# Patient Record
Sex: MALE | Race: WHITE | NOT HISPANIC OR LATINO
[De-identification: names, ages, dates, MRNs, and addresses within clinical notes are randomized per-mention and may not be internally consistent; named-entity substitution may affect disease eponyms.]

---

## 2017-01-20 ENCOUNTER — MEDICATION RENEWAL (OUTPATIENT)
Age: 62
End: 2017-01-20

## 2017-02-16 ENCOUNTER — APPOINTMENT (OUTPATIENT)
Dept: INTERNAL MEDICINE | Facility: CLINIC | Age: 62
End: 2017-02-16

## 2017-02-16 VITALS
WEIGHT: 150 LBS | BODY MASS INDEX: 24.99 KG/M2 | TEMPERATURE: 98.3 F | SYSTOLIC BLOOD PRESSURE: 130 MMHG | OXYGEN SATURATION: 95 % | HEIGHT: 65 IN | DIASTOLIC BLOOD PRESSURE: 90 MMHG | HEART RATE: 80 BPM | RESPIRATION RATE: 14 BRPM

## 2017-02-16 DIAGNOSIS — R22.0 LOCALIZED SWELLING, MASS AND LUMP, HEAD: ICD-10-CM

## 2017-02-27 ENCOUNTER — LABORATORY RESULT (OUTPATIENT)
Age: 62
End: 2017-02-27

## 2017-02-27 ENCOUNTER — APPOINTMENT (OUTPATIENT)
Dept: INTERNAL MEDICINE | Facility: CLINIC | Age: 62
End: 2017-02-27

## 2017-02-27 DIAGNOSIS — F11.10 OPIOID ABUSE, UNCOMPLICATED: ICD-10-CM

## 2017-03-02 LAB — SUBOXONE: NEGATIVE

## 2017-03-05 LAB
AMPHET UR-MCNC: NEGATIVE
BARBITURATES UR-MCNC: NEGATIVE
BENZODIAZ UR-MCNC: NORMAL
COCAINE METAB.OTHER UR-MCNC: NEGATIVE
CREATININE, URINE: 133 MG/DL
METHADONE UR-MCNC: NEGATIVE
METHAQUALONE UR-MCNC: NEGATIVE
OPIATES UR-MCNC: NORMAL
PCP UR-MCNC: NEGATIVE
PROPOXYPH UR QL: NEGATIVE
THC UR QL: NEGATIVE

## 2017-03-27 ENCOUNTER — APPOINTMENT (OUTPATIENT)
Dept: INTERNAL MEDICINE | Facility: CLINIC | Age: 62
End: 2017-03-27

## 2017-04-01 ENCOUNTER — APPOINTMENT (OUTPATIENT)
Dept: INTERNAL MEDICINE | Facility: CLINIC | Age: 62
End: 2017-04-01
Payer: MEDICAID

## 2017-04-01 ENCOUNTER — LABORATORY RESULT (OUTPATIENT)
Age: 62
End: 2017-04-01

## 2017-04-01 ENCOUNTER — NON-APPOINTMENT (OUTPATIENT)
Age: 62
End: 2017-04-01

## 2017-04-01 VITALS
RESPIRATION RATE: 14 BRPM | DIASTOLIC BLOOD PRESSURE: 60 MMHG | SYSTOLIC BLOOD PRESSURE: 100 MMHG | WEIGHT: 154 LBS | OXYGEN SATURATION: 94 % | TEMPERATURE: 98 F | BODY MASS INDEX: 25.66 KG/M2 | HEART RATE: 65 BPM | HEIGHT: 65 IN

## 2017-04-01 PROCEDURE — 93000 ELECTROCARDIOGRAM COMPLETE: CPT

## 2017-04-01 PROCEDURE — 99396 PREV VISIT EST AGE 40-64: CPT | Mod: 25

## 2017-04-01 PROCEDURE — 36415 COLL VENOUS BLD VENIPUNCTURE: CPT

## 2017-04-03 ENCOUNTER — MEDICATION RENEWAL (OUTPATIENT)
Age: 62
End: 2017-04-03

## 2017-04-03 LAB
25(OH)D3 SERPL-MCNC: 22.5 NG/ML
ALBUMIN SERPL ELPH-MCNC: 4 G/DL
ALP BLD-CCNC: 89 U/L
ALT SERPL-CCNC: 12 U/L
ANION GAP SERPL CALC-SCNC: 17 MMOL/L
AST SERPL-CCNC: 15 U/L
BASOPHILS # BLD AUTO: 0.05 K/UL
BASOPHILS NFR BLD AUTO: 0.7 %
BILIRUB SERPL-MCNC: 0.2 MG/DL
BUN SERPL-MCNC: 11 MG/DL
CALCIUM SERPL-MCNC: 9.5 MG/DL
CHLORIDE SERPL-SCNC: 105 MMOL/L
CHOLEST SERPL-MCNC: 184 MG/DL
CHOLEST/HDLC SERPL: 3.2 RATIO
CK SERPL-CCNC: 49 U/L
CO2 SERPL-SCNC: 21 MMOL/L
CREAT SERPL-MCNC: 0.83 MG/DL
EOSINOPHIL # BLD AUTO: 0.28 K/UL
EOSINOPHIL NFR BLD AUTO: 3.9 %
GLUCOSE SERPL-MCNC: 101 MG/DL
HBA1C MFR BLD HPLC: 5.8 %
HCT VFR BLD CALC: 45.5 %
HDLC SERPL-MCNC: 58 MG/DL
HGB BLD-MCNC: 14.7 G/DL
IMM GRANULOCYTES NFR BLD AUTO: 0.3 %
LDLC SERPL CALC-MCNC: 109 MG/DL
LYMPHOCYTES # BLD AUTO: 2.34 K/UL
LYMPHOCYTES NFR BLD AUTO: 32.2 %
MAN DIFF?: NORMAL
MCHC RBC-ENTMCNC: 30.2 PG
MCHC RBC-ENTMCNC: 32.3 GM/DL
MCV RBC AUTO: 93.4 FL
MONOCYTES # BLD AUTO: 0.68 K/UL
MONOCYTES NFR BLD AUTO: 9.4 %
NEUTROPHILS # BLD AUTO: 3.9 K/UL
NEUTROPHILS NFR BLD AUTO: 53.5 %
PLATELET # BLD AUTO: 299 K/UL
POTASSIUM SERPL-SCNC: 4.8 MMOL/L
PROT SERPL-MCNC: 6.5 G/DL
PSA SERPL-MCNC: 4.04 NG/ML
RBC # BLD: 4.87 M/UL
RBC # FLD: 14.1 %
SODIUM SERPL-SCNC: 143 MMOL/L
TRIGL SERPL-MCNC: 83 MG/DL
TSH SERPL-ACNC: 2.34 UIU/ML
WBC # FLD AUTO: 7.27 K/UL

## 2017-04-07 LAB
AMPHET UR-MCNC: NEGATIVE
BARBITURATES UR-MCNC: NEGATIVE
BENZODIAZ UR-MCNC: NORMAL
COCAINE METAB.OTHER UR-MCNC: NEGATIVE
CREATININE, URINE: >200 MG/DL
METHADONE UR-MCNC: NEGATIVE
METHAQUALONE UR-MCNC: NEGATIVE
OPIATES UR-MCNC: NEGATIVE
PCP UR-MCNC: NEGATIVE
PROPOXYPH UR QL: NEGATIVE
PSA SERPL-MCNC: 3.58 NG/ML
THC UR QL: NEGATIVE

## 2017-04-09 LAB — SUBOXONE: NORMAL

## 2017-04-11 ENCOUNTER — APPOINTMENT (OUTPATIENT)
Dept: GASTROENTEROLOGY | Facility: CLINIC | Age: 62
End: 2017-04-11

## 2017-04-11 VITALS
OXYGEN SATURATION: 97 % | HEART RATE: 69 BPM | WEIGHT: 155 LBS | DIASTOLIC BLOOD PRESSURE: 69 MMHG | HEIGHT: 65 IN | SYSTOLIC BLOOD PRESSURE: 108 MMHG | BODY MASS INDEX: 25.83 KG/M2

## 2017-04-11 RX ORDER — BUPRENORPHINE HYDROCHLORIDE, NALOXONE HYDROCHLORIDE 8; 2 MG/1; MG/1
8-2 FILM, SOLUBLE BUCCAL; SUBLINGUAL
Qty: 30 | Refills: 0 | Status: DISCONTINUED | COMMUNITY
Start: 2017-02-27 | End: 2017-04-11

## 2017-05-16 ENCOUNTER — APPOINTMENT (OUTPATIENT)
Dept: GASTROENTEROLOGY | Facility: CLINIC | Age: 62
End: 2017-05-16

## 2017-05-16 ENCOUNTER — MEDICATION RENEWAL (OUTPATIENT)
Age: 62
End: 2017-05-16

## 2017-05-16 VITALS
HEART RATE: 57 BPM | HEIGHT: 65 IN | WEIGHT: 156 LBS | SYSTOLIC BLOOD PRESSURE: 135 MMHG | DIASTOLIC BLOOD PRESSURE: 87 MMHG | BODY MASS INDEX: 25.99 KG/M2 | OXYGEN SATURATION: 99 %

## 2017-05-16 RX ORDER — LACTOBACIL 2/BIFIDO 1/S.THERMO 900B CELL
PACKET (EA) ORAL DAILY
Qty: 30 | Refills: 6 | Status: DISCONTINUED | COMMUNITY
Start: 2017-04-11 | End: 2017-05-16

## 2017-05-16 RX ORDER — LACTOBACIL 2/BIFIDO 1/S.THERMO 900B CELL
PACKET (EA) ORAL
Qty: 60 | Refills: 6 | Status: DISCONTINUED | COMMUNITY
Start: 2017-04-11 | End: 2017-05-16

## 2017-06-08 ENCOUNTER — APPOINTMENT (OUTPATIENT)
Dept: INTERNAL MEDICINE | Facility: CLINIC | Age: 62
End: 2017-06-08
Payer: MEDICAID

## 2017-06-08 DIAGNOSIS — J30.1 ALLERGIC RHINITIS DUE TO POLLEN: ICD-10-CM

## 2017-06-08 PROCEDURE — 99214 OFFICE O/P EST MOD 30 MIN: CPT

## 2017-06-12 ENCOUNTER — APPOINTMENT (OUTPATIENT)
Dept: INTERNAL MEDICINE | Facility: CLINIC | Age: 62
End: 2017-06-12

## 2017-06-12 ENCOUNTER — LABORATORY RESULT (OUTPATIENT)
Age: 62
End: 2017-06-12

## 2017-06-17 LAB — SUBOXONE: NORMAL

## 2017-06-18 LAB
AMPHET UR-MCNC: NEGATIVE
BARBITURATES UR-MCNC: NEGATIVE
BENZODIAZ UR-MCNC: NORMAL
COCAINE METAB.OTHER UR-MCNC: NEGATIVE
CREATININE, URINE: 45.8 MG/DL
METHADONE UR-MCNC: NEGATIVE
METHAQUALONE UR-MCNC: NEGATIVE
OPIATES UR-MCNC: NEGATIVE
PCP UR-MCNC: NEGATIVE
PROPOXYPH UR QL: NEGATIVE
THC UR QL: NEGATIVE

## 2017-07-13 ENCOUNTER — RX RENEWAL (OUTPATIENT)
Age: 62
End: 2017-07-13

## 2017-08-25 ENCOUNTER — APPOINTMENT (OUTPATIENT)
Dept: INTERNAL MEDICINE | Facility: CLINIC | Age: 62
End: 2017-08-25

## 2017-09-05 ENCOUNTER — RX RENEWAL (OUTPATIENT)
Age: 62
End: 2017-09-05

## 2017-09-08 ENCOUNTER — APPOINTMENT (OUTPATIENT)
Dept: INTERNAL MEDICINE | Facility: CLINIC | Age: 62
End: 2017-09-08
Payer: COMMERCIAL

## 2017-09-08 VITALS
HEIGHT: 65 IN | TEMPERATURE: 98.5 F | OXYGEN SATURATION: 98 % | DIASTOLIC BLOOD PRESSURE: 80 MMHG | RESPIRATION RATE: 14 BRPM | WEIGHT: 150 LBS | SYSTOLIC BLOOD PRESSURE: 128 MMHG | HEART RATE: 80 BPM | BODY MASS INDEX: 24.99 KG/M2

## 2017-09-08 DIAGNOSIS — R13.10 DYSPHAGIA, UNSPECIFIED: ICD-10-CM

## 2017-09-08 PROCEDURE — 99214 OFFICE O/P EST MOD 30 MIN: CPT

## 2017-10-04 ENCOUNTER — RX RENEWAL (OUTPATIENT)
Age: 62
End: 2017-10-04

## 2017-10-20 ENCOUNTER — MEDICATION RENEWAL (OUTPATIENT)
Age: 62
End: 2017-10-20

## 2017-10-31 ENCOUNTER — RX RENEWAL (OUTPATIENT)
Age: 62
End: 2017-10-31

## 2017-12-22 ENCOUNTER — RX RENEWAL (OUTPATIENT)
Age: 62
End: 2017-12-22

## 2017-12-24 ENCOUNTER — TRANSCRIPTION ENCOUNTER (OUTPATIENT)
Age: 62
End: 2017-12-24

## 2018-02-08 ENCOUNTER — APPOINTMENT (OUTPATIENT)
Dept: INTERNAL MEDICINE | Facility: CLINIC | Age: 63
End: 2018-02-08
Payer: COMMERCIAL

## 2018-02-08 VITALS
DIASTOLIC BLOOD PRESSURE: 80 MMHG | BODY MASS INDEX: 24.99 KG/M2 | RESPIRATION RATE: 14 BRPM | HEART RATE: 77 BPM | SYSTOLIC BLOOD PRESSURE: 120 MMHG | OXYGEN SATURATION: 98 % | HEIGHT: 65 IN | WEIGHT: 150 LBS | TEMPERATURE: 97.5 F

## 2018-02-08 DIAGNOSIS — S89.91XA UNSPECIFIED INJURY OF RIGHT LOWER LEG, INITIAL ENCOUNTER: ICD-10-CM

## 2018-02-08 PROCEDURE — 99214 OFFICE O/P EST MOD 30 MIN: CPT

## 2018-02-08 RX ORDER — BUPRENORPHINE HYDROCHLORIDE, NALOXONE HYDROCHLORIDE 8; 2 MG/1; MG/1
8-2 FILM, SOLUBLE BUCCAL; SUBLINGUAL
Qty: 15 | Refills: 0 | Status: DISCONTINUED | COMMUNITY
Start: 2017-06-12 | End: 2018-02-08

## 2018-02-08 RX ORDER — BUDESONIDE 3 MG/1
3 CAPSULE, COATED PELLETS ORAL DAILY
Qty: 60 | Refills: 2 | Status: DISCONTINUED | COMMUNITY
Start: 2017-04-11 | End: 2018-02-08

## 2018-02-08 RX ORDER — CEPHALEXIN 500 MG/1
500 CAPSULE ORAL
Qty: 15 | Refills: 0 | Status: DISCONTINUED | COMMUNITY
Start: 2017-12-24

## 2018-02-08 RX ORDER — FLUTICASONE PROPIONATE 50 UG/1
50 SPRAY, METERED NASAL DAILY
Qty: 1 | Refills: 3 | Status: DISCONTINUED | COMMUNITY
Start: 2017-06-08 | End: 2018-02-08

## 2018-04-29 ENCOUNTER — RX RENEWAL (OUTPATIENT)
Age: 63
End: 2018-04-29

## 2018-07-06 ENCOUNTER — APPOINTMENT (OUTPATIENT)
Dept: INTERNAL MEDICINE | Facility: CLINIC | Age: 63
End: 2018-07-06
Payer: COMMERCIAL

## 2018-07-06 VITALS
DIASTOLIC BLOOD PRESSURE: 80 MMHG | HEIGHT: 65 IN | SYSTOLIC BLOOD PRESSURE: 130 MMHG | BODY MASS INDEX: 27.32 KG/M2 | RESPIRATION RATE: 14 BRPM | TEMPERATURE: 98.8 F | HEART RATE: 71 BPM | WEIGHT: 164 LBS | OXYGEN SATURATION: 98 %

## 2018-07-06 PROCEDURE — 99214 OFFICE O/P EST MOD 30 MIN: CPT

## 2018-07-30 ENCOUNTER — MEDICATION RENEWAL (OUTPATIENT)
Age: 63
End: 2018-07-30

## 2018-08-22 ENCOUNTER — MEDICATION RENEWAL (OUTPATIENT)
Age: 63
End: 2018-08-22

## 2018-09-06 ENCOUNTER — APPOINTMENT (OUTPATIENT)
Dept: INTERNAL MEDICINE | Facility: CLINIC | Age: 63
End: 2018-09-06
Payer: COMMERCIAL

## 2018-09-06 VITALS
RESPIRATION RATE: 14 BRPM | HEIGHT: 65 IN | OXYGEN SATURATION: 98 % | WEIGHT: 159 LBS | BODY MASS INDEX: 26.49 KG/M2 | HEART RATE: 65 BPM | DIASTOLIC BLOOD PRESSURE: 60 MMHG | SYSTOLIC BLOOD PRESSURE: 120 MMHG | TEMPERATURE: 98 F

## 2018-09-06 PROCEDURE — 99214 OFFICE O/P EST MOD 30 MIN: CPT

## 2018-09-06 NOTE — PHYSICAL EXAM

## 2018-09-06 NOTE — PLAN
[FreeTextEntry1] : See Orthopedist\par Tylenol PRN\par Wrote a letter for patient recommending a reduced work schedule

## 2018-09-06 NOTE — HISTORY OF PRESENT ILLNESS
[FreeTextEntry8] : Pt c/o pain in right shin and right hip 1 week. 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

## 2018-09-27 ENCOUNTER — MEDICATION RENEWAL (OUTPATIENT)
Age: 63
End: 2018-09-27

## 2018-10-17 ENCOUNTER — MEDICATION RENEWAL (OUTPATIENT)
Age: 63
End: 2018-10-17

## 2018-10-31 ENCOUNTER — RX RENEWAL (OUTPATIENT)
Age: 63
End: 2018-10-31

## 2019-03-04 ENCOUNTER — RESULT CHARGE (OUTPATIENT)
Age: 64
End: 2019-03-04

## 2019-03-05 ENCOUNTER — RX RENEWAL (OUTPATIENT)
Age: 64
End: 2019-03-05

## 2019-03-05 ENCOUNTER — LABORATORY RESULT (OUTPATIENT)
Age: 64
End: 2019-03-05

## 2019-03-05 ENCOUNTER — APPOINTMENT (OUTPATIENT)
Dept: INTERNAL MEDICINE | Facility: CLINIC | Age: 64
End: 2019-03-05
Payer: COMMERCIAL

## 2019-03-05 ENCOUNTER — NON-APPOINTMENT (OUTPATIENT)
Age: 64
End: 2019-03-05

## 2019-03-05 VITALS
SYSTOLIC BLOOD PRESSURE: 110 MMHG | RESPIRATION RATE: 14 BRPM | HEART RATE: 70 BPM | OXYGEN SATURATION: 98 % | BODY MASS INDEX: 26.99 KG/M2 | WEIGHT: 162 LBS | DIASTOLIC BLOOD PRESSURE: 60 MMHG | HEIGHT: 65 IN | TEMPERATURE: 98.2 F

## 2019-03-05 VITALS
BODY MASS INDEX: 26.99 KG/M2 | RESPIRATION RATE: 14 BRPM | DIASTOLIC BLOOD PRESSURE: 60 MMHG | SYSTOLIC BLOOD PRESSURE: 110 MMHG | TEMPERATURE: 98.2 F | OXYGEN SATURATION: 98 % | HEART RATE: 70 BPM | WEIGHT: 162 LBS | HEIGHT: 65 IN

## 2019-03-05 PROCEDURE — 99396 PREV VISIT EST AGE 40-64: CPT | Mod: 25

## 2019-03-05 PROCEDURE — 36415 COLL VENOUS BLD VENIPUNCTURE: CPT

## 2019-03-05 PROCEDURE — 93000 ELECTROCARDIOGRAM COMPLETE: CPT

## 2019-03-05 NOTE — PHYSICAL EXAM
[No Acute Distress] : no acute distress [Well Nourished] : well nourished [Well Developed] : well developed [Well-Appearing] : well-appearing [Normal Voice/Communication] : normal voice/communication [Normal Sclera/Conjunctiva] : normal sclera/conjunctiva [PERRL] : pupils equal round and reactive to light [EOMI] : extraocular movements intact [Normal Outer Ear/Nose] : the outer ears and nose were normal in appearance [Normal Oropharynx] : the oropharynx was normal [Normal TMs] : both tympanic membranes were normal [No JVD] : no jugular venous distention [Supple] : supple [No Lymphadenopathy] : no lymphadenopathy [Thyroid Normal, No Nodules] : the thyroid was normal and there were no nodules present [No Respiratory Distress] : no respiratory distress  [Clear to Auscultation] : lungs were clear to auscultation bilaterally [No Accessory Muscle Use] : no accessory muscle use [Normal Percussion] : the chest was normal to percussion [Normal Rate] : normal rate  [Regular Rhythm] : with a regular rhythm [Normal S1, S2] : normal S1 and S2 [No Murmur] : no murmur heard [No Carotid Bruits] : no carotid bruits [No Abdominal Bruit] : a ~M bruit was not heard ~T in the abdomen [No Varicosities] : no varicosities [Pedal Pulses Present] : the pedal pulses are present [No Edema] : there was no peripheral edema [No Extremity Clubbing/Cyanosis] : no extremity clubbing/cyanosis [No Palpable Aorta] : no palpable aorta [Normal Appearance] : normal in appearance [Soft] : abdomen soft [Non Tender] : non-tender [Non-distended] : non-distended [No Masses] : no abdominal mass palpated [No HSM] : no HSM [Normal Bowel Sounds] : normal bowel sounds [No Hernias] : no hernias [Normal Sphincter Tone] : normal sphincter tone [No Mass] : no mass [Penis Abnormality] : normal circumcised penis [Testes Tenderness] : no tenderness of the testes [Testes Mass (___cm)] : there were no testicular masses [Prostate Tenderness] : the prostate was not tender [Prostate Enlarged] : was enlarged [Normal Supraclavicular Nodes] : no supraclavicular lymphadenopathy [Normal Axillary Nodes] : no axillary lymphadenopathy [Normal Posterior Cervical Nodes] : no posterior cervical lymphadenopathy [Normal Femoral Nodes] : no femoral lymphadenopathy [Normal Anterior Cervical Nodes] : no anterior cervical lymphadenopathy [Normal Inguinal Nodes] : no inguinal lymphadenopathy [No CVA Tenderness] : no CVA  tenderness [No Spinal Tenderness] : no spinal tenderness [No Joint Swelling] : no joint swelling [Grossly Normal Strength/Tone] : grossly normal strength/tone [No Rash] : no rash [Normal Gait] : normal gait [Coordination Grossly Intact] : coordination grossly intact [No Focal Deficits] : no focal deficits [Deep Tendon Reflexes (DTR)] : deep tendon reflexes were 2+ and symmetric [Speech Grossly Normal] : speech grossly normal [Normal Affect] : the affect was normal [Alert and Oriented x3] : oriented to person, place, and time [Normal Mood] : the mood was normal [Normal Insight/Judgement] : insight and judgment were intact [Prostate Nodule] : did not have a nodule [Prostate Tenderness] : was not tender

## 2019-03-06 DIAGNOSIS — R19.5 OTHER FECAL ABNORMALITIES: ICD-10-CM

## 2019-03-07 PROBLEM — R19.5 OCCULT BLOOD IN STOOLS: Status: ACTIVE | Noted: 2019-03-07

## 2019-03-07 LAB
25(OH)D3 SERPL-MCNC: 35.6 NG/ML
ALBUMIN SERPL ELPH-MCNC: 4.2 G/DL
ALP BLD-CCNC: 93 U/L
ALT SERPL-CCNC: 13 U/L
ANION GAP SERPL CALC-SCNC: 17 MMOL/L
APPEARANCE: ABNORMAL
AST SERPL-CCNC: 22 U/L
BASOPHILS # BLD AUTO: 0.09 K/UL
BASOPHILS NFR BLD AUTO: 0.9 %
BILIRUB SERPL-MCNC: 0.4 MG/DL
BILIRUBIN URINE: NEGATIVE
BLOOD URINE: ABNORMAL
BUN SERPL-MCNC: 13 MG/DL
CALCIUM SERPL-MCNC: 9.4 MG/DL
CHLORIDE SERPL-SCNC: 103 MMOL/L
CHOLEST SERPL-MCNC: 249 MG/DL
CHOLEST/HDLC SERPL: 2.8 RATIO
CK SERPL-CCNC: 69 U/L
CO2 SERPL-SCNC: 24 MMOL/L
COLOR: YELLOW
CREAT SERPL-MCNC: 0.92 MG/DL
EOSINOPHIL # BLD AUTO: 0.18 K/UL
EOSINOPHIL NFR BLD AUTO: 1.8 %
GLUCOSE QUALITATIVE U: NEGATIVE
GLUCOSE SERPL-MCNC: 80 MG/DL
HBA1C MFR BLD HPLC: 5.5 %
HCT VFR BLD CALC: 51.8 %
HDLC SERPL-MCNC: 90 MG/DL
HEMOCCULT STL QL IA: POSITIVE
HGB BLD-MCNC: 16.5 G/DL
IMM GRANULOCYTES NFR BLD AUTO: 0.5 %
KETONES URINE: NORMAL
LDLC SERPL CALC-MCNC: 143 MG/DL
LEUKOCYTE ESTERASE URINE: ABNORMAL
LYMPHOCYTES # BLD AUTO: 2.28 K/UL
LYMPHOCYTES NFR BLD AUTO: 22.6 %
MAN DIFF?: NORMAL
MCHC RBC-ENTMCNC: 29.9 PG
MCHC RBC-ENTMCNC: 31.9 GM/DL
MCV RBC AUTO: 94 FL
MONOCYTES # BLD AUTO: 1.17 K/UL
MONOCYTES NFR BLD AUTO: 11.6 %
NEUTROPHILS # BLD AUTO: 6.3 K/UL
NEUTROPHILS NFR BLD AUTO: 62.6 %
NITRITE URINE: NEGATIVE
PH URINE: 5.5
PLATELET # BLD AUTO: 296 K/UL
POTASSIUM SERPL-SCNC: 4.6 MMOL/L
PROT SERPL-MCNC: 7.3 G/DL
PROTEIN URINE: NORMAL
PSA SERPL-MCNC: 6.66 NG/ML
RBC # BLD: 5.51 M/UL
RBC # FLD: 15.1 %
SODIUM SERPL-SCNC: 144 MMOL/L
SPECIFIC GRAVITY URINE: 1.03
TRIGL SERPL-MCNC: 81 MG/DL
TSH SERPL-ACNC: 1 UIU/ML
UROBILINOGEN URINE: NORMAL
WBC # FLD AUTO: 10.07 K/UL

## 2019-03-14 LAB — PSA SERPL-MCNC: 5.2 NG/ML

## 2019-03-25 ENCOUNTER — APPOINTMENT (OUTPATIENT)
Age: 64
End: 2019-03-25
Payer: COMMERCIAL

## 2019-03-25 VITALS
HEART RATE: 83 BPM | SYSTOLIC BLOOD PRESSURE: 160 MMHG | DIASTOLIC BLOOD PRESSURE: 93 MMHG | WEIGHT: 160 LBS | RESPIRATION RATE: 14 BRPM | OXYGEN SATURATION: 95 % | BODY MASS INDEX: 26.66 KG/M2 | HEIGHT: 65 IN

## 2019-03-25 PROCEDURE — 51798 US URINE CAPACITY MEASURE: CPT

## 2019-03-25 PROCEDURE — 99204 OFFICE O/P NEW MOD 45 MIN: CPT | Mod: 25

## 2019-03-25 NOTE — ASSESSMENT
[FreeTextEntry1] : Benign Prostatic Hyperplasia:\par No bothersome lower urinary tract symptoms and minimal post void residual. \par \par Elevated PSA:\par Discussed with the patient that PSA is a substance produced by the prostate gland. Elevated PSA levels may indicate a noncancerous condition such as prostatitis, or an enlarged prostate but it may also indicate prostate cancer.\par Discussed PSA screening and latest recommendations/guidelines- USPTF and AUA. \par Explained that only way to rule out Prostate cancer in a patient with elevated PSA, increased PSA velocity or abnormal digital rectal exam is to do Prostate needle biopsy.\par Will repeat PSA and if still elevated will proceed with TRUS/PNB. Pt in agreement. \par Total and Free PSA. Will inform results. \par \par

## 2019-03-25 NOTE — HISTORY OF PRESENT ILLNESS
[FreeTextEntry1] : 62 yo male presents for Elevated PSA. \par Recently had PSA checked and was told is elevated. \par Denies any recent unintentional weight loss, night sweats and new bone or back pain.\par Family history of Prostate cancer- no. Father had a growth which was surgically removed- benign. \par Reports reasonable stream, urinates every few hours or so during the day. Nocturia of 1 x. \par Endorses occasional hesitancy and intermittency when bladder full. \par Denies urgency, incontinence, sense of incomplete emptying.\par Denies dysuria, hematuria, lower abdominal or flank pain, fever, chills or rigors.\par Rates erections as 5/5. Has off and on problem maintaining erections. Reports normal libido. Has not tried Cialis/Viagra. \par \par

## 2019-04-04 LAB
PSA FREE FLD-MCNC: 11 %
PSA FREE SERPL-MCNC: 0.48 NG/ML
PSA SERPL-MCNC: 4.38 NG/ML

## 2019-05-08 ENCOUNTER — APPOINTMENT (OUTPATIENT)
Dept: INTERNAL MEDICINE | Facility: CLINIC | Age: 64
End: 2019-05-08
Payer: COMMERCIAL

## 2019-05-08 ENCOUNTER — LABORATORY RESULT (OUTPATIENT)
Age: 64
End: 2019-05-08

## 2019-05-08 VITALS
TEMPERATURE: 97.7 F | DIASTOLIC BLOOD PRESSURE: 72 MMHG | HEIGHT: 65 IN | SYSTOLIC BLOOD PRESSURE: 118 MMHG | BODY MASS INDEX: 24.99 KG/M2 | OXYGEN SATURATION: 98 % | RESPIRATION RATE: 14 BRPM | HEART RATE: 82 BPM | WEIGHT: 150 LBS

## 2019-05-08 PROCEDURE — 36415 COLL VENOUS BLD VENIPUNCTURE: CPT

## 2019-05-08 PROCEDURE — 99214 OFFICE O/P EST MOD 30 MIN: CPT | Mod: 25

## 2019-05-08 NOTE — PHYSICAL EXAM
[Well Nourished] : well nourished [No Acute Distress] : no acute distress [Well Developed] : well developed [Well-Appearing] : well-appearing [EOMI] : extraocular movements intact [Normal Voice/Communication] : normal voice/communication [Normal Sclera/Conjunctiva] : normal sclera/conjunctiva [PERRL] : pupils equal round and reactive to light [Normal Outer Ear/Nose] : the outer ears and nose were normal in appearance [No JVD] : no jugular venous distention [Normal Oropharynx] : the oropharynx was normal [Supple] : supple [No Respiratory Distress] : no respiratory distress  [Thyroid Normal, No Nodules] : the thyroid was normal and there were no nodules present [No Lymphadenopathy] : no lymphadenopathy [Clear to Auscultation] : lungs were clear to auscultation bilaterally [No Accessory Muscle Use] : no accessory muscle use [Normal Rate] : normal rate  [Normal S1, S2] : normal S1 and S2 [No Murmur] : no murmur heard [Regular Rhythm] : with a regular rhythm [No Abdominal Bruit] : a ~M bruit was not heard ~T in the abdomen [No Carotid Bruits] : no carotid bruits [No Varicosities] : no varicosities [No Extremity Clubbing/Cyanosis] : no extremity clubbing/cyanosis [Pedal Pulses Present] : the pedal pulses are present [No Edema] : there was no peripheral edema [No Palpable Aorta] : no palpable aorta [Soft] : abdomen soft [Non-distended] : non-distended [No Masses] : no abdominal mass palpated [No HSM] : no HSM [Normal Bowel Sounds] : normal bowel sounds [Normal Posterior Cervical Nodes] : no posterior cervical lymphadenopathy [Normal Anterior Cervical Nodes] : no anterior cervical lymphadenopathy [No Spinal Tenderness] : no spinal tenderness [No Joint Swelling] : no joint swelling [No CVA Tenderness] : no CVA  tenderness [Normal Gait] : normal gait [Grossly Normal Strength/Tone] : grossly normal strength/tone [No Rash] : no rash [No Focal Deficits] : no focal deficits [Coordination Grossly Intact] : coordination grossly intact [Normal Affect] : the affect was normal [Normal Insight/Judgement] : insight and judgment were intact [Deep Tendon Reflexes (DTR)] : deep tendon reflexes were 2+ and symmetric [de-identified] : mild mid epigastric tenderness

## 2019-05-08 NOTE — REVIEW OF SYSTEMS
[Abdominal Pain] : abdominal pain [Diarrhea] : diarrhea [Nausea] : nausea [Heartburn] : heartburn [Negative] : Psychiatric

## 2019-05-08 NOTE — HISTORY OF PRESENT ILLNESS
[FreeTextEntry8] : has gastric pains 2 weeks \par had fever to 101.8\par had diarrhea mucous mixed with blood \par has nausea and vomiting

## 2019-05-09 LAB
ALBUMIN SERPL ELPH-MCNC: 2.8 G/DL
ALP BLD-CCNC: 104 U/L
ALT SERPL-CCNC: 8 U/L
ANION GAP SERPL CALC-SCNC: 18 MMOL/L
AST SERPL-CCNC: 9 U/L
BASOPHILS # BLD AUTO: 0 K/UL
BASOPHILS NFR BLD AUTO: 0 %
BILIRUB SERPL-MCNC: 0.7 MG/DL
BUN SERPL-MCNC: 9 MG/DL
CALCIUM SERPL-MCNC: 8.4 MG/DL
CHLORIDE SERPL-SCNC: 89 MMOL/L
CO2 SERPL-SCNC: 25 MMOL/L
CREAT SERPL-MCNC: 0.9 MG/DL
EOSINOPHIL # BLD AUTO: 0 K/UL
EOSINOPHIL NFR BLD AUTO: 0 %
GLUCOSE SERPL-MCNC: 132 MG/DL
HCT VFR BLD CALC: 47.9 %
HGB BLD-MCNC: 15 G/DL
LYMPHOCYTES # BLD AUTO: 2.41 K/UL
LYMPHOCYTES NFR BLD AUTO: 13.4 %
MAN DIFF?: NORMAL
MCHC RBC-ENTMCNC: 30.1 PG
MCHC RBC-ENTMCNC: 31.3 GM/DL
MCV RBC AUTO: 96 FL
MONOCYTES # BLD AUTO: 4.17 K/UL
MONOCYTES NFR BLD AUTO: 23.2 %
NEUTROPHILS # BLD AUTO: 10.75 K/UL
NEUTROPHILS NFR BLD AUTO: 51.8 %
PLATELET # BLD AUTO: 550 K/UL
POTASSIUM SERPL-SCNC: 3.7 MMOL/L
PROT SERPL-MCNC: 6.1 G/DL
RBC # BLD: 4.99 M/UL
RBC # FLD: 14.4 %
SODIUM SERPL-SCNC: 132 MMOL/L
WBC # FLD AUTO: 17.97 K/UL

## 2019-05-10 ENCOUNTER — INPATIENT (INPATIENT)
Facility: HOSPITAL | Age: 64
LOS: 2 days | Discharge: HOME | DRG: 386 | End: 2019-05-13
Attending: INTERNAL MEDICINE | Admitting: HOSPITALIST
Payer: COMMERCIAL

## 2019-05-10 VITALS
TEMPERATURE: 99 F | RESPIRATION RATE: 15 BRPM | DIASTOLIC BLOOD PRESSURE: 86 MMHG | OXYGEN SATURATION: 95 % | WEIGHT: 149.91 LBS | HEART RATE: 82 BPM | HEIGHT: 65 IN | SYSTOLIC BLOOD PRESSURE: 115 MMHG

## 2019-05-10 DIAGNOSIS — E87.8 OTHER DISORDERS OF ELECTROLYTE AND FLUID BALANCE, NOT ELSEWHERE CLASSIFIED: ICD-10-CM

## 2019-05-10 DIAGNOSIS — Z29.9 ENCOUNTER FOR PROPHYLACTIC MEASURES, UNSPECIFIED: ICD-10-CM

## 2019-05-10 DIAGNOSIS — K62.89 OTHER SPECIFIED DISEASES OF ANUS AND RECTUM: ICD-10-CM

## 2019-05-10 DIAGNOSIS — E78.5 HYPERLIPIDEMIA, UNSPECIFIED: ICD-10-CM

## 2019-05-10 DIAGNOSIS — F41.9 ANXIETY DISORDER, UNSPECIFIED: ICD-10-CM

## 2019-05-10 DIAGNOSIS — K51.00 ULCERATIVE (CHRONIC) PANCOLITIS WITHOUT COMPLICATIONS: ICD-10-CM

## 2019-05-10 DIAGNOSIS — K51.90 ULCERATIVE COLITIS, UNSPECIFIED, WITHOUT COMPLICATIONS: ICD-10-CM

## 2019-05-10 LAB
ALBUMIN SERPL ELPH-MCNC: 1.7 G/DL — LOW (ref 3.3–5)
ALP SERPL-CCNC: 91 U/L — SIGNIFICANT CHANGE UP (ref 40–120)
ALT FLD-CCNC: 7 U/L — LOW (ref 12–78)
AMYLASE P1 CFR SERPL: 10 U/L — LOW (ref 25–115)
ANION GAP SERPL CALC-SCNC: 13 MMOL/L — SIGNIFICANT CHANGE UP (ref 5–17)
APPEARANCE UR: CLEAR — SIGNIFICANT CHANGE UP
APTT BLD: 29.3 SEC — SIGNIFICANT CHANGE UP (ref 28.5–37)
AST SERPL-CCNC: 8 U/L — LOW (ref 15–37)
BASOPHILS # BLD AUTO: 0 K/UL — SIGNIFICANT CHANGE UP (ref 0–0.2)
BASOPHILS NFR BLD AUTO: 0 % — SIGNIFICANT CHANGE UP (ref 0–2)
BILIRUB SERPL-MCNC: 0.6 MG/DL — SIGNIFICANT CHANGE UP (ref 0.2–1.2)
BILIRUB UR-MCNC: ABNORMAL
BUN SERPL-MCNC: 9 MG/DL — SIGNIFICANT CHANGE UP (ref 7–23)
C DIFF TOX GENS STL QL NAA+PROBE: NORMAL
CALCIUM SERPL-MCNC: 8.1 MG/DL — LOW (ref 8.5–10.1)
CDIFF BY PCR: NOT DETECTED
CHLORIDE SERPL-SCNC: 92 MMOL/L — LOW (ref 96–108)
CO2 SERPL-SCNC: 28 MMOL/L — SIGNIFICANT CHANGE UP (ref 22–31)
COLOR SPEC: YELLOW — SIGNIFICANT CHANGE UP
CREAT SERPL-MCNC: 0.74 MG/DL — SIGNIFICANT CHANGE UP (ref 0.5–1.3)
DIFF PNL FLD: ABNORMAL
EOSINOPHIL # BLD AUTO: 0 K/UL — SIGNIFICANT CHANGE UP (ref 0–0.5)
EOSINOPHIL NFR BLD AUTO: 0 % — SIGNIFICANT CHANGE UP (ref 0–6)
GLUCOSE SERPL-MCNC: 132 MG/DL — HIGH (ref 70–99)
GLUCOSE UR QL: NEGATIVE — SIGNIFICANT CHANGE UP
HCT VFR BLD CALC: 40.7 % — SIGNIFICANT CHANGE UP (ref 39–50)
HGB BLD-MCNC: 14.2 G/DL — SIGNIFICANT CHANGE UP (ref 13–17)
INR BLD: 1.64 RATIO — HIGH (ref 0.88–1.16)
KETONES UR-MCNC: ABNORMAL
LACTATE SERPL-SCNC: 1.8 MMOL/L — SIGNIFICANT CHANGE UP (ref 0.7–2)
LEUKOCYTE ESTERASE UR-ACNC: NEGATIVE — SIGNIFICANT CHANGE UP
LIDOCAIN IGE QN: 22 U/L — LOW (ref 73–393)
LYMPHOCYTES # BLD AUTO: 0.4 K/UL — LOW (ref 1–3.3)
LYMPHOCYTES # BLD AUTO: 3 % — LOW (ref 13–44)
MCHC RBC-ENTMCNC: 30.7 PG — SIGNIFICANT CHANGE UP (ref 27–34)
MCHC RBC-ENTMCNC: 34.9 GM/DL — SIGNIFICANT CHANGE UP (ref 32–36)
MCV RBC AUTO: 88.1 FL — SIGNIFICANT CHANGE UP (ref 80–100)
MONOCYTES # BLD AUTO: 2.95 K/UL — HIGH (ref 0–0.9)
MONOCYTES NFR BLD AUTO: 22 % — HIGH (ref 2–14)
NEUTROPHILS # BLD AUTO: 8.97 K/UL — HIGH (ref 1.8–7.4)
NEUTROPHILS NFR BLD AUTO: 51 % — SIGNIFICANT CHANGE UP (ref 43–77)
NITRITE UR-MCNC: NEGATIVE — SIGNIFICANT CHANGE UP
NRBC # BLD: SIGNIFICANT CHANGE UP /100 WBCS (ref 0–0)
PH UR: 5 — SIGNIFICANT CHANGE UP (ref 5–8)
PLATELET # BLD AUTO: 505 K/UL — HIGH (ref 150–400)
POTASSIUM SERPL-MCNC: 3 MMOL/L — LOW (ref 3.5–5.3)
POTASSIUM SERPL-SCNC: 3 MMOL/L — LOW (ref 3.5–5.3)
PROT SERPL-MCNC: 6.2 G/DL — SIGNIFICANT CHANGE UP (ref 6–8.3)
PROT UR-MCNC: 25 MG/DL
PROTHROM AB SERPL-ACNC: 19 SEC — HIGH (ref 10–12.9)
RBC # BLD: 4.62 M/UL — SIGNIFICANT CHANGE UP (ref 4.2–5.8)
RBC # FLD: 13.6 % — SIGNIFICANT CHANGE UP (ref 10.3–14.5)
SODIUM SERPL-SCNC: 133 MMOL/L — LOW (ref 135–145)
SP GR SPEC: 1.01 — SIGNIFICANT CHANGE UP (ref 1.01–1.02)
UROBILINOGEN FLD QL: NEGATIVE — SIGNIFICANT CHANGE UP
WBC # BLD: 13.39 K/UL — HIGH (ref 3.8–10.5)
WBC # FLD AUTO: 13.39 K/UL — HIGH (ref 3.8–10.5)

## 2019-05-10 PROCEDURE — 93010 ELECTROCARDIOGRAM REPORT: CPT

## 2019-05-10 PROCEDURE — 74022 RADEX COMPL AQT ABD SERIES: CPT | Mod: 26

## 2019-05-10 PROCEDURE — 99285 EMERGENCY DEPT VISIT HI MDM: CPT

## 2019-05-10 PROCEDURE — 74019 RADEX ABDOMEN 2 VIEWS: CPT | Mod: 26

## 2019-05-10 PROCEDURE — 99223 1ST HOSP IP/OBS HIGH 75: CPT | Mod: AI,GC

## 2019-05-10 PROCEDURE — 71045 X-RAY EXAM CHEST 1 VIEW: CPT | Mod: 26

## 2019-05-10 PROCEDURE — 74177 CT ABD & PELVIS W/CONTRAST: CPT | Mod: 26

## 2019-05-10 RX ORDER — CIPROFLOXACIN LACTATE 400MG/40ML
400 VIAL (ML) INTRAVENOUS ONCE
Refills: 0 | Status: COMPLETED | OUTPATIENT
Start: 2019-05-10 | End: 2019-05-10

## 2019-05-10 RX ORDER — ACETAMINOPHEN 500 MG
650 TABLET ORAL EVERY 6 HOURS
Refills: 0 | Status: DISCONTINUED | OUTPATIENT
Start: 2019-05-10 | End: 2019-05-13

## 2019-05-10 RX ORDER — SODIUM CHLORIDE 9 MG/ML
1000 INJECTION INTRAMUSCULAR; INTRAVENOUS; SUBCUTANEOUS ONCE
Refills: 0 | Status: COMPLETED | OUTPATIENT
Start: 2019-05-10 | End: 2019-05-10

## 2019-05-10 RX ORDER — METRONIDAZOLE 500 MG
500 TABLET ORAL EVERY 8 HOURS
Refills: 0 | Status: DISCONTINUED | OUTPATIENT
Start: 2019-05-10 | End: 2019-05-13

## 2019-05-10 RX ORDER — SODIUM CHLORIDE 9 MG/ML
1000 INJECTION INTRAMUSCULAR; INTRAVENOUS; SUBCUTANEOUS
Refills: 0 | Status: DISCONTINUED | OUTPATIENT
Start: 2019-05-10 | End: 2019-05-13

## 2019-05-10 RX ORDER — POTASSIUM CHLORIDE 20 MEQ
10 PACKET (EA) ORAL
Refills: 0 | Status: COMPLETED | OUTPATIENT
Start: 2019-05-10 | End: 2019-05-10

## 2019-05-10 RX ORDER — MORPHINE SULFATE 50 MG/1
2 CAPSULE, EXTENDED RELEASE ORAL EVERY 6 HOURS
Refills: 0 | Status: DISCONTINUED | OUTPATIENT
Start: 2019-05-10 | End: 2019-05-13

## 2019-05-10 RX ORDER — DIAZEPAM 5 MG
5 TABLET ORAL AT BEDTIME
Refills: 0 | Status: DISCONTINUED | OUTPATIENT
Start: 2019-05-10 | End: 2019-05-13

## 2019-05-10 RX ORDER — CIPROFLOXACIN LACTATE 400MG/40ML
400 VIAL (ML) INTRAVENOUS EVERY 12 HOURS
Refills: 0 | Status: DISCONTINUED | OUTPATIENT
Start: 2019-05-10 | End: 2019-05-13

## 2019-05-10 RX ORDER — SODIUM CHLORIDE 9 MG/ML
3 INJECTION INTRAMUSCULAR; INTRAVENOUS; SUBCUTANEOUS ONCE
Refills: 0 | Status: COMPLETED | OUTPATIENT
Start: 2019-05-10 | End: 2019-05-10

## 2019-05-10 RX ORDER — IOHEXOL 300 MG/ML
30 INJECTION, SOLUTION INTRAVENOUS ONCE
Refills: 0 | Status: COMPLETED | OUTPATIENT
Start: 2019-05-10 | End: 2019-05-10

## 2019-05-10 RX ORDER — MORPHINE SULFATE 50 MG/1
4 CAPSULE, EXTENDED RELEASE ORAL ONCE
Refills: 0 | Status: DISCONTINUED | OUTPATIENT
Start: 2019-05-10 | End: 2019-05-10

## 2019-05-10 RX ORDER — ENOXAPARIN SODIUM 100 MG/ML
40 INJECTION SUBCUTANEOUS DAILY
Refills: 0 | Status: DISCONTINUED | OUTPATIENT
Start: 2019-05-10 | End: 2019-05-10

## 2019-05-10 RX ORDER — METRONIDAZOLE 500 MG
500 TABLET ORAL ONCE
Refills: 0 | Status: COMPLETED | OUTPATIENT
Start: 2019-05-10 | End: 2019-05-10

## 2019-05-10 RX ORDER — TRAZODONE HCL 50 MG
50 TABLET ORAL AT BEDTIME
Refills: 0 | Status: DISCONTINUED | OUTPATIENT
Start: 2019-05-10 | End: 2019-05-13

## 2019-05-10 RX ORDER — MESALAMINE 400 MG
1200 TABLET, DELAYED RELEASE (ENTERIC COATED) ORAL
Refills: 0 | Status: DISCONTINUED | OUTPATIENT
Start: 2019-05-10 | End: 2019-05-13

## 2019-05-10 RX ORDER — ONDANSETRON 8 MG/1
4 TABLET, FILM COATED ORAL ONCE
Refills: 0 | Status: COMPLETED | OUTPATIENT
Start: 2019-05-10 | End: 2019-05-10

## 2019-05-10 RX ORDER — TRAZODONE HCL 50 MG
1 TABLET ORAL
Qty: 0 | Refills: 0 | DISCHARGE

## 2019-05-10 RX ORDER — MORPHINE SULFATE 50 MG/1
1 CAPSULE, EXTENDED RELEASE ORAL EVERY 6 HOURS
Refills: 0 | Status: DISCONTINUED | OUTPATIENT
Start: 2019-05-10 | End: 2019-05-13

## 2019-05-10 RX ORDER — ATORVASTATIN CALCIUM 80 MG/1
20 TABLET, FILM COATED ORAL AT BEDTIME
Refills: 0 | Status: DISCONTINUED | OUTPATIENT
Start: 2019-05-10 | End: 2019-05-13

## 2019-05-10 RX ORDER — MESALAMINE 400 MG
1000 TABLET, DELAYED RELEASE (ENTERIC COATED) ORAL AT BEDTIME
Refills: 0 | Status: DISCONTINUED | OUTPATIENT
Start: 2019-05-10 | End: 2019-05-13

## 2019-05-10 RX ORDER — DIAZEPAM 5 MG
1 TABLET ORAL
Qty: 0 | Refills: 0 | DISCHARGE

## 2019-05-10 RX ORDER — LACTOBACILLUS ACIDOPHILUS 100MM CELL
1 CAPSULE ORAL
Refills: 0 | Status: DISCONTINUED | OUTPATIENT
Start: 2019-05-10 | End: 2019-05-13

## 2019-05-10 RX ORDER — ONDANSETRON 8 MG/1
4 TABLET, FILM COATED ORAL EVERY 6 HOURS
Refills: 0 | Status: DISCONTINUED | OUTPATIENT
Start: 2019-05-10 | End: 2019-05-13

## 2019-05-10 RX ORDER — PANTOPRAZOLE SODIUM 20 MG/1
40 TABLET, DELAYED RELEASE ORAL ONCE
Refills: 0 | Status: COMPLETED | OUTPATIENT
Start: 2019-05-10 | End: 2019-05-10

## 2019-05-10 RX ADMIN — ONDANSETRON 4 MILLIGRAM(S): 8 TABLET, FILM COATED ORAL at 11:43

## 2019-05-10 RX ADMIN — Medication 200 MILLIGRAM(S): at 11:43

## 2019-05-10 RX ADMIN — SODIUM CHLORIDE 75 MILLILITER(S): 9 INJECTION INTRAMUSCULAR; INTRAVENOUS; SUBCUTANEOUS at 18:05

## 2019-05-10 RX ADMIN — Medication 100 MILLIEQUIVALENT(S): at 17:07

## 2019-05-10 RX ADMIN — ATORVASTATIN CALCIUM 20 MILLIGRAM(S): 80 TABLET, FILM COATED ORAL at 22:16

## 2019-05-10 RX ADMIN — Medication 100 MILLIGRAM(S): at 13:28

## 2019-05-10 RX ADMIN — Medication 100 MILLIEQUIVALENT(S): at 18:02

## 2019-05-10 RX ADMIN — Medication 400 MILLIGRAM(S): at 20:02

## 2019-05-10 RX ADMIN — SODIUM CHLORIDE 1000 MILLILITER(S): 9 INJECTION INTRAMUSCULAR; INTRAVENOUS; SUBCUTANEOUS at 15:00

## 2019-05-10 RX ADMIN — SODIUM CHLORIDE 1000 MILLILITER(S): 9 INJECTION INTRAMUSCULAR; INTRAVENOUS; SUBCUTANEOUS at 11:43

## 2019-05-10 RX ADMIN — Medication 50 MILLIGRAM(S): at 22:15

## 2019-05-10 RX ADMIN — SODIUM CHLORIDE 3 MILLILITER(S): 9 INJECTION INTRAMUSCULAR; INTRAVENOUS; SUBCUTANEOUS at 11:38

## 2019-05-10 RX ADMIN — IOHEXOL 30 MILLILITER(S): 300 INJECTION, SOLUTION INTRAVENOUS at 11:43

## 2019-05-10 RX ADMIN — MORPHINE SULFATE 4 MILLIGRAM(S): 50 CAPSULE, EXTENDED RELEASE ORAL at 11:58

## 2019-05-10 RX ADMIN — Medication 40 MILLIGRAM(S): at 22:15

## 2019-05-10 RX ADMIN — Medication 100 MILLIEQUIVALENT(S): at 15:37

## 2019-05-10 RX ADMIN — Medication 100 MILLIGRAM(S): at 22:15

## 2019-05-10 RX ADMIN — MORPHINE SULFATE 4 MILLIGRAM(S): 50 CAPSULE, EXTENDED RELEASE ORAL at 11:43

## 2019-05-10 RX ADMIN — PANTOPRAZOLE SODIUM 40 MILLIGRAM(S): 20 TABLET, DELAYED RELEASE ORAL at 11:43

## 2019-05-10 RX ADMIN — Medication 1 TABLET(S): at 20:07

## 2019-05-10 RX ADMIN — Medication 500 MILLIGRAM(S): at 20:02

## 2019-05-10 NOTE — H&P ADULT - PROBLEM SELECTOR PLAN 2
pt w/ abd pain, n/v with ct scan findings consistent with pancolitis/proctitis likely 2/2 UC flare  plan as per above

## 2019-05-10 NOTE — H&P ADULT - ASSESSMENT
64 YO M PMHx ulcerative colitis presents to ED for nausea/vomiting, abdominal pain admitted for pancolitis/proctitis. 62 YO M PMHx ulcerative colitis presents to ED for nausea/vomiting, abdominal pain admitted for pancolitis/proctitis likely UC flare.

## 2019-05-10 NOTE — CONSULT NOTE ADULT - SUBJECTIVE AND OBJECTIVE BOX
Chief Complaint:  Patient is a 63y old  Male who presents with a chief complaint of abdominal pain 62 YO M PMHx ulcerative colitis, HLD, anxiety presents to ED for nausea/vomiting, abdominal pain. 2 weeks ago, patient began have diffuse, severe abdominal cramps along with bloody diarrhea. Patient states his wife also had crampy abdominal pain around this time but did not have diarrhea or other symptoms. He is unsure if viral illness. Patient states he has chronic pain related to his ~50 year history of UC. States that he has no major flares in 10-12 years. Has been on azathioprine in past but not currently on any meds for his UC. States pain is crampy in nature and as bad as 9/10 in severity, however currently 3.5/10. Due to pain, patient has had significantly decreased PO intake of food. He endorses multiple episodes of vomiting that have become progressively more bilious without any hematemesis. He reports okay PO intake of water. He has intermittent fever and chills, fever as high as 101.8F on Saturday. No recent antibiotic use, sick contacts or recent travel. Last colonoscopy was 3-4 years ago according to patient without abnormal findings. He denies chest pain, SOB, fatigue, weakness, headache, dizziness.   Of note, saw PCP Dr. Plata yesterday where WBC was 18K; told to go to ED but went home hoping symptoms would improve.   In the ED, vitals T 99F, HR 82, /86, RR 15, 95% on RA. Labs significant for WBC 13.39, Plts 505, 16% bands, PT 19, INR 1.64 Na 133, K 3.0. 16% bands. lactate neg. CT abd w/ po and iv cont:  Severe acute proctitis and pancolitis CXR; no acute pathology Xray abd: consistent with colitis  EKG: NSR, QTc 486  Received Cipro, flagyl, K riderx1, 2L bolus, Zofran, protonix, morphine         Review of Systems:  Review of Systems: CONSTITUTIONAL: Endorses fever, chills, Denies fatigue, weakness  HEENT: denies blurred vision, sore throat  SKIN: denies new lesions, rash  CARDIOVASCULAR: denies chest pain, chest pressure, palpitations  RESPIRATORY: denies shortness of breath, sputum production  GASTROINTESTINAL: Endorses nausea, vomiting, diarrhea, abdominal pain  GENITOURINARY: denies dysuria, discharge  NEUROLOGICAL: denies numbness, headache, focal weakness  MUSCULOSKELETAL: denies new joint pain, muscle aches  HEMATOLOGIC: Endorses bloody diarrhea PSYCHIATRIC: denies recent changes in anxiety, depression	  Other Review of Systems: All other review of systems negative, except as noted in HPI	    was on azathioprine stopped by dr bowden he is on probiotics he is doing better in the past and now he is doing poorly 30 bm a day   brbpr no melena no sob or chest pain    Allergies:  penicillin (Swelling)      Medications:  acetaminophen   Tablet .. 650 milliGRAM(s) Oral every 6 hours PRN  atorvastatin 20 milliGRAM(s) Oral at bedtime  ciprofloxacin   IVPB 400 milliGRAM(s) IV Intermittent every 12 hours  diazepam    Tablet 5 milliGRAM(s) Oral at bedtime PRN  lactobacillus acidophilus 1 Tablet(s) Oral two times a day  mesalamine DR Capsule 1200 milliGRAM(s) Oral four times a day  mesalamine Suppository 1000 milliGRAM(s) Rectal at bedtime  methylPREDNISolone sodium succinate Injectable 40 milliGRAM(s) IV Push every 8 hours  metroNIDAZOLE  IVPB 500 milliGRAM(s) IV Intermittent every 8 hours  morphine  - Injectable 2 milliGRAM(s) IV Push every 6 hours PRN  morphine  - Injectable 1 milliGRAM(s) IV Push every 6 hours PRN  ondansetron Injectable 4 milliGRAM(s) IV Push every 6 hours PRN  sodium chloride 0.9%. 1000 milliLiter(s) IV Continuous <Continuous>  traZODone 50 milliGRAM(s) Oral at bedtime      PMHX/PSHX:  Anxiety  HLD (hyperlipidemia)  Ulcerative colitis  No significant past surgical history      Family history:  no uc no cd no celiac disease    Social History: no etoh no cigs maarried with children    ROS:     General:  No wt loss, fevers, chills, night sweats, fatigue,   Eyes:  Good vision, no reported pain  ENT:  No sore throat, pain, runny nose, dysphagia  CV:  No pain, palpitations, hypo/hypertension  Resp:  No dyspnea, cough, tachypnea, wheezing  GI:  No pain, No nausea, No vomiting, No diarrhea, No constipation, No weight loss, No fever, No pruritis, No rectal bleeding, No tarry stools, No dysphagia,  :  No pain, bleeding, incontinence, nocturia  Muscle:  No pain, weakness  Neuro:  No weakness, tingling, memory problems  Psych:  No fatigue, insomnia, mood problems, depression  Endocrine:  No polyuria, polydipsia, cold/heat intolerance  Heme:  No petechiae, ecchymosis, easy bruisability  Skin:  No rash, tattoos, scars, edema      PHYSICAL EXAM:   Vital Signs:  Vital Signs Last 24 Hrs  T(C): 36.9 (10 May 2019 20:00), Max: 37.2 (10 May 2019 10:37)  T(F): 98.4 (10 May 2019 20:00), Max: 99 (10 May 2019 10:37)  HR: 94 (10 May 2019 20:00) (82 - 94)  BP: 143/90 (10 May 2019 20:00) (115/86 - 143/90)  BP(mean): 108 (10 May 2019 20:00) (108 - 108)  RR: 16 (10 May 2019 20:00) (15 - 17)  SpO2: 98% (10 May 2019 20:00) (95% - 98%)  Daily Height in cm: 165.1 (10 May 2019 10:37)    Daily     GENERAL:  Appears stated age, well-groomed, well-nourished, no distress  HEENT:  NC/AT,  conjunctivae clear and pink, no thyromegaly, nodules, adenopathy, no JVD, sclera -anicteric  CHEST:  Full & symmetric excursion, no increased effort, breath sounds clear  HEART:  Regular rhythm, S1, S2, no murmur/rub/S3/S4, no abdominal bruit, no edema  ABDOMEN:  Soft, non-tender, non-distended, normoactive bowel sounds,  no masses ,no hepato-splenomegaly, no signs of chronic liver disease  EXTEREMITIES:  no cyanosis,clubbing or edema  SKIN:  No rash/erythema/ecchymoses/petechiae/wounds/abscess/warm/dry  NEURO:  Alert, oriented, no asterixis, no tremor, no encephalopathy    LABS:                        14.2   13.39 )-----------( 505      ( 10 May 2019 11:54 )             40.7     05-10    133<L>  |  92<L>  |  9   ----------------------------<  132<H>  3.0<L>   |  28  |  0.74    Ca    8.1<L>      10 May 2019 11:54    TPro  6.2  /  Alb  1.7<L>  /  TBili  0.6  /  DBili  x   /  AST  8<L>  /  ALT  7<L>  /  AlkPhos  91  05-10    LIVER FUNCTIONS - ( 10 May 2019 11:54 )  Alb: 1.7 g/dL / Pro: 6.2 g/dL / ALK PHOS: 91 U/L / ALT: 7 U/L / AST: 8 U/L / GGT: x           PT/INR - ( 10 May 2019 11:54 )   PT: 19.0 sec;   INR: 1.64 ratio         PTT - ( 10 May 2019 11:54 )  PTT:29.3 sec  Urinalysis Basic - ( 10 May 2019 16:27 )    Color: Yellow / Appearance: Clear / S.010 / pH: x  Gluc: x / Ketone: Small  / Bili: Small / Urobili: Negative   Blood: x / Protein: 25 mg/dL / Nitrite: Negative   Leuk Esterase: Negative / RBC: 3-5 /HPF / WBC 0-2   Sq Epi: x / Non Sq Epi: Moderate / Bacteria: Few      Amylase Serum10      Lipase serum22       Ammonia--      Imaging:

## 2019-05-10 NOTE — ED PROVIDER NOTE - ATTENDING CONTRIBUTION TO CARE
I have personally performed a face to face diagnostic evaluation on this patient.  I have reviewed the PA note and agree with the history, exam, and plan of care, except as noted.  History and Exam by me shows patient c/o abdominal pain, nausea, vomiting, loose stool, blood tinged, vomiting bile for 10 days, h/o UC, urged by PMD to go to ER 2 days ago, patient though he wound improve, patient alert and oriented, heart and lungs clear, abdomen soft, epigastric tenderess, no pedal edema, f/u pain, control, abx, iv fluids, protonix, ct abdomen/pelvis, labs, admit.

## 2019-05-10 NOTE — ED ADULT NURSE NOTE - OBJECTIVE STATEMENT
pt with complaints of ulcerative colitis flare up for the past 10 days. pt with vomiting and bloody diarrhea for the past 10 days. pt states the bloody diarrhea is his normal.

## 2019-05-10 NOTE — H&P ADULT - PROBLEM SELECTOR PLAN 1
pt w/ abd pain, n/v with ct scan findings consistent with pancolitis/proctitis likely 2/2 UC flare  with bandemia, will c/w cipro and flagyl; f/u all culture data  start maintenance fluids @75cc/hr for 12hrs while NPO  keep NPO, will transition diet as tolerated  start bacid  zofran PRN for nausea with caution given prolonged QTc  pain control with morphine  GI consult: Fercho pt w/ abd pain, n/v with ct scan findings consistent with pancolitis/proctitis likely 2/2 UC flare  with bandemia, will c/w cipro and flagyl; f/u all culture data  start maintenance fluids @75cc/hr for 12hrs while NPO  clear liquid diet, will transition diet as tolerated  start bacid  zofran PRN for nausea with caution given prolonged QTc  pain control with morphine  GI consult: Fercho pt w/ abd pain, n/v with ct scan findings consistent with pancolitis/proctitis likely 2/2 UC flare  with bandemia, will c/w cipro and flagyl; f/u all culture data  start maintenance fluids @75cc/hr for 12hrs while NPO  clear liquid diet, will transition diet as tolerated  start bacid  zofran PRN for nausea with caution given prolonged QTc  pain control with morphine  IV solumedrol 40Q8H  mesalamine suppository 1g qhs  GI consult: Kane Lama

## 2019-05-10 NOTE — ED PROVIDER NOTE - PROGRESS NOTE DETAILS
pt is currently comfortable in ED, in no distress, is feeling better with tx, all results were reviewed and discussed with pt.  pt understands the need for admission.  case DW hospitalist, Dr. Brito who accepts admission.  call was placed to Dr. Lama, pt's GI doctor, awaiting callback.

## 2019-05-10 NOTE — H&P ADULT - ATTENDING COMMENTS
Patient seen and examined at bedside. Plan d/w Patient. Dr. Kane Lama called, left message with the service, also Texted, ER also paged. Plan as above d/w PGY1. Dr. Plata was informed of the admission

## 2019-05-10 NOTE — H&P ADULT - NSHPREVIEWOFSYSTEMS_GEN_ALL_CORE
CONSTITUTIONAL: denies fever, chills, fatigue, weakness  HEENT: denies blurred vision, sore throat  SKIN: denies new lesions, rash  CARDIOVASCULAR: denies chest pain, chest pressure, palpitations  RESPIRATORY: denies shortness of breath, sputum production  GASTROINTESTINAL: denies nausea, vomiting, diarrhea, abdominal pain  GENITOURINARY: denies dysuria, discharge  NEUROLOGICAL: denies numbness, headache, focal weakness  MUSCULOSKELETAL: denies new joint pain, muscle aches  HEMATOLOGIC: denies gross bleeding, bruising  LYMPHATICS: denies enlarged lymph nodes, extremity swelling  PSYCHIATRIC: denies recent changes in anxiety, depression  ENDOCRINOLOGIC: denies sweating, cold or heat intolerance CONSTITUTIONAL: Endorses fever, chills, Denies fatigue, weakness  HEENT: denies blurred vision, sore throat  SKIN: denies new lesions, rash  CARDIOVASCULAR: denies chest pain, chest pressure, palpitations  RESPIRATORY: denies shortness of breath, sputum production  GASTROINTESTINAL: Endorses nausea, vomiting, diarrhea, abdominal pain  GENITOURINARY: denies dysuria, discharge  NEUROLOGICAL: denies numbness, headache, focal weakness  MUSCULOSKELETAL: denies new joint pain, muscle aches  HEMATOLOGIC: Endorses bloody diarrhea  PSYCHIATRIC: denies recent changes in anxiety, depression

## 2019-05-10 NOTE — ED PROVIDER NOTE - GASTROINTESTINAL, MLM
hyperactive bowel sound, abdomen soft, tender to palpation diffusely, no rebound tenderness or guarding noted, no CVA tenderness noted.

## 2019-05-10 NOTE — H&P ADULT - HISTORY OF PRESENT ILLNESS
64 YO M PMHx ulcerative colitis, HLD, anxiety presents to ED for nausea/vomiting, abdominal pain. 2 weeks ago, patient began have diffuse, severe abdominal cramps along with bloody diarrhea. Patient states his wife also had crampy abdominal pain around this time but did not have diarrhea or other symptoms. He is unsure if viral illness. Patient states he has chronic pain related to his ~50 year history of UC. States that he has no major flares in 10-12 years. Has been on azathioprine in past but not currently on any meds for his UC. States pain is crampy in nature and as bad as 9/10 in severity, however currently 3.5/10. Due to pain, patient has had significantly decreased PO intake of food. He endorses multiple episodes of vomiting that have become progressively more bilious without any hematemesis. He reports okay PO intake of water. He has intermittent fever and chills, fever as high as 101.8F on Saturday. No recent antibiotic use, sick contacts or recent travel. Last colonoscopy was 3-4 years ago according to patient without abnormal findings. He denies chest pain, SOB, fatigue, weakness, headache, dizziness.   Of note, saw PCP Dr. Plata yesterday where WBC was 18K; told to go to ED but went home hoping symptoms would improve.   In the ED, vitals T 99F, HR 82, /86, RR 15, 95% on RA. Labs significant for WBC 13.39, Plts 505, 16% bands, PT 19, INR 1.64 Na 133, K 3.0. 16% bands. lactate neg. CT abd w/ po and iv cont:  Severe acute proctitis and pancolitis CXR; no acute pathology Xray abd: consistent with colitis  EKG: NSR, QTc 486  Received Cipro,flagyl, K riderx1, 2L bolus, Zofran, protonix, morphine 62 YO M PMHx ulcerative colitis, HLD, anxiety presents to ED for nausea/vomiting, abdominal pain. 2 weeks ago, patient began have diffuse, severe abdominal cramps along with bloody diarrhea. Patient states his wife also had crampy abdominal pain around this time but did not have diarrhea or other symptoms. He is unsure if viral illness. Patient states he has chronic pain related to his ~50 year history of UC. States that he has no major flares in 10-12 years. Has been on azathioprine in past but not currently on any meds for his UC. States pain is crampy in nature and as bad as 9/10 in severity, however currently 3.5/10. Due to pain, patient has had significantly decreased PO intake of food. He endorses multiple episodes of vomiting that have become progressively more bilious without any hematemesis. He reports okay PO intake of water. He has intermittent fever and chills, fever as high as 101.8F on Saturday. No recent antibiotic use, sick contacts or recent travel. Last colonoscopy was 3-4 years ago according to patient without abnormal findings. He denies chest pain, SOB, fatigue, weakness, headache, dizziness.   Of note, saw PCP Dr. Plata yesterday where WBC was 18K; told to go to ED but went home hoping symptoms would improve.   In the ED, vitals T 99F, HR 82, /86, RR 15, 95% on RA. Labs significant for WBC 13.39, Plts 505, 16% bands, PT 19, INR 1.64 Na 133, K 3.0. 16% bands. lactate neg. CT abd w/ po and iv cont:  Severe acute proctitis and pancolitis CXR; no acute pathology Xray abd: consistent with colitis  EKG: NSR, QTc 486  Received Cipro, flagyl, K riderx1, 2L bolus, Zofran, protonix, morphine

## 2019-05-10 NOTE — H&P ADULT - PROBLEM SELECTOR PLAN 6
hypovolemic hyponatremia and hypokaliemia in the setting of diarrhea  supplement PRN to keep K > 4, Mg >2, Phos >3 hypovolemic hyponatremia and hypokalemia in the setting of diarrhea  supplement PRN to keep K > 4, Mg >2, Phos >3  follow up AM BMP

## 2019-05-10 NOTE — ED ADULT NURSE NOTE - NSIMPLEMENTINTERV_GEN_ALL_ED
Implemented All Universal Safety Interventions:  [place] to call system. Call bell, personal items and telephone within reach. Instruct patient to call for assistance. Room bathroom lighting operational. Non-slip footwear when patient is off stretcher. Physically safe environment: no spills, clutter or unnecessary equipment. Stretcher in lowest position, wheels locked, appropriate side rails in place.

## 2019-05-10 NOTE — CONSULT NOTE ADULT - ASSESSMENT
uc flare vs infectious colitis    check tb, hep b and tpmt testing  clears  in and out  ppi once a day  iv steroids  f/u stool studies  canasa and delzicol to be started

## 2019-05-10 NOTE — H&P ADULT - PROBLEM SELECTOR PLAN 7
IMPROVE VTE Individual Risk Assessment          RISK                                                          Points  [  ] Previous VTE                                                3  [  ] Thrombophilia                                             2  [  ] Lower limb paralysis                                   2        (unable to hold up >15 seconds)    [  ] Current Cancer                                             2         (within 6 months)  [  ] Immobilization > 24 hrs                              1  [  ] ICU/CCU stay > 24 hours                             1  [x  ] Age > 60                                                         1    IMPROVE VTE Score: 1  lovenox for VTE ppx IMPROVE VTE Individual Risk Assessment          RISK                                                          Points  [  ] Previous VTE                                                3  [  ] Thrombophilia                                             2  [  ] Lower limb paralysis                                   2        (unable to hold up >15 seconds)    [  ] Current Cancer                                             2         (within 6 months)  [  ] Immobilization > 24 hrs                              1  [  ] ICU/CCU stay > 24 hours                             1  [x  ] Age > 60                                                         1    IMPROVE VTE Score: 1  SCD since patient is ambulating, will not give AC secondary to bloody diarrhea and risk of bleeding

## 2019-05-10 NOTE — H&P ADULT - NSHPSOCIALHISTORY_GEN_ALL_CORE
Lives at home with wife and daughter  Denies any tobacco or alcohol use  Denies illicit drug use  Independent with all ADLs  Walks without assistance  Retired

## 2019-05-10 NOTE — ED PROVIDER NOTE - OBJECTIVE STATEMENT
62 y/o male, hx of ulcerative colitis, comes in c/o abdominal pain, nausea and vomiting which started two weeks ago and is worsening.  pt went to his PMD two days ago where he had labs drawn and was told that his WBC is 18 and he could come to the ER but pt states he was trying to wait it out at home.  pt states he is vomiting bile products, states he is unable to tolerate anything by mouth.  c/o fevers at home, highest of 101.8F.  denies any sick contacts or recent travel, denies any long car ride.

## 2019-05-10 NOTE — H&P ADULT - NSHPPHYSICALEXAM_GEN_ALL_CORE
T(C): 36.4 (05-10-19 @ 15:33), Max: 37.2 (05-10-19 @ 10:37)  HR: 86 (05-10-19 @ 15:33) (82 - 86)  BP: 129/80 (05-10-19 @ 15:33) (115/86 - 129/80)  RR: 17 (05-10-19 @ 15:33) (15 - 17)  SpO2: 97% (05-10-19 @ 15:33) (95% - 97%)    GENERAL: patient appears well, no acute distress, appropriate, pleasant  EYES: sclera clear, no exudates  ENMT: oropharynx clear without erythema, no exudates, moist mucous membranes  NECK: supple, soft  LUNGS: good air entry bilaterally, clear to auscultation, symmetric breath sounds, no wheezing or rhonchi appreciated  HEART: soft S1/S2, regular rate and rhythm, no murmurs noted, no lower extremity edema  GASTROINTESTINAL: abdomen is soft, nontender, slight distention, normoactive bowel sounds, no palpable masses  INTEGUMENT: normal skin color, appearance  MUSCULOSKELETAL: no clubbing or cyanosis, no obvious deformity  NEUROLOGIC: awake, alert, oriented x3, good muscle tone in 4 extremities, no obvious sensory deficits  PSYCHIATRIC: mood is good, affect is congruent, linear and logical thought process

## 2019-05-11 LAB
AFP-TM SERPL-MCNC: <1.8 NG/ML — SIGNIFICANT CHANGE UP
ALBUMIN SERPL ELPH-MCNC: 1.3 G/DL — LOW (ref 3.3–5)
ALP SERPL-CCNC: 65 U/L — SIGNIFICANT CHANGE UP (ref 40–120)
ALT FLD-CCNC: 6 U/L — LOW (ref 12–78)
ANION GAP SERPL CALC-SCNC: 11 MMOL/L — SIGNIFICANT CHANGE UP (ref 5–17)
APTT BLD: 29.1 SEC — SIGNIFICANT CHANGE UP (ref 27.5–36.3)
AST SERPL-CCNC: 6 U/L — LOW (ref 15–37)
BASOPHILS # BLD AUTO: 0 K/UL — SIGNIFICANT CHANGE UP (ref 0–0.2)
BASOPHILS NFR BLD AUTO: 0 % — SIGNIFICANT CHANGE UP (ref 0–2)
BILIRUB SERPL-MCNC: 0.4 MG/DL — SIGNIFICANT CHANGE UP (ref 0.2–1.2)
BUN SERPL-MCNC: 5 MG/DL — LOW (ref 7–23)
CALCIUM SERPL-MCNC: 7.3 MG/DL — LOW (ref 8.5–10.1)
CHLORIDE SERPL-SCNC: 100 MMOL/L — SIGNIFICANT CHANGE UP (ref 96–108)
CO2 SERPL-SCNC: 26 MMOL/L — SIGNIFICANT CHANGE UP (ref 22–31)
CREAT SERPL-MCNC: 0.5 MG/DL — SIGNIFICANT CHANGE UP (ref 0.5–1.3)
CULTURE RESULTS: NO GROWTH — SIGNIFICANT CHANGE UP
EOSINOPHIL # BLD AUTO: 0.08 K/UL — SIGNIFICANT CHANGE UP (ref 0–0.5)
EOSINOPHIL NFR BLD AUTO: 1 % — SIGNIFICANT CHANGE UP (ref 0–6)
GLUCOSE SERPL-MCNC: 129 MG/DL — HIGH (ref 70–99)
HBA1C BLD-MCNC: 5.7 % — HIGH (ref 4–5.6)
HBV SURFACE AB SER-ACNC: SIGNIFICANT CHANGE UP
HBV SURFACE AG SER-ACNC: SIGNIFICANT CHANGE UP
HCT VFR BLD CALC: 35.1 % — LOW (ref 39–50)
HGB BLD-MCNC: 11.8 G/DL — LOW (ref 13–17)
INR BLD: 1.66 RATIO — HIGH (ref 0.88–1.16)
LYMPHOCYTES # BLD AUTO: 0.41 K/UL — LOW (ref 1–3.3)
LYMPHOCYTES # BLD AUTO: 5 % — LOW (ref 13–44)
MAGNESIUM SERPL-MCNC: 2.1 MG/DL — SIGNIFICANT CHANGE UP (ref 1.6–2.6)
MCHC RBC-ENTMCNC: 30.3 PG — SIGNIFICANT CHANGE UP (ref 27–34)
MCHC RBC-ENTMCNC: 33.6 GM/DL — SIGNIFICANT CHANGE UP (ref 32–36)
MCV RBC AUTO: 90.2 FL — SIGNIFICANT CHANGE UP (ref 80–100)
MONOCYTES # BLD AUTO: 1.15 K/UL — HIGH (ref 0–0.9)
MONOCYTES NFR BLD AUTO: 14 % — SIGNIFICANT CHANGE UP (ref 2–14)
NEUTROPHILS # BLD AUTO: 6.59 K/UL — SIGNIFICANT CHANGE UP (ref 1.8–7.4)
NEUTROPHILS NFR BLD AUTO: 63 % — SIGNIFICANT CHANGE UP (ref 43–77)
NRBC # BLD: SIGNIFICANT CHANGE UP /100 WBCS (ref 0–0)
PHOSPHATE SERPL-MCNC: 1.8 MG/DL — LOW (ref 2.5–4.5)
PLATELET # BLD AUTO: 421 K/UL — HIGH (ref 150–400)
POTASSIUM SERPL-MCNC: 3.3 MMOL/L — LOW (ref 3.5–5.3)
POTASSIUM SERPL-SCNC: 3.3 MMOL/L — LOW (ref 3.5–5.3)
PROT SERPL-MCNC: 4.9 G/DL — LOW (ref 6–8.3)
PROTHROM AB SERPL-ACNC: 19.1 SEC — HIGH (ref 10–12.9)
RBC # BLD: 3.89 M/UL — LOW (ref 4.2–5.8)
RBC # FLD: 13.7 % — SIGNIFICANT CHANGE UP (ref 10.3–14.5)
SODIUM SERPL-SCNC: 137 MMOL/L — SIGNIFICANT CHANGE UP (ref 135–145)
SPECIMEN SOURCE: SIGNIFICANT CHANGE UP
WBC # BLD: 8.24 K/UL — SIGNIFICANT CHANGE UP (ref 3.8–10.5)
WBC # FLD AUTO: 8.24 K/UL — SIGNIFICANT CHANGE UP (ref 3.8–10.5)

## 2019-05-11 PROCEDURE — 99233 SBSQ HOSP IP/OBS HIGH 50: CPT

## 2019-05-11 RX ORDER — PANTOPRAZOLE SODIUM 20 MG/1
40 TABLET, DELAYED RELEASE ORAL
Refills: 0 | Status: DISCONTINUED | OUTPATIENT
Start: 2019-05-11 | End: 2019-05-13

## 2019-05-11 RX ORDER — POTASSIUM CHLORIDE 20 MEQ
40 PACKET (EA) ORAL EVERY 4 HOURS
Refills: 0 | Status: COMPLETED | OUTPATIENT
Start: 2019-05-11 | End: 2019-05-11

## 2019-05-11 RX ADMIN — Medication 50 MILLIGRAM(S): at 22:25

## 2019-05-11 RX ADMIN — Medication 1200 MILLIGRAM(S): at 00:17

## 2019-05-11 RX ADMIN — Medication 40 MILLIGRAM(S): at 15:00

## 2019-05-11 RX ADMIN — ATORVASTATIN CALCIUM 20 MILLIGRAM(S): 80 TABLET, FILM COATED ORAL at 22:25

## 2019-05-11 RX ADMIN — Medication 100 MILLIGRAM(S): at 05:46

## 2019-05-11 RX ADMIN — Medication 100 MILLIGRAM(S): at 22:25

## 2019-05-11 RX ADMIN — Medication 1200 MILLIGRAM(S): at 05:44

## 2019-05-11 RX ADMIN — Medication 1 TABLET(S): at 05:44

## 2019-05-11 RX ADMIN — Medication 40 MILLIGRAM(S): at 05:45

## 2019-05-11 RX ADMIN — Medication 40 MILLIEQUIVALENT(S): at 11:59

## 2019-05-11 RX ADMIN — Medication 1 TABLET(S): at 17:41

## 2019-05-11 RX ADMIN — Medication 200 MILLIGRAM(S): at 11:59

## 2019-05-11 RX ADMIN — Medication 40 MILLIEQUIVALENT(S): at 17:40

## 2019-05-11 RX ADMIN — Medication 1200 MILLIGRAM(S): at 17:40

## 2019-05-11 RX ADMIN — Medication 1200 MILLIGRAM(S): at 11:59

## 2019-05-11 RX ADMIN — Medication 40 MILLIGRAM(S): at 22:25

## 2019-05-11 RX ADMIN — Medication 100 MILLIGRAM(S): at 15:00

## 2019-05-11 RX ADMIN — Medication 200 MILLIGRAM(S): at 00:17

## 2019-05-11 NOTE — PROGRESS NOTE ADULT - PROBLEM SELECTOR PLAN 6
hypovolemic hyponatremia and hypokalemia in the setting of diarrhea  supplement PRN to keep K > 4, Mg >2, Phos >3  follow up AM BMP

## 2019-05-11 NOTE — PROGRESS NOTE ADULT - PROBLEM SELECTOR PLAN 1
uc flare vs infectious colitis  ct reviewed  f/u stool studies  f/u tb, hep b and tpmt testing  follow bld cxs  ivf/clears as tolerated  cont iv steroids  cont abx  cont bacid  cont canasa suppos qhs  cont delzicol tid  cont ppi ppx  monitor exam/stool output improving   uc flare vs infectious colitis  ct reviewed  f/u stool studies  f/u tb, hep b and tpmt testing  follow bld cxs  ivf/clears as tolerated, rec to advance to lrlf fulls as tolerated  cont iv steroids  cont abx  cont bacid  cont canasa suppos qhs  cont delzicol qid  cont ppi ppx  monitor exam/stool output  will follow, will need close op gi f/u and eventual colonoscopy

## 2019-05-11 NOTE — PROGRESS NOTE ADULT - PROBLEM SELECTOR PLAN 1
pt w/ abd pain, n/v with ct scan findings consistent with pancolitis/proctitis likely 2/2 UC flare  with bandemia, will c/w cipro and flagyl; f/u all culture data  start maintenance fluids @75cc/hr for 12hrs while NPO  clear liquid diet, will transition diet as tolerated  start bacid  zofran PRN for nausea with caution given prolonged QTc  pain control with morphine  IV solumedrol 40Q8H  mesalamine suppository 1g qhs  GI consult: Kane Lama pt w/ abd pain, n/v with ct scan findings consistent with pancolitis/proctitis likely 2/2 UC flare  with bandemia, will c/w cipro and flagyl; f/u all culture data  clear liquid diet, will transition diet as tolerated  start bacid  zofran PRN for nausea with caution given prolonged QTc  pain control with morphine  IV solumedrol 40Q8H  mesalamine suppository 1g qhs  GI consult: Kane Lama

## 2019-05-11 NOTE — PROGRESS NOTE ADULT - SUBJECTIVE AND OBJECTIVE BOX
INTERVAL HPI/OVERNIGHT EVENTS: Patient seen and examined at bedside. No acute events overnight.     MEDICATIONS  (STANDING):  atorvastatin 20 milliGRAM(s) Oral at bedtime  ciprofloxacin   IVPB 400 milliGRAM(s) IV Intermittent every 12 hours  lactobacillus acidophilus 1 Tablet(s) Oral two times a day  mesalamine DR Capsule 1200 milliGRAM(s) Oral four times a day  mesalamine Suppository 1000 milliGRAM(s) Rectal at bedtime  methylPREDNISolone sodium succinate Injectable 40 milliGRAM(s) IV Push every 8 hours  metroNIDAZOLE  IVPB 500 milliGRAM(s) IV Intermittent every 8 hours  pantoprazole    Tablet 40 milliGRAM(s) Oral before breakfast  potassium chloride    Tablet ER 40 milliEquivalent(s) Oral every 4 hours  sodium chloride 0.9%. 1000 milliLiter(s) (75 mL/Hr) IV Continuous <Continuous>  traZODone 50 milliGRAM(s) Oral at bedtime    MEDICATIONS  (PRN):  acetaminophen   Tablet .. 650 milliGRAM(s) Oral every 6 hours PRN Temp greater or equal to 38C (100.4F), Mild Pain (1 - 3)  diazepam    Tablet 5 milliGRAM(s) Oral at bedtime PRN Sleep  morphine  - Injectable 2 milliGRAM(s) IV Push every 6 hours PRN Severe Pain (7 - 10)  morphine  - Injectable 1 milliGRAM(s) IV Push every 6 hours PRN Moderate Pain (4 - 6)  ondansetron Injectable 4 milliGRAM(s) IV Push every 6 hours PRN Nausea      Allergies    penicillin (Swelling)    Intolerances        CONSTITUTIONAL: No weakness, fevers or chills  RESPIRATORY: No cough, wheezing, hemoptysis; No shortness of breath  Cvs: No chest pain or palpitations  Gi: + abdominal pain. No nausea, vomiting, diarrhea or constipation. No melena or hematochezia.  Neuro: no weakness    All other review of systems is negative unless indicated above.    Vital Signs Last 24 Hrs  T(C): 36.9 (11 May 2019 08:21), Max: 37.2 (10 May 2019 23:36)  T(F): 98.4 (11 May 2019 08:21), Max: 99 (10 May 2019 23:36)  HR: 72 (11 May 2019 08:21) (72 - 94)  BP: 114/74 (11 May 2019 08:21) (112/72 - 143/90)  BP(mean): 108 (10 May 2019 20:00) (108 - 108)  RR: 16 (11 May 2019 08:21) (16 - 17)  SpO2: 95% (11 May 2019 08:21) (95% - 98%)    General: NAD  Neurology: A&Ox3 , nonfocal  Respiratory: CTA B/L  CV: RRR, S1S2  Abdominal: Soft,  , ND +BS  Extremities: No edema, + peripheral pulses      LABS:                        11.8   8.24  )-----------( 421      ( 11 May 2019 07:54 )             35.1     0511    137  |  100  |  5<L>  ----------------------------<  129<H>  3.3<L>   |  26  |  0.50    Ca    7.3<L>      11 May 2019 07:54  Phos  1.8       Mg     2.1         TPro  4.9<L>  /  Alb  1.3<L>  /  TBili  0.4  /  DBili  x   /  AST  6<L>  /  ALT  6<L>  /  AlkPhos  65  05-11    PT/INR - ( 11 May 2019 07:54 )   PT: 19.1 sec;   INR: 1.66 ratio         PTT - ( 11 May 2019 07:54 )  PTT:29.1 sec  Urinalysis Basic - ( 10 May 2019 16:27 )    Color: Yellow / Appearance: Clear / S.010 / pH: x  Gluc: x / Ketone: Small  / Bili: Small / Urobili: Negative   Blood: x / Protein: 25 mg/dL / Nitrite: Negative   Leuk Esterase: Negative / RBC: 3-5 /HPF / WBC 0-2   Sq Epi: x / Non Sq Epi: Moderate / Bacteria: Few        RADIOLOGY & ADDITIONAL TESTS: INTERVAL HPI/OVERNIGHT EVENTS: Patient seen and examined at bedside. Pain improved this morning. Denies any diarrhea, melena. Patient refuses to continue to follow with [facility]. Consulted again Dr. Fercho tadeo.     MEDICATIONS  (STANDING):  atorvastatin 20 milliGRAM(s) Oral at bedtime  ciprofloxacin   IVPB 400 milliGRAM(s) IV Intermittent every 12 hours  lactobacillus acidophilus 1 Tablet(s) Oral two times a day  mesalamine DR Capsule 1200 milliGRAM(s) Oral four times a day  mesalamine Suppository 1000 milliGRAM(s) Rectal at bedtime  methylPREDNISolone sodium succinate Injectable 40 milliGRAM(s) IV Push every 8 hours  metroNIDAZOLE  IVPB 500 milliGRAM(s) IV Intermittent every 8 hours  pantoprazole    Tablet 40 milliGRAM(s) Oral before breakfast  potassium chloride    Tablet ER 40 milliEquivalent(s) Oral every 4 hours  sodium chloride 0.9%. 1000 milliLiter(s) (75 mL/Hr) IV Continuous <Continuous>  traZODone 50 milliGRAM(s) Oral at bedtime    MEDICATIONS  (PRN):  acetaminophen   Tablet .. 650 milliGRAM(s) Oral every 6 hours PRN Temp greater or equal to 38C (100.4F), Mild Pain (1 - 3)  diazepam    Tablet 5 milliGRAM(s) Oral at bedtime PRN Sleep  morphine  - Injectable 2 milliGRAM(s) IV Push every 6 hours PRN Severe Pain (7 - 10)  morphine  - Injectable 1 milliGRAM(s) IV Push every 6 hours PRN Moderate Pain (4 - 6)  ondansetron Injectable 4 milliGRAM(s) IV Push every 6 hours PRN Nausea      Allergies    penicillin (Swelling)    Intolerances        CONSTITUTIONAL: No weakness, fevers or chills  RESPIRATORY: No cough, wheezing, hemoptysis; No shortness of breath  Cvs: No chest pain or palpitations  Gi: + abdominal pain. No nausea, vomiting, diarrhea or constipation. No melena or hematochezia.  Neuro: no weakness    All other review of systems is negative unless indicated above.    Vital Signs Last 24 Hrs  T(C): 36.9 (11 May 2019 08:21), Max: 37.2 (10 May 2019 23:36)  T(F): 98.4 (11 May 2019 08:21), Max: 99 (10 May 2019 23:36)  HR: 72 (11 May 2019 08:21) (72 - 94)  BP: 114/74 (11 May 2019 08:21) (112/72 - 143/90)  BP(mean): 108 (10 May 2019 20:00) (108 - 108)  RR: 16 (11 May 2019 08:21) (16 - 17)  SpO2: 95% (11 May 2019 08:21) (95% - 98%)    General: NAD  Neurology: A&Ox3 , nonfocal  Respiratory: CTA B/L  CV: RRR, S1S2  Abdominal: Soft, mild tenderness on deep palpation , ND +BS  Extremities: No edema, + peripheral pulses      LABS:                        11.8   8.24  )-----------( 421      ( 11 May 2019 07:54 )             35.1         137  |  100  |  5<L>  ----------------------------<  129<H>  3.3<L>   |  26  |  0.50    Ca    7.3<L>      11 May 2019 07:54  Phos  1.8       Mg     2.1         TPro  4.9<L>  /  Alb  1.3<L>  /  TBili  0.4  /  DBili  x   /  AST  6<L>  /  ALT  6<L>  /  AlkPhos  65  0511    PT/INR - ( 11 May 2019 07:54 )   PT: 19.1 sec;   INR: 1.66 ratio         PTT - ( 11 May 2019 07:54 )  PTT:29.1 sec  Urinalysis Basic - ( 10 May 2019 16:27 )    Color: Yellow / Appearance: Clear / S.010 / pH: x  Gluc: x / Ketone: Small  / Bili: Small / Urobili: Negative   Blood: x / Protein: 25 mg/dL / Nitrite: Negative   Leuk Esterase: Negative / RBC: 3-5 /HPF / WBC 0-2   Sq Epi: x / Non Sq Epi: Moderate / Bacteria: Few        RADIOLOGY & ADDITIONAL TESTS:

## 2019-05-11 NOTE — PROGRESS NOTE ADULT - PROBLEM SELECTOR PLAN 7
IMPROVE VTE Individual Risk Assessment          RISK                                                          Points  [  ] Previous VTE                                                3  [  ] Thrombophilia                                             2  [  ] Lower limb paralysis                                   2        (unable to hold up >15 seconds)    [  ] Current Cancer                                             2         (within 6 months)  [  ] Immobilization > 24 hrs                              1  [  ] ICU/CCU stay > 24 hours                             1  [x  ] Age > 60                                                         1    IMPROVE VTE Score: 1  SCD since patient is ambulating, will not give AC secondary to bloody diarrhea and risk of bleeding

## 2019-05-11 NOTE — PROGRESS NOTE ADULT - SUBJECTIVE AND OBJECTIVE BOX
INTERVAL HPI/OVERNIGHT EVENTS:  pt seen and examined      MEDICATIONS  (STANDING):  atorvastatin 20 milliGRAM(s) Oral at bedtime  ciprofloxacin   IVPB 400 milliGRAM(s) IV Intermittent every 12 hours  lactobacillus acidophilus 1 Tablet(s) Oral two times a day  mesalamine DR Capsule 1200 milliGRAM(s) Oral four times a day  mesalamine Suppository 1000 milliGRAM(s) Rectal at bedtime  methylPREDNISolone sodium succinate Injectable 40 milliGRAM(s) IV Push every 8 hours  metroNIDAZOLE  IVPB 500 milliGRAM(s) IV Intermittent every 8 hours  potassium chloride    Tablet ER 40 milliEquivalent(s) Oral every 4 hours  sodium chloride 0.9%. 1000 milliLiter(s) (75 mL/Hr) IV Continuous <Continuous>  traZODone 50 milliGRAM(s) Oral at bedtime    MEDICATIONS  (PRN):  acetaminophen   Tablet .. 650 milliGRAM(s) Oral every 6 hours PRN Temp greater or equal to 38C (100.4F), Mild Pain (1 - 3)  diazepam    Tablet 5 milliGRAM(s) Oral at bedtime PRN Sleep  morphine  - Injectable 2 milliGRAM(s) IV Push every 6 hours PRN Severe Pain (7 - 10)  morphine  - Injectable 1 milliGRAM(s) IV Push every 6 hours PRN Moderate Pain (4 - 6)  ondansetron Injectable 4 milliGRAM(s) IV Push every 6 hours PRN Nausea      Allergies    penicillin (Swelling)    Intolerances        Review of Systems:    General:  No wt loss, fevers, chills, night sweats, fatigue   Eyes:  Good vision, no reported pain  ENT:  No sore throat, pain, runny nose, dysphagia  CV:  No pain, palpitations, hypo/hypertension  Resp:  No dyspnea, cough, tachypnea, wheezing  GI:  No pain, No nausea, No vomiting, No diarrhea, No constipation, No weight loss, No fever, No pruritis, No rectal bleeding, No melena, No dysphagia  :  No pain, bleeding, incontinence, nocturia  Muscle:  No pain, weakness  Neuro:  No weakness, tingling, memory problems  Psych:  No fatigue, insomnia, mood problems, depression  Endocrine:  No polyuria, polydypsia, cold/heat intolerance  Heme:  No petechiae, ecchymosis, easy bruisability  Skin:  No rash, tattoos, scars, edema      Vital Signs Last 24 Hrs  T(C): 36.9 (11 May 2019 08:21), Max: 37.2 (10 May 2019 23:36)  T(F): 98.4 (11 May 2019 08:21), Max: 99 (10 May 2019 23:36)  HR: 72 (11 May 2019 08:21) (72 - 94)  BP: 114/74 (11 May 2019 08:21) (112/72 - 143/90)  BP(mean): 108 (10 May 2019 20:00) (108 - 108)  RR: 16 (11 May 2019 08:21) (16 - 17)  SpO2: 95% (11 May 2019 08:21) (95% - 98%)    PHYSICAL EXAM:    Constitutional: NAD  HEENT: EOMI, throat clear  Neck: No LAD, supple  Respiratory: CTA and P  Cardiovascular: S1 and S2, RRR, no M  Gastrointestinal: BS+, soft, NT/ND, neg HSM,  Extremities: No peripheral edema, neg clubbing, cyanosis  Vascular: 2+ peripheral pulses  Neurological: A/O x 3, no focal deficits  Psychiatric: Normal mood, normal affect  Skin: No rashes      LABS:                        11.8   8.24  )-----------( 421      ( 11 May 2019 07:54 )             35.1     05-11    137  |  100  |  5<L>  ----------------------------<  129<H>  3.3<L>   |  26  |  0.50    Ca    7.3<L>      11 May 2019 07:54  Phos  1.8     0511  Mg     2.1     05-11    TPro  4.9<L>  /  Alb  1.3<L>  /  TBili  0.4  /  DBili  x   /  AST  6<L>  /  ALT  6<L>  /  AlkPhos  65  05-11    PT/INR - ( 11 May 2019 07:54 )   PT: 19.1 sec;   INR: 1.66 ratio         PTT - ( 11 May 2019 07:54 )  PTT:29.1 sec  Urinalysis Basic - ( 10 May 2019 16:27 )    Color: Yellow / Appearance: Clear / S.010 / pH: x  Gluc: x / Ketone: Small  / Bili: Small / Urobili: Negative   Blood: x / Protein: 25 mg/dL / Nitrite: Negative   Leuk Esterase: Negative / RBC: 3-5 /HPF / WBC 0-2   Sq Epi: x / Non Sq Epi: Moderate / Bacteria: Few        RADIOLOGY & ADDITIONAL TESTS: INTERVAL HPI/OVERNIGHT EVENTS:  pt seen and examined  states abd pain resolved  deneis n/v  last bm yesterday, somewhat formed w some brb, none since    MEDICATIONS  (STANDING):  atorvastatin 20 milliGRAM(s) Oral at bedtime  ciprofloxacin   IVPB 400 milliGRAM(s) IV Intermittent every 12 hours  lactobacillus acidophilus 1 Tablet(s) Oral two times a day  mesalamine DR Capsule 1200 milliGRAM(s) Oral four times a day  mesalamine Suppository 1000 milliGRAM(s) Rectal at bedtime  methylPREDNISolone sodium succinate Injectable 40 milliGRAM(s) IV Push every 8 hours  metroNIDAZOLE  IVPB 500 milliGRAM(s) IV Intermittent every 8 hours  potassium chloride    Tablet ER 40 milliEquivalent(s) Oral every 4 hours  sodium chloride 0.9%. 1000 milliLiter(s) (75 mL/Hr) IV Continuous <Continuous>  traZODone 50 milliGRAM(s) Oral at bedtime    MEDICATIONS  (PRN):  acetaminophen   Tablet .. 650 milliGRAM(s) Oral every 6 hours PRN Temp greater or equal to 38C (100.4F), Mild Pain (1 - 3)  diazepam    Tablet 5 milliGRAM(s) Oral at bedtime PRN Sleep  morphine  - Injectable 2 milliGRAM(s) IV Push every 6 hours PRN Severe Pain (7 - 10)  morphine  - Injectable 1 milliGRAM(s) IV Push every 6 hours PRN Moderate Pain (4 - 6)  ondansetron Injectable 4 milliGRAM(s) IV Push every 6 hours PRN Nausea      Allergies    penicillin (Swelling)    Intolerances        Review of Systems:    General:  No wt loss, fevers, chills, night sweats, fatigue   Eyes:  Good vision, no reported pain  ENT:  No sore throat, pain, runny nose, dysphagia  CV:  No pain, palpitations, hypo/hypertension  Resp:  No dyspnea, cough, tachypnea, wheezing  GI:  No pain, No nausea, No vomiting, No diarrhea, No constipation, No weight loss, No fever, No pruritis, +rectal bleeding, No melena, No dysphagia  :  No pain, bleeding, incontinence, nocturia  Muscle:  No pain, weakness  Neuro:  No weakness, tingling, memory problems  Psych:  No fatigue, insomnia, mood problems, depression  Endocrine:  No polyuria, polydypsia, cold/heat intolerance  Heme:  No petechiae, ecchymosis, easy bruisability  Skin:  No rash, tattoos, scars, edema      Vital Signs Last 24 Hrs  T(C): 36.9 (11 May 2019 08:21), Max: 37.2 (10 May 2019 23:36)  T(F): 98.4 (11 May 2019 08:21), Max: 99 (10 May 2019 23:36)  HR: 72 (11 May 2019 08:21) (72 - 94)  BP: 114/74 (11 May 2019 08:21) (112/72 - 143/90)  BP(mean): 108 (10 May 2019 20:00) (108 - 108)  RR: 16 (11 May 2019 08:21) (16 - 17)  SpO2: 95% (11 May 2019 08:21) (95% - 98%)    PHYSICAL EXAM:    Constitutional: NAD  HEENT: ncat  Neck: No LAD  Respiratory: dec bs  Cardiovascular: S1 and S2, RRR  Gastrointestinal: soft nt mild dt  Extremities: No peripheral edema  Vascular: 2+ peripheral pulses  Neurological: Awake alert responds appropriately  Skin: No rashes      LABS:                        11.8   8.24  )-----------( 421      ( 11 May 2019 07:54 )             35.1     05-11    137  |  100  |  5<L>  ----------------------------<  129<H>  3.3<L>   |  26  |  0.50    Ca    7.3<L>      11 May 2019 07:54  Phos  1.8     05-11  Mg     2.1     05-11    TPro  4.9<L>  /  Alb  1.3<L>  /  TBili  0.4  /  DBili  x   /  AST  6<L>  /  ALT  6<L>  /  AlkPhos  65  05-11    PT/INR - ( 11 May 2019 07:54 )   PT: 19.1 sec;   INR: 1.66 ratio         PTT - ( 11 May 2019 07:54 )  PTT:29.1 sec  Urinalysis Basic - ( 10 May 2019 16:27 )    Color: Yellow / Appearance: Clear / S.010 / pH: x  Gluc: x / Ketone: Small  / Bili: Small / Urobili: Negative   Blood: x / Protein: 25 mg/dL / Nitrite: Negative   Leuk Esterase: Negative / RBC: 3-5 /HPF / WBC 0-2   Sq Epi: x / Non Sq Epi: Moderate / Bacteria: Few        RADIOLOGY & ADDITIONAL TESTS:

## 2019-05-12 LAB
ALBUMIN SERPL ELPH-MCNC: 1.5 G/DL — LOW (ref 3.3–5)
ALP SERPL-CCNC: 64 U/L — SIGNIFICANT CHANGE UP (ref 40–120)
ALT FLD-CCNC: 7 U/L — LOW (ref 12–78)
ANION GAP SERPL CALC-SCNC: 8 MMOL/L — SIGNIFICANT CHANGE UP (ref 5–17)
AST SERPL-CCNC: 9 U/L — LOW (ref 15–37)
BACTERIA STL CULT: NORMAL
BILIRUB SERPL-MCNC: 0.3 MG/DL — SIGNIFICANT CHANGE UP (ref 0.2–1.2)
BUN SERPL-MCNC: 5 MG/DL — LOW (ref 7–23)
CALCIUM SERPL-MCNC: 7.4 MG/DL — LOW (ref 8.5–10.1)
CHLORIDE SERPL-SCNC: 102 MMOL/L — SIGNIFICANT CHANGE UP (ref 96–108)
CO2 SERPL-SCNC: 28 MMOL/L — SIGNIFICANT CHANGE UP (ref 22–31)
CREAT SERPL-MCNC: 0.51 MG/DL — SIGNIFICANT CHANGE UP (ref 0.5–1.3)
CULTURE RESULTS: SIGNIFICANT CHANGE UP
DEPRECATED O AND P PREP STL: NORMAL
GLUCOSE SERPL-MCNC: 152 MG/DL — HIGH (ref 70–99)
HCT VFR BLD CALC: 34.9 % — LOW (ref 39–50)
HGB BLD-MCNC: 11.8 G/DL — LOW (ref 13–17)
MCHC RBC-ENTMCNC: 30.5 PG — SIGNIFICANT CHANGE UP (ref 27–34)
MCHC RBC-ENTMCNC: 33.8 GM/DL — SIGNIFICANT CHANGE UP (ref 32–36)
MCV RBC AUTO: 90.2 FL — SIGNIFICANT CHANGE UP (ref 80–100)
NRBC # BLD: 0 /100 WBCS — SIGNIFICANT CHANGE UP (ref 0–0)
PLATELET # BLD AUTO: 424 K/UL — HIGH (ref 150–400)
POTASSIUM SERPL-MCNC: 3.5 MMOL/L — SIGNIFICANT CHANGE UP (ref 3.5–5.3)
POTASSIUM SERPL-SCNC: 3.5 MMOL/L — SIGNIFICANT CHANGE UP (ref 3.5–5.3)
PROT SERPL-MCNC: 5.1 G/DL — LOW (ref 6–8.3)
RBC # BLD: 3.87 M/UL — LOW (ref 4.2–5.8)
RBC # FLD: 13.4 % — SIGNIFICANT CHANGE UP (ref 10.3–14.5)
SODIUM SERPL-SCNC: 138 MMOL/L — SIGNIFICANT CHANGE UP (ref 135–145)
SPECIMEN SOURCE: SIGNIFICANT CHANGE UP
WBC # BLD: 9.01 K/UL — SIGNIFICANT CHANGE UP (ref 3.8–10.5)
WBC # FLD AUTO: 9.01 K/UL — SIGNIFICANT CHANGE UP (ref 3.8–10.5)

## 2019-05-12 PROCEDURE — 99233 SBSQ HOSP IP/OBS HIGH 50: CPT

## 2019-05-12 RX ADMIN — Medication 50 MILLIGRAM(S): at 21:54

## 2019-05-12 RX ADMIN — Medication 200 MILLIGRAM(S): at 12:10

## 2019-05-12 RX ADMIN — Medication 1200 MILLIGRAM(S): at 23:13

## 2019-05-12 RX ADMIN — Medication 100 MILLIGRAM(S): at 13:55

## 2019-05-12 RX ADMIN — Medication 40 MILLIGRAM(S): at 06:20

## 2019-05-12 RX ADMIN — Medication 1 TABLET(S): at 17:28

## 2019-05-12 RX ADMIN — Medication 1200 MILLIGRAM(S): at 06:20

## 2019-05-12 RX ADMIN — Medication 1200 MILLIGRAM(S): at 00:21

## 2019-05-12 RX ADMIN — Medication 100 MILLIGRAM(S): at 21:55

## 2019-05-12 RX ADMIN — Medication 40 MILLIGRAM(S): at 21:55

## 2019-05-12 RX ADMIN — Medication 200 MILLIGRAM(S): at 00:20

## 2019-05-12 RX ADMIN — Medication 1200 MILLIGRAM(S): at 12:11

## 2019-05-12 RX ADMIN — Medication 1 TABLET(S): at 06:20

## 2019-05-12 RX ADMIN — Medication 100 MILLIGRAM(S): at 06:19

## 2019-05-12 RX ADMIN — Medication 200 MILLIGRAM(S): at 23:13

## 2019-05-12 RX ADMIN — Medication 1200 MILLIGRAM(S): at 17:28

## 2019-05-12 RX ADMIN — Medication 40 MILLIGRAM(S): at 13:55

## 2019-05-12 RX ADMIN — ATORVASTATIN CALCIUM 20 MILLIGRAM(S): 80 TABLET, FILM COATED ORAL at 21:54

## 2019-05-12 RX ADMIN — PANTOPRAZOLE SODIUM 40 MILLIGRAM(S): 20 TABLET, DELAYED RELEASE ORAL at 06:19

## 2019-05-12 NOTE — PROGRESS NOTE ADULT - PROBLEM SELECTOR PLAN 1
improving, f/u am labs  uc flare vs infectious colitis  ct reviewed  cdiff cancelled as no further loose stool/gi pcr pending  f/u tb, and tpmt testing  advance diet as tolerated  cont iv steroids  cont abx  cont bacid  cont canasa suppos qhs  cont delzicol qid  cont ppi ppx  monitor exam/stool output  will follow, will need close op gi f/u and eventual colonoscopy

## 2019-05-12 NOTE — PROGRESS NOTE ADULT - ASSESSMENT
62 YO M PMHx ulcerative colitis presents to ED for nausea/vomiting, abdominal pain admitted for pancolitis/proctitis likely UC flare.
62 YO M PMHx ulcerative colitis presents to ED for nausea/vomiting, abdominal pain admitted for pancolitis/proctitis likely UC flare.

## 2019-05-12 NOTE — PROGRESS NOTE ADULT - PROBLEM SELECTOR PLAN 1
pt w/ abd pain, n/v with ct scan findings consistent with pancolitis/proctitis likely 2/2 UC flare  with bandemia, will c/w cipro and flagyl; f/u all culture data  advance diet as tolerated  start bacid  zofran PRN for nausea with caution given prolonged QTc  pain control with morphine  IV solumedrol 40Q8H  mesalamine suppository 1g qhs  GI Dr Brantley new GI following

## 2019-05-12 NOTE — PROGRESS NOTE ADULT - SUBJECTIVE AND OBJECTIVE BOX
INTERVAL HPI/OVERNIGHT EVENTS:  pt seen and examined  abd pain improved  denies n/v  stool w some consistency, 2-3x  states hes eating regular outside food and tolerating  labs pending    MEDICATIONS  (STANDING):  atorvastatin 20 milliGRAM(s) Oral at bedtime  ciprofloxacin   IVPB 400 milliGRAM(s) IV Intermittent every 12 hours  lactobacillus acidophilus 1 Tablet(s) Oral two times a day  mesalamine DR Capsule 1200 milliGRAM(s) Oral four times a day  mesalamine Suppository 1000 milliGRAM(s) Rectal at bedtime  methylPREDNISolone sodium succinate Injectable 40 milliGRAM(s) IV Push every 8 hours  metroNIDAZOLE  IVPB 500 milliGRAM(s) IV Intermittent every 8 hours  pantoprazole    Tablet 40 milliGRAM(s) Oral before breakfast  sodium chloride 0.9%. 1000 milliLiter(s) (75 mL/Hr) IV Continuous <Continuous>  traZODone 50 milliGRAM(s) Oral at bedtime    MEDICATIONS  (PRN):  acetaminophen   Tablet .. 650 milliGRAM(s) Oral every 6 hours PRN Temp greater or equal to 38C (100.4F), Mild Pain (1 - 3)  diazepam    Tablet 5 milliGRAM(s) Oral at bedtime PRN Sleep  morphine  - Injectable 2 milliGRAM(s) IV Push every 6 hours PRN Severe Pain (7 - 10)  morphine  - Injectable 1 milliGRAM(s) IV Push every 6 hours PRN Moderate Pain (4 - 6)  ondansetron Injectable 4 milliGRAM(s) IV Push every 6 hours PRN Nausea      Allergies    penicillin (Swelling)    Intolerances        Review of Systems:    General:  No wt loss, fevers, chills, night sweats, fatigue   Eyes:  Good vision, no reported pain  ENT:  No sore throat, pain, runny nose, dysphagia  CV:  No pain, palpitations, hypo/hypertension  Resp:  No dyspnea, cough, tachypnea, wheezing  GI:  No pain, No nausea, No vomiting, No diarrhea, No constipation, No weight loss, No fever, No pruritis, No rectal bleeding, No melena, No dysphagia  :  No pain, bleeding, incontinence, nocturia  Muscle:  No pain, weakness  Neuro:  No weakness, tingling, memory problems  Psych:  No fatigue, insomnia, mood problems, depression  Endocrine:  No polyuria, polydypsia, cold/heat intolerance  Heme:  No petechiae, ecchymosis, easy bruisability  Skin:  No rash, tattoos, scars, edema      Vital Signs Last 24 Hrs  T(C): 36.9 (11 May 2019 23:39), Max: 36.9 (11 May 2019 08:21)  T(F): 98.5 (11 May 2019 23:39), Max: 98.5 (11 May 2019 23:39)  HR: 85 (11 May 2019 23:39) (72 - 85)  BP: 144/83 (11 May 2019 23:39) (114/74 - 144/83)  BP(mean): --  RR: 16 (11 May 2019 23:39) (16 - 16)  SpO2: 96% (11 May 2019 23:39) (95% - 96%)    PHYSICAL EXAM:    Constitutional: NAD  HEENT: ncat  Neck: No LAD  Respiratory: dec bs  Cardiovascular: S1 and S2, RRR  Gastrointestinal: soft nt mild dt  Extremities: No peripheral edema  Vascular: 2+ peripheral pulses  Neurological: Awake alert responds appropriately  Skin: No rashes        LABS:                        11.8   8.24  )-----------( 421      ( 11 May 2019 07:54 )             35.1     05-11    137  |  100  |  5<L>  ----------------------------<  129<H>  3.3<L>   |  26  |  0.50    Ca    7.3<L>      11 May 2019 07:54  Phos  1.8     0511  Mg     2.1     05-11    TPro  4.9<L>  /  Alb  1.3<L>  /  TBili  0.4  /  DBili  x   /  AST  6<L>  /  ALT  6<L>  /  AlkPhos  65  05-11    PT/INR - ( 11 May 2019 07:54 )   PT: 19.1 sec;   INR: 1.66 ratio         PTT - ( 11 May 2019 07:54 )  PTT:29.1 sec  Urinalysis Basic - ( 10 May 2019 16:27 )    Color: Yellow / Appearance: Clear / S.010 / pH: x  Gluc: x / Ketone: Small  / Bili: Small / Urobili: Negative   Blood: x / Protein: 25 mg/dL / Nitrite: Negative   Leuk Esterase: Negative / RBC: 3-5 /HPF / WBC 0-2   Sq Epi: x / Non Sq Epi: Moderate / Bacteria: Few        RADIOLOGY & ADDITIONAL TESTS:

## 2019-05-12 NOTE — PROGRESS NOTE ADULT - PROBLEM SELECTOR PLAN 3
Chronic  not on any home meds  GI consulted, appreciate recs
Chronic  not on any home meds  GI consulted, appreciate recs

## 2019-05-13 ENCOUNTER — TRANSCRIPTION ENCOUNTER (OUTPATIENT)
Age: 64
End: 2019-05-13

## 2019-05-13 VITALS
HEART RATE: 62 BPM | RESPIRATION RATE: 17 BRPM | DIASTOLIC BLOOD PRESSURE: 70 MMHG | OXYGEN SATURATION: 95 % | SYSTOLIC BLOOD PRESSURE: 117 MMHG | TEMPERATURE: 99 F

## 2019-05-13 LAB
ALBUMIN SERPL ELPH-MCNC: 1.4 G/DL — LOW (ref 3.3–5)
ALP SERPL-CCNC: 59 U/L — SIGNIFICANT CHANGE UP (ref 40–120)
ALT FLD-CCNC: 7 U/L — LOW (ref 12–78)
ANION GAP SERPL CALC-SCNC: 6 MMOL/L — SIGNIFICANT CHANGE UP (ref 5–17)
AST SERPL-CCNC: 8 U/L — LOW (ref 15–37)
BILIRUB SERPL-MCNC: 0.3 MG/DL — SIGNIFICANT CHANGE UP (ref 0.2–1.2)
BUN SERPL-MCNC: 5 MG/DL — LOW (ref 7–23)
CALCIUM SERPL-MCNC: 7.5 MG/DL — LOW (ref 8.5–10.1)
CHLORIDE SERPL-SCNC: 103 MMOL/L — SIGNIFICANT CHANGE UP (ref 96–108)
CO2 SERPL-SCNC: 31 MMOL/L — SIGNIFICANT CHANGE UP (ref 22–31)
CREAT SERPL-MCNC: 0.47 MG/DL — LOW (ref 0.5–1.3)
GLUCOSE SERPL-MCNC: 149 MG/DL — HIGH (ref 70–99)
HCT VFR BLD CALC: 33.2 % — LOW (ref 39–50)
HGB BLD-MCNC: 11.3 G/DL — LOW (ref 13–17)
MCHC RBC-ENTMCNC: 30.5 PG — SIGNIFICANT CHANGE UP (ref 27–34)
MCHC RBC-ENTMCNC: 34 GM/DL — SIGNIFICANT CHANGE UP (ref 32–36)
MCV RBC AUTO: 89.5 FL — SIGNIFICANT CHANGE UP (ref 80–100)
NRBC # BLD: 0 /100 WBCS — SIGNIFICANT CHANGE UP (ref 0–0)
PLATELET # BLD AUTO: 396 K/UL — SIGNIFICANT CHANGE UP (ref 150–400)
POTASSIUM SERPL-MCNC: 3.4 MMOL/L — LOW (ref 3.5–5.3)
POTASSIUM SERPL-SCNC: 3.4 MMOL/L — LOW (ref 3.5–5.3)
PROT SERPL-MCNC: 4.7 G/DL — LOW (ref 6–8.3)
RBC # BLD: 3.71 M/UL — LOW (ref 4.2–5.8)
RBC # FLD: 14 % — SIGNIFICANT CHANGE UP (ref 10.3–14.5)
SODIUM SERPL-SCNC: 140 MMOL/L — SIGNIFICANT CHANGE UP (ref 135–145)
WBC # BLD: 7.69 K/UL — SIGNIFICANT CHANGE UP (ref 3.8–10.5)
WBC # FLD AUTO: 7.69 K/UL — SIGNIFICANT CHANGE UP (ref 3.8–10.5)

## 2019-05-13 PROCEDURE — 83735 ASSAY OF MAGNESIUM: CPT

## 2019-05-13 PROCEDURE — 81001 URINALYSIS AUTO W/SCOPE: CPT

## 2019-05-13 PROCEDURE — 86900 BLOOD TYPING SEROLOGIC ABO: CPT

## 2019-05-13 PROCEDURE — 80053 COMPREHEN METABOLIC PANEL: CPT

## 2019-05-13 PROCEDURE — 99285 EMERGENCY DEPT VISIT HI MDM: CPT | Mod: 25

## 2019-05-13 PROCEDURE — 85027 COMPLETE CBC AUTOMATED: CPT

## 2019-05-13 PROCEDURE — 74177 CT ABD & PELVIS W/CONTRAST: CPT

## 2019-05-13 PROCEDURE — 71045 X-RAY EXAM CHEST 1 VIEW: CPT

## 2019-05-13 PROCEDURE — 84433 ASY THIOPURIN S-MTHYLTRNSFRS: CPT

## 2019-05-13 PROCEDURE — 85610 PROTHROMBIN TIME: CPT

## 2019-05-13 PROCEDURE — 86803 HEPATITIS C AB TEST: CPT

## 2019-05-13 PROCEDURE — 87086 URINE CULTURE/COLONY COUNT: CPT

## 2019-05-13 PROCEDURE — 74019 RADEX ABDOMEN 2 VIEWS: CPT

## 2019-05-13 PROCEDURE — 96375 TX/PRO/DX INJ NEW DRUG ADDON: CPT

## 2019-05-13 PROCEDURE — 83605 ASSAY OF LACTIC ACID: CPT

## 2019-05-13 PROCEDURE — 84100 ASSAY OF PHOSPHORUS: CPT

## 2019-05-13 PROCEDURE — 87507 IADNA-DNA/RNA PROBE TQ 12-25: CPT

## 2019-05-13 PROCEDURE — 87340 HEPATITIS B SURFACE AG IA: CPT

## 2019-05-13 PROCEDURE — 36415 COLL VENOUS BLD VENIPUNCTURE: CPT

## 2019-05-13 PROCEDURE — 86901 BLOOD TYPING SEROLOGIC RH(D): CPT

## 2019-05-13 PROCEDURE — 86850 RBC ANTIBODY SCREEN: CPT

## 2019-05-13 PROCEDURE — 86706 HEP B SURFACE ANTIBODY: CPT

## 2019-05-13 PROCEDURE — 96374 THER/PROPH/DIAG INJ IV PUSH: CPT

## 2019-05-13 PROCEDURE — 87040 BLOOD CULTURE FOR BACTERIA: CPT

## 2019-05-13 PROCEDURE — 86480 TB TEST CELL IMMUN MEASURE: CPT

## 2019-05-13 PROCEDURE — 83690 ASSAY OF LIPASE: CPT

## 2019-05-13 PROCEDURE — 82105 ALPHA-FETOPROTEIN SERUM: CPT

## 2019-05-13 PROCEDURE — 99238 HOSP IP/OBS DSCHRG MGMT 30/<: CPT

## 2019-05-13 PROCEDURE — 85730 THROMBOPLASTIN TIME PARTIAL: CPT

## 2019-05-13 PROCEDURE — 93005 ELECTROCARDIOGRAM TRACING: CPT

## 2019-05-13 PROCEDURE — 82150 ASSAY OF AMYLASE: CPT

## 2019-05-13 PROCEDURE — 83036 HEMOGLOBIN GLYCOSYLATED A1C: CPT

## 2019-05-13 RX ORDER — PANTOPRAZOLE SODIUM 20 MG/1
1 TABLET, DELAYED RELEASE ORAL
Qty: 30 | Refills: 0
Start: 2019-05-13 | End: 2019-06-11

## 2019-05-13 RX ORDER — CIPROFLOXACIN LACTATE 400MG/40ML
1 VIAL (ML) INTRAVENOUS
Qty: 14 | Refills: 0
Start: 2019-05-13 | End: 2019-05-19

## 2019-05-13 RX ORDER — MESALAMINE 400 MG
3 TABLET, DELAYED RELEASE (ENTERIC COATED) ORAL
Qty: 168 | Refills: 0
Start: 2019-05-13 | End: 2019-05-26

## 2019-05-13 RX ORDER — MESALAMINE 400 MG
1 TABLET, DELAYED RELEASE (ENTERIC COATED) ORAL
Qty: 30 | Refills: 0
Start: 2019-05-13 | End: 2019-06-11

## 2019-05-13 RX ORDER — METRONIDAZOLE 500 MG
1 TABLET ORAL
Qty: 21 | Refills: 0
Start: 2019-05-13 | End: 2019-05-19

## 2019-05-13 RX ORDER — LACTOBACILLUS ACIDOPHILUS 100MM CELL
1 CAPSULE ORAL
Qty: 60 | Refills: 0
Start: 2019-05-13 | End: 2019-06-11

## 2019-05-13 RX ORDER — CIPROFLOXACIN LACTATE 400MG/40ML
500 VIAL (ML) INTRAVENOUS EVERY 12 HOURS
Refills: 0 | Status: DISCONTINUED | OUTPATIENT
Start: 2019-05-13 | End: 2019-05-13

## 2019-05-13 RX ORDER — METRONIDAZOLE 500 MG
500 TABLET ORAL EVERY 8 HOURS
Refills: 0 | Status: DISCONTINUED | OUTPATIENT
Start: 2019-05-13 | End: 2019-05-13

## 2019-05-13 RX ADMIN — Medication 500 MILLIGRAM(S): at 14:17

## 2019-05-13 RX ADMIN — Medication 1 TABLET(S): at 17:42

## 2019-05-13 RX ADMIN — Medication 40 MILLIGRAM(S): at 05:34

## 2019-05-13 RX ADMIN — Medication 1 TABLET(S): at 05:34

## 2019-05-13 RX ADMIN — Medication 1200 MILLIGRAM(S): at 05:34

## 2019-05-13 RX ADMIN — MORPHINE SULFATE 2 MILLIGRAM(S): 50 CAPSULE, EXTENDED RELEASE ORAL at 07:47

## 2019-05-13 RX ADMIN — Medication 500 MILLIGRAM(S): at 17:42

## 2019-05-13 RX ADMIN — PANTOPRAZOLE SODIUM 40 MILLIGRAM(S): 20 TABLET, DELAYED RELEASE ORAL at 05:34

## 2019-05-13 RX ADMIN — Medication 40 MILLIGRAM(S): at 11:52

## 2019-05-13 RX ADMIN — Medication 1200 MILLIGRAM(S): at 17:43

## 2019-05-13 RX ADMIN — Medication 1200 MILLIGRAM(S): at 11:52

## 2019-05-13 RX ADMIN — Medication 100 MILLIGRAM(S): at 05:35

## 2019-05-13 NOTE — PROGRESS NOTE ADULT - PROBLEM SELECTOR PLAN 2
pt w/ abd pain, n/v with ct scan findings consistent with pancolitis/proctitis likely 2/2 UC flare  plan as per above
see above
pt w/ abd pain, n/v with ct scan findings consistent with pancolitis/proctitis likely 2/2 UC flare  plan as per above

## 2019-05-13 NOTE — DISCHARGE NOTE PROVIDER - CARE PROVIDER_API CALL
Arvind Mckee (DO)  Gastroenterology; Internal Medicine  [address] [place] [address]  Phone: [phone]  Fax: [phone]  Follow Up Time:

## 2019-05-13 NOTE — PROGRESS NOTE ADULT - SUBJECTIVE AND OBJECTIVE BOX
INTERVAL HPI/OVERNIGHT EVENTS:  pt seen and examined  did not tolerate regular food yesterday, had severe abd cramping afterwards  restarted on liquid diet w resolution of cramps  denies n/v  states stools more formed w less blood    MEDICATIONS  (STANDING):  atorvastatin 20 milliGRAM(s) Oral at bedtime  ciprofloxacin   IVPB 400 milliGRAM(s) IV Intermittent every 12 hours  lactobacillus acidophilus 1 Tablet(s) Oral two times a day  mesalamine DR Capsule 1200 milliGRAM(s) Oral four times a day  mesalamine Suppository 1000 milliGRAM(s) Rectal at bedtime  methylPREDNISolone sodium succinate Injectable 40 milliGRAM(s) IV Push every 8 hours  metroNIDAZOLE  IVPB 500 milliGRAM(s) IV Intermittent every 8 hours  pantoprazole    Tablet 40 milliGRAM(s) Oral before breakfast  sodium chloride 0.9%. 1000 milliLiter(s) (75 mL/Hr) IV Continuous <Continuous>  traZODone 50 milliGRAM(s) Oral at bedtime    MEDICATIONS  (PRN):  acetaminophen   Tablet .. 650 milliGRAM(s) Oral every 6 hours PRN Temp greater or equal to 38C (100.4F), Mild Pain (1 - 3)  diazepam    Tablet 5 milliGRAM(s) Oral at bedtime PRN Sleep  morphine  - Injectable 2 milliGRAM(s) IV Push every 6 hours PRN Severe Pain (7 - 10)  morphine  - Injectable 1 milliGRAM(s) IV Push every 6 hours PRN Moderate Pain (4 - 6)  ondansetron Injectable 4 milliGRAM(s) IV Push every 6 hours PRN Nausea      Allergies    penicillin (Swelling)    Intolerances        Review of Systems:    General:  No wt loss, fevers, chills, night sweats, fatigue   Eyes:  Good vision, no reported pain  ENT:  No sore throat, pain, runny nose, dysphagia  CV:  No pain, palpitations, hypo/hypertension  Resp:  No dyspnea, cough, tachypnea, wheezing  GI:  No pain, No nausea, No vomiting, No diarrhea, No constipation, No weight loss, No fever, No pruritis, No rectal bleeding, No melena, No dysphagia  :  No pain, bleeding, incontinence, nocturia  Muscle:  No pain, weakness  Neuro:  No weakness, tingling, memory problems  Psych:  No fatigue, insomnia, mood problems, depression  Endocrine:  No polyuria, polydypsia, cold/heat intolerance  Heme:  No petechiae, ecchymosis, easy bruisability  Skin:  No rash, tattoos, scars, edema      Vital Signs Last 24 Hrs  T(C): 36.6 (13 May 2019 07:20), Max: 36.9 (13 May 2019 00:45)  T(F): 97.9 (13 May 2019 07:20), Max: 98.5 (13 May 2019 00:45)  HR: 73 (13 May 2019 07:20) (73 - 75)  BP: 136/82 (13 May 2019 07:20) (128/78 - 139/80)  BP(mean): --  RR: 18 (13 May 2019 07:20) (16 - 18)  SpO2: 97% (13 May 2019 07:20) (96% - 97%)    PHYSICAL EXAM:    Constitutional: NAD  HEENT: ncat  Neck: No LAD  Respiratory: dec bs  Cardiovascular: S1 and S2, RRR  Gastrointestinal: soft nt mild dt  Extremities: No peripheral edema  Vascular: 2+ peripheral pulses  Neurological: Awake alert responds appropriately  Skin: No rashes    LABS:                        11.3   7.69  )-----------( 396      ( 13 May 2019 06:19 )             33.2     05-13    140  |  103  |  5<L>  ----------------------------<  149<H>  3.4<L>   |  31  |  0.47<L>    Ca    7.5<L>      13 May 2019 06:19    TPro  4.7<L>  /  Alb  1.4<L>  /  TBili  0.3  /  DBili  x   /  AST  8<L>  /  ALT  7<L>  /  AlkPhos  59  05-13          RADIOLOGY & ADDITIONAL TESTS:

## 2019-05-13 NOTE — PROGRESS NOTE ADULT - PROBLEM SELECTOR PLAN 1
suspected uc flare   ct reviewed  cdiff cancelled as no further loose stool; gi pcr neg  reverted to liquid diet 2/2 abd cramping, monitor tolerance  f/u tb, and tpmt testing  cont iv steroids  cont abx  cont bacid  cont canasa suppos qhs and delzicol qid  cont ppi ppx  monitor exam/stool output  will follow, will need close op gi f/u and eventual colonoscopy

## 2019-05-13 NOTE — DISCHARGE NOTE PROVIDER - NSDCCPCAREPLAN_GEN_ALL_CORE_FT
PRINCIPAL DISCHARGE DIAGNOSIS  Diagnosis: Pancolitis  Assessment and Plan of Treatment: Continue antibiotics and steroids  Follow up with GI within 1-2 weeks      SECONDARY DISCHARGE DIAGNOSES  Diagnosis: Proctitis  Assessment and Plan of Treatment: Continue antibiotics.    Diagnosis: Ulcerative colitis  Assessment and Plan of Treatment: Follow up with GI Dr Brantley/ Dr Nagy

## 2019-05-13 NOTE — DISCHARGE NOTE NURSING/CASE MANAGEMENT/SOCIAL WORK - NSDCDPATPORTLINK_GEN_ALL_CORE
You can access the [facility] Patient Portal, offered by [facility], by registering with the following website: [email or web address]

## 2019-05-13 NOTE — DISCHARGE NOTE PROVIDER - HOSPITAL COURSE
62 YO M PMHx ulcerative colitis, HLD, anxiety presents to ED for nausea/vomiting, abdominal pain. 2 weeks ago, patient began have diffuse, severe abdominal cramps along with bloody diarrhea. Patient states his wife also had crampy abdominal pain around this time but did not have diarrhea or other symptoms. He is unsure if viral illness. Patient states he has chronic pain related to his ~50 year history of UC. States that he has no major flares in 10-12 years. Has been on azathioprine in past but not currently on any meds for his UC. States pain is crampy in nature and as bad as 9/10 in severity, however currently 3.5/10. Due to pain, patient has had significantly decreased PO intake of food. He endorses multiple episodes of vomiting that have become progressively more bilious without any hematemesis. He reports okay PO intake of water. He has intermittent fever and chills, fever as high as 101.8F on Saturday. No recent antibiotic use, sick contacts or recent travel. Last colonoscopy was 3-4 years ago according to patient without abnormal findings. He denies chest pain, SOB, fatigue, weakness, headache, dizziness.     Of note, saw PCP Dr. Plata yesterday where WBC was 18K; told to go to ED but went home hoping symptoms would improve.     In the ED, vitals T 99F, HR 82, /86, RR 15, 95% on RA. Labs significant for WBC 13.39, Plts 505, 16% bands, PT 19, INR 1.64 Na 133, K 3.0. 16% bands. lactate neg. CT abd w/ po and iv cont:  Severe acute proctitis and pancolitis CXR; no acute pathology Xray abd: consistent with colitis    EKG: NSR, QTc 486    Received Cipro, flagyl, K riderx1, 2L bolus, Zofran, protonix, morphine        Patient admitted for abdominal pain,  pancolitis/proctitis likely UC flare. He was started on IV antibiotics and steroids. He wishes to switch GI so was seen by Dr Brantley. Patients pain improved, diet was advanced, stable to discharge on oral antibiotics and prednisone.         Vital Signs Last 24 Hrs    T(C): 36.6 (13 May 2019 07:20), Max: 36.9 (13 May 2019 00:45)    T(F): 97.9 (13 May 2019 07:20), Max: 98.5 (13 May 2019 00:45)    HR: 73 (13 May 2019 07:20) (73 - 75)    BP: 136/82 (13 May 2019 07:20) (128/78 - 139/80)    BP(mean): --    RR: 18 (13 May 2019 07:20) (16 - 18)    SpO2: 97% (13 May 2019 07:20) (96% - 97%)        General: NAD    Neurology: A&Ox3 , nonfocal    Respiratory: CTA B/L    CV: RRR, S1S2    Abdominal: Soft, NT, ND +BS    Extremities: No edema, + peripheral pulses No

## 2019-05-15 LAB
CULTURE RESULTS: SIGNIFICANT CHANGE UP
CULTURE RESULTS: SIGNIFICANT CHANGE UP
SPECIMEN SOURCE: SIGNIFICANT CHANGE UP
SPECIMEN SOURCE: SIGNIFICANT CHANGE UP

## 2019-05-17 LAB
GAMMA INTERFERON BACKGROUND BLD IA-ACNC: 0.03 IU/ML — SIGNIFICANT CHANGE UP
M TB IFN-G BLD-IMP: ABNORMAL
M TB IFN-G CD4+ BCKGRND COR BLD-ACNC: 0 IU/ML — SIGNIFICANT CHANGE UP
M TB IFN-G CD4+CD8+ BCKGRND COR BLD-ACNC: 0 IU/ML — SIGNIFICANT CHANGE UP
QUANT TB PLUS MITOGEN MINUS NIL: 0.01 IU/ML — SIGNIFICANT CHANGE UP

## 2019-05-20 LAB
THIOPURINE METHYLTRANSFERASE (TPMT) ENZY: 11.7 — SIGNIFICANT CHANGE UP
TPMT ENZYME METHODOLOGY: SIGNIFICANT CHANGE UP
TPMT GENE PROD MET ACT IMP BLD/T-IMP: SIGNIFICANT CHANGE UP

## 2019-05-22 LAB
HCV AB S/CO SERPL IA: 0.08 S/CO — SIGNIFICANT CHANGE UP (ref 0–0.99)
HCV AB SERPL-IMP: SIGNIFICANT CHANGE UP

## 2019-05-28 ENCOUNTER — INPATIENT (INPATIENT)
Facility: HOSPITAL | Age: 64
LOS: 21 days | Discharge: HOME | DRG: 329 | End: 2019-06-19
Attending: COLON & RECTAL SURGERY | Admitting: INTERNAL MEDICINE
Payer: COMMERCIAL

## 2019-05-28 VITALS
TEMPERATURE: 99 F | HEIGHT: 65 IN | OXYGEN SATURATION: 98 % | RESPIRATION RATE: 16 BRPM | HEART RATE: 120 BPM | WEIGHT: 130.07 LBS | SYSTOLIC BLOOD PRESSURE: 100 MMHG | DIASTOLIC BLOOD PRESSURE: 72 MMHG

## 2019-05-28 DIAGNOSIS — K51.90 ULCERATIVE COLITIS, UNSPECIFIED, WITHOUT COMPLICATIONS: ICD-10-CM

## 2019-05-28 DIAGNOSIS — R13.10 DYSPHAGIA, UNSPECIFIED: ICD-10-CM

## 2019-05-28 DIAGNOSIS — F41.9 ANXIETY DISORDER, UNSPECIFIED: ICD-10-CM

## 2019-05-28 DIAGNOSIS — Z29.9 ENCOUNTER FOR PROPHYLACTIC MEASURES, UNSPECIFIED: ICD-10-CM

## 2019-05-28 DIAGNOSIS — B37.81 CANDIDAL ESOPHAGITIS: ICD-10-CM

## 2019-05-28 DIAGNOSIS — R10.9 UNSPECIFIED ABDOMINAL PAIN: ICD-10-CM

## 2019-05-28 DIAGNOSIS — K21.9 GASTRO-ESOPHAGEAL REFLUX DISEASE WITHOUT ESOPHAGITIS: ICD-10-CM

## 2019-05-28 PROBLEM — E78.5 HYPERLIPIDEMIA, UNSPECIFIED: Chronic | Status: ACTIVE | Noted: 2019-05-10

## 2019-05-28 LAB
ALBUMIN SERPL ELPH-MCNC: 1.7 G/DL — LOW (ref 3.3–5)
ALP SERPL-CCNC: 76 U/L — SIGNIFICANT CHANGE UP (ref 40–120)
ALT FLD-CCNC: <6 U/L — LOW (ref 12–78)
ANION GAP SERPL CALC-SCNC: 10 MMOL/L — SIGNIFICANT CHANGE UP (ref 5–17)
AST SERPL-CCNC: 8 U/L — LOW (ref 15–37)
BASOPHILS # BLD AUTO: 0.07 K/UL — SIGNIFICANT CHANGE UP (ref 0–0.2)
BASOPHILS NFR BLD AUTO: 1 % — SIGNIFICANT CHANGE UP (ref 0–2)
BILIRUB SERPL-MCNC: 0.4 MG/DL — SIGNIFICANT CHANGE UP (ref 0.2–1.2)
BUN SERPL-MCNC: 8 MG/DL — SIGNIFICANT CHANGE UP (ref 7–23)
CALCIUM SERPL-MCNC: 7.5 MG/DL — LOW (ref 8.5–10.1)
CHLORIDE SERPL-SCNC: 100 MMOL/L — SIGNIFICANT CHANGE UP (ref 96–108)
CO2 SERPL-SCNC: 29 MMOL/L — SIGNIFICANT CHANGE UP (ref 22–31)
CREAT SERPL-MCNC: 0.62 MG/DL — SIGNIFICANT CHANGE UP (ref 0.5–1.3)
EOSINOPHIL # BLD AUTO: 0.07 K/UL — SIGNIFICANT CHANGE UP (ref 0–0.5)
EOSINOPHIL NFR BLD AUTO: 1 % — SIGNIFICANT CHANGE UP (ref 0–6)
GLUCOSE SERPL-MCNC: 97 MG/DL — SIGNIFICANT CHANGE UP (ref 70–99)
HCT VFR BLD CALC: 37.4 % — LOW (ref 39–50)
HGB BLD-MCNC: 12.5 G/DL — LOW (ref 13–17)
LACTATE SERPL-SCNC: 1.8 MMOL/L — SIGNIFICANT CHANGE UP (ref 0.7–2)
LIDOCAIN IGE QN: 32 U/L — LOW (ref 73–393)
LYMPHOCYTES # BLD AUTO: 0.95 K/UL — LOW (ref 1–3.3)
LYMPHOCYTES # BLD AUTO: 14 % — SIGNIFICANT CHANGE UP (ref 13–44)
MCHC RBC-ENTMCNC: 30.3 PG — SIGNIFICANT CHANGE UP (ref 27–34)
MCHC RBC-ENTMCNC: 33.4 GM/DL — SIGNIFICANT CHANGE UP (ref 32–36)
MCV RBC AUTO: 90.8 FL — SIGNIFICANT CHANGE UP (ref 80–100)
MONOCYTES # BLD AUTO: 0.68 K/UL — SIGNIFICANT CHANGE UP (ref 0–0.9)
MONOCYTES NFR BLD AUTO: 10 % — SIGNIFICANT CHANGE UP (ref 2–14)
NEUTROPHILS # BLD AUTO: 4.77 K/UL — SIGNIFICANT CHANGE UP (ref 1.8–7.4)
NEUTROPHILS NFR BLD AUTO: 62 % — SIGNIFICANT CHANGE UP (ref 43–77)
NRBC # BLD: SIGNIFICANT CHANGE UP /100 WBCS (ref 0–0)
PLATELET # BLD AUTO: 387 K/UL — SIGNIFICANT CHANGE UP (ref 150–400)
POTASSIUM SERPL-MCNC: 2.6 MMOL/L — CRITICAL LOW (ref 3.5–5.3)
POTASSIUM SERPL-SCNC: 2.6 MMOL/L — CRITICAL LOW (ref 3.5–5.3)
PROT SERPL-MCNC: 5.9 G/DL — LOW (ref 6–8.3)
RBC # BLD: 4.12 M/UL — LOW (ref 4.2–5.8)
RBC # FLD: 13.9 % — SIGNIFICANT CHANGE UP (ref 10.3–14.5)
SODIUM SERPL-SCNC: 139 MMOL/L — SIGNIFICANT CHANGE UP (ref 135–145)
WBC # BLD: 6.81 K/UL — SIGNIFICANT CHANGE UP (ref 3.8–10.5)
WBC # FLD AUTO: 6.81 K/UL — SIGNIFICANT CHANGE UP (ref 3.8–10.5)

## 2019-05-28 PROCEDURE — 99223 1ST HOSP IP/OBS HIGH 75: CPT | Mod: AI

## 2019-05-28 PROCEDURE — 44212 LAPARO TOTAL PROCTOCOLECTOMY: CPT | Mod: AS

## 2019-05-28 PROCEDURE — 99285 EMERGENCY DEPT VISIT HI MDM: CPT

## 2019-05-28 RX ORDER — POTASSIUM CHLORIDE 20 MEQ
40 PACKET (EA) ORAL ONCE
Refills: 0 | Status: COMPLETED | OUTPATIENT
Start: 2019-05-28 | End: 2019-05-28

## 2019-05-28 RX ORDER — MAGNESIUM SULFATE 500 MG/ML
1 VIAL (ML) INJECTION ONCE
Refills: 0 | Status: COMPLETED | OUTPATIENT
Start: 2019-05-28 | End: 2019-05-28

## 2019-05-28 RX ORDER — ACETAMINOPHEN 500 MG
650 TABLET ORAL EVERY 6 HOURS
Refills: 0 | Status: DISCONTINUED | OUTPATIENT
Start: 2019-05-28 | End: 2019-06-10

## 2019-05-28 RX ORDER — FAMOTIDINE 10 MG/ML
20 INJECTION INTRAVENOUS ONCE
Refills: 0 | Status: COMPLETED | OUTPATIENT
Start: 2019-05-28 | End: 2019-05-28

## 2019-05-28 RX ORDER — FLUCONAZOLE 150 MG/1
TABLET ORAL
Refills: 0 | Status: DISCONTINUED | OUTPATIENT
Start: 2019-05-28 | End: 2019-06-02

## 2019-05-28 RX ORDER — ENOXAPARIN SODIUM 100 MG/ML
40 INJECTION SUBCUTANEOUS DAILY
Refills: 0 | Status: DISCONTINUED | OUTPATIENT
Start: 2019-05-28 | End: 2019-06-10

## 2019-05-28 RX ORDER — SODIUM CHLORIDE 9 MG/ML
1000 INJECTION INTRAMUSCULAR; INTRAVENOUS; SUBCUTANEOUS ONCE
Refills: 0 | Status: COMPLETED | OUTPATIENT
Start: 2019-05-28 | End: 2019-05-28

## 2019-05-28 RX ORDER — DIAZEPAM 5 MG
5 TABLET ORAL AT BEDTIME
Refills: 0 | Status: DISCONTINUED | OUTPATIENT
Start: 2019-05-28 | End: 2019-06-03

## 2019-05-28 RX ORDER — ONDANSETRON 8 MG/1
4 TABLET, FILM COATED ORAL ONCE
Refills: 0 | Status: COMPLETED | OUTPATIENT
Start: 2019-05-28 | End: 2019-05-28

## 2019-05-28 RX ORDER — PANTOPRAZOLE SODIUM 20 MG/1
40 TABLET, DELAYED RELEASE ORAL EVERY 12 HOURS
Refills: 0 | Status: DISCONTINUED | OUTPATIENT
Start: 2019-05-28 | End: 2019-05-29

## 2019-05-28 RX ORDER — MESALAMINE 400 MG
1000 TABLET, DELAYED RELEASE (ENTERIC COATED) ORAL AT BEDTIME
Refills: 0 | Status: DISCONTINUED | OUTPATIENT
Start: 2019-05-28 | End: 2019-05-29

## 2019-05-28 RX ORDER — FLUCONAZOLE 150 MG/1
200 TABLET ORAL ONCE
Refills: 0 | Status: COMPLETED | OUTPATIENT
Start: 2019-05-28 | End: 2019-05-28

## 2019-05-28 RX ORDER — ONDANSETRON 8 MG/1
4 TABLET, FILM COATED ORAL EVERY 6 HOURS
Refills: 0 | Status: DISCONTINUED | OUTPATIENT
Start: 2019-05-28 | End: 2019-06-02

## 2019-05-28 RX ORDER — SODIUM CHLORIDE 9 MG/ML
1000 INJECTION INTRAMUSCULAR; INTRAVENOUS; SUBCUTANEOUS
Refills: 0 | Status: DISCONTINUED | OUTPATIENT
Start: 2019-05-28 | End: 2019-06-01

## 2019-05-28 RX ORDER — TRAZODONE HCL 50 MG
50 TABLET ORAL AT BEDTIME
Refills: 0 | Status: DISCONTINUED | OUTPATIENT
Start: 2019-05-28 | End: 2019-06-10

## 2019-05-28 RX ORDER — POTASSIUM CHLORIDE 20 MEQ
10 PACKET (EA) ORAL ONCE
Refills: 0 | Status: DISCONTINUED | OUTPATIENT
Start: 2019-05-28 | End: 2019-05-28

## 2019-05-28 RX ORDER — POTASSIUM CHLORIDE 20 MEQ
10 PACKET (EA) ORAL
Refills: 0 | Status: COMPLETED | OUTPATIENT
Start: 2019-05-28 | End: 2019-05-28

## 2019-05-28 RX ORDER — FLUCONAZOLE 150 MG/1
200 TABLET ORAL EVERY 24 HOURS
Refills: 0 | Status: DISCONTINUED | OUTPATIENT
Start: 2019-05-29 | End: 2019-06-02

## 2019-05-28 RX ORDER — SUCRALFATE 1 G
1 TABLET ORAL
Refills: 0 | Status: DISCONTINUED | OUTPATIENT
Start: 2019-05-28 | End: 2019-06-10

## 2019-05-28 RX ADMIN — SODIUM CHLORIDE 1000 MILLILITER(S): 9 INJECTION INTRAMUSCULAR; INTRAVENOUS; SUBCUTANEOUS at 16:30

## 2019-05-28 RX ADMIN — Medication 100 MILLIEQUIVALENT(S): at 16:20

## 2019-05-28 RX ADMIN — Medication 1 GRAM(S): at 19:38

## 2019-05-28 RX ADMIN — Medication 50 MILLIGRAM(S): at 22:59

## 2019-05-28 RX ADMIN — Medication 100 MILLIEQUIVALENT(S): at 18:42

## 2019-05-28 RX ADMIN — ONDANSETRON 4 MILLIGRAM(S): 8 TABLET, FILM COATED ORAL at 13:46

## 2019-05-28 RX ADMIN — FAMOTIDINE 20 MILLIGRAM(S): 10 INJECTION INTRAVENOUS at 13:46

## 2019-05-28 RX ADMIN — Medication 1000 MILLIGRAM(S): at 23:00

## 2019-05-28 RX ADMIN — SODIUM CHLORIDE 1000 MILLILITER(S): 9 INJECTION INTRAMUSCULAR; INTRAVENOUS; SUBCUTANEOUS at 14:25

## 2019-05-28 RX ADMIN — Medication 5 MILLIGRAM(S): at 23:09

## 2019-05-28 RX ADMIN — Medication 100 GRAM(S): at 18:42

## 2019-05-28 RX ADMIN — SODIUM CHLORIDE 1000 MILLILITER(S): 9 INJECTION INTRAMUSCULAR; INTRAVENOUS; SUBCUTANEOUS at 13:40

## 2019-05-28 RX ADMIN — PANTOPRAZOLE SODIUM 40 MILLIGRAM(S): 20 TABLET, DELAYED RELEASE ORAL at 19:49

## 2019-05-28 RX ADMIN — Medication 100 MILLIEQUIVALENT(S): at 17:50

## 2019-05-28 RX ADMIN — SODIUM CHLORIDE 75 MILLILITER(S): 9 INJECTION INTRAMUSCULAR; INTRAVENOUS; SUBCUTANEOUS at 19:53

## 2019-05-28 RX ADMIN — FLUCONAZOLE 100 MILLIGRAM(S): 150 TABLET ORAL at 16:47

## 2019-05-28 NOTE — H&P ADULT - NSHPREVIEWOFSYSTEMS_GEN_ALL_CORE
REVIEW OF SYSTEMS:    CONSTITUTIONAL: No weakness, fevers or chills  EYES/ENT: No visual changes;  No vertigo, + ORAL THRUSH, tenderness   NECK: No pain or stiffness  RESPIRATORY: No cough, wheezing, hemoptysis; No shortness of breath  CARDIOVASCULAR: No chest pain or palpitations  GASTROINTESTINAL: +abdominal /epigastric pain. No nausea, vomiting, or hematemesis; No diarrhea or constipation. No melena or hematochezia.   GENITOURINARY: No dysuria, frequency or hematuria  NEUROLOGICAL: No numbness or weakness    All other review of systems is negative unless indicated above.

## 2019-05-28 NOTE — H&P ADULT - NSHPPHYSICALEXAM_GEN_ALL_CORE
Vital Signs Last 24 Hrs  T(C): 37.1 (28 May 2019 12:53), Max: 37.1 (28 May 2019 12:53)  T(F): 98.8 (28 May 2019 12:53), Max: 98.8 (28 May 2019 12:53)  HR: 120 (28 May 2019 12:53) (120 - 120)  BP: 100/72 (28 May 2019 12:53) (100/72 - 100/72)  BP(mean): --  RR: 16 (28 May 2019 12:53) (16 - 16)  SpO2: 98% (28 May 2019 12:53) (98% - 98%)    General: WN/WD NAD  Neurology: A&Ox3, nonfocal, MARIEE x 4  HEENT: Oral thrush   Respiratory: CTA B/L  CV: RRR, S1S2, no murmurs, rubs or gallops  Abdominal: Soft, mild epigastric tenderness, ND +BS\  Extremities: No edema, + peripheral pulses

## 2019-05-28 NOTE — ED ADULT TRIAGE NOTE - CHIEF COMPLAINT QUOTE
n/v, abdominal pain & no appetite "I have not eaten solid foods" x 1 month  admitted here 1 week ago for pancolitis

## 2019-05-28 NOTE — H&P ADULT - HISTORY OF PRESENT ILLNESS
62 yo M PMHx ulcerative colitis, HLD, anxiety presents to ED  with complaints of inability to eat, tolerate PO since discharge from hospital recently. Patient states that after discharge on 5/13 for uc flare(on steroids) has been unable to swallow pills or tolerate po. He is complaining of dysphagia (to both solids and liquids) He also complains of nausea, vomiting,and fevers at home. He denies any diarrhea, admits seeing GI in office after discharge, stopped oral steroids and antibiotics recently.   In the ED patient afebrile, tachycardic. Seen by CHERYL wills.

## 2019-05-28 NOTE — CONSULT NOTE ADULT - SUBJECTIVE AND OBJECTIVE BOX
Chief Complaint:  Patient is a 63y old  Male who presents with a chief complaint of   Anxiety  HLD (hyperlipidemia)  Ulcerative colitis  No significant past surgical history     HPI:  63 y male presents with nvd, generalized abdominal pain, states he has hx of ulcerative colitis, was admitted 2 weeks ago for pancolitis,  seen on Wednesday last week by GI Dr Brantley,  states he cannot tolerate meds given    gi consulted for n/v/inability to tolerate po. pt well known to service, dcd 5/13 for uc flare was on iv steroids and abx at that time, stool studies neg. pt f/u w gi op this past wednesday, states since he has been unable to swallow pills or tolerate po. c/o dysphagia (to both solids and liquids) and odynophagia- points to anterior cervical neck. also endorses nausea, episode of non bloody vomiting, intermittent fevers, tmax reported to be 102 at home, dec po and progressive weight loss. denies abd pain and diarrhea; reports multiple bms/day, mix of loose/semi loose stool w scant brb, but states he is at baseline, no acute change. states he is due for colonoscopy has never had egd.    recent vs/cbc reviewed  additional labs pending    penicillin (Swelling)      sodium chloride 0.9% Bolus 1000 milliLiter(s) IV Bolus once  sodium chloride 0.9% Bolus 1000 milliLiter(s) IV Bolus once        FAMILY HISTORY:        Review of Systems:    General:  fever dec po wt loss  Eyes:  Good vision, no reported pain  ENT:  No sore throat, pain, runny nose, dysphagia  CV:  No pain, palpitations, no lightheadedness  Resp:  No dyspnea, cough, tachypnea, wheezing  GI: see above  :  No pain, bleeding, incontinence, nocturia  Muscle:  No pain, weakness  Neuro:  No weakness, tingling, memory problems  Psych:  No fatigue, insomnia, mood problems, depression  Endocrine:  No polyuria, polydypsia, cold/heat intolerance  Heme:  No petechiae, ecchymosis, easy bruisability  Skin:  No rash, tattoos, scars, edema    Relevant Family History:   n/c    Relevant Social History: n/c      Physical Exam:    Vital Signs:  Vital Signs Last 24 Hrs  T(C): 37.1 (28 May 2019 12:53), Max: 37.1 (28 May 2019 12:53)  T(F): 98.8 (28 May 2019 12:53), Max: 98.8 (28 May 2019 12:53)  HR: 120 (28 May 2019 12:53) (120 - 120)  BP: 100/72 (28 May 2019 12:53) (100/72 - 100/72)  BP(mean): --  RR: 16 (28 May 2019 12:53) (16 - 16)  SpO2: 98% (28 May 2019 12:53) (98% - 98%)  Daily Height in cm: 165.1 (28 May 2019 12:53)    Daily     General:  nad  HEENT:  NC/AT,  dry mm, white plaques to tongue and roof of mouth visualized  Chest:  dec bs  Cardiovascular:  tachy S1, S2  Abdomen:  Soft, non-tender, non-distended  Extremities:  no edema  Skin:  No rash  Neuro/Psych:  A&Ox3    Laboratory:                            12.5   6.81  )-----------( 387      ( 28 May 2019 13:40 )             37.4                   Imaging:    none Chief Complaint:  Patient is a 63y old  Male who presents with a chief complaint of   Anxiety  HLD (hyperlipidemia)  Ulcerative colitis  No significant past surgical history     HPI:  63 y male presents with nvd, generalized abdominal pain, states he has hx of ulcerative colitis, was admitted 2 weeks ago for pancolitis,  seen on Wednesday last week by GI Dr Brantley,  states he cannot tolerate meds given    gi consulted for n/v/inability to tolerate po. pt well known to service, dcd 5/13 for uc flare was on iv steroids and abx at that time, stool studies neg. pt f/u w gi op this past wednesday, states since he has been unable to swallow pills or tolerate po. c/o dysphagia (to both solids and liquids) and odynophagia- points to anterior cervical neck. also endorses nausea, episode of non bloody vomiting, intermittent fevers, tmax reported to be 102 at home, dec po and progressive weight loss. denies abd pain and diarrhea; reports multiple bms/day, mix of loose/semi loose stool w scant brb, but states he is at baseline, no acute change. states he is due for colonoscopy has never had egd.    recent vs/cbc reviewed, afebrile, tachy  no leukocytosis no bands  additional labs pending    penicillin (Swelling)      sodium chloride 0.9% Bolus 1000 milliLiter(s) IV Bolus once  sodium chloride 0.9% Bolus 1000 milliLiter(s) IV Bolus once        FAMILY HISTORY:        Review of Systems:    General:  fever dec po wt loss  Eyes:  Good vision, no reported pain  ENT:  No sore throat, pain, runny nose, dysphagia  CV:  No pain, palpitations, no lightheadedness  Resp:  No dyspnea, cough, tachypnea, wheezing  GI: see above  :  No pain, bleeding, incontinence, nocturia  Muscle:  No pain, weakness  Neuro:  No weakness, tingling, memory problems  Psych:  No fatigue, insomnia, mood problems, depression  Endocrine:  No polyuria, polydypsia, cold/heat intolerance  Heme:  No petechiae, ecchymosis, easy bruisability  Skin:  No rash, tattoos, scars, edema    Relevant Family History:   n/c    Relevant Social History: n/c      Physical Exam:    Vital Signs:  Vital Signs Last 24 Hrs  T(C): 37.1 (28 May 2019 12:53), Max: 37.1 (28 May 2019 12:53)  T(F): 98.8 (28 May 2019 12:53), Max: 98.8 (28 May 2019 12:53)  HR: 120 (28 May 2019 12:53) (120 - 120)  BP: 100/72 (28 May 2019 12:53) (100/72 - 100/72)  BP(mean): --  RR: 16 (28 May 2019 12:53) (16 - 16)  SpO2: 98% (28 May 2019 12:53) (98% - 98%)  Daily Height in cm: 165.1 (28 May 2019 12:53)    Daily     General:  nad  HEENT:  NC/AT,  dry mm, white plaques to tongue and roof of mouth visualized  Chest:  dec bs  Cardiovascular:  tachy S1, S2  Abdomen:  Soft, non-tender, non-distended  Extremities:  no edema  Skin:  No rash  Neuro/Psych:  A&Ox3    Laboratory:                            12.5   6.81  )-----------( 387      ( 28 May 2019 13:40 )             37.4                   Imaging:    none

## 2019-05-28 NOTE — ED PROVIDER NOTE - OBJECTIVE STATEMENT
63 y male presents with nvd, generalized abdominal pain, states he has hx of ulcerative colitis, was admitted 2 weeks ago for pancolitis,  seen on Wednesday last week by GI Dr Brantley,  states he cannot tolerate meds given, states he has low grade temp at home, tmax 101.4,  nonsmoker, denies etoh.  PMd Dr MARIO Plata

## 2019-05-28 NOTE — ED PROVIDER NOTE - CLINICAL SUMMARY MEDICAL DECISION MAKING FREE TEXT BOX
soy, hx of ulcerative colitis, recent pancolitis, will obtain labs, give ivf, meds, gi consult, ct if warranted

## 2019-05-28 NOTE — CONSULT NOTE ADULT - ASSESSMENT
64 yo male, hx of uc, recently admitted for flare on abx/steroids at that time, presents with n/v and dysphagia.

## 2019-05-28 NOTE — H&P ADULT - ASSESSMENT
62 yo M PMHx ulcerative colitis, HLD, anxiety presents to ED  with complaints of inability to eat, tolerate PO since discharge from hospital recently. Patient states that after discharge on 5/13 for uc flare(on steroids) has been unable to swallow pills or tolerate po. Suspected candida esophagitis.

## 2019-05-28 NOTE — ED PROVIDER NOTE - PROGRESS NOTE DETAILS
call out to Dr monique, awaiting call back spoke with Roxanna LUO, case discussed, will see patient patient seen by ADRIANE osuna, advised admission, spoke with Dr Sweeney, hospitalist case discussed, will admit patient

## 2019-05-28 NOTE — ED PROVIDER NOTE - ATTENDING CONTRIBUTION TO CARE
63 y male presents with nvd, generalized abdominal pain, states he has hx of ulcerative colitis, was admitted 2 weeks ago for pancolitis,  seen on Wednesday last week by GI Dr Brantley,  states he cannot tolerate meds given, states he has low grade temp at home, tmax 101.4,  nonsmoker, denies etoh.  PMd Dr MARIO hyde, nontoxic   pt admitted for further eval and mgmt

## 2019-05-28 NOTE — ED PROVIDER NOTE - CARE PLAN
Principal Discharge DX:	Abdominal pain, vomiting, and diarrhea Principal Discharge DX:	Abdominal pain, vomiting, and diarrhea  Secondary Diagnosis:	Dehydration, moderate

## 2019-05-28 NOTE — ED ADULT NURSE NOTE - OBJECTIVE STATEMENT
pt states he was admitted here last week for pancolitis - states hes had no appetite and been unable to tolerate po for 1 week -

## 2019-05-29 DIAGNOSIS — R76.11 NONSPECIFIC REACTION TO TUBERCULIN SKIN TEST WITHOUT ACTIVE TUBERCULOSIS: ICD-10-CM

## 2019-05-29 DIAGNOSIS — E87.6 HYPOKALEMIA: ICD-10-CM

## 2019-05-29 LAB
ALBUMIN SERPL ELPH-MCNC: 1.5 G/DL — LOW (ref 3.3–5)
ALP SERPL-CCNC: 65 U/L — SIGNIFICANT CHANGE UP (ref 40–120)
ALT FLD-CCNC: 7 U/L — LOW (ref 12–78)
ANION GAP SERPL CALC-SCNC: 8 MMOL/L — SIGNIFICANT CHANGE UP (ref 5–17)
APPEARANCE UR: ABNORMAL
AST SERPL-CCNC: 10 U/L — LOW (ref 15–37)
BILIRUB SERPL-MCNC: 0.4 MG/DL — SIGNIFICANT CHANGE UP (ref 0.2–1.2)
BILIRUB UR-MCNC: ABNORMAL
BUN SERPL-MCNC: 6 MG/DL — LOW (ref 7–23)
CALCIUM SERPL-MCNC: 6.9 MG/DL — LOW (ref 8.5–10.1)
CHLORIDE SERPL-SCNC: 102 MMOL/L — SIGNIFICANT CHANGE UP (ref 96–108)
CO2 SERPL-SCNC: 30 MMOL/L — SIGNIFICANT CHANGE UP (ref 22–31)
COLOR SPEC: YELLOW — SIGNIFICANT CHANGE UP
CREAT SERPL-MCNC: 0.62 MG/DL — SIGNIFICANT CHANGE UP (ref 0.5–1.3)
DIFF PNL FLD: ABNORMAL
GLUCOSE SERPL-MCNC: 99 MG/DL — SIGNIFICANT CHANGE UP (ref 70–99)
GLUCOSE UR QL: NEGATIVE — SIGNIFICANT CHANGE UP
HCT VFR BLD CALC: 32.1 % — LOW (ref 39–50)
HGB BLD-MCNC: 10.5 G/DL — LOW (ref 13–17)
KETONES UR-MCNC: ABNORMAL
LEUKOCYTE ESTERASE UR-ACNC: ABNORMAL
MAGNESIUM SERPL-MCNC: 2 MG/DL — SIGNIFICANT CHANGE UP (ref 1.6–2.6)
MCHC RBC-ENTMCNC: 30.1 PG — SIGNIFICANT CHANGE UP (ref 27–34)
MCHC RBC-ENTMCNC: 32.7 GM/DL — SIGNIFICANT CHANGE UP (ref 32–36)
MCV RBC AUTO: 92 FL — SIGNIFICANT CHANGE UP (ref 80–100)
NITRITE UR-MCNC: NEGATIVE — SIGNIFICANT CHANGE UP
NRBC # BLD: 0 /100 WBCS — SIGNIFICANT CHANGE UP (ref 0–0)
PH UR: 5 — SIGNIFICANT CHANGE UP (ref 5–8)
PLATELET # BLD AUTO: 341 K/UL — SIGNIFICANT CHANGE UP (ref 150–400)
POTASSIUM SERPL-MCNC: 3 MMOL/L — LOW (ref 3.5–5.3)
POTASSIUM SERPL-SCNC: 3 MMOL/L — LOW (ref 3.5–5.3)
PROT SERPL-MCNC: 5.4 G/DL — LOW (ref 6–8.3)
PROT UR-MCNC: 25 MG/DL
RBC # BLD: 3.49 M/UL — LOW (ref 4.2–5.8)
RBC # FLD: 13.8 % — SIGNIFICANT CHANGE UP (ref 10.3–14.5)
SODIUM SERPL-SCNC: 140 MMOL/L — SIGNIFICANT CHANGE UP (ref 135–145)
SP GR SPEC: 1.01 — SIGNIFICANT CHANGE UP (ref 1.01–1.02)
UROBILINOGEN FLD QL: NEGATIVE — SIGNIFICANT CHANGE UP
WBC # BLD: 6.14 K/UL — SIGNIFICANT CHANGE UP (ref 3.8–10.5)
WBC # FLD AUTO: 6.14 K/UL — SIGNIFICANT CHANGE UP (ref 3.8–10.5)

## 2019-05-29 PROCEDURE — 92610 EVALUATE SWALLOWING FUNCTION: CPT

## 2019-05-29 PROCEDURE — 99233 SBSQ HOSP IP/OBS HIGH 50: CPT

## 2019-05-29 RX ORDER — MESALAMINE 400 MG
1200 TABLET, DELAYED RELEASE (ENTERIC COATED) ORAL
Refills: 0 | Status: DISCONTINUED | OUTPATIENT
Start: 2019-05-29 | End: 2019-06-10

## 2019-05-29 RX ORDER — VALACYCLOVIR 500 MG/1
1000 TABLET, FILM COATED ORAL THREE TIMES A DAY
Refills: 0 | Status: COMPLETED | OUTPATIENT
Start: 2019-05-29 | End: 2019-06-05

## 2019-05-29 RX ORDER — MESALAMINE 400 MG
400 TABLET, DELAYED RELEASE (ENTERIC COATED) ORAL
Refills: 0 | Status: DISCONTINUED | OUTPATIENT
Start: 2019-05-29 | End: 2019-05-29

## 2019-05-29 RX ORDER — PANTOPRAZOLE SODIUM 20 MG/1
40 TABLET, DELAYED RELEASE ORAL
Refills: 0 | Status: DISCONTINUED | OUTPATIENT
Start: 2019-05-29 | End: 2019-06-01

## 2019-05-29 RX ORDER — POTASSIUM CHLORIDE 20 MEQ
40 PACKET (EA) ORAL ONCE
Refills: 0 | Status: COMPLETED | OUTPATIENT
Start: 2019-05-29 | End: 2019-05-29

## 2019-05-29 RX ADMIN — PANTOPRAZOLE SODIUM 40 MILLIGRAM(S): 20 TABLET, DELAYED RELEASE ORAL at 07:00

## 2019-05-29 RX ADMIN — Medication 1200 MILLIGRAM(S): at 17:06

## 2019-05-29 RX ADMIN — ENOXAPARIN SODIUM 40 MILLIGRAM(S): 100 INJECTION SUBCUTANEOUS at 12:05

## 2019-05-29 RX ADMIN — Medication 40 MILLIEQUIVALENT(S): at 08:46

## 2019-05-29 RX ADMIN — Medication 1 GRAM(S): at 07:00

## 2019-05-29 RX ADMIN — PANTOPRAZOLE SODIUM 40 MILLIGRAM(S): 20 TABLET, DELAYED RELEASE ORAL at 17:06

## 2019-05-29 RX ADMIN — FLUCONAZOLE 100 MILLIGRAM(S): 150 TABLET ORAL at 15:09

## 2019-05-29 RX ADMIN — VALACYCLOVIR 1000 MILLIGRAM(S): 500 TABLET, FILM COATED ORAL at 21:13

## 2019-05-29 RX ADMIN — Medication 1200 MILLIGRAM(S): at 12:05

## 2019-05-29 RX ADMIN — Medication 1 GRAM(S): at 17:06

## 2019-05-29 RX ADMIN — Medication 50 MILLIGRAM(S): at 21:13

## 2019-05-29 RX ADMIN — SODIUM CHLORIDE 75 MILLILITER(S): 9 INJECTION INTRAMUSCULAR; INTRAVENOUS; SUBCUTANEOUS at 12:05

## 2019-05-29 NOTE — PROGRESS NOTE ADULT - ASSESSMENT
62 yo male, hx of uc, recently admitted for flare on abx/steroids at that time, presents with n/v and dysphagia.

## 2019-05-29 NOTE — PROGRESS NOTE ADULT - PROBLEM SELECTOR PLAN 5
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

## 2019-05-29 NOTE — CONSULT NOTE ADULT - PROBLEM SELECTOR RECOMMENDATION 3
no
management per primary team and   GI  pt on prednisone and mesalamine.
f/u labs  f/u bld cxs  cont home meds- delzicol 1200mg qid, bacid tid, steroid taper (pred 20mg qd)  was pending op id w/u for indeterminate quantiferon result prior to initiating additional maintenance therapy  will need op colonoscopy  monitor abd exam/stool output    plan dw attg, agreeable

## 2019-05-29 NOTE — PROGRESS NOTE ADULT - ASSESSMENT
64 yo M PMHx ulcerative colitis, HLD, anxiety presents to ED  with complaints of inability to eat, tolerate PO since discharge from hospital recently. Patient states that after discharge on 5/13 for uc flare(on steroids) has been unable to swallow pills or tolerate po. Suspected candida esophagitis.

## 2019-05-29 NOTE — CONSULT NOTE ADULT - ASSESSMENT
63M with UC, hld recent UC flare discharged 5/13  admitted with dysphagia  hypokalemia, hypocalcemia  latent TB

## 2019-05-29 NOTE — CONSULT NOTE ADULT - SUBJECTIVE AND OBJECTIVE BOX
[facility], Division of Infectious Diseases  DARYL Heck A. Lee  [phone]    MONSE RAZO  63y, Male  [id number]    HPI:  62 yo M PMHx ulcerative colitis, HLD, anxiety presents to ED  with complaints of inability to eat, tolerate PO . 10 loose stool per day.  Patient was just admitted to [facility] with UC flare treated with cipro/ flagyl and steroids states he stopped when he saw GI as outpt.    states that after discharge on 5/13 for uc flare(on steroids) has been unable to swallow pills or tolerate po.   He is complaining of dysphagia (to both solids and liquids) He also complains of nausea, vomiting,and fevers at home.  No sick contacts  He may need remicade or other immune suppresion to treat UC and quant gold came back indeterminant      PMH/PSH--  Anxiety  HLD (hyperlipidemia)  Ulcerative colitis      Allergies--  PCN    Medications--  Antibiotics: fluconAZOLE IVPB      fluconAZOLE IVPB 200 milliGRAM(s) IV Intermittent every 24 hours    Immunologic:   Other: acetaminophen   Tablet .. PRN  diazepam    Tablet PRN  enoxaparin Injectable  mesalamine DR Capsule  ondansetron Injectable PRN  pantoprazole  Injectable  predniSONE   Tablet  sodium chloride 0.9%.  sucralfate suspension  traZODone      Social History--  EtOH: denies ***  Tobacco: denies ***  Drug Use: denies ***    Family/Marital History--   lives with wife    Remainder not relevant to clinical concern.    Travel/Environmental/Occupational History:  worked in homeless shelters and   with HIV patients    Review of Systems:  A >=10-point review of systems was obtained.     Pertinent positives and negatives--  Constitutional: No fevers. No Chills. No Rigors. no night sweats + weight loss  Eyes: no blurry vision  ENMT: no sore throat  Cardiovascular: No chest pain. No palpitations.  Respiratory: No shortness of breath. No cough.  Gastrointestinal: No nausea or vomiting. + diarrhea or constipation.   Genitourinary: no dysuria  Musculoskeletal: no myalgia  Skin: no rash  Neurologic: no headache  Psychiatric: + depression + anxiety  Review of systems otherwise negative except as previously noted.    Physical Exam--  Vital Signs: T(F): 98.3 (05-29-19 @ 08:03), Max: 98.8 (05-28-19 @ 12:53)  HR: 94 (05-29-19 @ 08:03)  BP: 119/78 (05-29-19 @ 08:03)  RR: 17 (05-29-19 @ 08:03)  SpO2: 99% (05-29-19 @ 08:03)  Wt(kg): --  General: Nontoxic-appearing Male in no acute distress.  HEENT: AT/NC. Anicteric. Conjunctiva pink and moist. no thrush Dentition fair.  Neck: Not rigid. No sense of mass.  Nodes: None palpable.  Lungs: Clear bilaterally without rales, wheezing or rhonchi  Heart: Regular rate and rhythm. No Murmur.  Abdomen: Bowel sounds present and normoactive. Soft. Nondistended. Nontender.   Back: No spinal tenderness. No costovertebral angle tenderness.   Extremities: No cyanosis or clubbing. No edema.   Skin: Warm. Dry. Good turgor. No rash. No vasculitic stigmata.  Psychiatric: Appropriate affect and mood for situation.         Laboratory & Imaging Data--  CBC                        10.5   6.14  )-----------( 341      ( 29 May 2019 06:33 )             32.1       Chemistries  05-29    140  |  102  |  6<L>  ----------------------------<  99  3.0<L>   |  30  |  0.62    Ca    6.9<L>      29 May 2019 06:33  Mg     2.0     05-29    TPro  5.4<L>  /  Alb  1.5<L>  /  TBili  0.4  /  DBili  x   /  AST  10<L>  /  ALT  7<L>  /  AlkPhos  65  05-29      Culture Data    1Urine Microscopic-Add On (NC) (05.10.19 @ 16:27)    Red Blood Cell - Urine: 3-5 /HPF    White Blood Cell - Urine: 0-2    Bacteria: Few    Epithelial Cells: Moderate        < from: CT Abdomen and Pelvis w/ Oral Cont and w/ IV Cont (05.10.19 @ 14:21) >  EXAM:  CT ABDOMEN AND PELVIS OC IC                            PROCEDURE DATE:  05/10/2019          INTERPRETATION:  History: Abdominal pain. Antecedent history of   ulcerative colitis.    CT of abdomen and pelvis oral and IV contrast.  95 cc Omnipaque 350 injected intravenously.  Bibasilar discoid atelectasis. Coronary artery calcification.  No calcified gallstones or biliary dilatation. Liver pancreas spleen   adrenals unremarkable. There is a tiny too small to characterize cortical   hypodensity lower pole right kidney probable cyst.  Atherosclerotic nonaneurysmal aorta.  No evidence of appendicitis.  There is severe contiguous edematous mural thickening and augmented   mucosal enhancement involving the rectum and entire colon consistent with   severe acute proctitis and pancolitis, presumed ulcerative colitis flare   in this specific clinical setting. Prominent submucosal fat deposition   noted in the rectosigmoid. Pericolonic stranding and hyperemic changes   noted. There is no pneumatosis or extraluminal fluid or gas. No bowel   obstruction. Reflex ileus involving transverse colon.  Normal prostate and bladder. Mild widening left inguinal ring with fat.  No acute skeletal abnormality.    Impression:    Severe acute proctitis and pancolitis, presumed ulcerative colitis flare   in this specific clinical setting.    < end of copied text >      < from: Xray Chest 1 View AP/PA (05.10.19 @ 11:21) >    EXAM:  XR CHEST AP OR PA 1V                            PROCEDURE DATE:  05/10/2019          INTERPRETATION:  Abdominal pain.    AP chest.    The heart and mediastinum not enlarged. There is atherosclerotic   unfolding and calcification in the thoracic aorta. The lungs are expanded   and clear. Each costophrenic angle is sharp.  Bony structures normal for   age.    IMPRESSION: No active disease.    < end of copied text >      GI PCR Panel, Stool (05.12.19 @ 14:57)    Specimen Source: .Stool Feces jana savanna    Culture Results:   GI PCR Results: NOT detected  *******Please Note:*******  GI panel PCR evaluates for:  Campylobacter, Plesiomonas shigelloides, Salmonella,  Vibrio, Yersinia enterocolitica, Enteroaggregative  Escherichia coli (EAEC), Enteropathogenic E.coli (EPEC),  Enterotoxigenic E. coli (ETEC) lt/st, Shiga-like  toxin-producing E. coli (STEC) stx1/stx2,  Shigella/ Enteroinvasive E. coli (EIEC), Cryptosporidium,  Cyclospora cayetanensis, Entamoeba histolytica,  Giardia lamblia, Adenovirus F 40/41, Astrovirus,  Norovirus GI/GII, Rotavirus A, Sapovirus [facility], Division of Infectious Diseases  DARYL Heck A. Lee  [phone]    MONSE RAZO  63y, Male  [id number]    HPI:  64 yo M PMHx ulcerative colitis, HLD, anxiety presents to ED  with complaints of inability to eat, tolerate PO . 10 loose stool per day.  Patient was just admitted to [facility] with UC flare treated with cipro/ flagyl and steroids states he stopped when he saw GI as outpt.    states that after discharge on 5/13 for uc flare(on steroids) has been unable to swallow pills or tolerate po.   He is complaining of dysphagia (to both solids and liquids) He also complains of nausea, vomiting,and fevers at home.  No sick contacts  He may need remicade or other immune suppresion to treat UC and quant gold came back indeterminant      PMH/PSH--  Anxiety  HLD (hyperlipidemia)  Ulcerative colitis      Allergies--  PCN    Medications--  Antibiotics: fluconAZOLE IVPB      fluconAZOLE IVPB 200 milliGRAM(s) IV Intermittent every 24 hours    Immunologic:   Other: acetaminophen   Tablet .. PRN  diazepam    Tablet PRN  enoxaparin Injectable  mesalamine DR Capsule  ondansetron Injectable PRN  pantoprazole  Injectable  predniSONE   Tablet  sodium chloride 0.9%.  sucralfate suspension  traZODone      Social History--  EtOH: denies ***  Tobacco: denies ***  Drug Use: denies ***    Family/Marital History--   lives with wife    Remainder not relevant to clinical concern.    Travel/Environmental/Occupational History:  worked in homeless shelters and   with HIV patients    Review of Systems:  A >=10-point review of systems was obtained.     Pertinent positives and negatives--  Constitutional: No fevers. No Chills. No Rigors. no night sweats + weight loss  Eyes: no blurry vision  ENMT: no sore throat  Cardiovascular: No chest pain. No palpitations.  Respiratory: No shortness of breath. No cough.  Gastrointestinal: No nausea or vomiting. + diarrhea or constipation.   Genitourinary: no dysuria  Musculoskeletal: no myalgia  Skin: no rash  Neurologic: no headache  Psychiatric: + depression + anxiety  Review of systems otherwise negative except as previously noted.    Physical Exam--  Vital Signs: T(F): 98.3 (05-29-19 @ 08:03), Max: 98.8 (05-28-19 @ 12:53)  HR: 94 (05-29-19 @ 08:03)  BP: 119/78 (05-29-19 @ 08:03)  RR: 17 (05-29-19 @ 08:03)  SpO2: 99% (05-29-19 @ 08:03)  Wt(kg): --  General: Nontoxic-appearing Male in no acute distress.  HEENT: AT/NC. Anicteric. Conjunctiva pink and moist. small whitish plaque in back and top palate   Dentition fair.  Neck: Not rigid. No sense of mass.  Nodes: None palpable.  Lungs: Clear bilaterally without rales, wheezing or rhonchi  Heart: Regular rate and rhythm. No Murmur.  Abdomen: Bowel sounds present and normoactive. Soft. Nondistended. Nontender.   Back: No spinal tenderness. No costovertebral angle tenderness.   Extremities: No cyanosis or clubbing. No edema.   Skin: Warm. Dry. Good turgor. No rash. No vasculitic stigmata.  Psychiatric: Appropriate affect and mood for situation.         Laboratory & Imaging Data--  CBC                        10.5   6.14  )-----------( 341      ( 29 May 2019 06:33 )             32.1       Chemistries  05-29    140  |  102  |  6<L>  ----------------------------<  99  3.0<L>   |  30  |  0.62    Ca    6.9<L>      29 May 2019 06:33  Mg     2.0     05-29    TPro  5.4<L>  /  Alb  1.5<L>  /  TBili  0.4  /  DBili  x   /  AST  10<L>  /  ALT  7<L>  /  AlkPhos  65  05-29      Culture Data    1Urine Microscopic-Add On (NC) (05.10.19 @ 16:27)    Red Blood Cell - Urine: 3-5 /HPF    White Blood Cell - Urine: 0-2    Bacteria: Few    Epithelial Cells: Moderate        < from: CT Abdomen and Pelvis w/ Oral Cont and w/ IV Cont (05.10.19 @ 14:21) >  EXAM:  CT ABDOMEN AND PELVIS OC IC                            PROCEDURE DATE:  05/10/2019          INTERPRETATION:  History: Abdominal pain. Antecedent history of   ulcerative colitis.    CT of abdomen and pelvis oral and IV contrast.  95 cc Omnipaque 350 injected intravenously.  Bibasilar discoid atelectasis. Coronary artery calcification.  No calcified gallstones or biliary dilatation. Liver pancreas spleen   adrenals unremarkable. There is a tiny too small to characterize cortical   hypodensity lower pole right kidney probable cyst.  Atherosclerotic nonaneurysmal aorta.  No evidence of appendicitis.  There is severe contiguous edematous mural thickening and augmented   mucosal enhancement involving the rectum and entire colon consistent with   severe acute proctitis and pancolitis, presumed ulcerative colitis flare   in this specific clinical setting. Prominent submucosal fat deposition   noted in the rectosigmoid. Pericolonic stranding and hyperemic changes   noted. There is no pneumatosis or extraluminal fluid or gas. No bowel   obstruction. Reflex ileus involving transverse colon.  Normal prostate and bladder. Mild widening left inguinal ring with fat.  No acute skeletal abnormality.    Impression:    Severe acute proctitis and pancolitis, presumed ulcerative colitis flare   in this specific clinical setting.    < end of copied text >      < from: Xray Chest 1 View AP/PA (05.10.19 @ 11:21) >    EXAM:  XR CHEST AP OR PA 1V                            PROCEDURE DATE:  05/10/2019          INTERPRETATION:  Abdominal pain.    AP chest.    The heart and mediastinum not enlarged. There is atherosclerotic   unfolding and calcification in the thoracic aorta. The lungs are expanded   and clear. Each costophrenic angle is sharp.  Bony structures normal for   age.    IMPRESSION: No active disease.    < end of copied text >      GI PCR Panel, Stool (05.12.19 @ 14:57)    Specimen Source: .Stool Feces jana corrales    Culture Results:   GI PCR Results: NOT detected  *******Please Note:*******  GI panel PCR evaluates for:  Campylobacter, Plesiomonas shigelloides, Salmonella,  Vibrio, Yersinia enterocolitica, Enteroaggregative  Escherichia coli (EAEC), Enteropathogenic E.coli (EPEC),  Enterotoxigenic E. coli (ETEC) lt/st, Shiga-like  toxin-producing E. coli (STEC) stx1/stx2,  Shigella/ Enteroinvasive E. coli (EIEC), Cryptosporidium,  Cyclospora cayetanensis, Entamoeba histolytica,  Giardia lamblia, Adenovirus F 40/41, Astrovirus,  Norovirus GI/GII, Rotavirus A, Sapovirus

## 2019-05-29 NOTE — CONSULT NOTE ADULT - PROBLEM SELECTOR RECOMMENDATION 2
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
see above  gerd prec

## 2019-05-29 NOTE — PROGRESS NOTE ADULT - PROBLEM SELECTOR PLAN 1
GI consulted, reccs appreciated  cont diflucan for suspected candida  cont ppi bid, carafate bid, anti emetics prn  trial of clears, advance diet as tolerated  aspiration prec  monitor lytes, replete prn  Speech and swallow eval placed  Plan for egd/colon on Friday per GI

## 2019-05-29 NOTE — SWALLOW BEDSIDE ASSESSMENT ADULT - ASR SWALLOW ASPIRATION MONITOR
fever/throat clearing/change of breathing pattern/cough/gurgly voice/oral hygiene/position upright (90Y)/pneumonia/upper respiratory infection

## 2019-05-29 NOTE — CONSULT NOTE ADULT - PROBLEM SELECTOR RECOMMENDATION 9
being treated for esophageal candidiasis  cont current rx
rec starting diflucan 200mg IV qd for possible candidiasis   rec ppi bid, carafate bid, anti emetics prn  npo/ivf  monitor lytes, replete prn  aspiration prec  slp eval  may need egd if no improvement

## 2019-05-29 NOTE — PROGRESS NOTE ADULT - PROBLEM SELECTOR PLAN 3
stable  f/u bld cxs  cont home meds- delzicol 1200mg qid, bacid tid, steroid taper (pred 20mg qd)  will need id eval re: indeterminate quantiferon result prior to initiating additional maintenance therapy  monitor abd exam/stool output  colon planned for friday

## 2019-05-29 NOTE — SWALLOW BEDSIDE ASSESSMENT ADULT - SWALLOW EVAL: DIAGNOSIS
1. The patient demonstrated functional oral management of puree and thin liquid textures marked by adequate bolus collection, transfer, and posterior transport. 2. The patient demonstrated a mild oral dysphagia for solids marked by prolonged oral manipulation resulting in delayed bolus collection, transfer, and posterior transport. Mild lingual residue noted subsequent to deglutition which reduced with a liquid wash. 3. The patient demonstrated a mild pharyngeal dysphagia for puree, solid, and thin liquid textures marked by a delayed pharyngeal swallow trigger with reduced hyolaryngeal elevation upon digital palpation w/o evidence of airway penetration.

## 2019-05-29 NOTE — PROGRESS NOTE ADULT - PROBLEM SELECTOR PLAN 1
cont diflucan for suspected candida  cont ppi bid, carafate bid, anti emetics prn  trial of clears, advance diet as tolerated  monitor lytes, replete prn  aspiration prec  consider slp eval  will plan for egd/colon on friday, pt agreeable

## 2019-05-29 NOTE — PROGRESS NOTE ADULT - ATTENDING COMMENTS
lost 11 kg since last admission lost 11 kg since last admission. needs endoscopy  - recurrent shingles. valtrex 1g tid    I personally conducted a physical examination of the patient. I personally gathered the patient's history. I edited the above listed findings which were prepared by the listed resident physician. I personally discussed the plan of care with the patient. The questions and concerns were addressed to the best of my ability. The patient is in agreement with the listed treatment plan.

## 2019-05-29 NOTE — PROGRESS NOTE ADULT - SUBJECTIVE AND OBJECTIVE BOX
Patient is a 63y old  Male who presents with a chief complaint of unable to eat (29 May 2019 10:34)      FROM ADMISSION H+P:   HPI:  62 yo M PMHx ulcerative colitis, HLD, anxiety presents to ED  with complaints of inability to eat, tolerate PO since discharge from hospital recently. Patient states that after discharge on 5/13 for uc flare(on steroids) has been unable to swallow pills or tolerate po. He is complaining of dysphagia (to both solids and liquids) He also complains of nausea, vomiting,and fevers at home. He denies any diarrhea, admits seeing GI in office after discharge, stopped oral steroids and antibiotics recently.   In the ED patient afebrile, tachycardic. Seen by CHERYL wills. (28 May 2019 16:17)      ----  INTERVAL HPI/OVERNIGHT EVENTS: Pt seen and evaluated at the bedside. No acute overnight events occurred. Patient reports feeling nauseous and poor appetite. No fevers, chills, headaches, dizziness, chest pain.    ----  PAST MEDICAL & SURGICAL HISTORY:  Anxiety  HLD (hyperlipidemia)  Ulcerative colitis  No significant past surgical history      FAMILY HISTORY:      ----  MEDICATIONS  (STANDING):  enoxaparin Injectable 40 milliGRAM(s) SubCutaneous daily  fluconAZOLE IVPB      fluconAZOLE IVPB 200 milliGRAM(s) IV Intermittent every 24 hours  mesalamine DR Capsule 1200 milliGRAM(s) Oral four times a day  pantoprazole  Injectable 40 milliGRAM(s) IV Push every 12 hours  predniSONE   Tablet 20 milliGRAM(s) Oral daily  sodium chloride 0.9%. 1000 milliLiter(s) (75 mL/Hr) IV Continuous <Continuous>  sucralfate suspension 1 Gram(s) Oral two times a day  traZODone 50 milliGRAM(s) Oral at bedtime    MEDICATIONS  (PRN):  acetaminophen   Tablet .. 650 milliGRAM(s) Oral every 6 hours PRN Temp greater or equal to 38C (100.4F)  diazepam    Tablet 5 milliGRAM(s) Oral at bedtime PRN insomnia  ondansetron Injectable 4 milliGRAM(s) IV Push every 6 hours PRN Nausea and/or Vomiting      ----  REVIEW OF SYSTEMS:  CONSTITUTIONAL: denies fever, chills, weakness  HEENT: denies blurred vision, sore throat  SKIN: denies new lesions, rash  CARDIOVASCULAR: denies chest pain, chest pressure, palpitations  RESPIRATORY: denies shortness of breath, sputum production  GASTROINTESTINAL: admits nausea, No vomiting, diarrhea, abdominal pain  GENITOURINARY: denies dysuria, discharge  NEUROLOGICAL: denies numbness, headache, focal weakness  MUSCULOSKELETAL: denies new joint pain, muscle aches  HEMATOLOGIC: denies gross bleeding, bruising  LYMPHATICS: denies enlarged lymph nodes, extremity swelling  PSYCHIATRIC: denies recent changes in anxiety, depression  ENDOCRINOLOGIC: denies sweating, cold or heat intolerance    ----  PHYSICAL EXAM:  GENERAL: No acute distress, appropriate  EYES: sclera clear, no exudates  ENMT: oropharynx clear without erythema, no exudates, dry mucous membranes  NECK: supple, soft, no thyromegaly noted  LUNGS: good air entry bilaterally, clear to auscultation, symmetric breath sounds, no wheezing or rhonchi appreciated  HEART: soft S1/S2, regular rate and rhythm, no murmurs noted, no lower extremity edema  GASTROINTESTINAL: abdomen is soft, nontender, nondistended,  hyperactive bowel sounds, no palpable masses  INTEGUMENT: good skin turgor, no lesions noted  MUSCULOSKELETAL: no clubbing or cyanosis, no obvious deformity  NEUROLOGIC: awake, alert, oriented x3, good muscle tone in 4 extremities, no obvious sensory deficits  PSYCHIATRIC: anxious  HEME/LYMPH: no palpable supraclavicular nodules, no obvious ecchymosis or petechiae     T(C): 36.8 (05-29-19 @ 08:03), Max: 37.1 (05-28-19 @ 12:53)  HR: 94 (05-29-19 @ 08:03) (74 - 120)  BP: 119/78 (05-29-19 @ 08:03) (100/72 - 127/84)  RR: 17 (05-29-19 @ 08:03) (16 - 18)  SpO2: 99% (05-29-19 @ 08:03) (98% - 99%)  Wt(kg): --    ----  I&O's Summary    28 May 2019 07:01  -  29 May 2019 07:00  --------------------------------------------------------  IN: 600 mL / OUT: 0 mL / NET: 600 mL        LABS:                        10.5   6.14  )-----------( 341      ( 29 May 2019 06:33 )             32.1     05-29    140  |  102  |  6<L>  ----------------------------<  99  3.0<L>   |  30  |  0.62    Ca    6.9<L>      29 May 2019 06:33  Mg     2.0     05-29    TPro  5.4<L>  /  Alb  1.5<L>  /  TBili  0.4  /  DBili  x   /  AST  10<L>  /  ALT  7<L>  /  AlkPhos  65  05-29        CAPILLARY BLOOD GLUCOSE                    ----  Personally reviewed:  Vital sign trends: [ X ] yes    [  ] no     [  ] n/a  Laboratory results: [X  ] yes    [  ] no     [  ] n/a  Radiology results: [X  ] yes    [  ] no     [  ] n/a  Culture results: [  ] yes    [  ] no     [ x ] n/a  Consultant recommendations: [X  ] yes    [  ] no     [  ] n/a Patient is a 63y old  Male who presents with a chief complaint of unable to eat (29 May 2019 10:34)      FROM ADMISSION H+P:   HPI:  62 yo M PMHx ulcerative colitis, HLD, anxiety presents to ED  with complaints of inability to eat, tolerate PO since discharge from hospital recently. Patient states that after discharge on 5/13 for uc flare(on steroids) has been unable to swallow pills or tolerate po. He is complaining of dysphagia (to both solids and liquids) He also complains of nausea, vomiting,and fevers at home. He denies any diarrhea, admits seeing GI in office after discharge, stopped oral steroids and antibiotics recently.   In the ED patient afebrile, tachycardic. Seen by CHERYL wills. (28 May 2019 16:17)      ----  INTERVAL HPI/OVERNIGHT EVENTS: Pt seen and evaluated at the bedside. No acute overnight events occurred. Patient reports feeling nauseous and poor appetite. No fevers, chills, headaches, dizziness, chest pain.     ----  PAST MEDICAL & SURGICAL HISTORY:  Anxiety  HLD (hyperlipidemia)  Ulcerative colitis  No significant past surgical history      FAMILY HISTORY:      ----  MEDICATIONS  (STANDING):  enoxaparin Injectable 40 milliGRAM(s) SubCutaneous daily  fluconAZOLE IVPB      fluconAZOLE IVPB 200 milliGRAM(s) IV Intermittent every 24 hours  mesalamine DR Capsule 1200 milliGRAM(s) Oral four times a day  pantoprazole  Injectable 40 milliGRAM(s) IV Push every 12 hours  predniSONE   Tablet 20 milliGRAM(s) Oral daily  sodium chloride 0.9%. 1000 milliLiter(s) (75 mL/Hr) IV Continuous <Continuous>  sucralfate suspension 1 Gram(s) Oral two times a day  traZODone 50 milliGRAM(s) Oral at bedtime    MEDICATIONS  (PRN):  acetaminophen   Tablet .. 650 milliGRAM(s) Oral every 6 hours PRN Temp greater or equal to 38C (100.4F)  diazepam    Tablet 5 milliGRAM(s) Oral at bedtime PRN insomnia  ondansetron Injectable 4 milliGRAM(s) IV Push every 6 hours PRN Nausea and/or Vomiting      ----  REVIEW OF SYSTEMS:  CONSTITUTIONAL: denies fever, chills, weakness  HEENT: denies blurred vision, sore throat  SKIN: denies new lesions, rash  CARDIOVASCULAR: denies chest pain, chest pressure, palpitations  RESPIRATORY: denies shortness of breath, sputum production  GASTROINTESTINAL: admits nausea, No vomiting, diarrhea, abdominal pain  GENITOURINARY: denies dysuria, discharge  NEUROLOGICAL: denies numbness, headache, focal weakness  MUSCULOSKELETAL: denies new joint pain, muscle aches  HEMATOLOGIC: denies gross bleeding, bruising  LYMPHATICS: denies enlarged lymph nodes, extremity swelling  PSYCHIATRIC: denies recent changes in anxiety, depression  ENDOCRINOLOGIC: denies sweating, cold or heat intolerance    ----  PHYSICAL EXAM:  GENERAL: No acute distress, appropriate  EYES: sclera clear, no exudates  ENMT: oropharynx clear without erythema, no exudates, dry mucous membranes  NECK: supple, soft, no thyromegaly noted  LUNGS: good air entry bilaterally, clear to auscultation, symmetric breath sounds, no wheezing or rhonchi appreciated  HEART: soft S1/S2, regular rate and rhythm, no murmurs noted, no lower extremity edema  GASTROINTESTINAL: abdomen is soft, nontender, nondistended,  hyperactive bowel sounds, no palpable masses  INTEGUMENT: good skin turgor, no lesions noted  MUSCULOSKELETAL: no clubbing or cyanosis, no obvious deformity  NEUROLOGIC: awake, alert, oriented x3, good muscle tone in 4 extremities, no obvious sensory deficits  PSYCHIATRIC: anxious  HEME/LYMPH: no palpable supraclavicular nodules, no obvious ecchymosis or petechiae     T(C): 36.8 (05-29-19 @ 08:03), Max: 37.1 (05-28-19 @ 12:53)  HR: 94 (05-29-19 @ 08:03) (74 - 120)  BP: 119/78 (05-29-19 @ 08:03) (100/72 - 127/84)  RR: 17 (05-29-19 @ 08:03) (16 - 18)  SpO2: 99% (05-29-19 @ 08:03) (98% - 99%)  Wt(kg): --    ----  I&O's Summary    28 May 2019 07:01  -  29 May 2019 07:00  --------------------------------------------------------  IN: 600 mL / OUT: 0 mL / NET: 600 mL        LABS:                        10.5   6.14  )-----------( 341      ( 29 May 2019 06:33 )             32.1     05-29    140  |  102  |  6<L>  ----------------------------<  99  3.0<L>   |  30  |  0.62    Ca    6.9<L>      29 May 2019 06:33  Mg     2.0     05-29    TPro  5.4<L>  /  Alb  1.5<L>  /  TBili  0.4  /  DBili  x   /  AST  10<L>  /  ALT  7<L>  /  AlkPhos  65  05-29        CAPILLARY BLOOD GLUCOSE                    ----  Personally reviewed:  Vital sign trends: [ X ] yes    [  ] no     [  ] n/a  Laboratory results: [X  ] yes    [  ] no     [  ] n/a  Radiology results: [X  ] yes    [  ] no     [  ] n/a  Culture results: [  ] yes    [  ] no     [ x ] n/a  Consultant recommendations: [X  ] yes    [  ] no     [  ] n/a

## 2019-05-29 NOTE — PROGRESS NOTE ADULT - SUBJECTIVE AND OBJECTIVE BOX
INTERVAL HPI/OVERNIGHT EVENTS:  pt seen and examined  feels better  denies n/v/abd pain/change in stool output  wants to try po    MEDICATIONS  (STANDING):  enoxaparin Injectable 40 milliGRAM(s) SubCutaneous daily  fluconAZOLE IVPB      fluconAZOLE IVPB 200 milliGRAM(s) IV Intermittent every 24 hours  mesalamine Suppository 1000 milliGRAM(s) Rectal at bedtime  pantoprazole  Injectable 40 milliGRAM(s) IV Push every 12 hours  sodium chloride 0.9%. 1000 milliLiter(s) (75 mL/Hr) IV Continuous <Continuous>  sucralfate suspension 1 Gram(s) Oral two times a day  traZODone 50 milliGRAM(s) Oral at bedtime    MEDICATIONS  (PRN):  acetaminophen   Tablet .. 650 milliGRAM(s) Oral every 6 hours PRN Temp greater or equal to 38C (100.4F)  diazepam    Tablet 5 milliGRAM(s) Oral at bedtime PRN insomnia  ondansetron Injectable 4 milliGRAM(s) IV Push every 6 hours PRN Nausea and/or Vomiting      Allergies    penicillin (Swelling)    Intolerances        Review of Systems:    General:  No wt loss, fevers, chills, night sweats, fatigue   Eyes:  Good vision, no reported pain  ENT:  No sore throat, pain, runny nose, dysphagia  CV:  No pain, palpitations, hypo/hypertension  Resp:  No dyspnea, cough, tachypnea, wheezing  GI:  No pain, No nausea, No vomiting, No diarrhea, No constipation, No weight loss, No fever, No pruritis, No rectal bleeding, No melena, ?dysphagia  :  No pain, bleeding, incontinence, nocturia  Muscle:  No pain, weakness  Neuro:  No weakness, tingling, memory problems  Psych:  No fatigue, insomnia, mood problems, depression  Endocrine:  No polyuria, polydypsia, cold/heat intolerance  Heme:  No petechiae, ecchymosis, easy bruisability  Skin:  No rash, tattoos, scars, edema      Vital Signs Last 24 Hrs  T(C): 36.8 (29 May 2019 08:03), Max: 37.1 (28 May 2019 12:53)  T(F): 98.3 (29 May 2019 08:03), Max: 98.8 (28 May 2019 12:53)  HR: 94 (29 May 2019 08:03) (74 - 120)  BP: 119/78 (29 May 2019 08:03) (100/72 - 127/84)  BP(mean): --  RR: 17 (29 May 2019 08:03) (16 - 18)  SpO2: 99% (29 May 2019 08:03) (98% - 99%)    PHYSICAL EXAM:    General:  nad  HEENT:  NC/AT  Chest:  dec bs  Cardiovascular:  rrr S1, S2  Abdomen:  Soft, non-tender, non-distended  Extremities:  no edema  Skin:  No rash  Neuro/Psych:  A&Ox3    LABS:                        10.5   6.14  )-----------( 341      ( 29 May 2019 06:33 )             32.1     05-29    140  |  102  |  6<L>  ----------------------------<  99  3.0<L>   |  30  |  0.62    Ca    6.9<L>      29 May 2019 06:33  Mg     2.0     05-29    TPro  5.4<L>  /  Alb  1.5<L>  /  TBili  0.4  /  DBili  x   /  AST  10<L>  /  ALT  7<L>  /  AlkPhos  65  05-29          RADIOLOGY & ADDITIONAL TESTS:

## 2019-05-29 NOTE — CHART NOTE - NSCHARTNOTEFT_GEN_A_CORE
Called to evaluate patient for rash on left thigh/ hip. Patient denies any current pain or other symptoms. Rash was noticed by RN as she was helping turn patient. Patient has known hx of shingles 3-4 times in same area.     Examined with Dr Castellanos at bedside. Patient has dermatomal rash from L2-L3 with 1 active site and remainder area crusted, non-tender to palpation    Started on Valtrex 1gm TID for 7 days.  Informed ID Dr. Shannen Rodriguez who is following patient

## 2019-05-29 NOTE — SWALLOW BEDSIDE ASSESSMENT ADULT - SWALLOW EVAL: RECOMMENDED FEEDING/EATING TECHNIQUES
maintain upright posture during/after eating for 30 mins/alternate food with liquid/oral hygiene/crush medication (when feasible)/hard swallow w/ each bite or sip/position upright (90 degrees)/small sips/bites/allow for swallow between intakes/check mouth frequently for oral residue/pocketing

## 2019-05-29 NOTE — SWALLOW BEDSIDE ASSESSMENT ADULT - COMMENTS
The patient was seen this afternoon for an initial assessment of swallow function, at which time he was alert and cooperative and denying any swallowing difficulties at this time. Patient is reporting reduced PO intake. Confusion noted throughout conversational discourse with this clinician. Per charting, the patient is a 64 yo M with PMHx significant of "ulcerative colitis, HLD, anxiety presents to ED  with complaints of inability to eat, tolerate PO since discharge from hospital recently. Patient states that after discharge on 5/13 for uc flare(on steroids) has been unable to swallow pills or tolerate po. He is complaining of dysphagia (to both solids and liquids) He also complains of nausea, vomiting,and fevers at home. He denies any diarrhea, admits seeing GI in office after discharge, stopped oral steroids and antibiotics recently." Discussed results and recommendations from this evaluation with the patient, RN, and MD.

## 2019-05-30 DIAGNOSIS — B02.9 ZOSTER WITHOUT COMPLICATIONS: ICD-10-CM

## 2019-05-30 DIAGNOSIS — Z01.818 ENCOUNTER FOR OTHER PREPROCEDURAL EXAMINATION: ICD-10-CM

## 2019-05-30 LAB
ALBUMIN SERPL ELPH-MCNC: 1.5 G/DL — LOW (ref 3.3–5)
ALP SERPL-CCNC: 65 U/L — SIGNIFICANT CHANGE UP (ref 40–120)
ALT FLD-CCNC: 8 U/L — LOW (ref 12–78)
ANION GAP SERPL CALC-SCNC: 12 MMOL/L — SIGNIFICANT CHANGE UP (ref 5–17)
AST SERPL-CCNC: 9 U/L — LOW (ref 15–37)
BILIRUB SERPL-MCNC: 0.4 MG/DL — SIGNIFICANT CHANGE UP (ref 0.2–1.2)
BUN SERPL-MCNC: 3 MG/DL — LOW (ref 7–23)
CALCIUM SERPL-MCNC: 7.2 MG/DL — LOW (ref 8.5–10.1)
CHLORIDE SERPL-SCNC: 100 MMOL/L — SIGNIFICANT CHANGE UP (ref 96–108)
CO2 SERPL-SCNC: 27 MMOL/L — SIGNIFICANT CHANGE UP (ref 22–31)
CREAT SERPL-MCNC: 0.54 MG/DL — SIGNIFICANT CHANGE UP (ref 0.5–1.3)
GLUCOSE SERPL-MCNC: 87 MG/DL — SIGNIFICANT CHANGE UP (ref 70–99)
HCT VFR BLD CALC: 30.8 % — LOW (ref 39–50)
HGB BLD-MCNC: 10.2 G/DL — LOW (ref 13–17)
MAGNESIUM SERPL-MCNC: 1.8 MG/DL — SIGNIFICANT CHANGE UP (ref 1.6–2.6)
MCHC RBC-ENTMCNC: 30.2 PG — SIGNIFICANT CHANGE UP (ref 27–34)
MCHC RBC-ENTMCNC: 33.1 GM/DL — SIGNIFICANT CHANGE UP (ref 32–36)
MCV RBC AUTO: 91.1 FL — SIGNIFICANT CHANGE UP (ref 80–100)
NRBC # BLD: 0 /100 WBCS — SIGNIFICANT CHANGE UP (ref 0–0)
PLATELET # BLD AUTO: 341 K/UL — SIGNIFICANT CHANGE UP (ref 150–400)
POTASSIUM SERPL-MCNC: 3 MMOL/L — LOW (ref 3.5–5.3)
POTASSIUM SERPL-MCNC: 3.2 MMOL/L — LOW (ref 3.5–5.3)
POTASSIUM SERPL-SCNC: 3 MMOL/L — LOW (ref 3.5–5.3)
POTASSIUM SERPL-SCNC: 3.2 MMOL/L — LOW (ref 3.5–5.3)
PROT SERPL-MCNC: 5.2 G/DL — LOW (ref 6–8.3)
RBC # BLD: 3.38 M/UL — LOW (ref 4.2–5.8)
RBC # FLD: 14.2 % — SIGNIFICANT CHANGE UP (ref 10.3–14.5)
SODIUM SERPL-SCNC: 139 MMOL/L — SIGNIFICANT CHANGE UP (ref 135–145)
WBC # BLD: 6.83 K/UL — SIGNIFICANT CHANGE UP (ref 3.8–10.5)
WBC # FLD AUTO: 6.83 K/UL — SIGNIFICANT CHANGE UP (ref 3.8–10.5)

## 2019-05-30 PROCEDURE — 99233 SBSQ HOSP IP/OBS HIGH 50: CPT

## 2019-05-30 PROCEDURE — 71045 X-RAY EXAM CHEST 1 VIEW: CPT | Mod: 26

## 2019-05-30 RX ORDER — POTASSIUM CHLORIDE 20 MEQ
10 PACKET (EA) ORAL
Refills: 0 | Status: COMPLETED | OUTPATIENT
Start: 2019-05-30 | End: 2019-05-30

## 2019-05-30 RX ADMIN — SODIUM CHLORIDE 75 MILLILITER(S): 9 INJECTION INTRAMUSCULAR; INTRAVENOUS; SUBCUTANEOUS at 02:55

## 2019-05-30 RX ADMIN — Medication 100 MILLIEQUIVALENT(S): at 16:49

## 2019-05-30 RX ADMIN — SODIUM CHLORIDE 75 MILLILITER(S): 9 INJECTION INTRAMUSCULAR; INTRAVENOUS; SUBCUTANEOUS at 14:38

## 2019-05-30 RX ADMIN — FLUCONAZOLE 100 MILLIGRAM(S): 150 TABLET ORAL at 14:23

## 2019-05-30 RX ADMIN — Medication 100 MILLIEQUIVALENT(S): at 14:10

## 2019-05-30 RX ADMIN — Medication 1 GRAM(S): at 07:30

## 2019-05-30 RX ADMIN — SODIUM CHLORIDE 75 MILLILITER(S): 9 INJECTION INTRAMUSCULAR; INTRAVENOUS; SUBCUTANEOUS at 21:44

## 2019-05-30 RX ADMIN — PANTOPRAZOLE SODIUM 40 MILLIGRAM(S): 20 TABLET, DELAYED RELEASE ORAL at 16:55

## 2019-05-30 RX ADMIN — PANTOPRAZOLE SODIUM 40 MILLIGRAM(S): 20 TABLET, DELAYED RELEASE ORAL at 07:30

## 2019-05-30 RX ADMIN — ENOXAPARIN SODIUM 40 MILLIGRAM(S): 100 INJECTION SUBCUTANEOUS at 11:16

## 2019-05-30 RX ADMIN — Medication 50 MILLIGRAM(S): at 21:45

## 2019-05-30 RX ADMIN — VALACYCLOVIR 1000 MILLIGRAM(S): 500 TABLET, FILM COATED ORAL at 10:39

## 2019-05-30 RX ADMIN — VALACYCLOVIR 1000 MILLIGRAM(S): 500 TABLET, FILM COATED ORAL at 14:22

## 2019-05-30 RX ADMIN — Medication 1200 MILLIGRAM(S): at 11:16

## 2019-05-30 RX ADMIN — VALACYCLOVIR 1000 MILLIGRAM(S): 500 TABLET, FILM COATED ORAL at 21:44

## 2019-05-30 RX ADMIN — Medication 100 MILLIEQUIVALENT(S): at 18:52

## 2019-05-30 RX ADMIN — ONDANSETRON 4 MILLIGRAM(S): 8 TABLET, FILM COATED ORAL at 07:29

## 2019-05-30 NOTE — PROGRESS NOTE ADULT - PROBLEM SELECTOR PLAN 1
no sense trying to repeat QFG while pt on steroids. If patient can be off steroids could reassess but if need for biologics would treat for 9 months of INH B6 given risk factors. This is certainly not an acute issue and happy to see pt in out pt setting to further evaluate.

## 2019-05-30 NOTE — PROGRESS NOTE ADULT - PROBLEM SELECTOR PLAN 1
GI consulted, recs appreciated  cont diflucan for suspected candida  cont ppi bid, carafate bid, anti emetics prn  trial of clears, advance diet as tolerated  aspiration prec  monitor lytes, replete prn  speech and swallow eval placed  plan for egd/colon on Friday per GI  - symptoms are improving  - npo p midnight, hold lovenox in the AM

## 2019-05-30 NOTE — PROGRESS NOTE ADULT - PROBLEM SELECTOR PLAN 1
cont diflucan for suspected candida  cont ppi bid, carafate bid, anti emetics prn  ivf/clears  monitor lytes, replete prn  aspiration prec  slp input appreciated  will plan for egd in am, pt unlikely to tolerate prep presently, pt agreeable, npo p mn, will need medical clearance

## 2019-05-30 NOTE — PROGRESS NOTE ADULT - ATTENDING COMMENTS
Advanced care planning was discussed with patient and family.  Advanced care planning forms were reviewed and discussed.  Risks, benefits and alternatives of gastroenterologic procedures were discussed in detail and all questions were answered.    30 minutes spent. Advanced care planning was discussed with patient and family.  Advanced care planning forms were reviewed and discussed.  Risks, benefits and alternatives of gastroenterologic procedures were discussed in detail and all questions were answered.    30 minutes spent.    unable to do prep for colonoscopy  patient prefers egd/colon together  consider clears in am  will hold off on  upper gastrointestinal endoscopy tmw

## 2019-05-30 NOTE — PROGRESS NOTE ADULT - ATTENDING COMMENTS
lost 11 kg since last admission. for EGD tomorrow    The tx plan was discussed with the patient in detail. The patient's questions and concerns were addressed to the best of my ability. The patient is in agreement with the plan detailed above. The patient demonstrated adequate understanding of the plan and the counseling which I have provided.

## 2019-05-30 NOTE — PROGRESS NOTE ADULT - SUBJECTIVE AND OBJECTIVE BOX
infectious diseases progress note:    MONSE RAZO is a 63y y. o. Male patient    Patient reports: rash on left flank is painful    ROS:    EYES:  Negative  blurry vision or double vision  GASTROINTESTINAL:  Negative for nausea, vomiting, diarrhea  -otherwise negative except for subjective    Allergies    penicillin (Swelling)    Intolerances        ANTIBIOTICS/RELEVANT:  antimicrobials  fluconAZOLE IVPB      fluconAZOLE IVPB 200 milliGRAM(s) IV Intermittent every 24 hours  valACYclovir 1000 milliGRAM(s) Oral three times a day    immunologic:    OTHER:  acetaminophen   Tablet .. 650 milliGRAM(s) Oral every 6 hours PRN  diazepam    Tablet 5 milliGRAM(s) Oral at bedtime PRN  enoxaparin Injectable 40 milliGRAM(s) SubCutaneous daily  mesalamine DR Capsule 1200 milliGRAM(s) Oral four times a day  ondansetron Injectable 4 milliGRAM(s) IV Push every 6 hours PRN  pantoprazole   Suspension 40 milliGRAM(s) Oral two times a day before meals  predniSONE   Tablet 20 milliGRAM(s) Oral daily  sodium chloride 0.9%. 1000 milliLiter(s) IV Continuous <Continuous>  sucralfate suspension 1 Gram(s) Oral two times a day  traZODone 50 milliGRAM(s) Oral at bedtime      Objective:  Vital Signs Last 24 Hrs  T(C): 37 (30 May 2019 08:04), Max: 37 (30 May 2019 08:04)  T(F): 98.6 (30 May 2019 08:04), Max: 98.6 (30 May 2019 08:04)  HR: 87 (30 May 2019 08:04) (80 - 87)  BP: 140/91 (30 May 2019 08:04) (115/72 - 140/91)  BP(mean): --  RR: 18 (30 May 2019 08:04) (18 - 18)  SpO2: 97% (30 May 2019 08:04) (97% - 98%)    T(C): 37 (19 @ 08:04), Max: 37.1 (19 @ 12:53)  T(C): 37 (19 @ 08:04), Max: 37.1 (19 @ 12:53)  T(C): 37 (19 @ 08:04), Max: 37.1 (05-28-19 @ 12:53)    PHYSICAL EXAM:  Constitutional: Well-developed, well nourished  Eyes: PERRLA, EOMI  Ear/Nose/Throat: oropharynx normal	  Neck: no JVD, no lymphadenopathy, supple  Respiratory: no accessory muscle use  Cardiovascular: RRR,   Gastrointestinal: soft, NT  Extremities: no clubbing, no cyanosis, edema absent  Skin: dermatomal rash left flank      LABS:                        10.2   6.83  )-----------( 341      ( 30 May 2019 07:07 )             30.8       6.83  @ 07:07  6.14  @ 06:33  6.81  @ 13:40          139  |  100  |  3<L>  ----------------------------<  87  3.2<L>   |  27  |  0.54    Ca    7.2<L>      30 May 2019 07:07  Mg     1.8         TPro  5.2<L>  /  Alb  1.5<L>  /  TBili  0.4  /  DBili  x   /  AST  9<L>  /  ALT  8<L>  /  AlkPhos  65        Creatinine, Serum: 0.54 mg/dL (19 @ 07:07)  Creatinine, Serum: 0.62 mg/dL (19 @ 06:33)  Creatinine, Serum: 0.62 mg/dL (19 @ 15:38)        Urinalysis Basic - ( 29 May 2019 13:19 )    Color: Yellow / Appearance: Slightly Turbid / S.015 / pH: x  Gluc: x / Ketone: Moderate  / Bili: Small / Urobili: Negative   Blood: x / Protein: 25 mg/dL / Nitrite: Negative   Leuk Esterase: Trace / RBC: 0-2 /HPF / WBC 0-2   Sq Epi: x / Non Sq Epi: Occasional / Bacteria: x            MICROBIOLOGY:              RADIOLOGY & ADDITIONAL STUDIES:

## 2019-05-30 NOTE — PROGRESS NOTE ADULT - PROBLEM SELECTOR PLAN 2
agree with Valtrex 1000mg PO TID x 7 days.    Thank you for consulting us and involving us in the management of this most interesting and challenging case.     Please Call with any further questions

## 2019-05-30 NOTE — PROGRESS NOTE ADULT - ASSESSMENT
63M with UC, hld recent UC flare discharged 5/13  admitted with dysphagia  hypokalemia, hypocalcemia  ? of latent TB

## 2019-05-30 NOTE — PROGRESS NOTE ADULT - PROBLEM SELECTOR PLAN 3
stable  bld cxs ngtd  cont home meds- delzicol 1200mg qid, bacid tid, steroid taper   id input appreciated  monitor abd exam/stool output  op colon      further plan to be dw attg

## 2019-05-30 NOTE — PROGRESS NOTE ADULT - PROBLEM SELECTOR PLAN 2
I personally reviewed the patient's labs: CBC, BMP, coagulation profile (from last admission), reordered for AM  I personally reviewed the patient's EKG and my interpretation is: PENDING FOR AM  I personally reviewed the patient's CXR and my interpretation is: PENDING FOR AM  The patient's RCRI score is: likely 0, pending eval  Risk stratification is pending workup

## 2019-05-30 NOTE — PROGRESS NOTE ADULT - SUBJECTIVE AND OBJECTIVE BOX
Patient is a 63y old  Male who presents with a chief complaint of unable to eat (30 May 2019 10:26)      FROM ADMISSION H+P:   HPI:  64 yo M PMHx ulcerative colitis, HLD, anxiety presents to ED  with complaints of inability to eat, tolerate PO since discharge from hospital recently. Patient states that after discharge on  for uc flare(on steroids) has been unable to swallow pills or tolerate po. He is complaining of dysphagia (to both solids and liquids) He also complains of nausea, vomiting,and fevers at home. He denies any diarrhea, admits seeing GI in office after discharge, stopped oral steroids and antibiotics recently.   In the ED patient afebrile, tachycardic. Seen by CHERYL wills. (28 May 2019 16:17)      ----  INTERVAL HPI/OVERNIGHT EVENTS: Pt seen and evaluated at the bedside. No acute overnight events occurred. Pt admits feeling improved nausea today. Having frequent BM's. No family @ bedside at the time of my eval. Pt admits nausea and vomiting overnight but none this morning.     PRE-OP: exercise tolerance >4.0 mets [ x ] YES   [  ] NO  ; active tobacco use [  ] YES   [ x ] NO  ;  known h/o ARMINDA [  ] YES   [ x ] NO  ; presence of obesity  [  ] YES   [ x ] NO  ;  presence of alcohol misuse [  ] YES   [ x ] NO  ; presence of illicit drug use [  ] YES   [ x ] NO  ; personal history of complications from anesthesia [  ] YES   [ x ] NO      ----  PAST MEDICAL & SURGICAL HISTORY:  Anxiety  HLD (hyperlipidemia)  Ulcerative colitis  No significant past surgical history      FAMILY HISTORY:      Allergies    penicillin (Swelling)    Intolerances        ----  REVIEW OF SYSTEMS:  CONSTITUTIONAL: denies fever, chills, weakness  HEENT: denies blurred vision, admits mild sore throat  SKIN: denies new lesions, rash  CARDIOVASCULAR: denies chest pain, chest pressure, palpitations  RESPIRATORY: denies shortness of breath, sputum production  GASTROINTESTINAL: as per HPI  GENITOURINARY: denies dysuria, discharge  MUSCULOSKELETAL: denies new joint pain, muscle aches   LYMPHATICS: denies enlarged lymph nodes, extremity swelling  ENDOCRINOLOGIC: denies sweating, cold or heat intolerance    ----  PHYSICAL EXAM:  GENERAL: no acute distress, appropriate, appears comfortable  EYES: sclera clear, no exudates  ENMT: oropharynx clear without erythema, no exudates, dry mucous membranes, no white colored exudates on tongue or in oropharynx  NECK: supple, soft, no thyromegaly noted  LUNGS: good air entry bilaterally, clear to auscultation, symmetric breath sounds, no wheezing or rhonchi appreciated  HEART: soft S1/S2, regular rate and rhythm, no murmurs noted, no lower extremity edema  GASTROINTESTINAL: abdomen is soft, nontender, nondistended,  hyperactive bowel sounds, no palpable masses  INTEGUMENT: good skin turgor, no lesions noted  MUSCULOSKELETAL: no clubbing or cyanosis, no obvious deformity  NEUROLOGIC: awake, alert, oriented x3, good muscle tone in 4 extremities, no obvious sensory deficits  HEME/LYMPH: no palpable supraclavicular nodules, no obvious ecchymosis or petechiae     T(C): 36.9 (19 @ 16:00), Max: 37 (19 @ 08:04)  HR: 90 (19 @ 16:00) (80 - 90)  BP: 139/87 (19 @ 16:00) (115/72 - 140/91)  RR: 18 (19 @ 16:00) (18 - 18)  SpO2: 99% (19 @ 16:00) (97% - 99%)  Wt(kg): --    ----  I&O's Summary    29 May 2019 07:  -  30 May 2019 07:00  --------------------------------------------------------  IN: 2350 mL / OUT: 700 mL / NET: 1650 mL    30 May 2019 07:  -  30 May 2019 17:48  --------------------------------------------------------  IN: 100 mL / OUT: 100 mL / NET: 0 mL        LABS:                        10.2   6.83  )-----------( 341      ( 30 May 2019 07:07 )             30.8         139  |  100  |  3<L>  ----------------------------<  87  3.2<L>   |  27  |  0.54    Ca    7.2<L>      30 May 2019 07:07  Mg     1.8         TPro  5.2<L>  /  Alb  1.5<L>  /  TBili  0.4  /  DBili  x   /  AST  9<L>  /  ALT  8<L>  /  AlkPhos  65        Urinalysis Basic - ( 29 May 2019 13:19 )    Color: Yellow / Appearance: Slightly Turbid / S.015 / pH: x  Gluc: x / Ketone: Moderate  / Bili: Small / Urobili: Negative   Blood: x / Protein: 25 mg/dL / Nitrite: Negative   Leuk Esterase: Trace / RBC: 0-2 /HPF / WBC 0-2   Sq Epi: x / Non Sq Epi: Occasional / Bacteria: x      CAPILLARY BLOOD GLUCOSE           @ 21:25   No growth to date.  --  --            ----  Personally reviewed:  Vital sign trends: [ x ] yes    [  ] no     [  ] n/a  Laboratory results: [x  ] yes    [  ] no     [  ] n/a  Radiology results: [  ] yes    [  ] no     [ x ] n/a  Culture results: [ x ] yes    [  ] no     [  ] n/a  Consultant recommendations: [ x ] yes    [  ] no     [  ] n/a

## 2019-05-31 LAB
ALBUMIN SERPL ELPH-MCNC: 1.5 G/DL — LOW (ref 3.3–5)
ALP SERPL-CCNC: 69 U/L — SIGNIFICANT CHANGE UP (ref 40–120)
ALT FLD-CCNC: <6 U/L — LOW (ref 12–78)
ANION GAP SERPL CALC-SCNC: 8 MMOL/L — SIGNIFICANT CHANGE UP (ref 5–17)
AST SERPL-CCNC: 7 U/L — LOW (ref 15–37)
BILIRUB DIRECT SERPL-MCNC: 0.1 MG/DL — SIGNIFICANT CHANGE UP (ref 0.05–0.2)
BILIRUB INDIRECT FLD-MCNC: 0.4 MG/DL — SIGNIFICANT CHANGE UP (ref 0.2–1)
BILIRUB SERPL-MCNC: 0.5 MG/DL — SIGNIFICANT CHANGE UP (ref 0.2–1.2)
BUN SERPL-MCNC: 3 MG/DL — LOW (ref 7–23)
CALCIUM SERPL-MCNC: 7.1 MG/DL — LOW (ref 8.5–10.1)
CHLORIDE SERPL-SCNC: 100 MMOL/L — SIGNIFICANT CHANGE UP (ref 96–108)
CO2 SERPL-SCNC: 29 MMOL/L — SIGNIFICANT CHANGE UP (ref 22–31)
CREAT SERPL-MCNC: 0.39 MG/DL — LOW (ref 0.5–1.3)
GAMMA INTERFERON BACKGROUND BLD IA-ACNC: 0.02 IU/ML — SIGNIFICANT CHANGE UP
GLUCOSE SERPL-MCNC: 91 MG/DL — SIGNIFICANT CHANGE UP (ref 70–99)
HCT VFR BLD CALC: 30.5 % — LOW (ref 39–50)
HGB BLD-MCNC: 10.1 G/DL — LOW (ref 13–17)
INR BLD: 1.06 RATIO — SIGNIFICANT CHANGE UP (ref 0.88–1.16)
M TB IFN-G BLD-IMP: ABNORMAL
M TB IFN-G CD4+ BCKGRND COR BLD-ACNC: 0 IU/ML — SIGNIFICANT CHANGE UP
M TB IFN-G CD4+CD8+ BCKGRND COR BLD-ACNC: 0 IU/ML — SIGNIFICANT CHANGE UP
MAGNESIUM SERPL-MCNC: 1.7 MG/DL — SIGNIFICANT CHANGE UP (ref 1.6–2.6)
MCHC RBC-ENTMCNC: 30.2 PG — SIGNIFICANT CHANGE UP (ref 27–34)
MCHC RBC-ENTMCNC: 33.1 GM/DL — SIGNIFICANT CHANGE UP (ref 32–36)
MCV RBC AUTO: 91.3 FL — SIGNIFICANT CHANGE UP (ref 80–100)
NRBC # BLD: 0 /100 WBCS — SIGNIFICANT CHANGE UP (ref 0–0)
PCP SPEC-MCNC: SIGNIFICANT CHANGE UP
PHOSPHATE SERPL-MCNC: 2.2 MG/DL — LOW (ref 2.5–4.5)
PLATELET # BLD AUTO: 349 K/UL — SIGNIFICANT CHANGE UP (ref 150–400)
POTASSIUM SERPL-MCNC: 3.5 MMOL/L — SIGNIFICANT CHANGE UP (ref 3.5–5.3)
POTASSIUM SERPL-SCNC: 3.5 MMOL/L — SIGNIFICANT CHANGE UP (ref 3.5–5.3)
PROT SERPL-MCNC: 5.2 G/DL — LOW (ref 6–8.3)
PROTHROM AB SERPL-ACNC: 12.1 SEC — SIGNIFICANT CHANGE UP (ref 10–12.9)
QUANT TB PLUS MITOGEN MINUS NIL: 0.18 IU/ML — SIGNIFICANT CHANGE UP
RBC # BLD: 3.34 M/UL — LOW (ref 4.2–5.8)
RBC # FLD: 14 % — SIGNIFICANT CHANGE UP (ref 10.3–14.5)
SODIUM SERPL-SCNC: 137 MMOL/L — SIGNIFICANT CHANGE UP (ref 135–145)
WBC # BLD: 7.18 K/UL — SIGNIFICANT CHANGE UP (ref 3.8–10.5)
WBC # FLD AUTO: 7.18 K/UL — SIGNIFICANT CHANGE UP (ref 3.8–10.5)

## 2019-05-31 PROCEDURE — 99232 SBSQ HOSP IP/OBS MODERATE 35: CPT

## 2019-05-31 PROCEDURE — 93010 ELECTROCARDIOGRAM REPORT: CPT

## 2019-05-31 RX ORDER — SOD SULF/SODIUM/NAHCO3/KCL/PEG
1 SOLUTION, RECONSTITUTED, ORAL ORAL EVERY 4 HOURS
Refills: 0 | Status: COMPLETED | OUTPATIENT
Start: 2019-06-02 | End: 2019-06-02

## 2019-05-31 RX ADMIN — ONDANSETRON 4 MILLIGRAM(S): 8 TABLET, FILM COATED ORAL at 00:29

## 2019-05-31 RX ADMIN — SODIUM CHLORIDE 75 MILLILITER(S): 9 INJECTION INTRAMUSCULAR; INTRAVENOUS; SUBCUTANEOUS at 14:24

## 2019-05-31 RX ADMIN — Medication 1 GRAM(S): at 17:07

## 2019-05-31 RX ADMIN — FLUCONAZOLE 100 MILLIGRAM(S): 150 TABLET ORAL at 14:27

## 2019-05-31 RX ADMIN — VALACYCLOVIR 1000 MILLIGRAM(S): 500 TABLET, FILM COATED ORAL at 14:00

## 2019-05-31 RX ADMIN — Medication 1200 MILLIGRAM(S): at 00:24

## 2019-05-31 RX ADMIN — ENOXAPARIN SODIUM 40 MILLIGRAM(S): 100 INJECTION SUBCUTANEOUS at 11:44

## 2019-05-31 RX ADMIN — Medication 1200 MILLIGRAM(S): at 11:44

## 2019-05-31 RX ADMIN — PANTOPRAZOLE SODIUM 40 MILLIGRAM(S): 20 TABLET, DELAYED RELEASE ORAL at 17:54

## 2019-05-31 RX ADMIN — Medication 1200 MILLIGRAM(S): at 19:54

## 2019-05-31 NOTE — DIETITIAN INITIAL EVALUATION ADULT. - PHYSICAL APPEARANCE
other (specify)/thin; BMI 20.8. Pt agreeable to Nutrition Focused Physical Exam. Findings upon examination include mild temporal, moderate clavicle, mild shoulder, mild calf and thigh muscle loss; mild orbital, mild triceps fat loss.

## 2019-05-31 NOTE — PROGRESS NOTE ADULT - SUBJECTIVE AND OBJECTIVE BOX
INTERVAL HPI/OVERNIGHT EVENTS:  pt seen and examined  feels slightly better  reports episode of vomiting overnight  c/o intermittent abd cramps  +bms  utox +for opiates    MEDICATIONS  (STANDING):  enoxaparin Injectable 40 milliGRAM(s) SubCutaneous daily  fluconAZOLE IVPB      fluconAZOLE IVPB 200 milliGRAM(s) IV Intermittent every 24 hours  mesalamine DR Capsule 1200 milliGRAM(s) Oral four times a day  pantoprazole   Suspension 40 milliGRAM(s) Oral two times a day before meals  predniSONE   Tablet 20 milliGRAM(s) Oral daily  sodium chloride 0.9%. 1000 milliLiter(s) (75 mL/Hr) IV Continuous <Continuous>  sucralfate suspension 1 Gram(s) Oral two times a day  traZODone 50 milliGRAM(s) Oral at bedtime  valACYclovir 1000 milliGRAM(s) Oral three times a day    MEDICATIONS  (PRN):  acetaminophen   Tablet .. 650 milliGRAM(s) Oral every 6 hours PRN Temp greater or equal to 38C (100.4F)  diazepam    Tablet 5 milliGRAM(s) Oral at bedtime PRN insomnia  ondansetron Injectable 4 milliGRAM(s) IV Push every 6 hours PRN Nausea and/or Vomiting      Allergies    penicillin (Swelling)    Intolerances        Review of Systems:    General:  No wt loss, fevers, chills, night sweats, fatigue   Eyes:  Good vision, no reported pain  ENT:  No sore throat, pain, runny nose, dysphagia  CV:  No pain, palpitations, hypo/hypertension  Resp:  No dyspnea, cough, tachypnea, wheezing  GI:  +cramps vomiting , No nausea,+ diarrhea, No constipation, No weight loss, No fever, No pruritis, No rectal bleeding, No melena, No dysphagia  :  No pain, bleeding, incontinence, nocturia  Muscle:  No pain, weakness  Neuro:  No weakness, tingling, memory problems  Psych:  No fatigue, insomnia, mood problems, depression  Endocrine:  No polyuria, polydypsia, cold/heat intolerance  Heme:  No petechiae, ecchymosis, easy bruisability  Skin:  No rash, tattoos, scars, edema      Vital Signs Last 24 Hrs  T(C): 37.1 (31 May 2019 08:12), Max: 37.1 (31 May 2019 08:12)  T(F): 98.7 (31 May 2019 08:12), Max: 98.7 (31 May 2019 08:12)  HR: 90 (31 May 2019 08:12) (90 - 93)  BP: 125/83 (31 May 2019 08:12) (125/83 - 139/87)  BP(mean): --  RR: 18 (31 May 2019 08:12) (17 - 18)  SpO2: 99% (31 May 2019 08:12) (99% - 99%)    PHYSICAL EXAM:      General:  nad  HEENT:  NC/AT  Chest:  dec bs  Cardiovascular:  rrr S1, S2  Abdomen:  Soft, non-tender, non-distended  Extremities:  no edema  Skin:  shingles l flank  Neuro/Psych:  A&Ox3  LABS:                        10.1   7.18  )-----------( 349      ( 31 May 2019 07:00 )             30.5     05-31    137  |  100  |  3<L>  ----------------------------<  91  3.5   |  29  |  0.39<L>    Ca    7.1<L>      31 May 2019 07:00  Phos  2.2     05-31  Mg     1.7     05-31    TPro  5.2<L>  /  Alb  1.5<L>  /  TBili  0.5  /  DBili  .10  /  AST  7<L>  /  ALT  <6<L>  /  AlkPhos  69  05-31    PT/INR - ( 31 May 2019 05:04 )   PT: 12.1 sec;   INR: 1.06 ratio           Urinalysis Basic - ( 29 May 2019 13:19 )    Color: Yellow / Appearance: Slightly Turbid / S.015 / pH: x  Gluc: x / Ketone: Moderate  / Bili: Small / Urobili: Negative   Blood: x / Protein: 25 mg/dL / Nitrite: Negative   Leuk Esterase: Trace / RBC: 0-2 /HPF / WBC 0-2   Sq Epi: x / Non Sq Epi: Occasional / Bacteria: x        RADIOLOGY & ADDITIONAL TESTS:

## 2019-05-31 NOTE — DIETITIAN INITIAL EVALUATION ADULT. - OTHER INFO
Pt seen for NPO/ clear liquids >3 days. As per chart pt is a 63 year old male with a PMH of ulcerative colitis, HLD, anxiety presents to ED  with complaints of inability to eat, tolerate PO since discharge from hospital recently. Patient states that after discharge on 5/13 for uc flare(on steroids) has been unable to swallow pills or tolerate po. Suspected candida esophagitis. Pt remains on clear liquids, reports that he is not tolerating it well, complains of nausea/ vomiting. Pt's current weight per chart (5/29) 137.7lbs, (admission wt) 135lbs. Pt reports current weight of 135lbs, reports UBW of 165lbs, states that he lost about 30lbs over the past two months secondary to being unable to eat and altered GI function. Still complains of swallowing difficulties, reports that he has to work hard to keep food from coming back up. Complains of nausea/ vomiting/ chronic diarrhea, +BM today.

## 2019-05-31 NOTE — PROGRESS NOTE ADULT - SUBJECTIVE AND OBJECTIVE BOX
Patient is a 63y old  Male who presents with a chief complaint of unable to eat (31 May 2019 09:24)      FROM ADMISSION H+P:   HPI:  64 yo M PMHx ulcerative colitis, HLD, anxiety presents to ED  with complaints of inability to eat, tolerate PO since discharge from hospital recently. Patient states that after discharge on 5/13 for uc flare(on steroids) has been unable to swallow pills or tolerate po. He is complaining of dysphagia (to both solids and liquids) He also complains of nausea, vomiting,and fevers at home. He denies any diarrhea, admits seeing GI in office after discharge, stopped oral steroids and antibiotics recently.   In the ED patient afebrile, tachycardic. Seen by CHERYL wills. (28 May 2019 16:17)      ----  INTERVAL HPI/OVERNIGHT EVENTS: Pt seen and evaluated at the bedside. No acute overnight events occurred. Pt declined endoscopy this morning, he wants EGD and colon at same time but wasn't able to tolerate the prep. Will plan for Monday. Pt still vomiting x1 this monring otherwise tolerated breakfast and lunch (clear liquids). no other complaints. Pt was taking his home narcotics without telling staff. Meds were confiscated and disposed of. Spouse counseled. Pt counseled. Will monitor for withdrawal. All in agreement.     ----  PAST MEDICAL & SURGICAL HISTORY:  Anxiety  HLD (hyperlipidemia)  Ulcerative colitis  No significant past surgical history      FAMILY HISTORY:      Allergies    penicillin (Swelling)    Intolerances        ----  REVIEW OF SYSTEMS:  CONSTITUTIONAL: denies fever, chills, weakness  HEENT: denies blurred vision, admits mild sore throat  SKIN: denies new lesions, rash  CARDIOVASCULAR: denies chest pain, chest pressure, palpitations  RESPIRATORY: denies shortness of breath, sputum production  GASTROINTESTINAL: as per HPI  GENITOURINARY: denies dysuria, discharge  MUSCULOSKELETAL: denies new joint pain, muscle aches   LYMPHATICS: denies enlarged lymph nodes, extremity swelling  ENDOCRINOLOGIC: denies sweating, cold or heat intolerance    ----  PHYSICAL EXAM:  GENERAL: no acute distress, appropriate, appears comfortable  EYES: sclera clear, no exudates  ENMT: oropharynx clear without erythema, no exudates, dry mucous membranes, no white colored exudates on tongue or in oropharynx  NECK: supple, soft, no thyromegaly noted  LUNGS: good air entry bilaterally, clear to auscultation, symmetric breath sounds, no wheezing or rhonchi appreciated  HEART: soft S1/S2, regular rate and rhythm, no murmurs noted, no lower extremity edema  GASTROINTESTINAL: abdomen is soft, nontender, nondistended,  hyperactive bowel sounds, no palpable masses  INTEGUMENT: good skin turgor, no lesions noted  MUSCULOSKELETAL: no clubbing or cyanosis, no obvious deformity  NEUROLOGIC: awake, alert, oriented x3, good muscle tone in 4 extremities, no obvious sensory deficits  HEME/LYMPH: no palpable supraclavicular nodules, no obvious ecchymosis or petechiae     T(C): 36.8 (05-31-19 @ 16:30), Max: 37.1 (05-31-19 @ 08:12)  HR: 88 (05-31-19 @ 16:30) (88 - 93)  BP: 126/89 (05-31-19 @ 16:30) (125/83 - 137/82)  RR: 18 (05-31-19 @ 16:30) (17 - 18)  SpO2: 99% (05-31-19 @ 16:30) (99% - 99%)  Wt(kg): --    ----  I&O's Summary    30 May 2019 07:01  -  31 May 2019 07:00  --------------------------------------------------------  IN: 1050 mL / OUT: 100 mL / NET: 950 mL        LABS:                        10.1   7.18  )-----------( 349      ( 31 May 2019 07:00 )             30.5     05-31    137  |  100  |  3<L>  ----------------------------<  91  3.5   |  29  |  0.39<L>    Ca    7.1<L>      31 May 2019 07:00  Phos  2.2     05-31  Mg     1.7     05-31    TPro  5.2<L>  /  Alb  1.5<L>  /  TBili  0.5  /  DBili  .10  /  AST  7<L>  /  ALT  <6<L>  /  AlkPhos  69  05-31    PT/INR - ( 31 May 2019 05:04 )   PT: 12.1 sec;   INR: 1.06 ratio             CAPILLARY BLOOD GLUCOSE          05-28 @ 21:25   No growth to date.  --  --            ----  Personally reviewed:  Vital sign trends: [ x ] yes    [  ] no     [  ] n/a  Laboratory results: [ x ] yes    [  ] no     [  ] n/a  Radiology results: [  ] yes    [  ] no     [ x ] n/a  Culture results: [x  ] yes    [  ] no     [  ] n/a  Consultant recommendations: [ x ] yes    [  ] no     [  ] n/a

## 2019-05-31 NOTE — DIETITIAN INITIAL EVALUATION ADULT. - ADHERENCE
Pt reports that he was not following any dietary restrictions PTA. Denies any vitamin/ mineral supplementation PTA. NKFA./n/a

## 2019-05-31 NOTE — PROGRESS NOTE ADULT - PROBLEM SELECTOR PLAN 1
cont diflucan for suspected candida  cont ppi bid, carafate bid, anti emetics prn, may need trial of reglan  ivf/liquid diet  monitor lytes, replete prn  aspiration prec  slp input appreciated  will plan for egd/colon on monday, pt agreeable will need medical clearance

## 2019-05-31 NOTE — PROGRESS NOTE ADULT - PROBLEM SELECTOR PLAN 1
GI consulted, recs appreciated  cont diflucan for suspected candida  cont ppi bid, carafate bid, anti emetics prn  trial of clears, advance diet as tolerated  aspiration prec  monitor lytes, replete prn  speech and swallow eval placed  plan for egd/colon on Monday  - symptoms are improving

## 2019-05-31 NOTE — DIETITIAN INITIAL EVALUATION ADULT. - FACTORS AFF FOOD INTAKE
persistent diarrhea/persistent lack of appetite/difficulty swallowing/persistent nausea/Pt seen by SLP on 5/29- recommending for soft solids with thin liquids/vomiting

## 2019-05-31 NOTE — PROGRESS NOTE ADULT - ATTENDING COMMENTS
lost 11 kg since last admission     The tx plan was discussed with the patient in detail. The patient's questions and concerns were addressed to the best of my ability. The patient is in agreement with the plan detailed above. The patient demonstrated adequate understanding of the plan and the counseling which I have provided.

## 2019-05-31 NOTE — DIETITIAN INITIAL EVALUATION ADULT. - ORAL INTAKE PTA
Pt reports poor appetite and PO intake PTA, reports that he was not able to eat anything as he could not swallow any solids or liquids./poor

## 2019-05-31 NOTE — DIETITIAN INITIAL EVALUATION ADULT. - NS AS NUTRI INTERV MEALS SNACK
When medically feasible recommend to advance diet to full liquids then to Low fiber. RD to remain available./General/healthful diet When medically feasible recommend to advance diet to full liquids then to Soft, Low fiber. RD to remain available./General/healthful diet

## 2019-05-31 NOTE — PROGRESS NOTE ADULT - PROBLEM SELECTOR PLAN 3
stable  bld cxs ngtd  cont home meds- delzicol 1200mg qid, bacid tid, steroid taper as tolerated  id input appreciated  monitor abd exam/stool output  colon monday

## 2019-05-31 NOTE — DIETITIAN INITIAL EVALUATION ADULT. - ENERGY NEEDS
Wt: 135lbs (stated), Ht: 65in, BMI: 20.8, IBW: 136lbs (+/-10%), %IBW: 99  No edema or pressure injuries noted at this time

## 2019-05-31 NOTE — CHART NOTE - NSCHARTNOTEFT_GEN_A_CORE
Upon Nutritional Assessment by the Registered Dietitian your patient was determined to meet criteria / has evidence of the following diagnosis/diagnoses:          [ ]  Mild Protein Calorie Malnutrition        [x ]  Moderate Protein Calorie Malnutrition        [ ] Severe Protein Calorie Malnutrition        [ ] Unspecified Protein Calorie Malnutrition        [ ] Underweight / BMI <19        [ ] Morbid Obesity / BMI > 40      Findings as based on:  •  Comprehensive nutrition assessment and consultation  consuming <75% of estimated nutrient needs   18.18% weight loss x 2 months  mild to moderate muscle and fat loss       Treatment:    The following diet has been recommended: Soft, low fiber, Ensure Clear TID      PROVIDER Section:     By signing this assessment you are acknowledging and agree with the diagnosis/diagnoses assigned by the Registered Dietitian    Comments:

## 2019-06-01 DIAGNOSIS — F19.10 OTHER PSYCHOACTIVE SUBSTANCE ABUSE, UNCOMPLICATED: ICD-10-CM

## 2019-06-01 LAB
ANION GAP SERPL CALC-SCNC: 15 MMOL/L — SIGNIFICANT CHANGE UP (ref 5–17)
BUN SERPL-MCNC: <1 MG/DL — LOW (ref 7–23)
CALCIUM SERPL-MCNC: 6.7 MG/DL — LOW (ref 8.5–10.1)
CHLORIDE SERPL-SCNC: 98 MMOL/L — SIGNIFICANT CHANGE UP (ref 96–108)
CO2 SERPL-SCNC: 24 MMOL/L — SIGNIFICANT CHANGE UP (ref 22–31)
CREAT SERPL-MCNC: 0.28 MG/DL — LOW (ref 0.5–1.3)
GLUCOSE SERPL-MCNC: 91 MG/DL — SIGNIFICANT CHANGE UP (ref 70–99)
HCT VFR BLD CALC: 27.6 % — LOW (ref 39–50)
HGB BLD-MCNC: 9.5 G/DL — LOW (ref 13–17)
MCHC RBC-ENTMCNC: 30.1 PG — SIGNIFICANT CHANGE UP (ref 27–34)
MCHC RBC-ENTMCNC: 34.4 GM/DL — SIGNIFICANT CHANGE UP (ref 32–36)
MCV RBC AUTO: 87.3 FL — SIGNIFICANT CHANGE UP (ref 80–100)
NRBC # BLD: 0 /100 WBCS — SIGNIFICANT CHANGE UP (ref 0–0)
PLATELET # BLD AUTO: 324 K/UL — SIGNIFICANT CHANGE UP (ref 150–400)
POTASSIUM SERPL-MCNC: 2.2 MMOL/L — CRITICAL LOW (ref 3.5–5.3)
POTASSIUM SERPL-SCNC: 2.2 MMOL/L — CRITICAL LOW (ref 3.5–5.3)
PREALB SERPL-MCNC: 3 MG/DL — LOW (ref 20–40)
RBC # BLD: 3.16 M/UL — LOW (ref 4.2–5.8)
RBC # FLD: 13.9 % — SIGNIFICANT CHANGE UP (ref 10.3–14.5)
SODIUM SERPL-SCNC: 137 MMOL/L — SIGNIFICANT CHANGE UP (ref 135–145)
WBC # BLD: 5.22 K/UL — SIGNIFICANT CHANGE UP (ref 3.8–10.5)
WBC # FLD AUTO: 5.22 K/UL — SIGNIFICANT CHANGE UP (ref 3.8–10.5)

## 2019-06-01 PROCEDURE — 99233 SBSQ HOSP IP/OBS HIGH 50: CPT

## 2019-06-01 RX ORDER — BUPRENORPHINE AND NALOXONE 2; .5 MG/1; MG/1
2 TABLET SUBLINGUAL ONCE
Refills: 0 | Status: DISCONTINUED | OUTPATIENT
Start: 2019-06-01 | End: 2019-06-01

## 2019-06-01 RX ORDER — SODIUM,POTASSIUM PHOSPHATES 278-250MG
1 POWDER IN PACKET (EA) ORAL
Refills: 0 | Status: COMPLETED | OUTPATIENT
Start: 2019-06-01 | End: 2019-06-04

## 2019-06-01 RX ORDER — POTASSIUM CHLORIDE 20 MEQ
40 PACKET (EA) ORAL EVERY 4 HOURS
Refills: 0 | Status: COMPLETED | OUTPATIENT
Start: 2019-06-01 | End: 2019-06-02

## 2019-06-01 RX ORDER — PROCHLORPERAZINE MALEATE 5 MG
10 TABLET ORAL EVERY 8 HOURS
Refills: 0 | Status: DISCONTINUED | OUTPATIENT
Start: 2019-06-01 | End: 2019-06-02

## 2019-06-01 RX ORDER — PANTOPRAZOLE SODIUM 20 MG/1
40 TABLET, DELAYED RELEASE ORAL
Refills: 0 | Status: DISCONTINUED | OUTPATIENT
Start: 2019-06-01 | End: 2019-06-10

## 2019-06-01 RX ORDER — PROCHLORPERAZINE MALEATE 5 MG
10 TABLET ORAL ONCE
Refills: 0 | Status: COMPLETED | OUTPATIENT
Start: 2019-06-01 | End: 2019-06-01

## 2019-06-01 RX ORDER — DEXTROSE MONOHYDRATE, SODIUM CHLORIDE, AND POTASSIUM CHLORIDE 50; .745; 4.5 G/1000ML; G/1000ML; G/1000ML
1000 INJECTION, SOLUTION INTRAVENOUS
Refills: 0 | Status: COMPLETED | OUTPATIENT
Start: 2019-06-01 | End: 2019-06-02

## 2019-06-01 RX ORDER — HYDROXYZINE HCL 10 MG
50 TABLET ORAL EVERY 4 HOURS
Refills: 0 | Status: DISCONTINUED | OUTPATIENT
Start: 2019-06-01 | End: 2019-06-02

## 2019-06-01 RX ORDER — POTASSIUM CHLORIDE 20 MEQ
10 PACKET (EA) ORAL
Refills: 0 | Status: COMPLETED | OUTPATIENT
Start: 2019-06-01 | End: 2019-06-01

## 2019-06-01 RX ADMIN — DEXTROSE MONOHYDRATE, SODIUM CHLORIDE, AND POTASSIUM CHLORIDE 100 MILLILITER(S): 50; .745; 4.5 INJECTION, SOLUTION INTRAVENOUS at 16:56

## 2019-06-01 RX ADMIN — VALACYCLOVIR 1000 MILLIGRAM(S): 500 TABLET, FILM COATED ORAL at 23:33

## 2019-06-01 RX ADMIN — Medication 40 MILLIEQUIVALENT(S): at 20:50

## 2019-06-01 RX ADMIN — Medication 100 MILLIEQUIVALENT(S): at 18:56

## 2019-06-01 RX ADMIN — Medication 40 MILLIEQUIVALENT(S): at 23:33

## 2019-06-01 RX ADMIN — Medication 1200 MILLIGRAM(S): at 23:34

## 2019-06-01 RX ADMIN — BUPRENORPHINE AND NALOXONE 2 TABLET(S): 2; .5 TABLET SUBLINGUAL at 18:49

## 2019-06-01 RX ADMIN — PANTOPRAZOLE SODIUM 40 MILLIGRAM(S): 20 TABLET, DELAYED RELEASE ORAL at 18:45

## 2019-06-01 RX ADMIN — FLUCONAZOLE 100 MILLIGRAM(S): 150 TABLET ORAL at 14:55

## 2019-06-01 RX ADMIN — BUPRENORPHINE AND NALOXONE 2 TABLET(S): 2; .5 TABLET SUBLINGUAL at 19:49

## 2019-06-01 RX ADMIN — VALACYCLOVIR 1000 MILLIGRAM(S): 500 TABLET, FILM COATED ORAL at 14:10

## 2019-06-01 RX ADMIN — Medication 50 MILLIGRAM(S): at 23:33

## 2019-06-01 RX ADMIN — ONDANSETRON 4 MILLIGRAM(S): 8 TABLET, FILM COATED ORAL at 10:26

## 2019-06-01 RX ADMIN — Medication 1 GRAM(S): at 14:10

## 2019-06-01 RX ADMIN — Medication 1200 MILLIGRAM(S): at 13:00

## 2019-06-01 RX ADMIN — Medication 100 MILLIEQUIVALENT(S): at 23:29

## 2019-06-01 RX ADMIN — ENOXAPARIN SODIUM 40 MILLIGRAM(S): 100 INJECTION SUBCUTANEOUS at 12:59

## 2019-06-01 RX ADMIN — Medication 1 PACKET(S): at 20:50

## 2019-06-01 RX ADMIN — Medication 1 GRAM(S): at 18:45

## 2019-06-01 RX ADMIN — Medication 20 MILLIGRAM(S): at 14:10

## 2019-06-01 RX ADMIN — Medication 1200 MILLIGRAM(S): at 18:45

## 2019-06-01 RX ADMIN — PANTOPRAZOLE SODIUM 40 MILLIGRAM(S): 20 TABLET, DELAYED RELEASE ORAL at 16:03

## 2019-06-01 RX ADMIN — Medication 104 MILLIGRAM(S): at 16:59

## 2019-06-01 RX ADMIN — Medication 100 MILLIEQUIVALENT(S): at 16:32

## 2019-06-01 NOTE — PROGRESS NOTE ADULT - PROBLEM SELECTOR PLAN 3
pt has been abusing his spouse's medications and was administering her medications while he was here  - meds were confiscated, pt may be withdrawing  - will add remote tele today  - may need to add clonidine for opiate withdrawal  - may need to add low dose valium for benzo withdrawal  - benzo's and opiates in the urine, will monitor for withdrawal

## 2019-06-01 NOTE — PROGRESS NOTE ADULT - PROBLEM SELECTOR PLAN 2
GI consulted, recs appreciated  cont diflucan for suspected candidal esophagitis, although the pt's symptoms haven't improved w/ sufficient tx regimen  cont ppi bid, carafate bid, anti emetics prn  continue clears for now but pt is not tolerating  aspiration prec  speech and swallow eval placed  plan for egd/colon on Monday

## 2019-06-01 NOTE — PROGRESS NOTE ADULT - PROBLEM SELECTOR PLAN 1
severely low potassium at 2.2  will check stat EKG  IV and po supplements. added compazine for additional nausea support  will recheck K after supplementation

## 2019-06-01 NOTE — CHART NOTE - NSCHARTNOTEFT_GEN_A_CORE
Signed out to evaluate for withdrawal symptoms at 21:00 and to give suboxone if demonstrating symptoms. Patient seen and examined at bedside, was sleeping comfortably. Patient denied sweating, palpitation, restlessness, anxiety/irritability, nausea/vomiting. On physical exam, PERRLA, no tremors, no yawning, piloerection, runny nose or tearing. Will hold off on giving additional suboxone at this time. RN to call with changes.

## 2019-06-01 NOTE — PROGRESS NOTE ADULT - SUBJECTIVE AND OBJECTIVE BOX
INTERVAL HPI/OVERNIGHT EVENTS:  pt seen and examined  still complains of pain and nausea   c/o intermittent abd cramps  +bms      MEDICATIONS  (STANDING):  enoxaparin Injectable 40 milliGRAM(s) SubCutaneous daily  fluconAZOLE IVPB      fluconAZOLE IVPB 200 milliGRAM(s) IV Intermittent every 24 hours  mesalamine DR Capsule 1200 milliGRAM(s) Oral four times a day  pantoprazole   Suspension 40 milliGRAM(s) Oral two times a day before meals  predniSONE   Tablet 20 milliGRAM(s) Oral daily  sodium chloride 0.9%. 1000 milliLiter(s) (75 mL/Hr) IV Continuous <Continuous>  sucralfate suspension 1 Gram(s) Oral two times a day  traZODone 50 milliGRAM(s) Oral at bedtime  valACYclovir 1000 milliGRAM(s) Oral three times a day    MEDICATIONS  (PRN):  acetaminophen   Tablet .. 650 milliGRAM(s) Oral every 6 hours PRN Temp greater or equal to 38C (100.4F)  diazepam    Tablet 5 milliGRAM(s) Oral at bedtime PRN insomnia  ondansetron Injectable 4 milliGRAM(s) IV Push every 6 hours PRN Nausea and/or Vomiting      Allergies    penicillin (Swelling)    Intolerances        Review of Systems:    General:  No wt loss, fevers, chills, night sweats, fatigue   Eyes:  Good vision, no reported pain  ENT:  No sore throat, pain, runny nose, dysphagia  CV:  No pain, palpitations, hypo/hypertension  Resp:  No dyspnea, cough, tachypnea, wheezing  GI:  +cramps vomiting , No nausea,+ diarrhea, No constipation, No weight loss, No fever, No pruritis, No rectal bleeding, No melena, No dysphagia  :  No pain, bleeding, incontinence, nocturia  Muscle:  No pain, weakness  Neuro:  No weakness, tingling, memory problems  Psych:  No fatigue, insomnia, mood problems, depression  Endocrine:  No polyuria, polydypsia, cold/heat intolerance  Heme:  No petechiae, ecchymosis, easy bruisability  Skin:  No rash, tattoos, scars, edema      Vital Signs Last 24 Hrs  T(C): 36.7 (2019 07:37), Max: 36.8 (31 May 2019 16:30)  T(F): 98 (2019 07:37), Max: 98.2 (31 May 2019 16:30)  HR: 89 (2019 07:37) (88 - 94)  BP: 127/81 (2019 07:37) (126/89 - 141/88)  BP(mean): --  RR: 18 (2019 07:37) (18 - 19)  SpO2: 98% (2019 07:37) (97% - 99%)    PHYSICAL EXAM:      General:  nad  HEENT:  NC/AT  Chest:  dec bs  Cardiovascular:  rrr S1, S2  Abdomen:  Soft, non-tender, non-distended  Extremities:  no edema  Skin:  shingles l flank  Neuro/Psych:  A&Ox3  LABS:                                             10.1   7.18  )-----------( 349      ( 31 May 2019 07:00 )             30.5   05-31    137  |  100  |  3<L>  ----------------------------<  91  3.5   |  29  |  0.39<L>    Ca    7.1<L>      31 May 2019 07:00  Phos  2.2     05-  Mg     1.7     31    TPro  5.2<L>  /  Alb  1.5<L>  /  TBili  0.5  /  DBili  .10  /  AST  7<L>  /  ALT  <6<L>  /  AlkPhos  69  05-31         Urinalysis Basic - ( 29 May 2019 13:19 )    Color: Yellow / Appearance: Slightly Turbid / S.015 / pH: x  Gluc: x / Ketone: Moderate  / Bili: Small / Urobili: Negative   Blood: x / Protein: 25 mg/dL / Nitrite: Negative   Leuk Esterase: Trace / RBC: 0-2 /HPF / WBC 0-2   Sq Epi: x / Non Sq Epi: Occasional / Bacteria: x        RADIOLOGY & ADDITIONAL TESTS:

## 2019-06-01 NOTE — PROGRESS NOTE ADULT - PROBLEM SELECTOR PLAN 1
cont diflucan for suspected candida  cont ppi bid, carafate bid, anti emetics prn, may need trial of reglan  ivf/liquid diet  monitor lytes, replete prn  aspiration prec  slp input appreciated  will plan for egd/colon on monday

## 2019-06-01 NOTE — PROGRESS NOTE ADULT - SUBJECTIVE AND OBJECTIVE BOX
Patient is a 63y old  Male who presents with a chief complaint of unable to eat (01 Jun 2019 12:42)      FROM ADMISSION H+P:   HPI:  64 yo M PMHx ulcerative colitis, HLD, anxiety presents to ED  with complaints of inability to eat, tolerate PO since discharge from hospital recently. Patient states that after discharge on 5/13 for uc flare(on steroids) has been unable to swallow pills or tolerate po. He is complaining of dysphagia (to both solids and liquids) He also complains of nausea, vomiting,and fevers at home. He denies any diarrhea, admits seeing GI in office after discharge, stopped oral steroids and antibiotics recently.   In the ED patient afebrile, tachycardic. Seen by CHERYL wills. (28 May 2019 16:17)      ----  INTERVAL HPI/OVERNIGHT EVENTS: Pt seen and evaluated at the bedside. No acute overnight events occurred. Pt has been experiencing severe nausea today. unable to tolerate any breakfast. Unable to tolerate any po meds and requests that meds be changed to IV. No other complaints. Admits upper abd discomfort which is likely 2/2 retching and repeated episodes of clear liquid emesis.     ----  PAST MEDICAL & SURGICAL HISTORY:  Anxiety  HLD (hyperlipidemia)  Ulcerative colitis  No significant past surgical history      FAMILY HISTORY:      Allergies    penicillin (Swelling)    Intolerances        ----  REVIEW OF SYSTEMS:  CONSTITUTIONAL: denies fever, chills, weakness  HEENT: denies blurred vision, admits mild sore throat  SKIN: denies new lesions, rash  CARDIOVASCULAR: denies chest pain, chest pressure, palpitations  RESPIRATORY: denies shortness of breath, sputum production  GASTROINTESTINAL: as per HPI  GENITOURINARY: denies dysuria, discharge  MUSCULOSKELETAL: denies new joint pain, muscle aches   LYMPHATICS: denies enlarged lymph nodes, admits mild b/l LE swelling  ENDOCRINOLOGIC: denies sweating, cold or heat intolerance    ----  PHYSICAL EXAM:  GENERAL: appears uncomfortable, no acute distress  EYES: sclera clear, no exudates  ENMT: oropharynx clear without erythema, no exudates, dry mucous membranes   LUNGS: good air entry bilaterally, clear to auscultation, symmetric breath sounds, no wheezing or rhonchi appreciated  HEART: soft S1/S2, regular rate and rhythm, no murmurs noted, trace b/l lower extremity edema which is nontender to palpation  GASTROINTESTINAL: abdomen is soft, nontender, nondistended,  hyperactive bowel sounds, no palpable masses  NEUROLOGIC: awake, alert, oriented x3, good muscle tone in 4 extremities, no obvious sensory deficits  HEME/LYMPH: no palpable supraclavicular nodules, no obvious ecchymosis or petechiae     T(C): 37.4 (06-01-19 @ 15:21), Max: 37.4 (06-01-19 @ 15:21)  HR: 87 (06-01-19 @ 15:21) (87 - 94)  BP: 116/80 (06-01-19 @ 15:21) (116/80 - 141/88)  RR: 18 (06-01-19 @ 15:21) (18 - 19)  SpO2: 98% (06-01-19 @ 15:21) (97% - 98%)  Wt(kg): --    ----  I&O's Summary    31 May 2019 07:01  -  01 Jun 2019 07:00  --------------------------------------------------------  IN: 2282 mL / OUT: 375 mL / NET: 1907 mL    01 Jun 2019 07:01  -  01 Jun 2019 17:34  --------------------------------------------------------  IN: 700 mL / OUT: 0 mL / NET: 700 mL        LABS:                        9.5    5.22  )-----------( 324      ( 01 Jun 2019 15:32 )             27.6     06-01    137  |  98  |  <1<L>  ----------------------------<  91  2.2<LL>   |  24  |  0.28<L>    Ca    6.7<L>      01 Jun 2019 15:32  Phos  2.2     05-31  Mg     1.7     05-31    TPro  5.2<L>  /  Alb  1.5<L>  /  TBili  0.5  /  DBili  .10  /  AST  7<L>  /  ALT  <6<L>  /  AlkPhos  69  05-31    PT/INR - ( 31 May 2019 05:04 )   PT: 12.1 sec;   INR: 1.06 ratio             CAPILLARY BLOOD GLUCOSE          05-28 @ 21:25   No growth to date.  --  --            ----  Personally reviewed:  Vital sign trends: [ x ] yes    [  ] no     [  ] n/a  Laboratory results: [ x ] yes    [  ] no     [  ] n/a  Radiology results: [  ] yes    [  ] no     [ x ] n/a  Culture results: [ x ] yes    [  ] no     [  ] n/a  Consultant recommendations: [ x ] yes    [  ] no     [  ] n/a

## 2019-06-01 NOTE — PROGRESS NOTE ADULT - PROBLEM SELECTOR PLAN 5
continue mesalamine rectal, restart oral when able to tolerate PO continue mesalamine rectal, restart oral when able to tolerate PO  - anemia is worsening, will check iron panel, b12, folate and check stool occult  - planning for egd/colon

## 2019-06-02 ENCOUNTER — TRANSCRIPTION ENCOUNTER (OUTPATIENT)
Age: 64
End: 2019-06-02

## 2019-06-02 DIAGNOSIS — E83.51 HYPOCALCEMIA: ICD-10-CM

## 2019-06-02 LAB
ALBUMIN SERPL ELPH-MCNC: 1.4 G/DL — LOW (ref 3.3–5)
ALP SERPL-CCNC: 58 U/L — SIGNIFICANT CHANGE UP (ref 40–120)
ALT FLD-CCNC: 8 U/L — LOW (ref 12–78)
ANION GAP SERPL CALC-SCNC: 11 MMOL/L — SIGNIFICANT CHANGE UP (ref 5–17)
AST SERPL-CCNC: 9 U/L — LOW (ref 15–37)
BILIRUB DIRECT SERPL-MCNC: <.1 MG/DL — SIGNIFICANT CHANGE UP (ref 0.05–0.2)
BILIRUB INDIRECT FLD-MCNC: >0.4 MG/DL — SIGNIFICANT CHANGE UP (ref 0.2–1)
BILIRUB SERPL-MCNC: 0.5 MG/DL — SIGNIFICANT CHANGE UP (ref 0.2–1.2)
BUN SERPL-MCNC: 2 MG/DL — LOW (ref 7–23)
CALCIUM SERPL-MCNC: 6.6 MG/DL — LOW (ref 8.5–10.1)
CHLORIDE SERPL-SCNC: 102 MMOL/L — SIGNIFICANT CHANGE UP (ref 96–108)
CO2 SERPL-SCNC: 22 MMOL/L — SIGNIFICANT CHANGE UP (ref 22–31)
CREAT SERPL-MCNC: 0.41 MG/DL — LOW (ref 0.5–1.3)
CULTURE RESULTS: SIGNIFICANT CHANGE UP
CULTURE RESULTS: SIGNIFICANT CHANGE UP
FERRITIN SERPL-MCNC: 762 NG/ML — HIGH (ref 30–400)
FOLATE SERPL-MCNC: 7 NG/ML — SIGNIFICANT CHANGE UP
GLUCOSE SERPL-MCNC: 119 MG/DL — HIGH (ref 70–99)
HCT VFR BLD CALC: 26.3 % — LOW (ref 39–50)
HGB BLD-MCNC: 8.7 G/DL — LOW (ref 13–17)
IRON SATN MFR SERPL: 25 % — SIGNIFICANT CHANGE UP (ref 16–55)
IRON SATN MFR SERPL: 26 UG/DL — LOW (ref 45–165)
MAGNESIUM SERPL-MCNC: 1.4 MG/DL — LOW (ref 1.6–2.6)
MCHC RBC-ENTMCNC: 29.8 PG — SIGNIFICANT CHANGE UP (ref 27–34)
MCHC RBC-ENTMCNC: 33.1 GM/DL — SIGNIFICANT CHANGE UP (ref 32–36)
MCV RBC AUTO: 90.1 FL — SIGNIFICANT CHANGE UP (ref 80–100)
NRBC # BLD: 0 /100 WBCS — SIGNIFICANT CHANGE UP (ref 0–0)
OB PNL STL: NEGATIVE — SIGNIFICANT CHANGE UP
PHOSPHATE SERPL-MCNC: 1.3 MG/DL — LOW (ref 2.5–4.5)
PLATELET # BLD AUTO: 305 K/UL — SIGNIFICANT CHANGE UP (ref 150–400)
POTASSIUM SERPL-MCNC: 3.4 MMOL/L — LOW (ref 3.5–5.3)
POTASSIUM SERPL-SCNC: 3.4 MMOL/L — LOW (ref 3.5–5.3)
PROT SERPL-MCNC: 4.8 G/DL — LOW (ref 6–8.3)
RBC # BLD: 2.92 M/UL — LOW (ref 4.2–5.8)
RBC # FLD: 13.8 % — SIGNIFICANT CHANGE UP (ref 10.3–14.5)
SODIUM SERPL-SCNC: 135 MMOL/L — SIGNIFICANT CHANGE UP (ref 135–145)
SPECIMEN SOURCE: SIGNIFICANT CHANGE UP
SPECIMEN SOURCE: SIGNIFICANT CHANGE UP
TIBC SERPL-MCNC: 106 UG/DL — LOW (ref 220–430)
UIBC SERPL-MCNC: 80 UG/DL — LOW (ref 110–370)
VIT B12 SERPL-MCNC: 1114 PG/ML — SIGNIFICANT CHANGE UP (ref 232–1245)
WBC # BLD: 3.9 K/UL — SIGNIFICANT CHANGE UP (ref 3.8–10.5)
WBC # FLD AUTO: 3.9 K/UL — SIGNIFICANT CHANGE UP (ref 3.8–10.5)

## 2019-06-02 PROCEDURE — 99233 SBSQ HOSP IP/OBS HIGH 50: CPT

## 2019-06-02 PROCEDURE — 93010 ELECTROCARDIOGRAM REPORT: CPT

## 2019-06-02 RX ORDER — FLUCONAZOLE 150 MG/1
100 TABLET ORAL EVERY 24 HOURS
Refills: 0 | Status: COMPLETED | OUTPATIENT
Start: 2019-06-03 | End: 2019-06-03

## 2019-06-02 RX ORDER — MAGNESIUM SULFATE 500 MG/ML
2 VIAL (ML) INJECTION ONCE
Refills: 0 | Status: COMPLETED | OUTPATIENT
Start: 2019-06-02 | End: 2019-06-02

## 2019-06-02 RX ORDER — ONDANSETRON 8 MG/1
4 TABLET, FILM COATED ORAL ONCE
Refills: 0 | Status: COMPLETED | OUTPATIENT
Start: 2019-06-02 | End: 2019-06-02

## 2019-06-02 RX ORDER — MULTIVIT WITH MIN/MFOLATE/K2 340-15/3 G
1 POWDER (GRAM) ORAL ONCE
Refills: 0 | Status: COMPLETED | OUTPATIENT
Start: 2019-06-02 | End: 2019-06-02

## 2019-06-02 RX ORDER — SOD SULF/SODIUM/NAHCO3/KCL/PEG
4000 SOLUTION, RECONSTITUTED, ORAL ORAL ONCE
Refills: 0 | Status: DISCONTINUED | OUTPATIENT
Start: 2019-06-02 | End: 2019-06-02

## 2019-06-02 RX ORDER — IRON SUCROSE 20 MG/ML
100 INJECTION, SOLUTION INTRAVENOUS ONCE
Refills: 0 | Status: DISCONTINUED | OUTPATIENT
Start: 2019-06-02 | End: 2019-06-02

## 2019-06-02 RX ORDER — POTASSIUM CHLORIDE 20 MEQ
40 PACKET (EA) ORAL EVERY 4 HOURS
Refills: 0 | Status: COMPLETED | OUTPATIENT
Start: 2019-06-02 | End: 2019-06-02

## 2019-06-02 RX ORDER — BUPRENORPHINE AND NALOXONE 2; .5 MG/1; MG/1
2 TABLET SUBLINGUAL DAILY
Refills: 0 | Status: DISCONTINUED | OUTPATIENT
Start: 2019-06-02 | End: 2019-06-09

## 2019-06-02 RX ORDER — CALCIUM GLUCONATE 100 MG/ML
1 VIAL (ML) INTRAVENOUS ONCE
Refills: 0 | Status: COMPLETED | OUTPATIENT
Start: 2019-06-02 | End: 2019-06-02

## 2019-06-02 RX ORDER — DEXTROSE MONOHYDRATE, SODIUM CHLORIDE, AND POTASSIUM CHLORIDE 50; .745; 4.5 G/1000ML; G/1000ML; G/1000ML
1000 INJECTION, SOLUTION INTRAVENOUS
Refills: 0 | Status: DISCONTINUED | OUTPATIENT
Start: 2019-06-02 | End: 2019-06-04

## 2019-06-02 RX ADMIN — DEXTROSE MONOHYDRATE, SODIUM CHLORIDE, AND POTASSIUM CHLORIDE 100 MILLILITER(S): 50; .745; 4.5 INJECTION, SOLUTION INTRAVENOUS at 03:44

## 2019-06-02 RX ADMIN — Medication 10 MILLIGRAM(S): at 18:05

## 2019-06-02 RX ADMIN — BUPRENORPHINE AND NALOXONE 2 TABLET(S): 2; .5 TABLET SUBLINGUAL at 13:30

## 2019-06-02 RX ADMIN — Medication 40 MILLIEQUIVALENT(S): at 03:44

## 2019-06-02 RX ADMIN — Medication 50 GRAM(S): at 18:06

## 2019-06-02 RX ADMIN — VALACYCLOVIR 1000 MILLIGRAM(S): 500 TABLET, FILM COATED ORAL at 14:27

## 2019-06-02 RX ADMIN — Medication 1 GRAM(S): at 18:06

## 2019-06-02 RX ADMIN — Medication 40 MILLIEQUIVALENT(S): at 21:13

## 2019-06-02 RX ADMIN — Medication 1200 MILLIGRAM(S): at 05:49

## 2019-06-02 RX ADMIN — Medication 1 PACKET(S): at 07:58

## 2019-06-02 RX ADMIN — Medication 1 BOTTLE: at 23:42

## 2019-06-02 RX ADMIN — Medication 1200 MILLIGRAM(S): at 12:29

## 2019-06-02 RX ADMIN — Medication 1200 MILLIGRAM(S): at 18:06

## 2019-06-02 RX ADMIN — ONDANSETRON 4 MILLIGRAM(S): 8 TABLET, FILM COATED ORAL at 20:04

## 2019-06-02 RX ADMIN — Medication 1 PACKET(S): at 12:29

## 2019-06-02 RX ADMIN — Medication 1 LITER(S): at 18:28

## 2019-06-02 RX ADMIN — Medication 1 PACKET(S): at 18:06

## 2019-06-02 RX ADMIN — Medication 40 MILLIEQUIVALENT(S): at 18:29

## 2019-06-02 RX ADMIN — Medication 20 MILLIGRAM(S): at 05:49

## 2019-06-02 RX ADMIN — BUPRENORPHINE AND NALOXONE 2 TABLET(S): 2; .5 TABLET SUBLINGUAL at 13:27

## 2019-06-02 RX ADMIN — Medication 200 GRAM(S): at 18:06

## 2019-06-02 RX ADMIN — VALACYCLOVIR 1000 MILLIGRAM(S): 500 TABLET, FILM COATED ORAL at 05:49

## 2019-06-02 RX ADMIN — ENOXAPARIN SODIUM 40 MILLIGRAM(S): 100 INJECTION SUBCUTANEOUS at 12:28

## 2019-06-02 RX ADMIN — FLUCONAZOLE 100 MILLIGRAM(S): 150 TABLET ORAL at 14:35

## 2019-06-02 RX ADMIN — Medication 10 MILLIGRAM(S): at 20:04

## 2019-06-02 RX ADMIN — PANTOPRAZOLE SODIUM 40 MILLIGRAM(S): 20 TABLET, DELAYED RELEASE ORAL at 18:05

## 2019-06-02 RX ADMIN — BUPRENORPHINE AND NALOXONE 2 TABLET(S): 2; .5 TABLET SUBLINGUAL at 12:28

## 2019-06-02 RX ADMIN — Medication 1 GRAM(S): at 05:49

## 2019-06-02 RX ADMIN — PANTOPRAZOLE SODIUM 40 MILLIGRAM(S): 20 TABLET, DELAYED RELEASE ORAL at 05:49

## 2019-06-02 NOTE — PROGRESS NOTE ADULT - ATTENDING COMMENTS
The tx plan was discussed with the patient in detail. The patient's questions and concerns were addressed to the best of my ability. The patient is in agreement with the plan detailed above. The patient demonstrated adequate understanding of the plan and the counseling which I have provided.    EGD tomorrow

## 2019-06-02 NOTE — PROGRESS NOTE ADULT - SUBJECTIVE AND OBJECTIVE BOX
INTERVAL HPI/OVERNIGHT EVENTS:  pt seen and examined  patient states that he is feeling better today   +bms  nausea improved     MEDICATIONS  (STANDING):  enoxaparin Injectable 40 milliGRAM(s) SubCutaneous daily  fluconAZOLE IVPB      fluconAZOLE IVPB 200 milliGRAM(s) IV Intermittent every 24 hours  mesalamine DR Capsule 1200 milliGRAM(s) Oral four times a day  pantoprazole   Suspension 40 milliGRAM(s) Oral two times a day before meals  predniSONE   Tablet 20 milliGRAM(s) Oral daily  sodium chloride 0.9%. 1000 milliLiter(s) (75 mL/Hr) IV Continuous <Continuous>  sucralfate suspension 1 Gram(s) Oral two times a day  traZODone 50 milliGRAM(s) Oral at bedtime  valACYclovir 1000 milliGRAM(s) Oral three times a day    MEDICATIONS  (PRN):  acetaminophen   Tablet .. 650 milliGRAM(s) Oral every 6 hours PRN Temp greater or equal to 38C (100.4F)  diazepam    Tablet 5 milliGRAM(s) Oral at bedtime PRN insomnia  ondansetron Injectable 4 milliGRAM(s) IV Push every 6 hours PRN Nausea and/or Vomiting      Allergies    penicillin (Swelling)    Intolerances        Review of Systems:    General:  No wt loss, fevers, chills, night sweats, fatigue   Eyes:  Good vision, no reported pain  ENT:  No sore throat, pain, runny nose, dysphagia  CV:  No pain, palpitations, hypo/hypertension  Resp:  No dyspnea, cough, tachypnea, wheezing  GI:  +cramps vomiting , No nausea,+ diarrhea, No constipation, No weight loss, No fever, No pruritis, No rectal bleeding, No melena, No dysphagia  :  No pain, bleeding, incontinence, nocturia  Muscle:  No pain, weakness  Neuro:  No weakness, tingling, memory problems  Psych:  No fatigue, insomnia, mood problems, depression  Endocrine:  No polyuria, polydypsia, cold/heat intolerance  Heme:  No petechiae, ecchymosis, easy bruisability  Skin:  No rash, tattoos, scars, edema      Vital Signs Last 24 Hrs  T(C): 36.6 (2019 07:47), Max: 37.4 (2019 15:21)  T(F): 97.9 (2019 07:47), Max: 99.4 (2019 15:21)  HR: 78 (2019 07:47) (78 - 87)  BP: 109/75 (2019 07:47) (109/75 - 117/80)  BP(mean): --  RR: 17 (2019 07:47) (17 - 18)  SpO2: 98% (2019 07:47) (98% - 98%)      PHYSICAL EXAM:      General:  nad  HEENT:  NC/AT  Chest:  dec bs  Cardiovascular:  rrr S1, S2  Abdomen:  Soft, non-tender, non-distended  Extremities:  no edema  Skin:  shingles l flank  Neuro/Psych:  A&Ox3  LABS:                                                             8.7    3.90  )-----------( 305      ( 2019 11:22 )             26.3   06-02    135  |  102  |  2<L>  ----------------------------<  119<H>  3.4<L>   |  22  |  0.41<L>    Ca    6.6<L>      2019 11:22  Phos  1.3     06-02  Mg     1.4     06-02    TPro  4.8<L>  /  Alb  1.4<L>  /  TBili  0.5  /  DBili  <.10  /  AST  9<L>  /  ALT  8<L>  /  AlkPhos  58  06-02      Urinalysis Basic - ( 29 May 2019 13:19 )    Color: Yellow / Appearance: Slightly Turbid / S.015 / pH: x  Gluc: x / Ketone: Moderate  / Bili: Small / Urobili: Negative   Blood: x / Protein: 25 mg/dL / Nitrite: Negative   Leuk Esterase: Trace / RBC: 0-2 /HPF / WBC 0-2   Sq Epi: x / Non Sq Epi: Occasional / Bacteria: x        RADIOLOGY & ADDITIONAL TESTS:

## 2019-06-02 NOTE — PROGRESS NOTE ADULT - PROBLEM SELECTOR PLAN 5
- corrected calcium is only mildly low (severely low albumin)  - will administer 1g calcium gluconate  - pt with dramatic electrolyte abnormalities, will attempt to optimize prior to anestehsia for endo/colon tomorrow

## 2019-06-02 NOTE — PROGRESS NOTE ADULT - SUBJECTIVE AND OBJECTIVE BOX
Patient is a 63y old  Male who presents with a chief complaint of unable to eat (02 Jun 2019 12:55)      FROM ADMISSION H+P:   HPI:  64 yo M PMHx ulcerative colitis, HLD, anxiety presents to ED  with complaints of inability to eat, tolerate PO since discharge from hospital recently. Patient states that after discharge on 5/13 for uc flare(on steroids) has been unable to swallow pills or tolerate po. He is complaining of dysphagia (to both solids and liquids) He also complains of nausea, vomiting,and fevers at home. He denies any diarrhea, admits seeing GI in office after discharge, stopped oral steroids and antibiotics recently.   In the ED patient afebrile, tachycardic. Seen by CHERYL wills. (28 May 2019 16:17)      ----  INTERVAL HPI/OVERNIGHT EVENTS: Pt seen and evaluated at the bedside. No acute overnight events occurred. Pt declined to have labs drawn this morning so they just resulted now, late in the afternoon. Pt was started on suboxone last night for his months of opiate abuse. He was started on 4mg suboxone tablets last night and he tolerated the regimen well. Denies anxiety. He slept well. Last night he was diaphoretic, anxious, jittery prior to administration of meds, then he slept well. No nausea today. He is tolerating 100% of his liquid tray this morning and overall is doing quite well. Pt and spouse are happy w/ the improvement of symptoms.     ----  PAST MEDICAL & SURGICAL HISTORY:  Anxiety  HLD (hyperlipidemia)  Ulcerative colitis  No significant past surgical history      FAMILY HISTORY:      Allergies    penicillin (Swelling)    Intolerances        ----  REVIEW OF SYSTEMS:  CONSTITUTIONAL: denies fever, chills, weakness  HEENT: denies blurred vision, admits mild sore throat  SKIN: denies new lesions, rash  CARDIOVASCULAR: denies chest pain, chest pressure, palpitations  RESPIRATORY: denies shortness of breath, sputum production  GASTROINTESTINAL: as per HPI  GENITOURINARY: denies dysuria, discharge  MUSCULOSKELETAL: denies new joint pain, muscle aches   LYMPHATICS: denies enlarged lymph nodes, admits that edema resolved  ENDOCRINOLOGIC: denies sweating, cold or heat intolerance    ----  PHYSICAL EXAM:  GENERAL: appears comfortable, he is smiling and interactive, he joked with me, no distress  EYES: sclera clear, no exudates  ENMT: oropharynx clear without erythema, no exudates, dry mucous membranes   LUNGS: good air entry bilaterally, clear to auscultation, symmetric breath sounds, no wheezing or rhonchi appreciated  HEART: soft S1/S2, regular rate and rhythm, no murmurs noted, trace b/l lower extremity edema which is nontender to palpation  GASTROINTESTINAL: abdomen is soft, nontender, nondistended,  hyperactive bowel sounds, no palpable masses  NEUROLOGIC: awake, alert, oriented x3, good muscle tone in 4 extremities, no obvious sensory deficits  HEME/LYMPH: no palpable supraclavicular nodules, no obvious ecchymosis or petechiae     T(C): 36.9 (06-02-19 @ 15:51), Max: 36.9 (06-02-19 @ 15:51)  HR: 82 (06-02-19 @ 15:51) (78 - 82)  BP: 124/84 (06-02-19 @ 15:51) (109/75 - 124/84)  RR: 18 (06-02-19 @ 15:51) (17 - 18)  SpO2: 99% (06-02-19 @ 15:51) (98% - 99%)  Wt(kg): --    ----  I&O's Summary    01 Jun 2019 07:01  -  02 Jun 2019 07:00  --------------------------------------------------------  IN: 1475 mL / OUT: 200 mL / NET: 1275 mL    02 Jun 2019 07:01  -  02 Jun 2019 16:34  --------------------------------------------------------  IN: 100 mL / OUT: 0 mL / NET: 100 mL        LABS:                        8.7    3.90  )-----------( 305      ( 02 Jun 2019 11:22 )             26.3     06-02    135  |  102  |  2<L>  ----------------------------<  119<H>  3.4<L>   |  22  |  0.41<L>    Ca    6.6<L>      02 Jun 2019 11:22  Phos  1.3     06-02  Mg     1.4     06-02    TPro  4.8<L>  /  Alb  1.4<L>  /  TBili  0.5  /  DBili  <.10  /  AST  9<L>  /  ALT  8<L>  /  AlkPhos  58  06-02        CAPILLARY BLOOD GLUCOSE                    ----  Personally reviewed:  Vital sign trends: [ x ] yes    [  ] no     [  ] n/a  Laboratory results: [ x ] yes    [  ] no     [  ] n/a  Radiology results: [  ] yes    [  ] no     [ x ] n/a  Culture results: [ x ] yes    [  ] no     [  ] n/a  Consultant recommendations: [ x ] yes    [  ] no     [  ] n/a

## 2019-06-02 NOTE — PROGRESS NOTE ADULT - PROBLEM SELECTOR PLAN 2
I personally reviewed the patient's labs: CBC, BMP, coagulation profile  I personally reviewed the patient's EKG and my interpretation is: NSR w/ qt prolongation  I personally reviewed the patient's CXR and my interpretation is: sharp costophrenic angles, clear lung fields  The patient's RCRI score is: 0  The patient is considered intermediate risk for low risk procedure  There are no absolute contraindications to this planned procedure

## 2019-06-02 NOTE — PROGRESS NOTE ADULT - PROBLEM SELECTOR PLAN 1
improving w/ po and IV supplements  added compazine for additional nausea support  add K to IVF  supplement Mg as well (for hypomagnesemia)

## 2019-06-02 NOTE — PROGRESS NOTE ADULT - PROBLEM SELECTOR PLAN 3
GI consulted, recs appreciated  cont diflucan for suspected candidal esophagitis (anticipate 7d course pending findings of EGD)  supportive care: ppi, carafate  clear liquids  aspiration prec   QT prolongation noted, pt not nauseated today, will hold off on further doses of compazine/zofran for now  plan for egd/colon on Monday

## 2019-06-02 NOTE — PROGRESS NOTE ADULT - PROBLEM SELECTOR PLAN 1
cont diflucan for suspected candida  cont ppi bid, carafate bid, anti emetics prn, may need trial of reglan  ivf/liquid diet  npo after midnight   monitor lytes, replete prn  aspiration prec  bowel prep today   will plan for egd/colon on monday

## 2019-06-02 NOTE — CHART NOTE - NSCHARTNOTEFT_GEN_A_CORE
Called by RN for nausea and vomiting. Patient seen at bedside, is drinking Moviprep in anticipation of procedure tomorrow. Notes he drank prep "too quickly" causing him to become nauseous. Feels better after vomiting. Denies any other sx.  Will order 1x dose of Zofran.  RN to call with any changes

## 2019-06-03 ENCOUNTER — RESULT REVIEW (OUTPATIENT)
Age: 64
End: 2019-06-03

## 2019-06-03 DIAGNOSIS — F43.0 ACUTE STRESS REACTION: ICD-10-CM

## 2019-06-03 DIAGNOSIS — K52.9 NONINFECTIVE GASTROENTERITIS AND COLITIS, UNSPECIFIED: ICD-10-CM

## 2019-06-03 LAB
ALBUMIN SERPL ELPH-MCNC: 1.5 G/DL — LOW (ref 3.3–5)
ALP SERPL-CCNC: 62 U/L — SIGNIFICANT CHANGE UP (ref 40–120)
ALT FLD-CCNC: 9 U/L — LOW (ref 12–78)
ANION GAP SERPL CALC-SCNC: 9 MMOL/L — SIGNIFICANT CHANGE UP (ref 5–17)
AST SERPL-CCNC: 11 U/L — LOW (ref 15–37)
BILIRUB DIRECT SERPL-MCNC: 0.1 MG/DL — SIGNIFICANT CHANGE UP (ref 0.05–0.2)
BILIRUB INDIRECT FLD-MCNC: 0.3 MG/DL — SIGNIFICANT CHANGE UP (ref 0.2–1)
BILIRUB SERPL-MCNC: 0.4 MG/DL — SIGNIFICANT CHANGE UP (ref 0.2–1.2)
BUN SERPL-MCNC: 1 MG/DL — LOW (ref 7–23)
CALCIUM SERPL-MCNC: 6.9 MG/DL — LOW (ref 8.5–10.1)
CHLORIDE SERPL-SCNC: 102 MMOL/L — SIGNIFICANT CHANGE UP (ref 96–108)
CO2 SERPL-SCNC: 25 MMOL/L — SIGNIFICANT CHANGE UP (ref 22–31)
CREAT SERPL-MCNC: 0.34 MG/DL — LOW (ref 0.5–1.3)
GLUCOSE SERPL-MCNC: 90 MG/DL — SIGNIFICANT CHANGE UP (ref 70–99)
HCT VFR BLD CALC: 28.9 % — LOW (ref 39–50)
HGB BLD-MCNC: 9.9 G/DL — LOW (ref 13–17)
INR BLD: 1.67 RATIO — HIGH (ref 0.88–1.16)
MAGNESIUM SERPL-MCNC: 1.9 MG/DL — SIGNIFICANT CHANGE UP (ref 1.6–2.6)
MCHC RBC-ENTMCNC: 30.2 PG — SIGNIFICANT CHANGE UP (ref 27–34)
MCHC RBC-ENTMCNC: 34.3 GM/DL — SIGNIFICANT CHANGE UP (ref 32–36)
MCV RBC AUTO: 88.1 FL — SIGNIFICANT CHANGE UP (ref 80–100)
NRBC # BLD: 0 /100 WBCS — SIGNIFICANT CHANGE UP (ref 0–0)
PHOSPHATE SERPL-MCNC: 1.5 MG/DL — LOW (ref 2.5–4.5)
PLATELET # BLD AUTO: 366 K/UL — SIGNIFICANT CHANGE UP (ref 150–400)
POTASSIUM SERPL-MCNC: 3.6 MMOL/L — SIGNIFICANT CHANGE UP (ref 3.5–5.3)
POTASSIUM SERPL-SCNC: 3.6 MMOL/L — SIGNIFICANT CHANGE UP (ref 3.5–5.3)
PROT SERPL-MCNC: 5.2 G/DL — LOW (ref 6–8.3)
PROTHROM AB SERPL-ACNC: 19.2 SEC — HIGH (ref 10–12.9)
RBC # BLD: 3.28 M/UL — LOW (ref 4.2–5.8)
RBC # FLD: 14.2 % — SIGNIFICANT CHANGE UP (ref 10.3–14.5)
SODIUM SERPL-SCNC: 136 MMOL/L — SIGNIFICANT CHANGE UP (ref 135–145)
WBC # BLD: 5.51 K/UL — SIGNIFICANT CHANGE UP (ref 3.8–10.5)
WBC # FLD AUTO: 5.51 K/UL — SIGNIFICANT CHANGE UP (ref 3.8–10.5)

## 2019-06-03 PROCEDURE — 88342 IMHCHEM/IMCYTCHM 1ST ANTB: CPT | Mod: 26

## 2019-06-03 PROCEDURE — 88341 IMHCHEM/IMCYTCHM EA ADD ANTB: CPT | Mod: 26

## 2019-06-03 PROCEDURE — 88313 SPECIAL STAINS GROUP 2: CPT | Mod: 26

## 2019-06-03 PROCEDURE — 88312 SPECIAL STAINS GROUP 1: CPT | Mod: 26

## 2019-06-03 PROCEDURE — 88305 TISSUE EXAM BY PATHOLOGIST: CPT | Mod: 26

## 2019-06-03 PROCEDURE — 99233 SBSQ HOSP IP/OBS HIGH 50: CPT

## 2019-06-03 RX ORDER — CLONAZEPAM 1 MG
0.5 TABLET ORAL THREE TIMES A DAY
Refills: 0 | Status: DISCONTINUED | OUTPATIENT
Start: 2019-06-03 | End: 2019-06-10

## 2019-06-03 RX ORDER — ONDANSETRON 8 MG/1
4 TABLET, FILM COATED ORAL ONCE
Refills: 0 | Status: COMPLETED | OUTPATIENT
Start: 2019-06-03 | End: 2019-06-04

## 2019-06-03 RX ADMIN — DEXTROSE MONOHYDRATE, SODIUM CHLORIDE, AND POTASSIUM CHLORIDE 80 MILLILITER(S): 50; .745; 4.5 INJECTION, SOLUTION INTRAVENOUS at 00:20

## 2019-06-03 RX ADMIN — Medication 1 GRAM(S): at 17:14

## 2019-06-03 RX ADMIN — BUPRENORPHINE AND NALOXONE 2 TABLET(S): 2; .5 TABLET SUBLINGUAL at 13:15

## 2019-06-03 RX ADMIN — Medication 1 PACKET(S): at 13:15

## 2019-06-03 RX ADMIN — PANTOPRAZOLE SODIUM 40 MILLIGRAM(S): 20 TABLET, DELAYED RELEASE ORAL at 17:13

## 2019-06-03 RX ADMIN — DEXTROSE MONOHYDRATE, SODIUM CHLORIDE, AND POTASSIUM CHLORIDE 80 MILLILITER(S): 50; .745; 4.5 INJECTION, SOLUTION INTRAVENOUS at 19:45

## 2019-06-03 RX ADMIN — Medication 1200 MILLIGRAM(S): at 23:47

## 2019-06-03 RX ADMIN — BUPRENORPHINE AND NALOXONE 2 TABLET(S): 2; .5 TABLET SUBLINGUAL at 14:10

## 2019-06-03 RX ADMIN — Medication 1 PACKET(S): at 17:14

## 2019-06-03 RX ADMIN — VALACYCLOVIR 1000 MILLIGRAM(S): 500 TABLET, FILM COATED ORAL at 13:15

## 2019-06-03 RX ADMIN — VALACYCLOVIR 1000 MILLIGRAM(S): 500 TABLET, FILM COATED ORAL at 21:03

## 2019-06-03 RX ADMIN — FLUCONAZOLE 50 MILLIGRAM(S): 150 TABLET ORAL at 13:29

## 2019-06-03 RX ADMIN — Medication 50 MILLIGRAM(S): at 21:03

## 2019-06-03 NOTE — PROGRESS NOTE ADULT - SUBJECTIVE AND OBJECTIVE BOX
Patient is a 63y old  Male who presents with a chief complaint of unable to eat (03 Jun 2019 12:50)      FROM ADMISSION H+P:   HPI:  62 yo M PMHx ulcerative colitis, HLD, anxiety presents to ED  with complaints of inability to eat, tolerate PO since discharge from hospital recently. Patient states that after discharge on 5/13 for uc flare(on steroids) has been unable to swallow pills or tolerate po. He is complaining of dysphagia (to both solids and liquids) He also complains of nausea, vomiting,and fevers at home. He denies any diarrhea, admits seeing GI in office after discharge, stopped oral steroids and antibiotics recently.   In the ED patient afebrile, tachycardic. Seen by CHERYL wills. (28 May 2019 16:17)      ----  INTERVAL HPI/OVERNIGHT EVENTS: Pt seen and evaluated at the bedside. Pt did not tolerate majority of prep and was vomiting most of the night. He had multiple BM's last night and 4 BM's which consisted of mostly water. No other events.    - pt underwent colonoscopy which showed severe proctocolitis. doubt biologic therapy would be of any benefit. rec: CRS eval for total proctocolectomy. pt then spoke w/ family and found out that his spouse (who has been chronically ill and has visited multiple times) had abruptly passed away today. the pt is very distraught.     ----  PAST MEDICAL & SURGICAL HISTORY:  Anxiety  HLD (hyperlipidemia)  Ulcerative colitis  No significant past surgical history      FAMILY HISTORY:      Allergies    penicillin (Swelling)    Intolerances        ----  REVIEW OF SYSTEMS:  CONSTITUTIONAL: denies fever, chills, weakness  HEENT: denies blurred vision, admits mild sore throat  SKIN: denies new lesions, rash  CARDIOVASCULAR: denies chest pain, chest pressure, palpitations  RESPIRATORY: denies shortness of breath, sputum production  GASTROINTESTINAL: as per HPI  GENITOURINARY: denies dysuria, discharge  MUSCULOSKELETAL: denies new joint pain, muscle aches   LYMPHATICS: denies enlarged lymph nodes, admits that edema resolved  ENDOCRINOLOGIC: denies sweating, cold or heat intolerance    ----  PHYSICAL EXAM:  GENERAL: appears comfortable, he has flat affect  EYES: sclera clear, no exudates  ENMT: oropharynx clear without erythema, no exudates, dry mucous membranes   LUNGS: good air entry bilaterally, clear to auscultation, symmetric breath sounds, no wheezing or rhonchi appreciated  HEART: soft S1/S2, regular rate and rhythm, no murmurs noted, trace b/l lower extremity edema which is nontender to palpation  GASTROINTESTINAL: abdomen is soft, nontender, nondistended,  hyperactive bowel sounds, no palpable masses  NEUROLOGIC: awake, alert, oriented x3, good muscle tone in 4 extremities, no obvious sensory deficits  HEME/LYMPH: no palpable supraclavicular nodules, no obvious ecchymosis or petechiae     T(C): 36.4 (06-03-19 @ 16:00), Max: 37.2 (06-03-19 @ 08:00)  HR: 97 (06-03-19 @ 16:00) (77 - 97)  BP: 107/71 (06-03-19 @ 16:00) (107/71 - 137/87)  RR: 16 (06-03-19 @ 16:00) (14 - 18)  SpO2: 99% (06-03-19 @ 16:00) (95% - 99%)  Wt(kg): --    ----  I&O's Summary    02 Jun 2019 07:01  -  03 Jun 2019 07:00  --------------------------------------------------------  IN: 1500 mL / OUT: 500 mL / NET: 1000 mL    03 Jun 2019 07:01  -  03 Jun 2019 19:29  --------------------------------------------------------  IN: 1050 mL / OUT: 400 mL / NET: 650 mL        LABS:                        9.9    5.51  )-----------( 366      ( 03 Jun 2019 09:16 )             28.9     06-03    136  |  102  |  1<L>  ----------------------------<  90  3.6   |  25  |  0.34<L>    Ca    6.9<L>      03 Jun 2019 09:16  Phos  1.5     06-03  Mg     1.9     06-03    TPro  5.2<L>  /  Alb  1.5<L>  /  TBili  0.4  /  DBili  .10  /  AST  11<L>  /  ALT  9<L>  /  AlkPhos  62  06-03    PT/INR - ( 03 Jun 2019 09:16 )   PT: 19.2 sec;   INR: 1.67 ratio             CAPILLARY BLOOD GLUCOSE                    ----  Personally reviewed:  Vital sign trends: [ x ] yes    [  ] no     [  ] n/a  Laboratory results: [ x ] yes    [  ] no     [  ] n/a  Radiology results: [ x ] yes    [  ] no     [  ] n/a  Culture results: [  ] yes    [  ] no     [ x ] n/a  Consultant recommendations: [ x ] yes    [  ] no     [  ] n/a

## 2019-06-03 NOTE — PROGRESS NOTE ADULT - SUBJECTIVE AND OBJECTIVE BOX
s/p  upper gastrointestinal endoscopy colonoscopy    mild esophagitis,  mild gastritis    colonoscopy with SEVERE ulceration, no mucosa, just completely denuted with deep ulcers and severe inflammatory pseudopolyps from rectum to ascending colon  biopsies were obtained where i was able    rec:   doubt biologic therapy would be of any benefit  rec: CRS eval for total proctocolectomy

## 2019-06-03 NOTE — PROGRESS NOTE ADULT - PROBLEM SELECTOR PLAN 2
pt's spouse  at home today. unexpected. pt is distraught.   I provided >45 minutes of counseling at the bedside. added klonopin tid as needed for anxiety.   - "what can I say?" "what can anyone say?" he is emotionally distraught but consolable.   - spoke w/ staff. may need additional support.

## 2019-06-03 NOTE — PROGRESS NOTE ADULT - PROBLEM SELECTOR PLAN 1
severe proctocolitis. will need proctocolectomy likely.   CRS eval is pending  clear liquids  supportive care

## 2019-06-03 NOTE — CHART NOTE - NSCHARTNOTEFT_GEN_A_CORE
Assessment/Follow up: Pt not available at time of visit; for EGD/Colonoscopy. Seen for malnutrition follow up. Clear liquid diet x 5 days. Consuming 0-50% past 5 days w/ improved po intake 6/2 per records. Intermittent nausea. Moderate loose/watery stool 6/2. Per 6/2 labs, low K, Phos, Mg, Ca- supplemented per MD order. Low Fe 26, ferritin 762 wdl. Venofer rx 6/2. B12/folate wdl. Will follow EGD/colonoscopy results. Recommend ensure clear TID. Recommend advancing diet as tolerated from clear liquid to full liquid to low fiber,low fat soft as tolerated. Will remain avaialble.      Factors impacting intake: [ ] none [ x] nausea  [ ] vomiting [x ] diarrhea [ ] constipation  [ ]chewing problems [ ] swallowing issues  [ x] other: Ulcerative Colitis, esophagitis    Diet Presciption: Diet, Clear Liquid (05-29-19 @ 09:53)  Diet, NPO after Midnight:      NPO Start Date: 02-Jun-2019,   NPO Start Time: 23:59 (06-02-19 @ 12:08)    Intake: 0-50% consumed on clear liquid diet past 5 days, slight improvement 6/2 per records.     Current Weight: Weight (kg): 62.6 (05-31 @ 14:00); request current wt to assess.     Pertinent Medications: MEDICATIONS  (STANDING):  buprenorphine 2 mG/naloxone 0.5 mG SL  Tablet 2 Tablet(s) SubLingual daily  enoxaparin Injectable 40 milliGRAM(s) SubCutaneous daily  fluconAZOLE IVPB 100 milliGRAM(s) IV Intermittent every 24 hours  mesalamine DR Capsule 1200 milliGRAM(s) Oral four times a day  ondansetron Injectable 4 milliGRAM(s) IV Push once  pantoprazole    Tablet 40 milliGRAM(s) Oral two times a day  potassium phosphate / sodium phosphate powder 1 Packet(s) Oral three times a day with meals  predniSONE   Tablet 20 milliGRAM(s) Oral daily  sodium chloride 0.9% with potassium chloride 40 mEq/L 1000 milliLiter(s) (80 mL/Hr) IV Continuous <Continuous>  sucralfate suspension 1 Gram(s) Oral two times a day  traZODone 50 milliGRAM(s) Oral at bedtime  valACYclovir 1000 milliGRAM(s) Oral three times a day    MEDICATIONS  (PRN):  acetaminophen   Tablet .. 650 milliGRAM(s) Oral every 6 hours PRN Temp greater or equal to 38C (100.4F)  diazepam    Tablet 5 milliGRAM(s) Oral at bedtime PRN insomnia    Pertinent Labs: 06-03 Na136 mmol/L Glu 90 mg/dL K+ 3.6 mmol/L Cr  0.34 mg/dL<L> BUN 1 mg/dL<L> 06-03 Phos 1.5 mg/dL<L> 06-03 Alb 1.5 g/dL<L> 06-01 PAB 3 mg/dL<L> 05-11 [id number] 5.7 %<H>     CAPILLARY BLOOD GLUCOSE        Skin: intact. Fitz 19.     Estimated Needs:   [x ] no change since previous assessment  [ ] recalculated:     Previous Nutrition Diagnosis:   [ ] Inadequate Energy Intake [ ]Inadequate Oral Intake [ ] Excessive Energy Intake   [ ] Underweight [ ] Increased Nutrient Needs [ ] Overweight/Obesity   [ ] Altered GI Function [ ] Unintended Weight Loss [ ] Food & Nutrition Related Knowledge Deficit [ ] Malnutrition     Nutrition Diagnosis is [ x] ongoing  [ ] resolved [ ] not applicable     New Nutrition Diagnosis: [x ] not applicable       Interventions:   Recommend  [x ] Change Diet To: Recommend advancing diet as tolerated from clear liquid to full liquid to Low Fiber, low fat soft as tolerated.   [x ] Nutrition Supplement: Ensure clear 8oz po TID.   [ ] Nutrition Support  [ ] Other:     Monitoring and Evaluation:   [x ] PO intake [ x ] Tolerance to diet prescription [ x ] weights [ x ] labs[ x ] follow up per protocol  [ ] other: Assessment/Follow up: Pt not available at time of visit; for EGD/Colonoscopy. Seen for malnutrition follow up. Clear liquid diet x 5 days. Consuming 0-50% past 5 days w/ improved po intake 6/2 per records. Intermittent nausea. Moderate loose/watery stool 6/2. Per 6/2 labs, low K, Phos, Mg, Ca- supplemented per MD order. Low Fe 26, ferritin 762 wdl. Venofer rx 6/2. B12/folate wdl. Will follow EGD/colonoscopy results. Recommend ensure clear TID. Recommend advancing diet as tolerated from clear liquid to full liquid to low fiber, soft as tolerated. Will remain avaialble.      Factors impacting intake: [ ] none [ x] nausea  [ ] vomiting [x ] diarrhea [ ] constipation  [ ]chewing problems [ ] swallowing issues  [ x] other: Ulcerative Colitis, esophagitis    Diet Presciption: Diet, Clear Liquid (05-29-19 @ 09:53)  Diet, NPO after Midnight:      NPO Start Date: 02-Jun-2019,   NPO Start Time: 23:59 (06-02-19 @ 12:08)    Intake: 0-50% consumed on clear liquid diet past 5 days, slight improvement 6/2 per records.     Current Weight: Weight (kg): 62.6 (05-31 @ 14:00); request current wt to assess.     Pertinent Medications: MEDICATIONS  (STANDING):  buprenorphine 2 mG/naloxone 0.5 mG SL  Tablet 2 Tablet(s) SubLingual daily  enoxaparin Injectable 40 milliGRAM(s) SubCutaneous daily  fluconAZOLE IVPB 100 milliGRAM(s) IV Intermittent every 24 hours  mesalamine DR Capsule 1200 milliGRAM(s) Oral four times a day  ondansetron Injectable 4 milliGRAM(s) IV Push once  pantoprazole    Tablet 40 milliGRAM(s) Oral two times a day  potassium phosphate / sodium phosphate powder 1 Packet(s) Oral three times a day with meals  predniSONE   Tablet 20 milliGRAM(s) Oral daily  sodium chloride 0.9% with potassium chloride 40 mEq/L 1000 milliLiter(s) (80 mL/Hr) IV Continuous <Continuous>  sucralfate suspension 1 Gram(s) Oral two times a day  traZODone 50 milliGRAM(s) Oral at bedtime  valACYclovir 1000 milliGRAM(s) Oral three times a day    MEDICATIONS  (PRN):  acetaminophen   Tablet .. 650 milliGRAM(s) Oral every 6 hours PRN Temp greater or equal to 38C (100.4F)  diazepam    Tablet 5 milliGRAM(s) Oral at bedtime PRN insomnia    Pertinent Labs: 06-03 Na136 mmol/L Glu 90 mg/dL K+ 3.6 mmol/L Cr  0.34 mg/dL<L> BUN 1 mg/dL<L> 06-03 Phos 1.5 mg/dL<L> 06-03 Alb 1.5 g/dL<L> 06-01 PAB 3 mg/dL<L> 05-11 [id number] 5.7 %<H>     CAPILLARY BLOOD GLUCOSE        Skin: intact. Fitz 19.     Estimated Needs:   [x ] no change since previous assessment  [ ] recalculated:     Previous Nutrition Diagnosis:   [ ] Inadequate Energy Intake [ ]Inadequate Oral Intake [ ] Excessive Energy Intake   [ ] Underweight [ ] Increased Nutrient Needs [ ] Overweight/Obesity   [ ] Altered GI Function [ ] Unintended Weight Loss [ ] Food & Nutrition Related Knowledge Deficit [ ] Malnutrition     Nutrition Diagnosis is [ x] ongoing  [ ] resolved [ ] not applicable     New Nutrition Diagnosis: [x ] not applicable       Interventions:   Recommend  [x ] Change Diet To: Recommend advancing diet as tolerated from clear liquid to full liquid to Low Fiber, soft as tolerated ( further dietary recommendations pending EGD/Colonoscopy results).   [x ] Nutrition Supplement: Ensure clear 8oz po TID.   [ ] Nutrition Support  [x ] Other: MVI with minerals daily    Monitoring and Evaluation:   [x ] PO intake [ x ] Tolerance to diet prescription [ x ] weights [ x ] labs[ x ] follow up per protocol  [ ] other: Assessment/Follow up: Pt not available at time of visit; for EGD/Colonoscopy. Seen for malnutrition follow up. Clear liquid diet x 5 days. Consuming 0-50% past 5 days w/ improved po intake 6/2 per records. Intermittent nausea. Moderate loose/watery stool 6/2. Per 6/2 labs, low K, Phos, Mg, Ca- supplemented per MD order. Low Fe 26, ferritin 762 wdl. Venofer rx 6/2. B12/folate wdl. Will follow EGD/colonoscopy results. Recommend ensure clear TID. Recommend advancing diet as tolerated from clear liquid to full liquid to low fiber, soft as tolerated. Will remain avaialble.      Factors impacting intake: [ ] none [ x] nausea  [ ] vomiting [x ] diarrhea [ ] constipation  [ ]chewing problems [ ] swallowing issues  [ x] other: Ulcerative Colitis, esophagitis    Diet Presciption: Diet, Clear Liquid (05-29-19 @ 09:53)  Diet, NPO after Midnight:      NPO Start Date: 02-Jun-2019,   NPO Start Time: 23:59 (06-02-19 @ 12:08)    Intake: 0-50% consumed on clear liquid diet past 5 days, slight improvement 6/2 per records.     Current Weight: Weight (kg): 62.6 (05-31 @ 14:00); request current wt to assess.     Pertinent Medications: MEDICATIONS  (STANDING):  buprenorphine 2 mG/naloxone 0.5 mG SL  Tablet 2 Tablet(s) SubLingual daily  enoxaparin Injectable 40 milliGRAM(s) SubCutaneous daily  fluconAZOLE IVPB 100 milliGRAM(s) IV Intermittent every 24 hours  mesalamine DR Capsule 1200 milliGRAM(s) Oral four times a day  ondansetron Injectable 4 milliGRAM(s) IV Push once  pantoprazole    Tablet 40 milliGRAM(s) Oral two times a day  potassium phosphate / sodium phosphate powder 1 Packet(s) Oral three times a day with meals  predniSONE   Tablet 20 milliGRAM(s) Oral daily  sodium chloride 0.9% with potassium chloride 40 mEq/L 1000 milliLiter(s) (80 mL/Hr) IV Continuous <Continuous>  sucralfate suspension 1 Gram(s) Oral two times a day  traZODone 50 milliGRAM(s) Oral at bedtime  valACYclovir 1000 milliGRAM(s) Oral three times a day    MEDICATIONS  (PRN):  acetaminophen   Tablet .. 650 milliGRAM(s) Oral every 6 hours PRN Temp greater or equal to 38C (100.4F)  diazepam    Tablet 5 milliGRAM(s) Oral at bedtime PRN insomnia    Pertinent Labs: 06-03 Na136 mmol/L Glu 90 mg/dL K+ 3.6 mmol/L Cr  0.34 mg/dL<L> BUN 1 mg/dL<L> 06-03 Phos 1.5 mg/dL<L> 06-03 Alb 1.5 g/dL<L> 06-01 PAB 3 mg/dL<L> 05-11 [id number] 5.7 %<H>     CAPILLARY BLOOD GLUCOSE        Skin: intact. Fitz 19.     Estimated Needs:   [x ] no change since previous assessment  [ ] recalculated:     Previous Nutrition Diagnosis:   [ ] Inadequate Energy Intake [ ]Inadequate Oral Intake [ ] Excessive Energy Intake   [ ] Underweight [ ] Increased Nutrient Needs [ ] Overweight/Obesity   [ ] Altered GI Function [ ] Unintended Weight Loss [ ] Food & Nutrition Related Knowledge Deficit [x ] Malnutrition     Nutrition Diagnosis is [ x] ongoing  [ ] resolved [ ] not applicable     New Nutrition Diagnosis: [x ] not applicable       Interventions:   Recommend  [x ] Change Diet To: Recommend advancing diet as tolerated from clear liquid to full liquid to Low Fiber, soft as tolerated ( further dietary recommendations pending EGD/Colonoscopy results).   [x ] Nutrition Supplement: Ensure clear 8oz po TID.   [ ] Nutrition Support  [x ] Other: MVI with minerals daily    Monitoring and Evaluation:   [x ] PO intake [ x ] Tolerance to diet prescription [ x ] weights [ x ] labs[ x ] follow up per protocol  [ ] other:

## 2019-06-04 LAB
ALBUMIN SERPL ELPH-MCNC: 1.5 G/DL — LOW (ref 3.3–5)
ALP SERPL-CCNC: 61 U/L — SIGNIFICANT CHANGE UP (ref 40–120)
ALT FLD-CCNC: 7 U/L — LOW (ref 12–78)
ANION GAP SERPL CALC-SCNC: 13 MMOL/L — SIGNIFICANT CHANGE UP (ref 5–17)
AST SERPL-CCNC: 4 U/L — LOW (ref 15–37)
BILIRUB SERPL-MCNC: 0.5 MG/DL — SIGNIFICANT CHANGE UP (ref 0.2–1.2)
BUN SERPL-MCNC: 3 MG/DL — LOW (ref 7–23)
CALCIUM SERPL-MCNC: 7 MG/DL — LOW (ref 8.5–10.1)
CHLORIDE SERPL-SCNC: 100 MMOL/L — SIGNIFICANT CHANGE UP (ref 96–108)
CO2 SERPL-SCNC: 25 MMOL/L — SIGNIFICANT CHANGE UP (ref 22–31)
CREAT SERPL-MCNC: 0.28 MG/DL — LOW (ref 0.5–1.3)
GLUCOSE SERPL-MCNC: 90 MG/DL — SIGNIFICANT CHANGE UP (ref 70–99)
HCT VFR BLD CALC: 26.7 % — LOW (ref 39–50)
HGB BLD-MCNC: 9 G/DL — LOW (ref 13–17)
MAGNESIUM SERPL-MCNC: 1.7 MG/DL — SIGNIFICANT CHANGE UP (ref 1.6–2.6)
MCHC RBC-ENTMCNC: 29.7 PG — SIGNIFICANT CHANGE UP (ref 27–34)
MCHC RBC-ENTMCNC: 33.7 GM/DL — SIGNIFICANT CHANGE UP (ref 32–36)
MCV RBC AUTO: 88.1 FL — SIGNIFICANT CHANGE UP (ref 80–100)
NRBC # BLD: 0 /100 WBCS — SIGNIFICANT CHANGE UP (ref 0–0)
PLATELET # BLD AUTO: 329 K/UL — SIGNIFICANT CHANGE UP (ref 150–400)
POTASSIUM SERPL-MCNC: 3.1 MMOL/L — LOW (ref 3.5–5.3)
POTASSIUM SERPL-SCNC: 3.1 MMOL/L — LOW (ref 3.5–5.3)
PROT SERPL-MCNC: 4.9 G/DL — LOW (ref 6–8.3)
RBC # BLD: 3.03 M/UL — LOW (ref 4.2–5.8)
RBC # FLD: 14.2 % — SIGNIFICANT CHANGE UP (ref 10.3–14.5)
SODIUM SERPL-SCNC: 138 MMOL/L — SIGNIFICANT CHANGE UP (ref 135–145)
WBC # BLD: 4.46 K/UL — SIGNIFICANT CHANGE UP (ref 3.8–10.5)
WBC # FLD AUTO: 4.46 K/UL — SIGNIFICANT CHANGE UP (ref 3.8–10.5)

## 2019-06-04 PROCEDURE — 99233 SBSQ HOSP IP/OBS HIGH 50: CPT

## 2019-06-04 RX ORDER — DEXTROSE MONOHYDRATE, SODIUM CHLORIDE, AND POTASSIUM CHLORIDE 50; .745; 4.5 G/1000ML; G/1000ML; G/1000ML
1000 INJECTION, SOLUTION INTRAVENOUS
Refills: 0 | Status: DISCONTINUED | OUTPATIENT
Start: 2019-06-04 | End: 2019-06-05

## 2019-06-04 RX ORDER — POTASSIUM CHLORIDE 20 MEQ
40 PACKET (EA) ORAL EVERY 4 HOURS
Refills: 0 | Status: COMPLETED | OUTPATIENT
Start: 2019-06-04 | End: 2019-06-04

## 2019-06-04 RX ADMIN — PANTOPRAZOLE SODIUM 40 MILLIGRAM(S): 20 TABLET, DELAYED RELEASE ORAL at 06:05

## 2019-06-04 RX ADMIN — Medication 20 MILLIGRAM(S): at 06:05

## 2019-06-04 RX ADMIN — Medication 40 MILLIEQUIVALENT(S): at 13:30

## 2019-06-04 RX ADMIN — ONDANSETRON 4 MILLIGRAM(S): 8 TABLET, FILM COATED ORAL at 13:33

## 2019-06-04 RX ADMIN — Medication 1 PACKET(S): at 07:56

## 2019-06-04 RX ADMIN — VALACYCLOVIR 1000 MILLIGRAM(S): 500 TABLET, FILM COATED ORAL at 21:26

## 2019-06-04 RX ADMIN — PANTOPRAZOLE SODIUM 40 MILLIGRAM(S): 20 TABLET, DELAYED RELEASE ORAL at 17:30

## 2019-06-04 RX ADMIN — ENOXAPARIN SODIUM 40 MILLIGRAM(S): 100 INJECTION SUBCUTANEOUS at 11:16

## 2019-06-04 RX ADMIN — Medication 1 GRAM(S): at 17:30

## 2019-06-04 RX ADMIN — DEXTROSE MONOHYDRATE, SODIUM CHLORIDE, AND POTASSIUM CHLORIDE 80 MILLILITER(S): 50; .745; 4.5 INJECTION, SOLUTION INTRAVENOUS at 12:22

## 2019-06-04 RX ADMIN — Medication 40 MILLIEQUIVALENT(S): at 11:16

## 2019-06-04 RX ADMIN — Medication 40 MILLIEQUIVALENT(S): at 17:30

## 2019-06-04 RX ADMIN — BUPRENORPHINE AND NALOXONE 2 TABLET(S): 2; .5 TABLET SUBLINGUAL at 18:29

## 2019-06-04 RX ADMIN — Medication 50 MILLIGRAM(S): at 21:26

## 2019-06-04 RX ADMIN — Medication 1 PACKET(S): at 12:23

## 2019-06-04 RX ADMIN — Medication 1 GRAM(S): at 06:05

## 2019-06-04 RX ADMIN — Medication 1200 MILLIGRAM(S): at 06:05

## 2019-06-04 RX ADMIN — Medication 0.5 MILLIGRAM(S): at 17:30

## 2019-06-04 RX ADMIN — BUPRENORPHINE AND NALOXONE 2 TABLET(S): 2; .5 TABLET SUBLINGUAL at 11:15

## 2019-06-04 RX ADMIN — Medication 1200 MILLIGRAM(S): at 11:15

## 2019-06-04 RX ADMIN — VALACYCLOVIR 1000 MILLIGRAM(S): 500 TABLET, FILM COATED ORAL at 13:30

## 2019-06-04 RX ADMIN — Medication 1200 MILLIGRAM(S): at 17:30

## 2019-06-04 RX ADMIN — VALACYCLOVIR 1000 MILLIGRAM(S): 500 TABLET, FILM COATED ORAL at 06:05

## 2019-06-04 NOTE — PROGRESS NOTE ADULT - PROBLEM SELECTOR PLAN 6
Continue Trazadone and valium
Continue Trazadone and valium
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
continue PPI IV BID, restart oral when able to tolerate PO
- corrected calcium is only mildly low (severely low albumin)  - s/p 1g calcium gluconate on 6/2/19
valtrex 1g tid x7d
- corrected calcium is only mildly low (severely low albumin)  - s/p 1g calcium gluconate on 6/2/19

## 2019-06-04 NOTE — CONSULT NOTE ADULT - SUBJECTIVE AND OBJECTIVE BOX
HPI:  63 year old male with PMH of ulcerative colitis, HLD, admitted for nausea and inability to tolerate PO since discharge from hospital a few weeks ago (admitted for UC flare up), underwent EGD/coloscopy with GI yesterday, found to have severe ulceration, no mucosa, completely denuded with deep ulcers and severe inflammatory pseudopolyps from rectum to ascending colon.  Patient reports 7 episodes of bloody diarrhea today, crampy abdominal pain as well as subjective fevers at home.  Patient denies chest pain, shortness of breath, melena, or urinary complaints.    PAST MEDICAL & SURGICAL HISTORY:  Anxiety  HLD (hyperlipidemia)  Ulcerative colitis    No significant past surgical history    REVIEW OF SYSTEMS:    CONSTITUTIONAL: No weakness  EYES/ENT: No visual changes;  No vertigo or throat pain   NECK: No pain or stiffness  RESPIRATORY: No cough, wheezing, hemoptysis; No shortness of breath  CARDIOVASCULAR: No chest pain or palpitations  GASTROINTESTINAL: See HPI.  No epigastric pain. No hematemesis; No constipation. No melena.  GENITOURINARY: No dysuria, frequency or hematuria  NEUROLOGICAL: No numbness or weakness  SKIN: +rash on left flank  All other review of systems is negative unless indicated above.	    MEDICATIONS  (STANDING):  buprenorphine 2 mG/naloxone 0.5 mG SL  Tablet 2 Tablet(s) SubLingual daily  enoxaparin Injectable 40 milliGRAM(s) SubCutaneous daily  mesalamine DR Capsule 1200 milliGRAM(s) Oral four times a day  ondansetron Injectable 4 milliGRAM(s) IV Push once  pantoprazole    Tablet 40 milliGRAM(s) Oral two times a day  potassium chloride    Tablet ER 40 milliEquivalent(s) Oral every 4 hours  sodium chloride 0.9% with potassium chloride 40 mEq/L 1000 milliLiter(s) (80 mL/Hr) IV Continuous <Continuous>  sucralfate suspension 1 Gram(s) Oral two times a day  traZODone 50 milliGRAM(s) Oral at bedtime  valACYclovir 1000 milliGRAM(s) Oral three times a day    MEDICATIONS  (PRN):  acetaminophen   Tablet .. 650 milliGRAM(s) Oral every 6 hours PRN Temp greater or equal to 38C (100.4F)  clonazePAM  Tablet 0.5 milliGRAM(s) Oral three times a day PRN anxiety    Allergies  penicillin (Swelling)    Intolerances    SOCIAL HISTORY:  Nonsmoker.  Denies alcohol or illicit drug use.    FAMILY HISTORY:  Mother with UC.  Grandmother with UC and colon cancer.    Vital Signs Last 24 Hrs  T(C): 37.1 (04 Jun 2019 07:32), Max: 37.1 (04 Jun 2019 07:32)  T(F): 98.8 (04 Jun 2019 07:32), Max: 98.8 (04 Jun 2019 07:32)  HR: 90 (04 Jun 2019 07:32) (90 - 97)  BP: 106/72 (04 Jun 2019 07:32) (106/72 - 129/92)  BP(mean): --  RR: 17 (04 Jun 2019 07:32) (16 - 17)  SpO2: 97% (04 Jun 2019 07:32) (97% - 99%)    PHYSICAL EXAM  GENERAL:  Well-nourished, well-developed male lying comfortably in bed in NAD  HEAD:  Normocephalic, atraumatic  ABDOMEN:  Soft, nondistended, mild TTP in bilateral lower quadrants, left more than right; normoactive bowel sounds in all 4 quadrants.  No rebound tenderness or guarding.  NEURO:  A&O x 3    LABS:                        9.0    4.46  )-----------( 329      ( 04 Jun 2019 07:01 )             26.7     06-04    138  |  100  |  3<L>  ----------------------------<  90  3.1<L>   |  25  |  0.28<L>    Ca    7.0<L>      04 Jun 2019 07:01  Phos  1.5     06-03  Mg     1.7     06-04    TPro  4.9<L>  /  Alb  1.5<L>  /  TBili  0.5  /  DBili  x   /  AST  4<L>  /  ALT  7<L>  /  AlkPhos  61  06-04  PT/INR - ( 03 Jun 2019 09:16 )   PT: 19.2 sec;   INR: 1.67 ratio HPI:  63 year old male with PMH of ulcerative colitis, HLD for many years with acute exacerbations for last year, admitted for nausea and inability to tolerate PO since discharge from hospital a few weeks ago (admitted for UC flare up), underwent EGD/coloscopy with GI yesterday, found to have severe ulceration, no mucosa, completely denuded with deep ulcers and severe inflammatory pseudopolyps from rectum to ascending colon.  Patient reports 7 episodes of bloody diarrhea today, crampy abdominal pain as well as subjective fevers at home.  Patient denies chest pain, shortness of breath, melena, or urinary complaints.  Patient rior colonoscopy was four years ago, and he will have as many as 20 bm per day and in constant abdominal pain.    PAST MEDICAL & SURGICAL HISTORY:  Anxiety  HLD (hyperlipidemia)  Ulcerative colitis    No significant past surgical history    REVIEW OF SYSTEMS:    CONSTITUTIONAL: No weakness  EYES/ENT: No visual changes;  No vertigo or throat pain   NECK: No pain or stiffness  RESPIRATORY: No cough, wheezing, hemoptysis; No shortness of breath  CARDIOVASCULAR: No chest pain or palpitations  GASTROINTESTINAL: See HPI.  No epigastric pain. No hematemesis; No constipation. No melena.  GENITOURINARY: No dysuria, frequency or hematuria  NEUROLOGICAL: No numbness or weakness  SKIN: +rash on left flank  All other review of systems is negative unless indicated above.	    MEDICATIONS  (STANDING):  buprenorphine 2 mG/naloxone 0.5 mG SL  Tablet 2 Tablet(s) SubLingual daily  enoxaparin Injectable 40 milliGRAM(s) SubCutaneous daily  mesalamine DR Capsule 1200 milliGRAM(s) Oral four times a day  ondansetron Injectable 4 milliGRAM(s) IV Push once  pantoprazole    Tablet 40 milliGRAM(s) Oral two times a day  potassium chloride    Tablet ER 40 milliEquivalent(s) Oral every 4 hours  sodium chloride 0.9% with potassium chloride 40 mEq/L 1000 milliLiter(s) (80 mL/Hr) IV Continuous <Continuous>  sucralfate suspension 1 Gram(s) Oral two times a day  traZODone 50 milliGRAM(s) Oral at bedtime  valACYclovir 1000 milliGRAM(s) Oral three times a day    MEDICATIONS  (PRN):  acetaminophen   Tablet .. 650 milliGRAM(s) Oral every 6 hours PRN Temp greater or equal to 38C (100.4F)  clonazePAM  Tablet 0.5 milliGRAM(s) Oral three times a day PRN anxiety    Allergies  penicillin (Swelling)    Intolerances    SOCIAL HISTORY:  Nonsmoker.  Denies alcohol or illicit drug use.    FAMILY HISTORY:  Mother with UC.  Grandmother with UC and colon cancer.    Vital Signs Last 24 Hrs  T(C): 37.1 (04 Jun 2019 07:32), Max: 37.1 (04 Jun 2019 07:32)  T(F): 98.8 (04 Jun 2019 07:32), Max: 98.8 (04 Jun 2019 07:32)  HR: 90 (04 Jun 2019 07:32) (90 - 97)  BP: 106/72 (04 Jun 2019 07:32) (106/72 - 129/92)  BP(mean): --  RR: 17 (04 Jun 2019 07:32) (16 - 17)  SpO2: 97% (04 Jun 2019 07:32) (97% - 99%)    PHYSICAL EXAM  GENERAL:  Well-nourished, well-developed male lying comfortably in bed in NAD  HEAD:  Normocephalic, atraumatic  ABDOMEN:  Soft, nondistended, mild TTP in bilateral lower quadrants, left more than right; normoactive bowel sounds in all 4 quadrants.  No rebound tenderness or guarding.  NEURO:  A&O x 3    LABS:                        9.0    4.46  )-----------( 329      ( 04 Jun 2019 07:01 )             26.7     06-04    138  |  100  |  3<L>  ----------------------------<  90  3.1<L>   |  25  |  0.28<L>    Ca    7.0<L>      04 Jun 2019 07:01  Phos  1.5     06-03  Mg     1.7     06-04    TPro  4.9<L>  /  Alb  1.5<L>  /  TBili  0.5  /  DBili  x   /  AST  4<L>  /  ALT  7<L>  /  AlkPhos  61  06-04  PT/INR - ( 03 Jun 2019 09:16 )   PT: 19.2 sec;   INR: 1.67 ratio

## 2019-06-04 NOTE — PROGRESS NOTE ADULT - PROBLEM SELECTOR PLAN 1
sp egd- findings of mild esophagitis and gastritis  cont ppi bid, carafate bid, anti emetics prn  monitor lytes, replete prn  aspiration prec

## 2019-06-04 NOTE — PROGRESS NOTE ADULT - PROBLEM SELECTOR PLAN 9
DVT ppx: Lovenox
DVT ppx: Lovenox
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
DVT ppx: Lovenox
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

## 2019-06-04 NOTE — PROGRESS NOTE ADULT - PROBLEM SELECTOR PLAN 3
sp c olon- findings of severe ulceration, no mucosa, completely denuded w deep ulcers and severe inflammatory pseudopolyps from rectum to ascending colon  f/u path  cont delzicol 1200mg qid, bacid tid  cont liquid diet  crs eval pending for possible proctocolectomy   id input appreciated  monitor cbc  monitor abd exam/stool output  will follow sp c olon- findings of severe ulceration, no mucosa, completely denuded w deep ulcers and severe inflammatory pseudopolyps from rectum to ascending colon  f/u path  cont delzicol 1200mg qid, bacid tid, pred 40 qd  cont liquid diet  crs eval pending for possible proctocolectomy   id input appreciated  monitor cbc  monitor abd exam/stool output  will follow, plan dw attg, agreeable

## 2019-06-04 NOTE — PROGRESS NOTE ADULT - PROBLEM SELECTOR PLAN 2
pt's spouse  at home yesterday, unexpected. pt is distraught.   counseling at bedside provided  - spoke w/ staff. may need additional support.

## 2019-06-04 NOTE — PROGRESS NOTE ADULT - PROBLEM SELECTOR PLAN 7
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
DVT ppx: Lovenox
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
continue PPI IV BID, restart oral when able to tolerate PO
valtrex 1g tid x7d
mesalamine  - anemia is worsening  - iron deficient - will add iron supplements tomorrow post-op - h/h is downtrending, may need heme eval, remainder of serologic workup still pending  - planning for egd/colon
s/p valtrex 1g tid

## 2019-06-04 NOTE — PROGRESS NOTE ADULT - SUBJECTIVE AND OBJECTIVE BOX
FULL NOTE TO FOLLOW:  - plan for proctocolectomy on Monday FULL NOTE TO FOLLOW:  - plan for proctocolectomy on Monday    -----  Patient is a 63y old  Male who presents with a chief complaint of unable to eat (04 Jun 2019 17:52)      FROM ADMISSION H+P:   HPI:  64 yo M PMHx ulcerative colitis, HLD, anxiety presents to ED  with complaints of inability to eat, tolerate PO since discharge from hospital recently. Patient states that after discharge on 5/13 for uc flare(on steroids) has been unable to swallow pills or tolerate po. He is complaining of dysphagia (to both solids and liquids) He also complains of nausea, vomiting,and fevers at home. He denies any diarrhea, admits seeing GI in office after discharge, stopped oral steroids and antibiotics recently.   In the ED patient afebrile, tachycardic. Seen by CHERYL wills. (28 May 2019 16:17)      ----  INTERVAL HPI/OVERNIGHT EVENTS: Pt seen and evaluated at the bedside. No acute overnight events occurred.     ----  PAST MEDICAL & SURGICAL HISTORY:  Anxiety  HLD (hyperlipidemia)  Ulcerative colitis  No significant past surgical history      FAMILY HISTORY:      Allergies    penicillin (Swelling)    Intolerances        ----  REVIEW OF SYSTEMS:  CONSTITUTIONAL: denies fever, chills, weakness  HEENT: denies blurred vision, admits mild sore throat  SKIN: denies new lesions, rash  CARDIOVASCULAR: denies chest pain, chest pressure, palpitations  RESPIRATORY: denies shortness of breath, sputum production  GASTROINTESTINAL: as per HPI  GENITOURINARY: denies dysuria, discharge  MUSCULOSKELETAL: denies new joint pain, muscle aches   LYMPHATICS: denies enlarged lymph nodes, admits that edema resolved  ENDOCRINOLOGIC: denies sweating, cold or heat intolerance    ----  PHYSICAL EXAM:  GENERAL: appears comfortable, appears to be coping well given circumstances  EYES: sclera clear, no exudates  ENMT: oropharynx clear without erythema, no exudates, dry mucous membranes   LUNGS: good air entry bilaterally, clear to auscultation, symmetric breath sounds, no wheezing or rhonchi appreciated  HEART: soft S1/S2, regular rate and rhythm, no murmurs noted, trace b/l lower extremity edema which is nontender to palpation  GASTROINTESTINAL: abdomen is soft, nontender, nondistended,  hyperactive bowel sounds, no palpable masses  NEUROLOGIC: awake, alert, oriented x3, good muscle tone in 4 extremities, no obvious sensory deficits  HEME/LYMPH: no palpable supraclavicular nodules, no obvious ecchymosis or petechiae     T(C): 37.1 (06-04-19 @ 16:02), Max: 37.1 (06-04-19 @ 07:32)  HR: 96 (06-04-19 @ 16:02) (90 - 96)  BP: 119/82 (06-04-19 @ 16:02) (106/72 - 129/92)  RR: 18 (06-04-19 @ 16:02) (17 - 18)  SpO2: 99% (06-04-19 @ 16:02) (97% - 99%)  Wt(kg): --    ----  I&O's Summary    03 Jun 2019 07:01  -  04 Jun 2019 07:00  --------------------------------------------------------  IN: 1450 mL / OUT: 400 mL / NET: 1050 mL    04 Jun 2019 07:01  -  04 Jun 2019 22:25  --------------------------------------------------------  IN: 990 mL / OUT: 0 mL / NET: 990 mL        LABS:                        9.0    4.46  )-----------( 329      ( 04 Jun 2019 07:01 )             26.7     06-04    138  |  100  |  3<L>  ----------------------------<  90  3.1<L>   |  25  |  0.28<L>    Ca    7.0<L>      04 Jun 2019 07:01  Phos  1.5     06-03  Mg     1.7     06-04    TPro  4.9<L>  /  Alb  1.5<L>  /  TBili  0.5  /  DBili  x   /  AST  4<L>  /  ALT  7<L>  /  AlkPhos  61  06-04    PT/INR - ( 03 Jun 2019 09:16 )   PT: 19.2 sec;   INR: 1.67 ratio             CAPILLARY BLOOD GLUCOSE

## 2019-06-04 NOTE — PROGRESS NOTE ADULT - PROBLEM SELECTOR PROBLEM 7
Latent tuberculosis
GERD (gastroesophageal reflux disease)
Latent tuberculosis
Prophylactic measure
Herpes zoster without complication
Ulcerative colitis
Herpes zoster without complication

## 2019-06-04 NOTE — PROGRESS NOTE ADULT - PROBLEM SELECTOR PROBLEM 9
Prophylactic measure
Prophylactic measure
Latent tuberculosis
Prophylactic measure
Latent tuberculosis

## 2019-06-04 NOTE — PROGRESS NOTE ADULT - PROBLEM SELECTOR PROBLEM 6
Anxiety
Anxiety
GERD (gastroesophageal reflux disease)
Latent tuberculosis
Hypocalcemia
Herpes zoster without complication
Hypocalcemia

## 2019-06-04 NOTE — PROGRESS NOTE ADULT - SUBJECTIVE AND OBJECTIVE BOX
INTERVAL HPI/OVERNIGHT EVENTS:  pt seen and examined  c/o abd pain and loose bms  denies n/v  tolerated some jello this am  sp egd/colon yesterday    MEDICATIONS  (STANDING):  buprenorphine 2 mG/naloxone 0.5 mG SL  Tablet 2 Tablet(s) SubLingual daily  enoxaparin Injectable 40 milliGRAM(s) SubCutaneous daily  mesalamine DR Capsule 1200 milliGRAM(s) Oral four times a day  ondansetron Injectable 4 milliGRAM(s) IV Push once  pantoprazole    Tablet 40 milliGRAM(s) Oral two times a day  potassium chloride    Tablet ER 40 milliEquivalent(s) Oral every 4 hours  potassium phosphate / sodium phosphate powder 1 Packet(s) Oral three times a day with meals  predniSONE   Tablet 20 milliGRAM(s) Oral daily  sodium chloride 0.9% with potassium chloride 40 mEq/L 1000 milliLiter(s) (80 mL/Hr) IV Continuous <Continuous>  sucralfate suspension 1 Gram(s) Oral two times a day  traZODone 50 milliGRAM(s) Oral at bedtime  valACYclovir 1000 milliGRAM(s) Oral three times a day    MEDICATIONS  (PRN):  acetaminophen   Tablet .. 650 milliGRAM(s) Oral every 6 hours PRN Temp greater or equal to 38C (100.4F)  clonazePAM  Tablet 0.5 milliGRAM(s) Oral three times a day PRN anxiety      Allergies    penicillin (Swelling)    Intolerances        Review of Systems:    General:  No wt loss, fevers, chills, night sweats, fatigue   Eyes:  Good vision, no reported pain  ENT:  No sore throat, pain, runny nose, dysphagia  CV:  No pain, palpitations, hypo/hypertension  Resp:  No dyspnea, cough, tachypnea, wheezing  GI:  +pain, No nausea, No vomiting, +diarrhea, No constipation, No weight loss, No fever, No pruritis, No rectal bleeding, No melena, No dysphagia  :  No pain, bleeding, incontinence, nocturia  Muscle:  No pain, weakness  Neuro:  No weakness, tingling, memory problems  Psych:  No fatigue, insomnia, mood problems, depression  Endocrine:  No polyuria, polydypsia, cold/heat intolerance  Heme:  No petechiae, ecchymosis, easy bruisability  Skin:  No rash, tattoos, scars, edema      Vital Signs Last 24 Hrs  T(C): 37.1 (04 Jun 2019 07:32), Max: 37.1 (04 Jun 2019 07:32)  T(F): 98.8 (04 Jun 2019 07:32), Max: 98.8 (04 Jun 2019 07:32)  HR: 90 (04 Jun 2019 07:32) (77 - 97)  BP: 106/72 (04 Jun 2019 07:32) (106/72 - 129/92)  BP(mean): --  RR: 17 (04 Jun 2019 07:32) (14 - 17)  SpO2: 97% (04 Jun 2019 07:32) (97% - 99%)    PHYSICAL EXAM:    General:  nad  HEENT:  NC/AT  Chest:  dec bs  Cardiovascular:  rrr S1, S2  Abdomen:  Soft, mild generalized ttp no guarding mild dt  Extremities:  no edema  Neuro/Psych:  A&Ox3      LABS:                        9.0    4.46  )-----------( 329      ( 04 Jun 2019 07:01 )             26.7     06-04    138  |  100  |  3<L>  ----------------------------<  90  3.1<L>   |  25  |  0.28<L>    Ca    7.0<L>      04 Jun 2019 07:01  Phos  1.5     06-03  Mg     1.7     06-04    TPro  4.9<L>  /  Alb  1.5<L>  /  TBili  0.5  /  DBili  x   /  AST  4<L>  /  ALT  7<L>  /  AlkPhos  61  06-04    PT/INR - ( 03 Jun 2019 09:16 )   PT: 19.2 sec;   INR: 1.67 ratio               RADIOLOGY & ADDITIONAL TESTS:

## 2019-06-04 NOTE — PROGRESS NOTE ADULT - PROBLEM SELECTOR PLAN 8
continue PPI IV BID, restart oral when able to tolerate PO
DVT ppx: Lovenox
continue PPI IV BID, restart oral when able to tolerate PO
mesalamine  - anemia is worsening  - iron deficient - will add iron supplements tomorrow post-op - h/h is downtrending, may need heme eval, remainder of serologic workup still pending
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
mesalamine  - anemia is worsening  - iron deficient - will add iron supplements tomorrow post-op - h/h is downtrending, may need heme eval, remainder of serologic workup still pending

## 2019-06-04 NOTE — PROGRESS NOTE ADULT - PROBLEM SELECTOR PROBLEM 8
GERD (gastroesophageal reflux disease)
GERD (gastroesophageal reflux disease)
Prophylactic measure
Ulcerative colitis
Latent tuberculosis
Ulcerative colitis
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
## 2019-06-04 NOTE — CONSULT NOTE ADULT - ASSESSMENT
63 year old male with severe UC from rectum to ascending colon.    - Continue mesalamine, bacid, and prednisone as per GI  - Continue clear liquids as tolerated  - Monitor electrolytes  - Plan for proctosigmoidectomy, likely Monday 6/10/19  - Case discussed with Dr. Velazquez 63 year old male with severe UC from rectum to ascending colon.    - Continue mesalamine, bacid, and prednisone as per GI  - Continue clear liquids as tolerated  - Monitor electrolytes  I agree at this point without much improvement despite multiple medical treatments and with severity of colitis seen on recent sigmoidoscopy and with severity of symptoms with diarrhea and abdominal pain.  At this point I recommend surgical treatment which i reviewed with him today.  We discussed a three stage procedure.  He will require a total colectomy with ileostomy, followed three to six months later with a proctectomy and ileal pouch anal anstamosis and ileostomy and reversal of ileostomy.  Risks benefits to the first stage was discussed and not being able to do a pouch if this is crohns or depending on the path was also discussed .  Risk benefits and alternatives to TAC and ileostomy were discussed .  Risks including bleeding requireing blood transfusion either intra opertively or post op and possible need for reoperative surgery.  Risk of intrabdominal infection pertonitis even death was discussed.  It may need reoperative surgery a drain by radiology or result in a fistula that may require prolonged Iv nutrition, a bowel obstruction a hernia, ureteral injury, UTI or bladder injury, sexual dysfuntion, cardiopulmonary events MI CHF, Stroke Seziure.  Pneumonia ateltasis, DVT PE and prophalxsis was discussed a 5-10day hositailization and 6-8 week rovery. We discussed without it he will likely not improve.  ALternatives including continueing medications were discussed.  French is aware and understands nd all questions were answered    His lbumin is low may benefit from hyperalimentation or some other improvement in nutrition will discuss with primary team and GI.

## 2019-06-04 NOTE — PROGRESS NOTE ADULT - PROBLEM SELECTOR PLAN 1
severe proctocolitis. will need proctocolectomy likely, tentatively planned for Monday  clear liquids  supportive care

## 2019-06-04 NOTE — PROGRESS NOTE ADULT - SUBJECTIVE AND OBJECTIVE BOX
The patient was interviewed and evaluated  63y Male    Vital Signs Last 24 Hrs  T(C): 37.1 (04 Jun 2019 07:32), Max: 37.1 (04 Jun 2019 07:32)  T(F): 98.8 (04 Jun 2019 07:32), Max: 98.8 (04 Jun 2019 07:32)  HR: 90 (04 Jun 2019 07:32) (90 - 97)  BP: 106/72 (04 Jun 2019 07:32) (106/72 - 129/92)  BP(mean): --  RR: 17 (04 Jun 2019 07:32) (16 - 17)  SpO2: 97% (04 Jun 2019 07:32) (97% - 99%)    Pt seen, doing well, no anesthesia complications or complaints noted or reported.   Pt has Nausea most likely has nothing to do with the procedure      No additional recommendations.     Pain well controlled

## 2019-06-05 DIAGNOSIS — D64.9 ANEMIA, UNSPECIFIED: ICD-10-CM

## 2019-06-05 LAB
ALBUMIN SERPL ELPH-MCNC: 1.4 G/DL — LOW (ref 3.3–5)
ALP SERPL-CCNC: 59 U/L — SIGNIFICANT CHANGE UP (ref 40–120)
ALT FLD-CCNC: 8 U/L — LOW (ref 12–78)
ANION GAP SERPL CALC-SCNC: 8 MMOL/L — SIGNIFICANT CHANGE UP (ref 5–17)
AST SERPL-CCNC: 6 U/L — LOW (ref 15–37)
BILIRUB SERPL-MCNC: 0.4 MG/DL — SIGNIFICANT CHANGE UP (ref 0.2–1.2)
BUN SERPL-MCNC: 3 MG/DL — LOW (ref 7–23)
CALCIUM SERPL-MCNC: 6.7 MG/DL — LOW (ref 8.5–10.1)
CHLORIDE SERPL-SCNC: 102 MMOL/L — SIGNIFICANT CHANGE UP (ref 96–108)
CO2 SERPL-SCNC: 28 MMOL/L — SIGNIFICANT CHANGE UP (ref 22–31)
CREAT SERPL-MCNC: 0.31 MG/DL — LOW (ref 0.5–1.3)
GLUCOSE SERPL-MCNC: 89 MG/DL — SIGNIFICANT CHANGE UP (ref 70–99)
HCT VFR BLD CALC: 25.6 % — LOW (ref 39–50)
HGB BLD-MCNC: 8.6 G/DL — LOW (ref 13–17)
MCHC RBC-ENTMCNC: 29.7 PG — SIGNIFICANT CHANGE UP (ref 27–34)
MCHC RBC-ENTMCNC: 33.6 GM/DL — SIGNIFICANT CHANGE UP (ref 32–36)
MCV RBC AUTO: 88.3 FL — SIGNIFICANT CHANGE UP (ref 80–100)
NRBC # BLD: 0 /100 WBCS — SIGNIFICANT CHANGE UP (ref 0–0)
PLATELET # BLD AUTO: 285 K/UL — SIGNIFICANT CHANGE UP (ref 150–400)
POTASSIUM SERPL-MCNC: 3.4 MMOL/L — LOW (ref 3.5–5.3)
POTASSIUM SERPL-SCNC: 3.4 MMOL/L — LOW (ref 3.5–5.3)
PROT SERPL-MCNC: 4.7 G/DL — LOW (ref 6–8.3)
RBC # BLD: 2.9 M/UL — LOW (ref 4.2–5.8)
RBC # FLD: 14.1 % — SIGNIFICANT CHANGE UP (ref 10.3–14.5)
SODIUM SERPL-SCNC: 138 MMOL/L — SIGNIFICANT CHANGE UP (ref 135–145)
WBC # BLD: 4.66 K/UL — SIGNIFICANT CHANGE UP (ref 3.8–10.5)
WBC # FLD AUTO: 4.66 K/UL — SIGNIFICANT CHANGE UP (ref 3.8–10.5)

## 2019-06-05 PROCEDURE — 99232 SBSQ HOSP IP/OBS MODERATE 35: CPT

## 2019-06-05 RX ORDER — DEXTROSE MONOHYDRATE, SODIUM CHLORIDE, AND POTASSIUM CHLORIDE 50; .745; 4.5 G/1000ML; G/1000ML; G/1000ML
1000 INJECTION, SOLUTION INTRAVENOUS
Refills: 0 | Status: DISCONTINUED | OUTPATIENT
Start: 2019-06-05 | End: 2019-06-06

## 2019-06-05 RX ORDER — POTASSIUM CHLORIDE 20 MEQ
40 PACKET (EA) ORAL ONCE
Refills: 0 | Status: COMPLETED | OUTPATIENT
Start: 2019-06-05 | End: 2019-06-05

## 2019-06-05 RX ORDER — DEXTROSE MONOHYDRATE, SODIUM CHLORIDE, AND POTASSIUM CHLORIDE 50; .745; 4.5 G/1000ML; G/1000ML; G/1000ML
1000 INJECTION, SOLUTION INTRAVENOUS
Refills: 0 | Status: DISCONTINUED | OUTPATIENT
Start: 2019-06-05 | End: 2019-06-05

## 2019-06-05 RX ADMIN — BUPRENORPHINE AND NALOXONE 2 TABLET(S): 2; .5 TABLET SUBLINGUAL at 13:00

## 2019-06-05 RX ADMIN — VALACYCLOVIR 1000 MILLIGRAM(S): 500 TABLET, FILM COATED ORAL at 14:37

## 2019-06-05 RX ADMIN — ENOXAPARIN SODIUM 40 MILLIGRAM(S): 100 INJECTION SUBCUTANEOUS at 12:04

## 2019-06-05 RX ADMIN — Medication 1 GRAM(S): at 06:16

## 2019-06-05 RX ADMIN — Medication 1200 MILLIGRAM(S): at 00:16

## 2019-06-05 RX ADMIN — Medication 1200 MILLIGRAM(S): at 23:24

## 2019-06-05 RX ADMIN — Medication 1 GRAM(S): at 18:04

## 2019-06-05 RX ADMIN — Medication 1200 MILLIGRAM(S): at 12:03

## 2019-06-05 RX ADMIN — VALACYCLOVIR 1000 MILLIGRAM(S): 500 TABLET, FILM COATED ORAL at 06:16

## 2019-06-05 RX ADMIN — Medication 40 MILLIEQUIVALENT(S): at 12:06

## 2019-06-05 RX ADMIN — Medication 1200 MILLIGRAM(S): at 18:04

## 2019-06-05 RX ADMIN — Medication 50 MILLIGRAM(S): at 21:19

## 2019-06-05 RX ADMIN — PANTOPRAZOLE SODIUM 40 MILLIGRAM(S): 20 TABLET, DELAYED RELEASE ORAL at 06:16

## 2019-06-05 RX ADMIN — Medication 1200 MILLIGRAM(S): at 06:16

## 2019-06-05 RX ADMIN — DEXTROSE MONOHYDRATE, SODIUM CHLORIDE, AND POTASSIUM CHLORIDE 80 MILLILITER(S): 50; .745; 4.5 INJECTION, SOLUTION INTRAVENOUS at 12:05

## 2019-06-05 RX ADMIN — Medication 40 MILLIGRAM(S): at 06:16

## 2019-06-05 RX ADMIN — BUPRENORPHINE AND NALOXONE 2 TABLET(S): 2; .5 TABLET SUBLINGUAL at 12:04

## 2019-06-05 RX ADMIN — Medication 0.5 MILLIGRAM(S): at 23:24

## 2019-06-05 RX ADMIN — PANTOPRAZOLE SODIUM 40 MILLIGRAM(S): 20 TABLET, DELAYED RELEASE ORAL at 18:04

## 2019-06-05 NOTE — PROGRESS NOTE ADULT - SUBJECTIVE AND OBJECTIVE BOX
Patient reports less pain and less diarrhea overnight.  Patient able to tolerate jello yesterday    ROS  GI diarrhea    PE  ABdomen soft nt nd

## 2019-06-05 NOTE — PROGRESS NOTE ADULT - PROBLEM SELECTOR PLAN 3
improved  sp egd- findings of mild esophagitis and gastritis  cont ppi bid, carafate bid, anti emetics prn  monitor lytes, replete prn  aspiration prec

## 2019-06-05 NOTE — PROGRESS NOTE ADULT - PROBLEM SELECTOR PLAN 1
sp colon- findings of severe ulceration, no mucosa, completely denuded w deep ulcers and severe inflammatory pseudopolyps from rectum to ascending colon  f/u path  cont delzicol 1200mg qid, bacid tid, pred 40 qd  cont liquid diet, would add ensure supps as tolerated  crs following, will likely need surgery, timing to be determined  may benefit from tpn for nutrition optimization   id input appreciated  monitor abd exam/stool output  will follow, plan dw attg/agreeable, will dw sx

## 2019-06-05 NOTE — PROGRESS NOTE ADULT - SUBJECTIVE AND OBJECTIVE BOX
INTERVAL HPI/OVERNIGHT EVENTS:  pt seen and examined  c/o nausea no vomiting  still w abd pain and loose stool but some improvement  tolerating some clears    MEDICATIONS  (STANDING):  buprenorphine 2 mG/naloxone 0.5 mG SL  Tablet 2 Tablet(s) SubLingual daily  enoxaparin Injectable 40 milliGRAM(s) SubCutaneous daily  mesalamine DR Capsule 1200 milliGRAM(s) Oral four times a day  pantoprazole    Tablet 40 milliGRAM(s) Oral two times a day  potassium chloride    Tablet ER 40 milliEquivalent(s) Oral once  predniSONE   Tablet 40 milliGRAM(s) Oral daily  sodium chloride 0.9% with potassium chloride 40 mEq/L 1000 milliLiter(s) (80 mL/Hr) IV Continuous <Continuous>  sucralfate suspension 1 Gram(s) Oral two times a day  traZODone 50 milliGRAM(s) Oral at bedtime  valACYclovir 1000 milliGRAM(s) Oral three times a day    MEDICATIONS  (PRN):  acetaminophen   Tablet .. 650 milliGRAM(s) Oral every 6 hours PRN Temp greater or equal to 38C (100.4F)  clonazePAM  Tablet 0.5 milliGRAM(s) Oral three times a day PRN anxiety      Allergies    penicillin (Swelling)    Intolerances        Review of Systems:    General:  No wt loss, fevers, chills, night sweats, fatigue   Eyes:  Good vision, no reported pain  ENT:  No sore throat, pain, runny nose, dysphagia  CV:  No pain, palpitations, hypo/hypertension  Resp:  No dyspnea, cough, tachypnea, wheezing  GI:  +pain, +nausea, No vomiting, +diarrhea, No constipation, No weight loss, No fever, No pruritis, +rectal bleeding, No melena, No dysphagia  :  No pain, bleeding, incontinence, nocturia  Muscle:  No pain, weakness  Neuro:  No weakness, tingling, memory problems  Psych:  No fatigue, insomnia, mood problems, depression  Endocrine:  No polyuria, polydypsia, cold/heat intolerance  Heme:  No petechiae, ecchymosis, easy bruisability  Skin:  No rash, tattoos, scars, edema      Vital Signs Last 24 Hrs  T(C): 36.7 (05 Jun 2019 08:13), Max: 37.4 (04 Jun 2019 23:39)  T(F): 98 (05 Jun 2019 08:13), Max: 99.4 (04 Jun 2019 23:39)  HR: 91 (05 Jun 2019 08:13) (91 - 96)  BP: 110/74 (05 Jun 2019 08:13) (110/74 - 119/82)  BP(mean): --  RR: 18 (05 Jun 2019 08:13) (16 - 18)  SpO2: 97% (05 Jun 2019 08:13) (97% - 99%)    PHYSICAL EXAM:    General:  nad  HEENT:  NC/AT  Chest:  dec bs  Cardiovascular:  rrr S1, S2  Abdomen:  Soft, mild generalized ttp no guarding mild dt  Extremities:  no edema  Neuro/Psych:  A&Ox3      LABS:                        8.6    4.66  )-----------( 285      ( 05 Jun 2019 06:18 )             25.6     06-05    138  |  102  |  3<L>  ----------------------------<  89  3.4<L>   |  28  |  0.31<L>    Ca    6.7<L>      05 Jun 2019 06:18  Mg     1.7     06-04    TPro  4.7<L>  /  Alb  1.4<L>  /  TBili  0.4  /  DBili  x   /  AST  6<L>  /  ALT  8<L>  /  AlkPhos  59  06-05          RADIOLOGY & ADDITIONAL TESTS:

## 2019-06-05 NOTE — PROGRESS NOTE ADULT - SUBJECTIVE AND OBJECTIVE BOX
CHIEF COMPLAINT/INTERVAL HISTORY:  Pt. seen and evaluated for proctocolitis/Ulcerative colitis.  Pt. is in no distress.  Reports soreness in abdomen.  Tolerating clear liquids.      REVIEW OF SYSTEMS:  No fever, CP, or SOB.      Vital Signs Last 24 Hrs  T(C): 36.7 (05 Jun 2019 08:13), Max: 37.4 (04 Jun 2019 23:39)  T(F): 98 (05 Jun 2019 08:13), Max: 99.4 (04 Jun 2019 23:39)  HR: 91 (05 Jun 2019 08:13) (91 - 96)  BP: 110/74 (05 Jun 2019 08:13) (110/74 - 119/82)  BP(mean): --  RR: 18 (05 Jun 2019 08:13) (16 - 18)  SpO2: 97% (05 Jun 2019 08:13) (97% - 99%)    PHYSICAL EXAM:  GENERAL: NAD  HEENT: EOMI, hearing normal, conjunctiva and sclera clear  Chest: CTA bilaterally, no wheezing  CV: S1S2, RRR,   GI: soft, +BS, soreness of lower quadrants, ND  Musculoskeletal: trace LE edema  Psychiatric: affect nL, mood nL  Skin: warm and dry    LABS:                        8.6    4.66  )-----------( 285      ( 05 Jun 2019 06:18 )             25.6     06-05    138  |  102  |  3<L>  ----------------------------<  89  3.4<L>   |  28  |  0.31<L>    Ca    6.7<L>      05 Jun 2019 06:18  Mg     1.7     06-04    TPro  4.7<L>  /  Alb  1.4<L>  /  TBili  0.4  /  DBili  x   /  AST  6<L>  /  ALT  8<L>  /  AlkPhos  59  06-05      Assessment and Plan:  -Proctocolitis/Ulcerative colitis:  continue clear liquid diet and NS + KCl 40meq@80cc/hr x 12 hours.  Continue Prednisone 40mg PO daily and mesalamine 1200mg PO QID.  Eventual surgical intervention per CRS.  GI f/u  -Dysphagia: s/p diflucan.  Supportive care with PPI and Carafate  -Drug abuse:  continue Suboxone  -Hypocalcemia:  Corrected calcium level for albumin is 8.7  -Herpes Zoster:  continue Valtrex 1000mg PO TID  -Anemia:  Of chronic disease.  Will monitor hbg.  -Latent TB:  appreciated ID input.    -VTE ppx:  Lovenox 40mg SQ daily

## 2019-06-05 NOTE — PROGRESS NOTE ADULT - ATTENDING COMMENTS
Advanced care planning was discussed with patient and family.  Advanced care planning forms were reviewed and discussed.  Risks, benefits and alternatives of gastroenterologic procedures were discussed in detail and all questions were answered.    30 minutes spent. will need PICC Placed for TPN    Advanced care planning was discussed with patient and family.  Advanced care planning forms were reviewed and discussed.  Risks, benefits and alternatives of gastroenterologic procedures were discussed in detail and all questions were answered.    30 minutes spent.

## 2019-06-05 NOTE — PROGRESS NOTE ADULT - ASSESSMENT
Dx colitis non resonsive to medical treatment  Will need surgery however albumin 1.5 perhaps would benefit from short course of dietary supplemnts and possibly tpn to improve and optimize medically and nutrition.  I will discuss with GI and hospitalist.

## 2019-06-06 LAB
ANION GAP SERPL CALC-SCNC: 9 MMOL/L — SIGNIFICANT CHANGE UP (ref 5–17)
BUN SERPL-MCNC: 3 MG/DL — LOW (ref 7–23)
CALCIUM SERPL-MCNC: 6.7 MG/DL — LOW (ref 8.5–10.1)
CHLORIDE SERPL-SCNC: 101 MMOL/L — SIGNIFICANT CHANGE UP (ref 96–108)
CO2 SERPL-SCNC: 27 MMOL/L — SIGNIFICANT CHANGE UP (ref 22–31)
CREAT SERPL-MCNC: 0.3 MG/DL — LOW (ref 0.5–1.3)
GLUCOSE SERPL-MCNC: 113 MG/DL — HIGH (ref 70–99)
HCT VFR BLD CALC: 23.6 % — LOW (ref 39–50)
HGB BLD-MCNC: 8 G/DL — LOW (ref 13–17)
MAGNESIUM SERPL-MCNC: 1.4 MG/DL — LOW (ref 1.6–2.6)
MCHC RBC-ENTMCNC: 30 PG — SIGNIFICANT CHANGE UP (ref 27–34)
MCHC RBC-ENTMCNC: 33.9 GM/DL — SIGNIFICANT CHANGE UP (ref 32–36)
MCV RBC AUTO: 88.4 FL — SIGNIFICANT CHANGE UP (ref 80–100)
NRBC # BLD: 0 /100 WBCS — SIGNIFICANT CHANGE UP (ref 0–0)
PHOSPHATE SERPL-MCNC: 1.9 MG/DL — LOW (ref 2.5–4.5)
PLATELET # BLD AUTO: 249 K/UL — SIGNIFICANT CHANGE UP (ref 150–400)
POTASSIUM SERPL-MCNC: 3.2 MMOL/L — LOW (ref 3.5–5.3)
POTASSIUM SERPL-SCNC: 3.2 MMOL/L — LOW (ref 3.5–5.3)
RBC # BLD: 2.67 M/UL — LOW (ref 4.2–5.8)
RBC # FLD: 13.8 % — SIGNIFICANT CHANGE UP (ref 10.3–14.5)
SODIUM SERPL-SCNC: 137 MMOL/L — SIGNIFICANT CHANGE UP (ref 135–145)
WBC # BLD: 3.67 K/UL — LOW (ref 3.8–10.5)
WBC # FLD AUTO: 3.67 K/UL — LOW (ref 3.8–10.5)

## 2019-06-06 PROCEDURE — 36573 INSJ PICC RS&I 5 YR+: CPT

## 2019-06-06 PROCEDURE — ZZZZZ: CPT

## 2019-06-06 PROCEDURE — 99232 SBSQ HOSP IP/OBS MODERATE 35: CPT

## 2019-06-06 RX ORDER — POTASSIUM CHLORIDE 20 MEQ
40 PACKET (EA) ORAL EVERY 4 HOURS
Refills: 0 | Status: COMPLETED | OUTPATIENT
Start: 2019-06-06 | End: 2019-06-06

## 2019-06-06 RX ORDER — DEXTROSE MONOHYDRATE, SODIUM CHLORIDE, AND POTASSIUM CHLORIDE 50; .745; 4.5 G/1000ML; G/1000ML; G/1000ML
1000 INJECTION, SOLUTION INTRAVENOUS
Refills: 0 | Status: DISCONTINUED | OUTPATIENT
Start: 2019-06-06 | End: 2019-06-06

## 2019-06-06 RX ORDER — CHLORHEXIDINE GLUCONATE 213 G/1000ML
1 SOLUTION TOPICAL
Refills: 0 | Status: DISCONTINUED | OUTPATIENT
Start: 2019-06-06 | End: 2019-06-10

## 2019-06-06 RX ORDER — MAGNESIUM SULFATE 500 MG/ML
2 VIAL (ML) INJECTION ONCE
Refills: 0 | Status: COMPLETED | OUTPATIENT
Start: 2019-06-06 | End: 2019-06-06

## 2019-06-06 RX ORDER — ELECTROLYTE SOLUTION,INJ
1 VIAL (ML) INTRAVENOUS
Refills: 0 | Status: DISCONTINUED | OUTPATIENT
Start: 2019-06-06 | End: 2019-06-06

## 2019-06-06 RX ORDER — POTASSIUM PHOSPHATE, MONOBASIC POTASSIUM PHOSPHATE, DIBASIC 236; 224 MG/ML; MG/ML
30 INJECTION, SOLUTION INTRAVENOUS ONCE
Refills: 0 | Status: COMPLETED | OUTPATIENT
Start: 2019-06-06 | End: 2019-06-06

## 2019-06-06 RX ADMIN — Medication 40 MILLIEQUIVALENT(S): at 16:14

## 2019-06-06 RX ADMIN — CHLORHEXIDINE GLUCONATE 1 APPLICATION(S): 213 SOLUTION TOPICAL at 16:43

## 2019-06-06 RX ADMIN — Medication 1200 MILLIGRAM(S): at 05:32

## 2019-06-06 RX ADMIN — POTASSIUM PHOSPHATE, MONOBASIC POTASSIUM PHOSPHATE, DIBASIC 83.33 MILLIMOLE(S): 236; 224 INJECTION, SOLUTION INTRAVENOUS at 16:31

## 2019-06-06 RX ADMIN — BUPRENORPHINE AND NALOXONE 2 TABLET(S): 2; .5 TABLET SUBLINGUAL at 11:20

## 2019-06-06 RX ADMIN — PANTOPRAZOLE SODIUM 40 MILLIGRAM(S): 20 TABLET, DELAYED RELEASE ORAL at 05:33

## 2019-06-06 RX ADMIN — ENOXAPARIN SODIUM 40 MILLIGRAM(S): 100 INJECTION SUBCUTANEOUS at 11:17

## 2019-06-06 RX ADMIN — Medication 1200 MILLIGRAM(S): at 11:15

## 2019-06-06 RX ADMIN — Medication 40 MILLIGRAM(S): at 05:33

## 2019-06-06 RX ADMIN — Medication 1 GRAM(S): at 18:16

## 2019-06-06 RX ADMIN — Medication 50 GRAM(S): at 11:17

## 2019-06-06 RX ADMIN — Medication 1 EACH: at 20:46

## 2019-06-06 RX ADMIN — Medication 40 MILLIEQUIVALENT(S): at 11:16

## 2019-06-06 RX ADMIN — Medication 1 GRAM(S): at 05:33

## 2019-06-06 NOTE — CHART NOTE - NSCHARTNOTEFT_GEN_A_CORE
Assessment: Patient seen for nutrition follow up. Chart reviewed, hospital course noted. 64 y/o M hospital day 9 with severe UC. Plan for tentative proctocolectomy when medically optimized, S/P PICC line placement today to start TPN. Current diet order provides 1170 kcal total, 80 grams of protein (19 kcal/kg, 1.3 grams of protein per kg of admission body wt 62 kg).    Pt alert and oriented during time of interview, with friend at bedside. Pt discouraged, states wife passed away on Monday and supportive care offered. Discussed indication for TPN and answered all questions related to intravenous nutrition. Drinks some broth, has jello. Drinking 1-2 Ensure Clear daily. Patient states he is having some loose BMs but not diarrhea. Endorsed intermittent nausea, no emesis.    Factors impacting intake: [ ] none [ ] nausea  [ ] vomiting [X] diarrhea [ ] constipation  [ ]chewing problems [ ] swallowing issues  [X] other: persistent lack of appetite    Diet, Clear Liquid:   Supplement Feeding Modality:  Oral  Ensure Clear Cans or Servings Per Day:  1       Frequency:  Three Times a day (06-05-19 @ 14:36)    Intake: Tolerating clear liquid diet however with minimal po intake.     Current Weight: 141.9 pounds     Pertinent Medications: MEDICATIONS  (STANDING):  buprenorphine 2 mG/naloxone 0.5 mG SL  Tablet 2 Tablet(s) SubLingual daily  chlorhexidine 4% Liquid 1 Application(s) Topical <User Schedule>  enoxaparin Injectable 40 milliGRAM(s) SubCutaneous daily  mesalamine DR Capsule 1200 milliGRAM(s) Oral four times a day  pantoprazole    Tablet 40 milliGRAM(s) Oral two times a day  Parenteral Nutrition - Adult 1 Each (52 mL/Hr) TPN Continuous <Continuous>  potassium chloride    Tablet ER 40 milliEquivalent(s) Oral every 4 hours  potassium phosphate IVPB 30 milliMole(s) IV Intermittent once  predniSONE   Tablet 40 milliGRAM(s) Oral daily  sucralfate suspension 1 Gram(s) Oral two times a day  traZODone 50 milliGRAM(s) Oral at bedtime    MEDICATIONS  (PRN):  acetaminophen   Tablet .. 650 milliGRAM(s) Oral every 6 hours PRN Temp greater or equal to 38C (100.4F)  clonazePAM  Tablet 0.5 milliGRAM(s) Oral three times a day PRN anxiety    Pertinent Labs: 06-06 Na137 mmol/L Glu 113 mg/dL<H> K+ 3.2 mmol/L<L> Cr  0.30 mg/dL<L> BUN 3 mg/dL<L> 06-06 Phos 1.9 mg/dL<L> 06-05 Alb 1.4 g/dL<L> 06-01 PAB 3 mg/dL<L> 05-11 [id number] 5.7 %<H>     CAPILLARY BLOOD GLUCOSE      Skin: no edema or pressure injuries    Estimated Needs:   [X] no change since previous assessment  [ ] recalculated:     Previous Nutrition Diagnosis:    [X] Malnutrition (moderate, acute)    Nutrition Diagnosis is [X] ongoing  [ ] resolved [ ] not applicable     New Nutrition Diagnosis: [X] not applicable       Interventions:   Recommend  [X] Change Diet To: Full liquids as medically feasible with Ensure Enlive supplementation.  [X] Nutrition Supplement: Ensure Clear TID as ordered.  [ ] Nutrition Support  [X] Other: Start TPN @ 1.25 liters as ordered. Check triglyceride levels. Monitor electrolytes closely (K, Phos, Mg) as patient malnourished and at high risk for re-feeding syndrome. Infuse lipids after 48 hours. Discussed with MD Berry. Will continue to follow and if patient tolerating well consider increasing to 1.5L of TPN to provide increased protein/kcal.     Monitoring and Evaluation:   [X] PO intake [ x ] Tolerance to diet prescription [ x ] weights [ x ] labs[ x ] follow up per protocol  [X] other: electrolytes

## 2019-06-06 NOTE — PROGRESS NOTE ADULT - PROBLEM SELECTOR PLAN 3
improved  sp egd- findings of mild esophagitis and gastritis  path as above  cont ppi bid, carafate bid, anti emetics prn  monitor lytes, replete prn  aspiration prec

## 2019-06-06 NOTE — CHART NOTE - NSCHARTNOTEFT_GEN_A_CORE
Interventional Radiology Brief- Operative Note    Operators: Solitario Gonsalez MD    Procedure: right upper extremity double lumen picc placement    Post-op Dx: ulcerative colitis    EBL: 4 mL    Medications: 1% lidocaine    Contrast: 0 mL    Complications: no immediate complications    Findings/Plan:  Successful right basilic approach double lumen 4 Fr 40 cm PICC placed with 2 cm external

## 2019-06-06 NOTE — PROGRESS NOTE ADULT - SUBJECTIVE AND OBJECTIVE BOX
INTERVAL HPI/OVERNIGHT EVENTS:  pt seen and examined  feels ok  denies n/v  states diarrhea improved  mild abd pain  drinking ensure    MEDICATIONS  (STANDING):  buprenorphine 2 mG/naloxone 0.5 mG SL  Tablet 2 Tablet(s) SubLingual daily  enoxaparin Injectable 40 milliGRAM(s) SubCutaneous daily  mesalamine DR Capsule 1200 milliGRAM(s) Oral four times a day  pantoprazole    Tablet 40 milliGRAM(s) Oral two times a day  predniSONE   Tablet 40 milliGRAM(s) Oral daily  sodium chloride 0.9% with potassium chloride 40 mEq/L 1000 milliLiter(s) (80 mL/Hr) IV Continuous <Continuous>  sucralfate suspension 1 Gram(s) Oral two times a day  traZODone 50 milliGRAM(s) Oral at bedtime    MEDICATIONS  (PRN):  acetaminophen   Tablet .. 650 milliGRAM(s) Oral every 6 hours PRN Temp greater or equal to 38C (100.4F)  clonazePAM  Tablet 0.5 milliGRAM(s) Oral three times a day PRN anxiety      Allergies    penicillin (Swelling)    Intolerances        Review of Systems:    General:  No wt loss, fevers, chills, night sweats, fatigue   Eyes:  Good vision, no reported pain  ENT:  No sore throat, pain, runny nose, dysphagia  CV:  No pain, palpitations, hypo/hypertension  Resp:  No dyspnea, cough, tachypnea, wheezing  GI:  +pain, No nausea, No vomiting, +diarrhea, No constipation, No weight loss, No fever, No pruritis, +rectal bleeding, No melena, No dysphagia  :  No pain, bleeding, incontinence, nocturia  Muscle:  No pain, weakness  Neuro:  No weakness, tingling, memory problems  Psych:  No fatigue, insomnia, mood problems, depression  Endocrine:  No polyuria, polydypsia, cold/heat intolerance  Heme:  No petechiae, ecchymosis, easy bruisability  Skin:  No rash, tattoos, scars, edema      Vital Signs Last 24 Hrs  T(C): 37.1 (06 Jun 2019 07:34), Max: 37.1 (06 Jun 2019 07:34)  T(F): 98.7 (06 Jun 2019 07:34), Max: 98.7 (06 Jun 2019 07:34)  HR: 97 (06 Jun 2019 07:34) (87 - 97)  BP: 100/67 (06 Jun 2019 07:34) (100/67 - 117/81)  BP(mean): --  RR: 18 (06 Jun 2019 07:34) (18 - 18)  SpO2: 97% (06 Jun 2019 07:34) (97% - 99%)    PHYSICAL EXAM:  General:  nad  HEENT:  NC/AT  Chest:  dec bs  Cardiovascular:  rrr S1, S2  Abdomen:  Soft, mild lq ttp no guarding mild dt  Extremities:  no edema  Neuro/Psych:  A&Ox3      LABS:                        8.0    3.67  )-----------( 249      ( 06 Jun 2019 06:34 )             23.6     06-06    137  |  101  |  3<L>  ----------------------------<  113<H>  3.2<L>   |  27  |  0.30<L>    Ca    6.7<L>      06 Jun 2019 06:34  Phos  1.9     06-06  Mg     1.4     06-06    TPro  4.7<L>  /  Alb  1.4<L>  /  TBili  0.4  /  DBili  x   /  AST  6<L>  /  ALT  8<L>  /  AlkPhos  59  06-05          RADIOLOGY & ADDITIONAL TESTS:  Surgical Pathology Report (06.03.19 @ 10:23)    Surgical Pathology Report:   ACCESSION No:  [id number]    DANNI MONSE M                   3        Surgical Final Report          Final Diagnosis      1. Gastric antrum biopsy:  Mild chronic inactive gastritis with mild foveolar  hyperplasia and  fibromuscularization of lamina propria, suggestive of  chemical injury/reactive  gastropathy.  Special stain for Helicobacter pylori is negative.    2. Midesophagus biopsy:  Moderate active chronic esophagitis with focal ulceration  and reactive changes.  Alcian blue PAS stain is negative for fungi.  Immunohistochemical analysis for viral inclusions is  pending; results will be  reported separately in an addendum to follow.    3. Ascending colon biopsy:  Moderately active chronic colitis with foci of cryptitis,  crypt abscess, mucin  depletion and crypt architectural distortion, accompanied  by focal ulceration  and reactive changes.  See comments.    4. Transverse colon biopsy:  Severely active chronic colitis with diffuse ulceration and  focal marked  hyalinizingstromal fibrosis (negative for amyloid with  Congo red stain and  polarized light microscopy).  No residual colonic crypt or surface epithelium present  (multiple levels  examined).  See comments.    5. Sigmoid colon biopsy:  Mildly active chronic colitis with focal cryptitis, mucin  depletion and reactive  changes, accompanied by crypt architectural distortion and  extensive surface  hyperplastic change.  See comments.      Comments:            MONSE RAZO                   3        Surgical Final Report          The findings may represent idiopathic inflammatory bowel disease.  No granulomas are identified, and there is no evidence of  dysplasia.  Differential diagnosis includes medication-induced  (in particular, NSAIDs) and infectious  colitis.  Clinical correlation and appropriate follow-up  recommended.    Verified by: Kayden Venegas M.D.  (Electronic Signature)  Reported on: 06/05/19 13:15 EDT, [address] [place] [address]  _________________________________________________________________    Clinical History  Severe colitis, esophagitis, rule out CMV/HSV  Procedure: EGD, colonoscopy    Specimen(s) Submitted  1-Antrum  2-Mid esophagus  3-Ascending colon  4-Transverse colon  5-Sigmoid colon    Gross Description  1. The specimen is received in formalin and the specimen  container is labeled: Antrum. It consists of two fragments of tan  soft tissue measuring 0.3 and 0.4 cm in greatest dimension.  Entirely submitted. One cassette.    2. The specimen is received in formalin and the specimen  container is labeled: Mid esophagus. It consists of two fragments  of tan soft tissue measuring 0.1 and 0.2 cm in greatest  dimension. Entirely submitted. One cassette.    3. The specimen is received in formalin andthe specimen  container is labeled: Ascending colon. It consists of two  fragments of tan soft tissue, each measuring 0.2 cm in greatest  dimension. Entirely submitted. One cassette.    4. The specimen is received in formalin and the specimen  container is labeled: Transverse colon. It consists of two  fragments of tan soft tissue, each measuring 0.2 cm in greatest  dimension. Entirely submitted. One cassette.    5. The specimen is received in formalin and the specimen  container is labeled: Sigmoid colon. It consists of two              MONSE RAZO                   3        Surgical Final Report          fragments of tan soft tissue measuring 0.2 and 0.3 cm in greatest  dimension. Entirely submitted. One cassette.    In addition to other data that may appear on the specimen  containers, all labels have been inspected to confirm the  presence of the patient's name and date of birth.  DN 6/3/2019 2:41 PM    Perioperative Diagnosis  Colitis    Postoperative Diagnosis  Colitis

## 2019-06-06 NOTE — PROGRESS NOTE ADULT - PROBLEM SELECTOR PLAN 1
sp colon- findings of severe ulceration, no mucosa, completely denuded w deep ulcers and severe inflammatory pseudopolyps from rectum to ascending colon  path cw gastritis, esophagitis pending stains, active colitis, no dysplasia  cont delzicol 1200mg qid, bacid tid, pred 40 qd  cont liquid diet +ensure supps   crs following, will likely need surgery, timing to be determined  for picc line to start on tpn, pt agreeable  id input appreciated  monitor abd exam/stool output  will follow

## 2019-06-06 NOTE — PROGRESS NOTE ADULT - SUBJECTIVE AND OBJECTIVE BOX
HOSPITAL DAY: 9    SUBJECTIVE:  62 y/o M seen and examined at bedside with no overnight events.  Patient with no new complaints at this time, he states he is doing "ok." He is currently tolerating clear liquid diet and drinking Ensure. He states he has had 3 episodes of diarrhea this AM.  Patient denies any fevers, chills, chest pain, shortness of breath, nausea, vomiting.    Vital Signs Last 24 Hrs  T(C): 37.1 (06 Jun 2019 07:34), Max: 37.1 (06 Jun 2019 07:34)  T(F): 98.7 (06 Jun 2019 07:34), Max: 98.7 (06 Jun 2019 07:34)  HR: 97 (06 Jun 2019 07:34) (87 - 97)  BP: 100/67 (06 Jun 2019 07:34) (100/67 - 117/81)  BP(mean): --  RR: 18 (06 Jun 2019 07:34) (18 - 18)  SpO2: 97% (06 Jun 2019 07:34) (97% - 99%)    PHYSICAL EXAM:  GENERAL: NAD, well-developed  HEAD:  Atraumatic, Normocephalic  ABDOMEN: Soft, mildly tender to palpation in LLQ, non-distended. Normal bowel sounds x 4 quadrants.   NEUROLOGY: A&O x 3.    I&O's Summary    05 Jun 2019 07:01  -  06 Jun 2019 07:00  --------------------------------------------------------  IN: 400 mL / OUT: 0 mL / NET: 400 mL      I&O's Detail    05 Jun 2019 07:01  -  06 Jun 2019 07:00  --------------------------------------------------------  IN:    sodium chloride 0.9% with potassium chloride 40 mEq/L: 400 mL  Total IN: 400 mL    OUT:  Total OUT: 0 mL    Total NET: 400 mL    MEDICATIONS  (STANDING):  buprenorphine 2 mG/naloxone 0.5 mG SL  Tablet 2 Tablet(s) SubLingual daily  enoxaparin Injectable 40 milliGRAM(s) SubCutaneous daily  mesalamine DR Capsule 1200 milliGRAM(s) Oral four times a day  pantoprazole    Tablet 40 milliGRAM(s) Oral two times a day  predniSONE   Tablet 40 milliGRAM(s) Oral daily  sodium chloride 0.9% with potassium chloride 40 mEq/L 1000 milliLiter(s) (80 mL/Hr) IV Continuous <Continuous>  sucralfate suspension 1 Gram(s) Oral two times a day  traZODone 50 milliGRAM(s) Oral at bedtime    MEDICATIONS  (PRN):  acetaminophen   Tablet .. 650 milliGRAM(s) Oral every 6 hours PRN Temp greater or equal to 38C (100.4F)  clonazePAM  Tablet 0.5 milliGRAM(s) Oral three times a day PRN anxiety    LABS:                        8.0    3.67  )-----------( 249      ( 06 Jun 2019 06:34 )             23.6     06-06    137  |  101  |  3<L>  ----------------------------<  113<H>  3.2<L>   |  27  |  0.30<L>    Ca    6.7<L>      06 Jun 2019 06:34  Phos  1.9     06-06  Mg     1.4     06-06    TPro  4.7<L>  /  Alb  1.4<L>  /  TBili  0.4  /  DBili  x   /  AST  6<L>  /  ALT  8<L>  /  AlkPhos  59  06-05    ASSESSMENT  62 y/o M hospital day 9 with severe UC, 3 episodes of diarrhea this AM, H/H today 8.0/23.6    PLAN  - pain control, supportive care  - care as per medicine  - OOB, ambulate as tolerated  - Clear liquid diet with Ensure  - PICC line placed today for TPN  - continue anticoagulation  - serial abdominal exams  - labs in AM, monitor CBC  - OR for proctocolectomy when medically optimized  - Will discuss with Dr. Velazquez    Surgical Team Contact Information  Spectralink: Ext: [phone] or [phone]  Pager: [phone]

## 2019-06-06 NOTE — PROGRESS NOTE ADULT - SUBJECTIVE AND OBJECTIVE BOX
CHIEF COMPLAINT/INTERVAL HISTORY:  Pt. seen and evaluated for proctocolitis.  Pt. is in no distress.  s/p PICC line placement.  Denies having any abdominal pain.  Reported having mild diarrhea.      REVIEW OF SYSTEMS:  No fever, CP, or SOB.      Vital Signs Last 24 Hrs  T(C): 37.1 (06 Jun 2019 07:34), Max: 37.1 (06 Jun 2019 07:34)  T(F): 98.7 (06 Jun 2019 07:34), Max: 98.7 (06 Jun 2019 07:34)  HR: 97 (06 Jun 2019 07:34) (87 - 97)  BP: 100/67 (06 Jun 2019 07:34) (100/67 - 117/81)  BP(mean): --  RR: 18 (06 Jun 2019 07:34) (18 - 18)  SpO2: 97% (06 Jun 2019 07:34) (97% - 99%)    PHYSICAL EXAM:  GENERAL: NAD  HEENT: EOMI, hearing normal, conjunctiva and sclera clear  Chest: CTA bilaterally, no wheezing  CV: S1S2, RRR,   GI: soft, +BS, NT/ND  Musculoskeletal: no edema  Psychiatric: affect nL, mood nL  Skin: warm and dry    LABS:                        8.0    3.67  )-----------( 249      ( 06 Jun 2019 06:34 )             23.6     06-06    137  |  101  |  3<L>  ----------------------------<  113<H>  3.2<L>   |  27  |  0.30<L>    Ca    6.7<L>      06 Jun 2019 06:34  Phos  1.9     06-06  Mg     1.4     06-06    TPro  4.7<L>  /  Alb  1.4<L>  /  TBili  0.4  /  DBili  x   /  AST  6<L>  /  ALT  8<L>  /  AlkPhos  59  06-05          Assessment and Plan:  -Proctocolitis/Ulcerative colitis:  continue clear liquid diet and NS + KCl 40meq@80cc/hr x 12 hours.  Continue Prednisone 40mg PO daily and mesalamine 1200mg PO QID.  Eventual proctocolectomy per CRS.  GI f/u  -Dysphagia: s/p diflucan.  Supportive care with PPI and Carafate  -Drug abuse:  continue Suboxone  -Hypocalcemia:  Corrected calcium level for albumin is 8.7  -Herpes Zoster:  Completed Valtrex  -Anemia:  Of chronic disease.  Will monitor hbg.  -Latent TB:  appreciated ID input.    -VTE ppx:  Lovenox 40mg SQ daily

## 2019-06-07 ENCOUNTER — TRANSCRIPTION ENCOUNTER (OUTPATIENT)
Age: 64
End: 2019-06-07

## 2019-06-07 LAB
ALBUMIN SERPL ELPH-MCNC: 1.5 G/DL — LOW (ref 3.3–5)
ALP SERPL-CCNC: 68 U/L — SIGNIFICANT CHANGE UP (ref 40–120)
ALT FLD-CCNC: 8 U/L — LOW (ref 12–78)
ANION GAP SERPL CALC-SCNC: 7 MMOL/L — SIGNIFICANT CHANGE UP (ref 5–17)
AST SERPL-CCNC: 6 U/L — LOW (ref 15–37)
BILIRUB SERPL-MCNC: 0.3 MG/DL — SIGNIFICANT CHANGE UP (ref 0.2–1.2)
BUN SERPL-MCNC: 3 MG/DL — LOW (ref 7–23)
CALCIUM SERPL-MCNC: 7.2 MG/DL — LOW (ref 8.5–10.1)
CHLORIDE SERPL-SCNC: 99 MMOL/L — SIGNIFICANT CHANGE UP (ref 96–108)
CO2 SERPL-SCNC: 32 MMOL/L — HIGH (ref 22–31)
CREAT SERPL-MCNC: 0.47 MG/DL — LOW (ref 0.5–1.3)
GLUCOSE SERPL-MCNC: 135 MG/DL — HIGH (ref 70–99)
HCT VFR BLD CALC: 26.7 % — LOW (ref 39–50)
HGB BLD-MCNC: 9 G/DL — LOW (ref 13–17)
MAGNESIUM SERPL-MCNC: 1.7 MG/DL — SIGNIFICANT CHANGE UP (ref 1.6–2.6)
MCHC RBC-ENTMCNC: 29.6 PG — SIGNIFICANT CHANGE UP (ref 27–34)
MCHC RBC-ENTMCNC: 33.7 GM/DL — SIGNIFICANT CHANGE UP (ref 32–36)
MCV RBC AUTO: 87.8 FL — SIGNIFICANT CHANGE UP (ref 80–100)
NRBC # BLD: 0 /100 WBCS — SIGNIFICANT CHANGE UP (ref 0–0)
PHOSPHATE SERPL-MCNC: 3.1 MG/DL — SIGNIFICANT CHANGE UP (ref 2.5–4.5)
PLATELET # BLD AUTO: 263 K/UL — SIGNIFICANT CHANGE UP (ref 150–400)
POTASSIUM SERPL-MCNC: 3.4 MMOL/L — LOW (ref 3.5–5.3)
POTASSIUM SERPL-SCNC: 3.4 MMOL/L — LOW (ref 3.5–5.3)
PROT SERPL-MCNC: 4.8 G/DL — LOW (ref 6–8.3)
RBC # BLD: 3.04 M/UL — LOW (ref 4.2–5.8)
RBC # FLD: 14.2 % — SIGNIFICANT CHANGE UP (ref 10.3–14.5)
SODIUM SERPL-SCNC: 138 MMOL/L — SIGNIFICANT CHANGE UP (ref 135–145)
TRIGL SERPL-MCNC: 117 MG/DL — SIGNIFICANT CHANGE UP (ref 10–149)
WBC # BLD: 5.62 K/UL — SIGNIFICANT CHANGE UP (ref 3.8–10.5)
WBC # FLD AUTO: 5.62 K/UL — SIGNIFICANT CHANGE UP (ref 3.8–10.5)

## 2019-06-07 PROCEDURE — 99232 SBSQ HOSP IP/OBS MODERATE 35: CPT

## 2019-06-07 RX ORDER — ELECTROLYTE SOLUTION,INJ
1 VIAL (ML) INTRAVENOUS
Refills: 0 | Status: DISCONTINUED | OUTPATIENT
Start: 2019-06-07 | End: 2019-06-07

## 2019-06-07 RX ORDER — INSULIN LISPRO 100/ML
VIAL (ML) SUBCUTANEOUS
Refills: 0 | Status: DISCONTINUED | OUTPATIENT
Start: 2019-06-07 | End: 2019-06-10

## 2019-06-07 RX ORDER — MULTIVIT WITH MIN/MFOLATE/K2 340-15/3 G
1 POWDER (GRAM) ORAL ONCE
Refills: 0 | Status: COMPLETED | OUTPATIENT
Start: 2019-06-09 | End: 2019-06-09

## 2019-06-07 RX ORDER — DEXTROSE 50 % IN WATER 50 %
25 SYRINGE (ML) INTRAVENOUS ONCE
Refills: 0 | Status: DISCONTINUED | OUTPATIENT
Start: 2019-06-07 | End: 2019-06-10

## 2019-06-07 RX ORDER — NEOMYCIN SULFATE 500 MG/1
1000 TABLET ORAL ONCE
Refills: 0 | Status: COMPLETED | OUTPATIENT
Start: 2019-06-09 | End: 2019-06-09

## 2019-06-07 RX ORDER — METRONIDAZOLE 500 MG
500 TABLET ORAL ONCE
Refills: 0 | Status: COMPLETED | OUTPATIENT
Start: 2019-06-09 | End: 2019-06-09

## 2019-06-07 RX ORDER — INSULIN LISPRO 100/ML
VIAL (ML) SUBCUTANEOUS
Refills: 0 | Status: DISCONTINUED | OUTPATIENT
Start: 2019-06-07 | End: 2019-06-07

## 2019-06-07 RX ORDER — DEXTROSE 50 % IN WATER 50 %
12.5 SYRINGE (ML) INTRAVENOUS ONCE
Refills: 0 | Status: DISCONTINUED | OUTPATIENT
Start: 2019-06-07 | End: 2019-06-10

## 2019-06-07 RX ORDER — GLUCAGON INJECTION, SOLUTION 0.5 MG/.1ML
1 INJECTION, SOLUTION SUBCUTANEOUS ONCE
Refills: 0 | Status: DISCONTINUED | OUTPATIENT
Start: 2019-06-07 | End: 2019-06-10

## 2019-06-07 RX ORDER — DEXTROSE 50 % IN WATER 50 %
15 SYRINGE (ML) INTRAVENOUS ONCE
Refills: 0 | Status: DISCONTINUED | OUTPATIENT
Start: 2019-06-07 | End: 2019-06-10

## 2019-06-07 RX ORDER — INSULIN LISPRO 100/ML
VIAL (ML) SUBCUTANEOUS EVERY 6 HOURS
Refills: 0 | Status: DISCONTINUED | OUTPATIENT
Start: 2019-06-07 | End: 2019-06-07

## 2019-06-07 RX ORDER — POTASSIUM CHLORIDE 20 MEQ
40 PACKET (EA) ORAL ONCE
Refills: 0 | Status: COMPLETED | OUTPATIENT
Start: 2019-06-07 | End: 2019-06-07

## 2019-06-07 RX ORDER — SODIUM CHLORIDE 9 MG/ML
1000 INJECTION, SOLUTION INTRAVENOUS
Refills: 0 | Status: DISCONTINUED | OUTPATIENT
Start: 2019-06-07 | End: 2019-06-10

## 2019-06-07 RX ADMIN — BUPRENORPHINE AND NALOXONE 2 TABLET(S): 2; .5 TABLET SUBLINGUAL at 12:34

## 2019-06-07 RX ADMIN — Medication 1200 MILLIGRAM(S): at 12:29

## 2019-06-07 RX ADMIN — PANTOPRAZOLE SODIUM 40 MILLIGRAM(S): 20 TABLET, DELAYED RELEASE ORAL at 07:04

## 2019-06-07 RX ADMIN — ENOXAPARIN SODIUM 40 MILLIGRAM(S): 100 INJECTION SUBCUTANEOUS at 12:29

## 2019-06-07 RX ADMIN — CHLORHEXIDINE GLUCONATE 1 APPLICATION(S): 213 SOLUTION TOPICAL at 14:36

## 2019-06-07 RX ADMIN — Medication 1200 MILLIGRAM(S): at 07:03

## 2019-06-07 RX ADMIN — Medication 1: at 18:06

## 2019-06-07 RX ADMIN — Medication 1 EACH: at 19:35

## 2019-06-07 RX ADMIN — Medication 1200 MILLIGRAM(S): at 00:22

## 2019-06-07 RX ADMIN — Medication 1 GRAM(S): at 18:06

## 2019-06-07 RX ADMIN — Medication 40 MILLIEQUIVALENT(S): at 12:29

## 2019-06-07 RX ADMIN — PANTOPRAZOLE SODIUM 40 MILLIGRAM(S): 20 TABLET, DELAYED RELEASE ORAL at 18:06

## 2019-06-07 RX ADMIN — Medication 1 GRAM(S): at 07:03

## 2019-06-07 RX ADMIN — Medication 1200 MILLIGRAM(S): at 18:06

## 2019-06-07 NOTE — PROGRESS NOTE ADULT - PROBLEM SELECTOR PLAN 1
sp colon- findings of severe ulceration, no mucosa, completely denuded w deep ulcers and severe inflammatory pseudopolyps from rectum to ascending colon  path cw active colitis, no dysplasia  cont delzicol 1200mg qid, bacid tid, pred 40 qd  cont liquid diet +ensure supps +tpn  while on tpn monitor fs, lytes, lfts, tg  crs following, will need surgery, timing to be determined  id input appreciated  monitor abd exam/stool output  will follow

## 2019-06-07 NOTE — DISCHARGE NOTE PROVIDER - HOSPITAL COURSE
64 yo M PMHx ulcerative colitis, HLD, anxiety presents to ED  with complaints of inability to eat or tolerate PO since discharge from hospital recently. Patient states that after discharge on 5/13 for uc flare(on steroids) has been unable to swallow pills or tolerate po. He is complaining of dysphagia (to both solids and liquids) He also complains of nausea, vomiting, and fevers at home. He denies any diarrhea, admits seeing GI in office after discharge, stopped oral steroids and antibiotics recently. In the ED patient afebrile, tachycardic. Seen by CHERYL wills.         Pt. was admitted with GI and ID consultation.  He was started on IV Diflucan for presumed candida esophagitis.  Blood cultures were negative.  He was also given mesalamine and prednisone for his ulcerative colitis.  He had a swallow evaluation recommending soft diet with clear liquids.   During the hospitalization he was noted to have dermatomal rash and diagnosed with herpes zoster.  He has since completed a course of Valtrex.  Pt. was also found to be abusing his wifes medications (benzo and opiates) which were confiscated.  He was then started on suboxone in attempts to prevent withdrawal symptoms. Pt. had EGD showing mild esophagitis and gastritis.  Colonoscopy showed severe ulceration, no mucosa, completely denuded with deep ulcers and severe inflammatory pseudopolyps from the rectum to ascending colon.  As a result colorectal surgery was consulted.  Pt. had PICC line placement and was started on TPN in an attempt to optimize his nutritional status prior to proctocolectomy. 62 yo M PMHx ulcerative colitis, HLD, anxiety presents to ED  with complaints of inability to eat or tolerate PO since discharge from hospital recently. Patient states that after discharge on 5/13 for uc flare(on steroids) has been unable to swallow pills or tolerate po. He is complaining of dysphagia (to both solids and liquids) He also complains of nausea, vomiting, and fevers at home. He denies any diarrhea, admits seeing GI in office after discharge, stopped oral steroids and antibiotics recently. In the ED patient afebrile, tachycardic. Seen by CHERYL wills.         Pt. was admitted with GI and ID consultation.  He was started on IV Diflucan for presumed candida esophagitis.  Blood cultures were negative.  He was also given mesalamine and prednisone for his ulcerative colitis.  He had a swallow evaluation recommending soft diet with clear liquids.   During the hospitalization he was noted to have dermatomal rash and diagnosed with herpes zoster.  He has since completed a course of Valtrex.  Pt. was also found to be abusing his wifes medications (benzo and opiates) which were confiscated.  He was then started on suboxone in attempts to prevent withdrawal symptoms. Pt. had EGD showing mild esophagitis and gastritis.  Colonoscopy showed severe ulceration, no mucosa, completely denuded with deep ulcers and severe inflammatory pseudopolyps from the rectum to ascending colon.  As a result colorectal surgery was consulted.  Pt. had PICC line placement and was started on TPN in an attempt to optimize his nutritional status prior to proctocolectomy.  Pt. then underwent total colectomy with end ileostomy.  Post op developed sinus tachycardia likely 2/2  dehydration and pain.  Was given IV hydration and started on morphine PCA with resolution of tachycardia. 64 yo M PMHx ulcerative colitis, HLD, anxiety, anemia of chronic disease presented to ED with complaints of inability to eat or tolerate PO since discharge from hospital on 19 for UC, also with complaints of nausea, vomiting, and fevers at home.  He saw GI in the office after discharge, stopped oral steroids and antibiotics recently.    Patient was admitted with GI and ID consultation.  He was started on IV Diflucan for presumed candida esophagitis.  Blood cultures were negative.  He was also given mesalamine and prednisone for his ulcerative colitis.  He had a swallow evaluation recommending soft diet with clear liquids.   During the hospitalization he was noted to have dermatomal rash and diagnosed with herpes zoster.  He has since completed a course of Valtrex.  Patient was also found to be abusing his wife's medications (benzo and opiates) which were confiscated.  He was then started on suboxone in attempts to prevent withdrawal symptoms.  Patient had EGD showing mild esophagitis and gastritis.  Colonoscopy showed severe ulceration, no mucosa, completely denuded with deep ulcers and severe inflammatory pseudopolyps from the rectum to ascending colon.  As a result colorectal surgery was consulted.  Patient had PICC line placed and was started on TPN in an attempt to optimize his nutritional status prior to proctocolectomy.  Patient then underwent total colectomy with end ileostomy, received 2u PRBC intra-operatively.  Postoperatively developed asymptomatic sinus tachycardia in PACU, EKG performed, was given IV hydration, started on morphine PCA, and given stress dose of steroids, with resolution of tachycardia.  Evaluated by ICU team, determined not to need ICU level of care at that time.  When PACU criteria met, patient was transferred to the floor on remote telemetry for observation, continued TPN, monitoring of ostomy function, and pain control.    Daily CBCs were performed with H/H of 7.3/21.7 noted on 19, transfused 2u PRBC with appropriate response, repeat H/H 11.4/33.3.    On 19 patient with complaints of feeling depressed as his wife recently , seen by psych, placed on remeron, deemed not to be a risk to self or others, no contraindication for discharge.    Patient's diet advanced as tolerated, TPN discontinued and patient placed on D10 on 19.  Also noted to have high ostomy output, placed on imodium and lomotil.    On discharge, tolerating low fiber diet, ostomy is functioning appropriately, and pain is controlled. 62 yo M PMHx ulcerative colitis, HLD, anxiety, anemia of chronic disease presented to ED with complaints of inability to eat or tolerate PO since discharge from hospital on 19 for UC, also with complaints of nausea, vomiting, and fevers at home.  He saw GI in the office after discharge, stopped oral steroids and antibiotics recently.    Patient was admitted with GI and ID consultation.  He was started on IV Diflucan for presumed candida esophagitis.  Blood cultures were negative.  He was also given mesalamine and prednisone for his ulcerative colitis.  He had a swallow evaluation recommending soft diet with clear liquids.   During the hospitalization he was noted to have dermatomal rash and diagnosed with herpes zoster.  He has since completed a course of Valtrex.  Patient was also found to be abusing his wife's medications (benzo and opiates) which were confiscated.  He was then started on suboxone in attempts to prevent withdrawal symptoms.  Patient had EGD showing mild esophagitis and gastritis.  Colonoscopy showed severe ulceration, no mucosa, completely denuded with deep ulcers and severe inflammatory pseudopolyps from the rectum to ascending colon.  As a result colorectal surgery was consulted.  Patient had PICC line placed and was started on TPN in an attempt to optimize his nutritional status prior to proctocolectomy.  Patient then underwent total colectomy with end ileostomy, received 2u PRBC intra-operatively.  Postoperatively developed asymptomatic sinus tachycardia in PACU, EKG performed, was given IV hydration, started on morphine PCA, and given stress dose of steroids, with resolution of tachycardia.  Evaluated by ICU team, determined not to need ICU level of care at that time.  When PACU criteria met, patient was transferred to the floor on remote telemetry for observation, continued TPN, monitoring of ostomy function, and pain control.    Daily CBCs were performed with H/H of 7.3/21.7 noted on 19, transfused 2u PRBC with appropriate response, repeat H/H 11.4/33.3.    On 19 patient with complaints of feeling depressed as his wife recently , seen by psych, placed on remeron, deemed not to be a risk to self or others, no contraindication for discharge.    Patient's diet advanced as tolerated, TPN discontinued and patient placed on D10 on 19.  Also noted to have high ostomy output, placed on imodium and lomotil.  IV antibiotics and PICC line discontinued.  IV hydrocortisone tapered.  As per medical team and outpatient PMD Dr. Plata, patient to be discharged on no pain medication or suboxone and to follow up as outpatient within 3 days.    On discharge, patient tolerating low fiber diet, H/H stable, ostomy is functioning appropriately, patient is ambulating, without pain, and ostomy teaching performed by RN.  As per Dr. Velazquez, patient is stable and cleared for discharge home with home care.

## 2019-06-07 NOTE — DISCHARGE NOTE PROVIDER - NSDCFUADDINST_GEN_ALL_CORE_FT
Continue to take oral prednisone 5mg every other day x 1 week.    Continue to take imodium and lomotil daily as directed.    You may shower normally, but do not scrub over incision site.  If you experience fever, bleeding or pus draining from incision site, black or bloody stools, nausea/vomiting, or severe abdominal pain, return to the ER immediately. Continue to take oral prednisone 5mg every other day x 1 week.    Continue to take imodium and lomotil daily as directed.  You may take over the counter pain medication such as tylenol as needed.  You may shower normally, but do not scrub over incision site.  If you experience fever, bleeding or pus draining from incision site, black or bloody stools, nausea/vomiting, or severe abdominal pain, return to the ER immediately.

## 2019-06-07 NOTE — DISCHARGE NOTE PROVIDER - NSDCCPTREATMENT_GEN_ALL_CORE_FT
PRINCIPAL PROCEDURE  Procedure: Hand-assisted laparoscopic total colectomy  Findings and Treatment: with end ileostomy      SECONDARY PROCEDURE  Procedure: Mobilization of splenic flexure  Findings and Treatment:

## 2019-06-07 NOTE — PROGRESS NOTE ADULT - PROBLEM SELECTOR PLAN 3
resolved  sp egd- findings of mild esophagitis and gastritis  path cw gastritis, esophagitis pending stains  cont ppi bid, carafate bid, anti emetics prn  monitor lytes, replete prn  aspiration prec

## 2019-06-07 NOTE — DISCHARGE NOTE PROVIDER - CARE PROVIDERS DIRECT ADDRESSES
[email or web address]

## 2019-06-07 NOTE — PROGRESS NOTE ADULT - SUBJECTIVE AND OBJECTIVE BOX
CHIEF COMPLAINT/INTERVAL HISTORY:  Pt. seen and evaluated for proctocolitis/ulcerative colitis.  Pt. is in no distress.  Reported having a few episodes of diarrhea over the night.  Started on TPN yesterday evening.      REVIEW OF SYSTEMS:  No fever, CP, SOB, or abdominal pain.     Vital Signs Last 24 Hrs  T(C): 36.6 (07 Jun 2019 07:41), Max: 37.2 (07 Jun 2019 00:18)  T(F): 97.9 (07 Jun 2019 07:41), Max: 99 (07 Jun 2019 00:18)  HR: 86 (07 Jun 2019 07:41) (82 - 86)  BP: 113/77 (07 Jun 2019 07:41) (102/66 - 113/77)  BP(mean): --  RR: 18 (07 Jun 2019 07:41) (18 - 18)  SpO2: 99% (07 Jun 2019 07:41) (98% - 99%)    PHYSICAL EXAM:  GENERAL: NAD  HEENT: EOMI, hearing normal, conjunctiva and sclera clear  Chest: CTA bilaterally, no wheezing  CV: S1S2, RRR,   GI: soft, +BS, NT/ND  Musculoskeletal: no edema  Psychiatric: affect nL, mood nL  Skin: warm and dry    LABS:                        9.0    5.62  )-----------( 263      ( 07 Jun 2019 06:14 )             26.7     06-07    138  |  99  |  3<L>  ----------------------------<  135<H>  3.4<L>   |  32<H>  |  0.47<L>    Ca    7.2<L>      07 Jun 2019 06:14  Phos  3.1     06-07  Mg     1.7     06-07    TPro  4.8<L>  /  Alb  1.5<L>  /  TBili  0.3  /  DBili  x   /  AST  6<L>  /  ALT  8<L>  /  AlkPhos  68  06-07      Assessment and Plan:  -Proctocolitis/Ulcerative colitis:  continue clear liquid diet and TPN  Continue Prednisone 40mg PO daily and mesalamine 1200mg PO QID.  Eventual proctocolectomy per CRS.  GI f/u  -Dysphagia: s/p diflucan.  Supportive care with PPI and Carafate  -Drug abuse:  continue Suboxone  -Hypocalcemia:  Corrected calcium level for albumin is 9.2 today  -Herpes Zoster:  Completed Valtrex  -Anemia:  Of chronic disease.  Will monitor hbg.  -Latent TB:  appreciated ID input.    -VTE ppx:  Lovenox 40mg SQ daily

## 2019-06-07 NOTE — DISCHARGE NOTE PROVIDER - PROVIDER TOKENS
PROVIDER:[TOKEN:[[id number]]] PROVIDER:[TOKEN:[[id number]],FOLLOWUP:[1 week]],PROVIDER:[TOKEN:[[id number]],FOLLOWUP:[1-3 days]]

## 2019-06-07 NOTE — CHART NOTE - NSCHARTNOTEFT_GEN_A_CORE
Received call from RN regarding need for insulin coverage. Patient started on TPN but also Full liquid diet. Discussed with nursing manager and pharmacy.  Will order low dose ISS and FS for coverage per policy.  F/u with primary team in AM

## 2019-06-07 NOTE — DISCHARGE NOTE PROVIDER - NSDCFUADDAPPT_GEN_ALL_CORE_FT
Follow up with your PMD Dr. Plata within 3 days of discharge.  Follow up with Dr. Velazquez within 1 week of discharge.  Follow up with an outpatient gastroenterologist for further management.

## 2019-06-07 NOTE — DISCHARGE NOTE PROVIDER - CARE PROVIDER_API CALL
Abimael Velazquez)  ColonRectal Surgery; Surgery  [address] [facility] [address] [place] [address]  Phone: [phone]  Fax: [phone]  Follow Up Time: Abimael Velazquez)  ColonRectal Surgery; Surgery  [address] [facility] [address] [place] [address]  Phone: [phone]  Fax: [phone]  Follow Up Time: 1 week    Dennis Plata)  Internal Medicine  [address] [place] [address]  Phone: [phone]  Fax: [phone]  Follow Up Time: 1-3 days

## 2019-06-07 NOTE — PROGRESS NOTE ADULT - SUBJECTIVE AND OBJECTIVE BOX
SUBJECTIVE:  Patient seen and examined at bedside.  No overnight events.  Patient with no new complaints at this time, not taking many clears, will drink Ensure clear, admits to flatus, a nonbloody BM this AM, and some mild LLQ pain.  Patient denies any fevers, chills, chest pain, shortness of breath, nausea or vomiting.    Vital Signs Last 24 Hrs  T(C): 36.6 (07 Jun 2019 07:41), Max: 37.2 (07 Jun 2019 00:18)  T(F): 97.9 (07 Jun 2019 07:41), Max: 99 (07 Jun 2019 00:18)  HR: 86 (07 Jun 2019 07:41) (82 - 86)  BP: 113/77 (07 Jun 2019 07:41) (102/66 - 113/77)  BP(mean): --  RR: 18 (07 Jun 2019 07:41) (18 - 18)  SpO2: 99% (07 Jun 2019 07:41) (98% - 99%)    PHYSICAL EXAM  GENERAL:  Well-developed Male lying comfortably in bed in NAD  HEAD:  Normocephalic, atraumatic  ABDOMEN:  Soft, nondistended, mild TTP in LLQ, normoactive bowel sounds in all 4 quadrants.  No rebound tenderness or guarding.  NEURO:  A&O x 3    I&O's Summary    06 Jun 2019 07:01  -  07 Jun 2019 07:00  --------------------------------------------------------  IN: 83.3 mL / OUT: 0 mL / NET: 83.3 mL    I&O's Detail    06 Jun 2019 07:01  -  07 Jun 2019 07:00  --------------------------------------------------------  IN:    Solution: 83.3 mL  Total IN: 83.3 mL    OUT:  Total OUT: 0 mL    Total NET: 83.3 mL    MEDICATIONS  (STANDING):  buprenorphine 2 mG/naloxone 0.5 mG SL  Tablet 2 Tablet(s) SubLingual daily  chlorhexidine 4% Liquid 1 Application(s) Topical <User Schedule>  dextrose 5%. 1000 milliLiter(s) (50 mL/Hr) IV Continuous <Continuous>  dextrose 50% Injectable 12.5 Gram(s) IV Push once  dextrose 50% Injectable 25 Gram(s) IV Push once  dextrose 50% Injectable 25 Gram(s) IV Push once  enoxaparin Injectable 40 milliGRAM(s) SubCutaneous daily  insulin lispro (HumaLOG) corrective regimen sliding scale   SubCutaneous every 6 hours  mesalamine DR Capsule 1200 milliGRAM(s) Oral four times a day  pantoprazole    Tablet 40 milliGRAM(s) Oral two times a day  Parenteral Nutrition - Adult 1 Each (52 mL/Hr) TPN Continuous <Continuous>  Parenteral Nutrition - Adult 1 Each (52 mL/Hr) TPN Continuous <Continuous>  potassium chloride    Tablet ER 40 milliEquivalent(s) Oral once  predniSONE   Tablet 40 milliGRAM(s) Oral daily  sucralfate suspension 1 Gram(s) Oral two times a day  traZODone 50 milliGRAM(s) Oral at bedtime    MEDICATIONS  (PRN):  acetaminophen   Tablet .. 650 milliGRAM(s) Oral every 6 hours PRN Temp greater or equal to 38C (100.4F)  clonazePAM  Tablet 0.5 milliGRAM(s) Oral three times a day PRN anxiety  dextrose 40% Gel 15 Gram(s) Oral once PRN Blood Glucose LESS THAN 70 milliGRAM(s)/deciliter  glucagon  Injectable 1 milliGRAM(s) IntraMuscular once PRN Glucose LESS THAN 70 milligrams/deciliter    LABS:                        9.0    5.62  )-----------( 263      ( 07 Jun 2019 06:14 )             26.7     06-07    138  |  99  |  3<L>  ----------------------------<  135<H>  3.4<L>   |  32<H>  |  0.47<L>    Ca    7.2<L>      07 Jun 2019 06:14  Phos  3.1     06-07  Mg     1.7     06-07    TPro  4.8<L>  /  Alb  1.5<L>  /  TBili  0.3  /  DBili  x   /  AST  6<L>  /  ALT  8<L>  /  AlkPhos  68  06-07    ASSESSMENT & PLAN  63 year old male with severe UC from rectum to ascending colon, nonbloody BM, H/H 9/26.7 today, up from 8/23.6 yesterday, TPN started yesterday.    - Continue current medication regimen as per GI  - Continue clear liquid diet, Ensure, and TPN  - Serial abdominal exams  - Daily CBC  - OOB, pain control  - DVT prophylaxis with lovenox  - OR for proctocolectomy when medically optimized  - Case to be discussed with Dr. Velazquez SUBJECTIVE:  Patient seen and examined at bedside.  No overnight events.  Patient with no new complaints at this time, not taking many clears, will drink Ensure clear, admits to flatus, a nonbloody BM this AM, and some mild LLQ pain.  Patient denies any fevers, chills, chest pain, shortness of breath, nausea or vomiting.    Vital Signs Last 24 Hrs  T(C): 36.6 (07 Jun 2019 07:41), Max: 37.2 (07 Jun 2019 00:18)  T(F): 97.9 (07 Jun 2019 07:41), Max: 99 (07 Jun 2019 00:18)  HR: 86 (07 Jun 2019 07:41) (82 - 86)  BP: 113/77 (07 Jun 2019 07:41) (102/66 - 113/77)  BP(mean): --  RR: 18 (07 Jun 2019 07:41) (18 - 18)  SpO2: 99% (07 Jun 2019 07:41) (98% - 99%)    PHYSICAL EXAM  GENERAL:  Well-developed Male lying comfortably in bed in NAD  HEAD:  Normocephalic, atraumatic  ABDOMEN:  Soft, nondistended, mild TTP in LLQ, normoactive bowel sounds in all 4 quadrants.  No rebound tenderness or guarding.  NEURO:  A&O x 3    I&O's Summary    06 Jun 2019 07:01  -  07 Jun 2019 07:00  --------------------------------------------------------  IN: 83.3 mL / OUT: 0 mL / NET: 83.3 mL    I&O's Detail    06 Jun 2019 07:01  -  07 Jun 2019 07:00  --------------------------------------------------------  IN:    Solution: 83.3 mL  Total IN: 83.3 mL    OUT:  Total OUT: 0 mL    Total NET: 83.3 mL    MEDICATIONS  (STANDING):  buprenorphine 2 mG/naloxone 0.5 mG SL  Tablet 2 Tablet(s) SubLingual daily  chlorhexidine 4% Liquid 1 Application(s) Topical <User Schedule>  dextrose 5%. 1000 milliLiter(s) (50 mL/Hr) IV Continuous <Continuous>  dextrose 50% Injectable 12.5 Gram(s) IV Push once  dextrose 50% Injectable 25 Gram(s) IV Push once  dextrose 50% Injectable 25 Gram(s) IV Push once  enoxaparin Injectable 40 milliGRAM(s) SubCutaneous daily  insulin lispro (HumaLOG) corrective regimen sliding scale   SubCutaneous every 6 hours  mesalamine DR Capsule 1200 milliGRAM(s) Oral four times a day  pantoprazole    Tablet 40 milliGRAM(s) Oral two times a day  Parenteral Nutrition - Adult 1 Each (52 mL/Hr) TPN Continuous <Continuous>  Parenteral Nutrition - Adult 1 Each (52 mL/Hr) TPN Continuous <Continuous>  potassium chloride    Tablet ER 40 milliEquivalent(s) Oral once  predniSONE   Tablet 40 milliGRAM(s) Oral daily  sucralfate suspension 1 Gram(s) Oral two times a day  traZODone 50 milliGRAM(s) Oral at bedtime    MEDICATIONS  (PRN):  acetaminophen   Tablet .. 650 milliGRAM(s) Oral every 6 hours PRN Temp greater or equal to 38C (100.4F)  clonazePAM  Tablet 0.5 milliGRAM(s) Oral three times a day PRN anxiety  dextrose 40% Gel 15 Gram(s) Oral once PRN Blood Glucose LESS THAN 70 milliGRAM(s)/deciliter  glucagon  Injectable 1 milliGRAM(s) IntraMuscular once PRN Glucose LESS THAN 70 milligrams/deciliter    LABS:                        9.0    5.62  )-----------( 263      ( 07 Jun 2019 06:14 )             26.7     06-07    138  |  99  |  3<L>  ----------------------------<  135<H>  3.4<L>   |  32<H>  |  0.47<L>    Ca    7.2<L>      07 Jun 2019 06:14  Phos  3.1     06-07  Mg     1.7     06-07    TPro  4.8<L>  /  Alb  1.5<L>  /  TBili  0.3  /  DBili  x   /  AST  6<L>  /  ALT  8<L>  /  AlkPhos  68  06-07

## 2019-06-07 NOTE — PROGRESS NOTE ADULT - ASSESSMENT
ASSESSMENT & PLAN  63 year old male with severe UC from rectum to ascending colon, nonbloody BM, H/H 9/26.7 today, up from 8/23.6 yesterday, TPN started yesterday.    - Continue current medication regimen as per GI  - Continue clear liquid diet, Ensure, and TPN  - Serial abdominal exams  - Daily CBC  - OOB, pain control  - DVT prophylaxis with lovenox  - OR for proctocolectomy Monday.  I reviewed with patient and his daughter risks benefits and alternatives.  This included not limited to bleeding infection and cardiopulmonary events.  Patient questions were answered.  He understands and wishes to proceed.  Patient will prep for OR Monday.

## 2019-06-07 NOTE — PROGRESS NOTE ADULT - SUBJECTIVE AND OBJECTIVE BOX
INTERVAL HPI/OVERNIGHT EVENTS:  pt seen and examined  feels ok  sp picc, started on tpn  denies n/v  c/o mild lq discomfort and some loose stools  intermittently refusing po meds    MEDICATIONS  (STANDING):  buprenorphine 2 mG/naloxone 0.5 mG SL  Tablet 2 Tablet(s) SubLingual daily  chlorhexidine 4% Liquid 1 Application(s) Topical <User Schedule>  dextrose 5%. 1000 milliLiter(s) (50 mL/Hr) IV Continuous <Continuous>  dextrose 50% Injectable 12.5 Gram(s) IV Push once  dextrose 50% Injectable 25 Gram(s) IV Push once  dextrose 50% Injectable 25 Gram(s) IV Push once  enoxaparin Injectable 40 milliGRAM(s) SubCutaneous daily  insulin lispro (HumaLOG) corrective regimen sliding scale   SubCutaneous every 6 hours  mesalamine DR Capsule 1200 milliGRAM(s) Oral four times a day  pantoprazole    Tablet 40 milliGRAM(s) Oral two times a day  Parenteral Nutrition - Adult 1 Each (52 mL/Hr) TPN Continuous <Continuous>  potassium chloride    Tablet ER 40 milliEquivalent(s) Oral once  predniSONE   Tablet 40 milliGRAM(s) Oral daily  sucralfate suspension 1 Gram(s) Oral two times a day  traZODone 50 milliGRAM(s) Oral at bedtime    MEDICATIONS  (PRN):  acetaminophen   Tablet .. 650 milliGRAM(s) Oral every 6 hours PRN Temp greater or equal to 38C (100.4F)  clonazePAM  Tablet 0.5 milliGRAM(s) Oral three times a day PRN anxiety  dextrose 40% Gel 15 Gram(s) Oral once PRN Blood Glucose LESS THAN 70 milliGRAM(s)/deciliter  glucagon  Injectable 1 milliGRAM(s) IntraMuscular once PRN Glucose LESS THAN 70 milligrams/deciliter      Allergies    penicillin (Swelling)    Intolerances        Review of Systems:    General:  No wt loss, fevers, chills, night sweats, fatigue   Eyes:  Good vision, no reported pain  ENT:  No sore throat, pain, runny nose, dysphagia  CV:  No pain, palpitations, hypo/hypertension  Resp:  No dyspnea, cough, tachypnea, wheezing  GI:  + pain, No nausea, No vomiting, +diarrhea, No constipation, No weight loss, No fever, No pruritis, No rectal bleeding, No melena, No dysphagia  :  No pain, bleeding, incontinence, nocturia  Muscle:  No pain, weakness  Neuro:  No weakness, tingling, memory problems  Psych:  No fatigue, insomnia, mood problems, depression  Endocrine:  No polyuria, polydypsia, cold/heat intolerance  Heme:  No petechiae, ecchymosis, easy bruisability  Skin:  No rash, tattoos, scars, edema      Vital Signs Last 24 Hrs  T(C): 36.6 (07 Jun 2019 07:41), Max: 37.2 (07 Jun 2019 00:18)  T(F): 97.9 (07 Jun 2019 07:41), Max: 99 (07 Jun 2019 00:18)  HR: 86 (07 Jun 2019 07:41) (82 - 86)  BP: 113/77 (07 Jun 2019 07:41) (102/66 - 113/77)  BP(mean): --  RR: 18 (07 Jun 2019 07:41) (18 - 18)  SpO2: 99% (07 Jun 2019 07:41) (98% - 99%)    PHYSICAL EXAM:  General:  nad  HEENT:  NC/AT  Chest:  dec bs  Cardiovascular:  rrr S1, S2  Abdomen:  Soft, mild lq ttp no guarding mild dt  Extremities:  no edema  Neuro/Psych:  A&Ox3    LABS:                        9.0    5.62  )-----------( 263      ( 07 Jun 2019 06:14 )             26.7     06-07    138  |  99  |  3<L>  ----------------------------<  135<H>  3.4<L>   |  32<H>  |  0.47<L>    Ca    7.2<L>      07 Jun 2019 06:14  Phos  3.1     06-07  Mg     1.7     06-07    TPro  4.8<L>  /  Alb  1.5<L>  /  TBili  0.3  /  DBili  x   /  AST  6<L>  /  ALT  8<L>  /  AlkPhos  68  06-07          RADIOLOGY & ADDITIONAL TESTS:

## 2019-06-08 LAB
ALBUMIN SERPL ELPH-MCNC: 1.2 G/DL — LOW (ref 3.3–5)
ALBUMIN SERPL ELPH-MCNC: 1.4 G/DL — LOW (ref 3.3–5)
ALP SERPL-CCNC: 60 U/L — SIGNIFICANT CHANGE UP (ref 40–120)
ALP SERPL-CCNC: 70 U/L — SIGNIFICANT CHANGE UP (ref 40–120)
ALT FLD-CCNC: <6 U/L — LOW (ref 12–78)
ALT FLD-CCNC: <6 U/L — LOW (ref 12–78)
ANION GAP SERPL CALC-SCNC: 5 MMOL/L — SIGNIFICANT CHANGE UP (ref 5–17)
ANION GAP SERPL CALC-SCNC: 8 MMOL/L — SIGNIFICANT CHANGE UP (ref 5–17)
AST SERPL-CCNC: 6 U/L — LOW (ref 15–37)
AST SERPL-CCNC: 6 U/L — LOW (ref 15–37)
BILIRUB SERPL-MCNC: 0.2 MG/DL — SIGNIFICANT CHANGE UP (ref 0.2–1.2)
BILIRUB SERPL-MCNC: 0.2 MG/DL — SIGNIFICANT CHANGE UP (ref 0.2–1.2)
BUN SERPL-MCNC: 5 MG/DL — LOW (ref 7–23)
BUN SERPL-MCNC: 5 MG/DL — LOW (ref 7–23)
CALCIUM SERPL-MCNC: 7.1 MG/DL — LOW (ref 8.5–10.1)
CALCIUM SERPL-MCNC: 7.1 MG/DL — LOW (ref 8.5–10.1)
CHLORIDE SERPL-SCNC: 96 MMOL/L — SIGNIFICANT CHANGE UP (ref 96–108)
CHLORIDE SERPL-SCNC: 97 MMOL/L — SIGNIFICANT CHANGE UP (ref 96–108)
CO2 SERPL-SCNC: 31 MMOL/L — SIGNIFICANT CHANGE UP (ref 22–31)
CO2 SERPL-SCNC: 31 MMOL/L — SIGNIFICANT CHANGE UP (ref 22–31)
CREAT SERPL-MCNC: 0.36 MG/DL — LOW (ref 0.5–1.3)
CREAT SERPL-MCNC: 0.46 MG/DL — LOW (ref 0.5–1.3)
GLUCOSE SERPL-MCNC: 129 MG/DL — HIGH (ref 70–99)
GLUCOSE SERPL-MCNC: 557 MG/DL — CRITICAL HIGH (ref 70–99)
HCT VFR BLD CALC: 22.6 % — LOW (ref 39–50)
HCT VFR BLD CALC: 25.9 % — LOW (ref 39–50)
HGB BLD-MCNC: 7.6 G/DL — LOW (ref 13–17)
HGB BLD-MCNC: 8.8 G/DL — LOW (ref 13–17)
MAGNESIUM SERPL-MCNC: 1.6 MG/DL — SIGNIFICANT CHANGE UP (ref 1.6–2.6)
MAGNESIUM SERPL-MCNC: 1.9 MG/DL — SIGNIFICANT CHANGE UP (ref 1.6–2.6)
MCHC RBC-ENTMCNC: 29.8 PG — SIGNIFICANT CHANGE UP (ref 27–34)
MCHC RBC-ENTMCNC: 29.8 PG — SIGNIFICANT CHANGE UP (ref 27–34)
MCHC RBC-ENTMCNC: 33.6 GM/DL — SIGNIFICANT CHANGE UP (ref 32–36)
MCHC RBC-ENTMCNC: 34 GM/DL — SIGNIFICANT CHANGE UP (ref 32–36)
MCV RBC AUTO: 87.8 FL — SIGNIFICANT CHANGE UP (ref 80–100)
MCV RBC AUTO: 88.6 FL — SIGNIFICANT CHANGE UP (ref 80–100)
NRBC # BLD: 0 /100 WBCS — SIGNIFICANT CHANGE UP (ref 0–0)
NRBC # BLD: 0 /100 WBCS — SIGNIFICANT CHANGE UP (ref 0–0)
PHOSPHATE SERPL-MCNC: 3.4 MG/DL — SIGNIFICANT CHANGE UP (ref 2.5–4.5)
PLATELET # BLD AUTO: 233 K/UL — SIGNIFICANT CHANGE UP (ref 150–400)
PLATELET # BLD AUTO: 254 K/UL — SIGNIFICANT CHANGE UP (ref 150–400)
POTASSIUM SERPL-MCNC: 3.3 MMOL/L — LOW (ref 3.5–5.3)
POTASSIUM SERPL-MCNC: 4.7 MMOL/L — SIGNIFICANT CHANGE UP (ref 3.5–5.3)
POTASSIUM SERPL-SCNC: 3.3 MMOL/L — LOW (ref 3.5–5.3)
POTASSIUM SERPL-SCNC: 4.7 MMOL/L — SIGNIFICANT CHANGE UP (ref 3.5–5.3)
PROT SERPL-MCNC: 4.2 G/DL — LOW (ref 6–8.3)
PROT SERPL-MCNC: 4.9 G/DL — LOW (ref 6–8.3)
RBC # BLD: 2.55 M/UL — LOW (ref 4.2–5.8)
RBC # BLD: 2.95 M/UL — LOW (ref 4.2–5.8)
RBC # FLD: 14.4 % — SIGNIFICANT CHANGE UP (ref 10.3–14.5)
RBC # FLD: 14.5 % — SIGNIFICANT CHANGE UP (ref 10.3–14.5)
SODIUM SERPL-SCNC: 132 MMOL/L — LOW (ref 135–145)
SODIUM SERPL-SCNC: 136 MMOL/L — SIGNIFICANT CHANGE UP (ref 135–145)
TRIGL SERPL-MCNC: 83 MG/DL — SIGNIFICANT CHANGE UP (ref 10–149)
WBC # BLD: 8.2 K/UL — SIGNIFICANT CHANGE UP (ref 3.8–10.5)
WBC # BLD: 9.21 K/UL — SIGNIFICANT CHANGE UP (ref 3.8–10.5)
WBC # FLD AUTO: 8.2 K/UL — SIGNIFICANT CHANGE UP (ref 3.8–10.5)
WBC # FLD AUTO: 9.21 K/UL — SIGNIFICANT CHANGE UP (ref 3.8–10.5)

## 2019-06-08 PROCEDURE — 99232 SBSQ HOSP IP/OBS MODERATE 35: CPT

## 2019-06-08 PROCEDURE — 93010 ELECTROCARDIOGRAM REPORT: CPT

## 2019-06-08 RX ORDER — ELECTROLYTE SOLUTION,INJ
1 VIAL (ML) INTRAVENOUS
Refills: 0 | Status: DISCONTINUED | OUTPATIENT
Start: 2019-06-08 | End: 2019-06-08

## 2019-06-08 RX ORDER — I.V. FAT EMULSION 20 G/100ML
0.55 EMULSION INTRAVENOUS
Qty: 35 | Refills: 0 | Status: DISCONTINUED | OUTPATIENT
Start: 2019-06-08 | End: 2019-06-08

## 2019-06-08 RX ORDER — POTASSIUM CHLORIDE 20 MEQ
40 PACKET (EA) ORAL EVERY 4 HOURS
Refills: 0 | Status: COMPLETED | OUTPATIENT
Start: 2019-06-08 | End: 2019-06-08

## 2019-06-08 RX ORDER — BUPRENORPHINE AND NALOXONE 2; .5 MG/1; MG/1
2 TABLET SUBLINGUAL DAILY
Refills: 0 | Status: DISCONTINUED | OUTPATIENT
Start: 2019-06-10 | End: 2019-06-10

## 2019-06-08 RX ADMIN — PANTOPRAZOLE SODIUM 40 MILLIGRAM(S): 20 TABLET, DELAYED RELEASE ORAL at 18:03

## 2019-06-08 RX ADMIN — CHLORHEXIDINE GLUCONATE 1 APPLICATION(S): 213 SOLUTION TOPICAL at 12:45

## 2019-06-08 RX ADMIN — ENOXAPARIN SODIUM 40 MILLIGRAM(S): 100 INJECTION SUBCUTANEOUS at 12:37

## 2019-06-08 RX ADMIN — Medication 1 GRAM(S): at 07:52

## 2019-06-08 RX ADMIN — Medication 1200 MILLIGRAM(S): at 00:35

## 2019-06-08 RX ADMIN — Medication 1200 MILLIGRAM(S): at 18:03

## 2019-06-08 RX ADMIN — I.V. FAT EMULSION 21.88 GM/KG/DAY: 20 EMULSION INTRAVENOUS at 20:05

## 2019-06-08 RX ADMIN — Medication 1200 MILLIGRAM(S): at 12:37

## 2019-06-08 RX ADMIN — Medication 1 EACH: at 20:05

## 2019-06-08 RX ADMIN — PANTOPRAZOLE SODIUM 40 MILLIGRAM(S): 20 TABLET, DELAYED RELEASE ORAL at 07:52

## 2019-06-08 RX ADMIN — BUPRENORPHINE AND NALOXONE 2 TABLET(S): 2; .5 TABLET SUBLINGUAL at 12:37

## 2019-06-08 RX ADMIN — Medication 1200 MILLIGRAM(S): at 07:52

## 2019-06-08 RX ADMIN — BUPRENORPHINE AND NALOXONE 2 TABLET(S): 2; .5 TABLET SUBLINGUAL at 13:23

## 2019-06-08 RX ADMIN — Medication 1 GRAM(S): at 18:04

## 2019-06-08 RX ADMIN — Medication 40 MILLIEQUIVALENT(S): at 18:04

## 2019-06-08 RX ADMIN — Medication 40 MILLIEQUIVALENT(S): at 13:26

## 2019-06-08 NOTE — PROGRESS NOTE ADULT - SUBJECTIVE AND OBJECTIVE BOX
CHIEF COMPLAINT/INTERVAL HISTORY:  Pt. seen and evaluated for proctocolitis/Ulcerative colitis.  Pt. is in no distress.  Denies having any abdominal pain.  Reports small amounts of diarrhea.    REVIEW OF SYSTEMS:  No fever, CP, or SOB.      Vital Signs Last 24 Hrs  T(C): 37.1 (08 Jun 2019 07:25), Max: 37.4 (07 Jun 2019 16:06)  T(F): 98.8 (08 Jun 2019 07:25), Max: 99.3 (07 Jun 2019 16:06)  HR: 95 (08 Jun 2019 07:25) (94 - 95)  BP: 107/73 (08 Jun 2019 07:25) (102/71 - 108/73)  BP(mean): --  RR: 18 (08 Jun 2019 07:25) (18 - 18)  SpO2: 98% (08 Jun 2019 07:25) (98% - 98%)    PHYSICAL EXAM:  GENERAL: NAD  HEENT: EOMI, hearing normal, conjunctiva and sclera clear  Chest: CTA bilaterally, no wheezing  CV: S1S2, RRR,   GI: soft, +BS, NT/ND  Musculoskeletal: no edema  Psychiatric: affect nL, mood nL  Skin: warm and dry    LABS:                        9.0    5.62  )-----------( 263      ( 07 Jun 2019 06:14 )             26.7     06-07    138  |  99  |  3<L>  ----------------------------<  135<H>  3.4<L>   |  32<H>  |  0.47<L>    Ca    7.2<L>      07 Jun 2019 06:14  Phos  3.1     06-07  Mg     1.7     06-07    TPro  4.8<L>  /  Alb  1.5<L>  /  TBili  0.3  /  DBili  x   /  AST  6<L>  /  ALT  8<L>  /  AlkPhos  68  06-07      Assessment and Plan:  -Proctocolitis/Ulcerative colitis:  continue clear liquid diet and TPN  Continue Prednisone 40mg PO daily and mesalamine 1200mg PO QID.  Proctocolectomy scheduled for Monday per CRS.  GI f/u.  Pt. is without s/s of ACS or decompensated CHF.  Low risk patient going for moderate risk procedure.  RCRI 0.4% for perioperative cardiac risk.  No absolute contraindication from medical perspective to proceed with planned proctocolectomy.    -Dysphagia: s/p diflucan.  Supportive care with PPI and Carafate  -Drug abuse:  continue Suboxone  -Hypocalcemia:  resolved.  -Herpes Zoster:  Completed Valtrex  -Anemia:  Of chronic disease.  Will monitor hbg.  -Latent TB:  appreciated ID input.    -VTE ppx:  Lovenox 40mg SQ daily

## 2019-06-08 NOTE — PROGRESS NOTE ADULT - SUBJECTIVE AND OBJECTIVE BOX
INTERVAL HPI/OVERNIGHT EVENTS:  pt seen and examined  no new events     MEDICATIONS  (STANDING):  buprenorphine 2 mG/naloxone 0.5 mG SL  Tablet 2 Tablet(s) SubLingual daily  chlorhexidine 4% Liquid 1 Application(s) Topical <User Schedule>  dextrose 5%. 1000 milliLiter(s) (50 mL/Hr) IV Continuous <Continuous>  dextrose 50% Injectable 12.5 Gram(s) IV Push once  dextrose 50% Injectable 25 Gram(s) IV Push once  dextrose 50% Injectable 25 Gram(s) IV Push once  enoxaparin Injectable 40 milliGRAM(s) SubCutaneous daily  insulin lispro (HumaLOG) corrective regimen sliding scale   SubCutaneous every 6 hours  mesalamine DR Capsule 1200 milliGRAM(s) Oral four times a day  pantoprazole    Tablet 40 milliGRAM(s) Oral two times a day  Parenteral Nutrition - Adult 1 Each (52 mL/Hr) TPN Continuous <Continuous>  potassium chloride    Tablet ER 40 milliEquivalent(s) Oral once  predniSONE   Tablet 40 milliGRAM(s) Oral daily  sucralfate suspension 1 Gram(s) Oral two times a day  traZODone 50 milliGRAM(s) Oral at bedtime    MEDICATIONS  (PRN):  acetaminophen   Tablet .. 650 milliGRAM(s) Oral every 6 hours PRN Temp greater or equal to 38C (100.4F)  clonazePAM  Tablet 0.5 milliGRAM(s) Oral three times a day PRN anxiety  dextrose 40% Gel 15 Gram(s) Oral once PRN Blood Glucose LESS THAN 70 milliGRAM(s)/deciliter  glucagon  Injectable 1 milliGRAM(s) IntraMuscular once PRN Glucose LESS THAN 70 milligrams/deciliter      Allergies    penicillin (Swelling)    Intolerances        Review of Systems:    General:  No wt loss, fevers, chills, night sweats, fatigue   Eyes:  Good vision, no reported pain  ENT:  No sore throat, pain, runny nose, dysphagia  CV:  No pain, palpitations, hypo/hypertension  Resp:  No dyspnea, cough, tachypnea, wheezing  GI:  + pain, No nausea, No vomiting, +diarrhea, No constipation, No weight loss, No fever, No pruritis, No rectal bleeding, No melena, No dysphagia  :  No pain, bleeding, incontinence, nocturia  Muscle:  No pain, weakness  Neuro:  No weakness, tingling, memory problems  Psych:  No fatigue, insomnia, mood problems, depression  Endocrine:  No polyuria, polydypsia, cold/heat intolerance  Heme:  No petechiae, ecchymosis, easy bruisability  Skin:  No rash, tattoos, scars, edema      Vital Signs Last 24 Hrs  T(C): 36.6 (07 Jun 2019 07:41), Max: 37.2 (07 Jun 2019 00:18)  T(F): 97.9 (07 Jun 2019 07:41), Max: 99 (07 Jun 2019 00:18)  HR: 86 (07 Jun 2019 07:41) (82 - 86)  BP: 113/77 (07 Jun 2019 07:41) (102/66 - 113/77)  BP(mean): --  RR: 18 (07 Jun 2019 07:41) (18 - 18)  SpO2: 99% (07 Jun 2019 07:41) (98% - 99%)    PHYSICAL EXAM:  General:  nad  HEENT:  NC/AT  Chest:  dec bs  Cardiovascular:  rrr S1, S2  Abdomen:  Soft, mild lq ttp no guarding mild dt  Extremities:  no edema  Neuro/Psych:  A&Ox3    LABS:                        9.0    5.62  )-----------( 263      ( 07 Jun 2019 06:14 )             26.7     06-07    138  |  99  |  3<L>  ----------------------------<  135<H>  3.4<L>   |  32<H>  |  0.47<L>    Ca    7.2<L>      07 Jun 2019 06:14  Phos  3.1     06-07  Mg     1.7     06-07    TPro  4.8<L>  /  Alb  1.5<L>  /  TBili  0.3  /  DBili  x   /  AST  6<L>  /  ALT  8<L>  /  AlkPhos  68  06-07          RADIOLOGY & ADDITIONAL TESTS:

## 2019-06-08 NOTE — PROGRESS NOTE ADULT - ASSESSMENT
CRSSNY  vss afeb  Pt is resting comfortably in bed  hgb 8.8  k 3.3    a/p                 supp K   monitor H/H in prep for surgery             pt sched for proctocolectomy on monday.

## 2019-06-08 NOTE — PROVIDER CONTACT NOTE (CRITICAL VALUE NOTIFICATION) - ACTION/TREATMENT ORDERED:
Labs to be reordered and blood to be drawn from left arm by another .
Will zeb romeo x 3 and p.o. Kcl

## 2019-06-08 NOTE — PROGRESS NOTE ADULT - PROBLEM SELECTOR PLAN 1
sp colon- findings of severe ulceration, no mucosa, completely denuded w deep ulcers and severe inflammatory pseudopolyps from rectum to ascending colon  path cw active colitis, no dysplasia  cont delzicol 1200mg qid, bacid tid, pred 40 qd  cont liquid diet +ensure supps +tpn  while on tpn monitor fs, lytes, lfts, tg  id input appreciated  monitor abd exam/stool output  will follow

## 2019-06-09 ENCOUNTER — TRANSCRIPTION ENCOUNTER (OUTPATIENT)
Age: 64
End: 2019-06-09

## 2019-06-09 LAB
ALBUMIN SERPL ELPH-MCNC: 1.4 G/DL — LOW (ref 3.3–5)
ALP SERPL-CCNC: 70 U/L — SIGNIFICANT CHANGE UP (ref 40–120)
ALT FLD-CCNC: 8 U/L — LOW (ref 12–78)
ANION GAP SERPL CALC-SCNC: 6 MMOL/L — SIGNIFICANT CHANGE UP (ref 5–17)
AST SERPL-CCNC: 7 U/L — LOW (ref 15–37)
BILIRUB SERPL-MCNC: 0.2 MG/DL — SIGNIFICANT CHANGE UP (ref 0.2–1.2)
BUN SERPL-MCNC: 6 MG/DL — LOW (ref 7–23)
CALCIUM SERPL-MCNC: 7.3 MG/DL — LOW (ref 8.5–10.1)
CHLORIDE SERPL-SCNC: 100 MMOL/L — SIGNIFICANT CHANGE UP (ref 96–108)
CO2 SERPL-SCNC: 30 MMOL/L — SIGNIFICANT CHANGE UP (ref 22–31)
CREAT SERPL-MCNC: 0.38 MG/DL — LOW (ref 0.5–1.3)
GLUCOSE SERPL-MCNC: 128 MG/DL — HIGH (ref 70–99)
HCT VFR BLD CALC: 24 % — LOW (ref 39–50)
HGB BLD-MCNC: 8.1 G/DL — LOW (ref 13–17)
MAGNESIUM SERPL-MCNC: 1.6 MG/DL — SIGNIFICANT CHANGE UP (ref 1.6–2.6)
MCHC RBC-ENTMCNC: 29.8 PG — SIGNIFICANT CHANGE UP (ref 27–34)
MCHC RBC-ENTMCNC: 33.8 GM/DL — SIGNIFICANT CHANGE UP (ref 32–36)
MCV RBC AUTO: 88.2 FL — SIGNIFICANT CHANGE UP (ref 80–100)
NRBC # BLD: 1 /100 WBCS — HIGH (ref 0–0)
PHOSPHATE SERPL-MCNC: 2 MG/DL — LOW (ref 2.5–4.5)
PLATELET # BLD AUTO: 268 K/UL — SIGNIFICANT CHANGE UP (ref 150–400)
POTASSIUM SERPL-MCNC: 3.6 MMOL/L — SIGNIFICANT CHANGE UP (ref 3.5–5.3)
POTASSIUM SERPL-SCNC: 3.6 MMOL/L — SIGNIFICANT CHANGE UP (ref 3.5–5.3)
PROT SERPL-MCNC: 5.1 G/DL — LOW (ref 6–8.3)
RBC # BLD: 2.72 M/UL — LOW (ref 4.2–5.8)
RBC # FLD: 14.7 % — HIGH (ref 10.3–14.5)
SODIUM SERPL-SCNC: 136 MMOL/L — SIGNIFICANT CHANGE UP (ref 135–145)
WBC # BLD: 9.47 K/UL — SIGNIFICANT CHANGE UP (ref 3.8–10.5)
WBC # FLD AUTO: 9.47 K/UL — SIGNIFICANT CHANGE UP (ref 3.8–10.5)

## 2019-06-09 PROCEDURE — 99232 SBSQ HOSP IP/OBS MODERATE 35: CPT

## 2019-06-09 RX ORDER — ELECTROLYTE SOLUTION,INJ
1 VIAL (ML) INTRAVENOUS
Refills: 0 | Status: DISCONTINUED | OUTPATIENT
Start: 2019-06-09 | End: 2019-06-10

## 2019-06-09 RX ORDER — POTASSIUM CHLORIDE 20 MEQ
40 PACKET (EA) ORAL EVERY 4 HOURS
Refills: 0 | Status: COMPLETED | OUTPATIENT
Start: 2019-06-09 | End: 2019-06-09

## 2019-06-09 RX ORDER — I.V. FAT EMULSION 20 G/100ML
0.56 EMULSION INTRAVENOUS
Qty: 35 | Refills: 0 | Status: DISCONTINUED | OUTPATIENT
Start: 2019-06-09 | End: 2019-06-09

## 2019-06-09 RX ORDER — MAGNESIUM SULFATE 500 MG/ML
1 VIAL (ML) INJECTION ONCE
Refills: 0 | Status: COMPLETED | OUTPATIENT
Start: 2019-06-09 | End: 2019-06-09

## 2019-06-09 RX ADMIN — NEOMYCIN SULFATE 1000 MILLIGRAM(S): 500 TABLET ORAL at 20:43

## 2019-06-09 RX ADMIN — Medication 100 GRAM(S): at 12:55

## 2019-06-09 RX ADMIN — Medication 500 MILLIGRAM(S): at 14:06

## 2019-06-09 RX ADMIN — Medication 1200 MILLIGRAM(S): at 13:15

## 2019-06-09 RX ADMIN — Medication 1 BOTTLE: at 14:13

## 2019-06-09 RX ADMIN — NEOMYCIN SULFATE 1000 MILLIGRAM(S): 500 TABLET ORAL at 14:06

## 2019-06-09 RX ADMIN — PANTOPRAZOLE SODIUM 40 MILLIGRAM(S): 20 TABLET, DELAYED RELEASE ORAL at 19:06

## 2019-06-09 RX ADMIN — CHLORHEXIDINE GLUCONATE 1 APPLICATION(S): 213 SOLUTION TOPICAL at 10:59

## 2019-06-09 RX ADMIN — Medication 1 GRAM(S): at 09:40

## 2019-06-09 RX ADMIN — Medication 500 MILLIGRAM(S): at 20:43

## 2019-06-09 RX ADMIN — NEOMYCIN SULFATE 1000 MILLIGRAM(S): 500 TABLET ORAL at 13:18

## 2019-06-09 RX ADMIN — Medication 500 MILLIGRAM(S): at 13:18

## 2019-06-09 RX ADMIN — I.V. FAT EMULSION 21.87 GM/KG/DAY: 20 EMULSION INTRAVENOUS at 20:12

## 2019-06-09 RX ADMIN — Medication 1200 MILLIGRAM(S): at 09:40

## 2019-06-09 RX ADMIN — Medication 1200 MILLIGRAM(S): at 19:06

## 2019-06-09 RX ADMIN — Medication 1200 MILLIGRAM(S): at 23:54

## 2019-06-09 RX ADMIN — ENOXAPARIN SODIUM 40 MILLIGRAM(S): 100 INJECTION SUBCUTANEOUS at 13:14

## 2019-06-09 RX ADMIN — Medication 1 GRAM(S): at 19:06

## 2019-06-09 RX ADMIN — Medication 40 MILLIEQUIVALENT(S): at 14:06

## 2019-06-09 RX ADMIN — PANTOPRAZOLE SODIUM 40 MILLIGRAM(S): 20 TABLET, DELAYED RELEASE ORAL at 09:39

## 2019-06-09 RX ADMIN — BUPRENORPHINE AND NALOXONE 2 TABLET(S): 2; .5 TABLET SUBLINGUAL at 13:01

## 2019-06-09 RX ADMIN — Medication 40 MILLIEQUIVALENT(S): at 18:12

## 2019-06-09 RX ADMIN — BUPRENORPHINE AND NALOXONE 2 TABLET(S): 2; .5 TABLET SUBLINGUAL at 13:02

## 2019-06-09 RX ADMIN — Medication 1 EACH: at 20:12

## 2019-06-09 NOTE — PROGRESS NOTE ADULT - SUBJECTIVE AND OBJECTIVE BOX
CHIEF COMPLAINT/INTERVAL HISTORY:  Pt. seen and evaluated for proctocolitis/Ulcerative colitis.  Pt. is in no distress.  Reports having small amount of diarrhea.  On TPN.    REVIEW OF SYSTEMS:  No fever, CP, or SOB.      Vital Signs Last 24 Hrs  T(C): 36.6 (09 Jun 2019 07:51), Max: 36.8 (09 Jun 2019 00:14)  T(F): 97.9 (09 Jun 2019 07:51), Max: 98.3 (09 Jun 2019 00:14)  HR: 86 (09 Jun 2019 07:51) (86 - 103)  BP: 110/75 (09 Jun 2019 07:51) (102/68 - 111/77)  BP(mean): --  RR: 18 (09 Jun 2019 07:51) (18 - 18)  SpO2: 99% (09 Jun 2019 07:51) (99% - 100%)    PHYSICAL EXAM:  GENERAL: NAD  HEENT: EOMI, hearing normal, conjunctiva and sclera clear  Chest: CTA bilaterally, no wheezing  CV: S1S2, RRR,   GI: soft, +BS, ND, Mild LLQ discomfort, no rebound or guarding  Musculoskeletal: no edema  Psychiatric: affect nL, mood nL  Skin: warm and dry    LABS:                        8.8    9.21  )-----------( 254      ( 08 Jun 2019 10:58 )             25.9     06-08    136  |  97  |  5<L>  ----------------------------<  129<H>  3.3<L>   |  31  |  0.36<L>    Ca    7.1<L>      08 Jun 2019 10:58  Phos  3.4     06-08  Mg     1.6     06-08    TPro  4.9<L>  /  Alb  1.4<L>  /  TBili  0.2  /  DBili  x   /  AST  6<L>  /  ALT  <6<L>  /  AlkPhos  70  06-08        Assessment and Plan:  -Proctocolitis/Ulcerative colitis:  continue clear liquid diet and TPN  Continue Prednisone 40mg PO daily and mesalamine 1200mg PO QID.  Proctocolectomy scheduled for tomorrow per CRS.  GI f/u.  Pt. is without s/s of ACS or decompensated CHF.  Can achieve METS>4.  Low risk patient going for moderate risk procedure.  RCRI 0.4% for perioperative cardiac risk.  No absolute contraindication from medical perspective to proceed with planned proctocolectomy.    -Dysphagia: s/p diflucan.  Supportive care with PPI and Carafate  -Drug abuse:  continue Suboxone  -Hypocalcemia:  resolved.  -Herpes Zoster:  Completed Valtrex  -Anemia:  Of chronic disease.  Will monitor hbg.  -Latent TB:  appreciated ID input.    -VTE ppx:  Lovenox 40mg SQ daily

## 2019-06-09 NOTE — CHART NOTE - NSCHARTNOTEFT_GEN_A_CORE
Assessment: 64 y/o male adm with candida esophagitis; proctocolitis and ulcerative colitis. Pt NPO after MN for proctocolectomy tomorrow. Pt tolerating clear liquids along with TPN (1.25 L + lipids). TPN providing pt with 1520 gita and 80 gms of protein.  Trig 83.     Factors impacting intake: [ ] none [ ] nausea  [ ] vomiting [ ] diarrhea [ ] constipation  [ ]chewing problems [ ] swallowing issues  [x ] other: ulcerative colitis/ clear liquids/ NPO after MN for OR tomorrow.     Diet Presciption: Diet, Clear Liquid:   Supplement Feeding Modality:  Oral  Ensure Clear Cans or Servings Per Day:  1       Frequency:  Three Times a day (06-05-19 @ 14:36)  Diet, NPO:   NPO for Procedure/Test     NPO Start Date: 09-Jun-2019,   NPO Start Time: 23:59 (06-07-19 @ 11:33)    Intake: fair to good intake of liquids.     Current Weight: 6/5 141.9# 5/29 137.7#  % Weight Change    Pertinent Medications: MEDICATIONS  (STANDING):  buprenorphine 2 mG/naloxone 0.5 mG SL  Tablet 2 Tablet(s) SubLingual daily  chlorhexidine 4% Liquid 1 Application(s) Topical <User Schedule>  dextrose 5%. 1000 milliLiter(s) (50 mL/Hr) IV Continuous <Continuous>  dextrose 50% Injectable 12.5 Gram(s) IV Push once  dextrose 50% Injectable 25 Gram(s) IV Push once  dextrose 50% Injectable 25 Gram(s) IV Push once  enoxaparin Injectable 40 milliGRAM(s) SubCutaneous daily  fat emulsion (Plant Based) 20% Infusion 0.56 Gm/kG/Day (21.87 mL/Hr) IV Continuous <Continuous>  insulin lispro (HumaLOG) corrective regimen sliding scale   SubCutaneous three times a day before meals  magnesium citrate Oral Solution 1 Bottle Oral once  magnesium sulfate  IVPB 1 Gram(s) IV Intermittent once  mesalamine DR Capsule 1200 milliGRAM(s) Oral four times a day  metroNIDAZOLE    Tablet 500 milliGRAM(s) Oral once  metroNIDAZOLE    Tablet 500 milliGRAM(s) Oral once  metroNIDAZOLE    Tablet 500 milliGRAM(s) Oral once  neomycin 1000 milliGRAM(s) Oral once  neomycin 1000 milliGRAM(s) Oral once  neomycin 1000 milliGRAM(s) Oral once  pantoprazole    Tablet 40 milliGRAM(s) Oral two times a day  Parenteral Nutrition - Adult 1 Each (52 mL/Hr) TPN Continuous <Continuous>  Parenteral Nutrition - Adult 1 Each (52 mL/Hr) TPN Continuous <Continuous>  potassium chloride    Tablet ER 40 milliEquivalent(s) Oral every 4 hours  predniSONE   Tablet 40 milliGRAM(s) Oral daily  sucralfate suspension 1 Gram(s) Oral two times a day  traZODone 50 milliGRAM(s) Oral at bedtime    MEDICATIONS  (PRN):  acetaminophen   Tablet .. 650 milliGRAM(s) Oral every 6 hours PRN Temp greater or equal to 38C (100.4F)  clonazePAM  Tablet 0.5 milliGRAM(s) Oral three times a day PRN anxiety  dextrose 40% Gel 15 Gram(s) Oral once PRN Blood Glucose LESS THAN 70 milliGRAM(s)/deciliter  glucagon  Injectable 1 milliGRAM(s) IntraMuscular once PRN Glucose LESS THAN 70 milligrams/deciliter    Pertinent Labs: 06-09 Na136 mmol/L Glu 128 mg/dL<H> K+ 3.6 mmol/L Cr  0.38 mg/dL<L> BUN 6 mg/dL<L> 06-09 Phos 2.0 mg/dL<L> 06-09 Alb 1.4 g/dL<L> 06-01 PAB 3 mg/dL<L> 05-11 [id number] 5.7 %<H> 06-08 Chol --    LDL --    HDL --    Trig 83 mg/dL     CAPILLARY BLOOD GLUCOSE      POCT Blood Glucose.: 133 mg/dL (09 Jun 2019 11:00)  POCT Blood Glucose.: 143 mg/dL (09 Jun 2019 07:32)  POCT Blood Glucose.: 125 mg/dL (08 Jun 2019 21:44)  POCT Blood Glucose.: 141 mg/dL (08 Jun 2019 16:32)    Skin: no skin breakdown noted.    Estimated Needs:   [x ] no change since previous assessment  [ ] recalculated:     Previous Nutrition Diagnosis:   [ ] Inadequate Energy Intake [ ]Inadequate Oral Intake [ ] Excessive Energy Intake   [ ] Underweight [ ] Increased Nutrient Needs [ ] Overweight/Obesity   [ ] Altered GI Function [ ] Unintended Weight Loss [ ] Food & Nutrition Related Knowledge Deficit [ x] Malnutrition     Nutrition Diagnosis is [ x] ongoing  [ ] resolved [ ] not applicable     New Nutrition Diagnosis: [ x] not applicable       Interventions:   Recommend  [x ] Change Diet To: continue clear liquids with Ensure clear (NPO after MN for OR tomorrow)  [ ] Nutrition Supplement  [ x] Nutrition Support: consider increasing TPN to 1.5 L/day to better meet pt's estimated nutritional needs.   [ ] Other:     Monitoring and Evaluation:   [x ] PO intake [ x ] Tolerance to diet prescription [ x ] weights [ x ] labs[ x ] follow up per protocol  [ ] other:

## 2019-06-09 NOTE — PROGRESS NOTE ADULT - ASSESSMENT
CRSSNY  vss afeb  Pt notes doing well  resting comfortably in bed    a/p      proctocolitis/UC      pt sched for surgery tomorrow.

## 2019-06-10 ENCOUNTER — RESULT REVIEW (OUTPATIENT)
Age: 64
End: 2019-06-10

## 2019-06-10 DIAGNOSIS — K51.00 ULCERATIVE (CHRONIC) PANCOLITIS WITHOUT COMPLICATIONS: ICD-10-CM

## 2019-06-10 LAB
ALBUMIN SERPL ELPH-MCNC: 1.3 G/DL — LOW (ref 3.3–5)
ALP SERPL-CCNC: 67 U/L — SIGNIFICANT CHANGE UP (ref 40–120)
ALT FLD-CCNC: 11 U/L — LOW (ref 12–78)
ANION GAP SERPL CALC-SCNC: 5 MMOL/L — SIGNIFICANT CHANGE UP (ref 5–17)
ANION GAP SERPL CALC-SCNC: 9 MMOL/L — SIGNIFICANT CHANGE UP (ref 5–17)
AST SERPL-CCNC: 13 U/L — LOW (ref 15–37)
BILIRUB SERPL-MCNC: 0.1 MG/DL — LOW (ref 0.2–1.2)
BUN SERPL-MCNC: 6 MG/DL — LOW (ref 7–23)
BUN SERPL-MCNC: 8 MG/DL — SIGNIFICANT CHANGE UP (ref 7–23)
CALCIUM SERPL-MCNC: 6.9 MG/DL — LOW (ref 8.5–10.1)
CALCIUM SERPL-MCNC: 7.3 MG/DL — LOW (ref 8.5–10.1)
CHLORIDE SERPL-SCNC: 102 MMOL/L — SIGNIFICANT CHANGE UP (ref 96–108)
CHLORIDE SERPL-SCNC: 102 MMOL/L — SIGNIFICANT CHANGE UP (ref 96–108)
CO2 SERPL-SCNC: 25 MMOL/L — SIGNIFICANT CHANGE UP (ref 22–31)
CO2 SERPL-SCNC: 29 MMOL/L — SIGNIFICANT CHANGE UP (ref 22–31)
CREAT SERPL-MCNC: 0.49 MG/DL — LOW (ref 0.5–1.3)
CREAT SERPL-MCNC: 0.58 MG/DL — SIGNIFICANT CHANGE UP (ref 0.5–1.3)
GLUCOSE SERPL-MCNC: 127 MG/DL — HIGH (ref 70–99)
GLUCOSE SERPL-MCNC: 252 MG/DL — HIGH (ref 70–99)
HCT VFR BLD CALC: 23.3 % — LOW (ref 39–50)
HCT VFR BLD CALC: 27.3 % — LOW (ref 39–50)
HCT VFR BLD CALC: 33.5 % — LOW (ref 39–50)
HGB BLD-MCNC: 11.2 G/DL — LOW (ref 13–17)
HGB BLD-MCNC: 7.7 G/DL — LOW (ref 13–17)
HGB BLD-MCNC: 9.4 G/DL — LOW (ref 13–17)
INR BLD: 1.2 RATIO — HIGH (ref 0.88–1.16)
MAGNESIUM SERPL-MCNC: 2.2 MG/DL — SIGNIFICANT CHANGE UP (ref 1.6–2.6)
MCHC RBC-ENTMCNC: 29.2 PG — SIGNIFICANT CHANGE UP (ref 27–34)
MCHC RBC-ENTMCNC: 29.2 PG — SIGNIFICANT CHANGE UP (ref 27–34)
MCHC RBC-ENTMCNC: 29.7 PG — SIGNIFICANT CHANGE UP (ref 27–34)
MCHC RBC-ENTMCNC: 33 GM/DL — SIGNIFICANT CHANGE UP (ref 32–36)
MCHC RBC-ENTMCNC: 33.4 GM/DL — SIGNIFICANT CHANGE UP (ref 32–36)
MCHC RBC-ENTMCNC: 34.4 GM/DL — SIGNIFICANT CHANGE UP (ref 32–36)
MCV RBC AUTO: 84.8 FL — SIGNIFICANT CHANGE UP (ref 80–100)
MCV RBC AUTO: 87.2 FL — SIGNIFICANT CHANGE UP (ref 80–100)
MCV RBC AUTO: 90 FL — SIGNIFICANT CHANGE UP (ref 80–100)
NRBC # BLD: 0 /100 WBCS — SIGNIFICANT CHANGE UP (ref 0–0)
NRBC # BLD: 2 /100 WBCS — HIGH (ref 0–0)
NRBC # BLD: 2 /100 WBCS — HIGH (ref 0–0)
PLATELET # BLD AUTO: 301 K/UL — SIGNIFICANT CHANGE UP (ref 150–400)
PLATELET # BLD AUTO: 303 K/UL — SIGNIFICANT CHANGE UP (ref 150–400)
PLATELET # BLD AUTO: 321 K/UL — SIGNIFICANT CHANGE UP (ref 150–400)
POTASSIUM SERPL-MCNC: 4.1 MMOL/L — SIGNIFICANT CHANGE UP (ref 3.5–5.3)
POTASSIUM SERPL-MCNC: 4.7 MMOL/L — SIGNIFICANT CHANGE UP (ref 3.5–5.3)
POTASSIUM SERPL-SCNC: 4.1 MMOL/L — SIGNIFICANT CHANGE UP (ref 3.5–5.3)
POTASSIUM SERPL-SCNC: 4.7 MMOL/L — SIGNIFICANT CHANGE UP (ref 3.5–5.3)
PROT SERPL-MCNC: 5 G/DL — LOW (ref 6–8.3)
PROTHROM AB SERPL-ACNC: 13.8 SEC — HIGH (ref 10–12.9)
RBC # BLD: 2.59 M/UL — LOW (ref 4.2–5.8)
RBC # BLD: 3.22 M/UL — LOW (ref 4.2–5.8)
RBC # BLD: 3.84 M/UL — LOW (ref 4.2–5.8)
RBC # FLD: 14.6 % — HIGH (ref 10.3–14.5)
RBC # FLD: 15.9 % — HIGH (ref 10.3–14.5)
RBC # FLD: 16.1 % — HIGH (ref 10.3–14.5)
SODIUM SERPL-SCNC: 136 MMOL/L — SIGNIFICANT CHANGE UP (ref 135–145)
SODIUM SERPL-SCNC: 136 MMOL/L — SIGNIFICANT CHANGE UP (ref 135–145)
WBC # BLD: 16.27 K/UL — HIGH (ref 3.8–10.5)
WBC # BLD: 24.18 K/UL — HIGH (ref 3.8–10.5)
WBC # BLD: 9.42 K/UL — SIGNIFICANT CHANGE UP (ref 3.8–10.5)
WBC # FLD AUTO: 16.27 K/UL — HIGH (ref 3.8–10.5)
WBC # FLD AUTO: 24.18 K/UL — HIGH (ref 3.8–10.5)
WBC # FLD AUTO: 9.42 K/UL — SIGNIFICANT CHANGE UP (ref 3.8–10.5)

## 2019-06-10 PROCEDURE — 88309 TISSUE EXAM BY PATHOLOGIST: CPT | Mod: 26

## 2019-06-10 PROCEDURE — 99232 SBSQ HOSP IP/OBS MODERATE 35: CPT

## 2019-06-10 PROCEDURE — 93010 ELECTROCARDIOGRAM REPORT: CPT

## 2019-06-10 RX ORDER — SODIUM CHLORIDE 9 MG/ML
1000 INJECTION, SOLUTION INTRAVENOUS
Refills: 0 | Status: DISCONTINUED | OUTPATIENT
Start: 2019-06-10 | End: 2019-06-10

## 2019-06-10 RX ORDER — DEXTROSE 50 % IN WATER 50 %
25 SYRINGE (ML) INTRAVENOUS ONCE
Refills: 0 | Status: DISCONTINUED | OUTPATIENT
Start: 2019-06-10 | End: 2019-06-10

## 2019-06-10 RX ORDER — HYDROMORPHONE HYDROCHLORIDE 2 MG/ML
0.5 INJECTION INTRAMUSCULAR; INTRAVENOUS; SUBCUTANEOUS
Refills: 0 | Status: DISCONTINUED | OUTPATIENT
Start: 2019-06-10 | End: 2019-06-10

## 2019-06-10 RX ORDER — ACETAMINOPHEN 500 MG
1000 TABLET ORAL ONCE
Refills: 0 | Status: COMPLETED | OUTPATIENT
Start: 2019-06-11 | End: 2019-06-11

## 2019-06-10 RX ORDER — HYDROCORTISONE 20 MG
50 TABLET ORAL EVERY 8 HOURS
Refills: 0 | Status: DISCONTINUED | OUTPATIENT
Start: 2019-06-10 | End: 2019-06-14

## 2019-06-10 RX ORDER — DEXTROSE 50 % IN WATER 50 %
25 SYRINGE (ML) INTRAVENOUS ONCE
Refills: 0 | Status: DISCONTINUED | OUTPATIENT
Start: 2019-06-10 | End: 2019-06-19

## 2019-06-10 RX ORDER — OXYCODONE HYDROCHLORIDE 5 MG/1
5 TABLET ORAL ONCE
Refills: 0 | Status: DISCONTINUED | OUTPATIENT
Start: 2019-06-10 | End: 2019-06-10

## 2019-06-10 RX ORDER — METOPROLOL TARTRATE 50 MG
5 TABLET ORAL ONCE
Refills: 0 | Status: COMPLETED | OUTPATIENT
Start: 2019-06-10 | End: 2019-06-10

## 2019-06-10 RX ORDER — ACETAMINOPHEN 500 MG
1000 TABLET ORAL ONCE
Refills: 0 | Status: COMPLETED | OUTPATIENT
Start: 2019-06-10 | End: 2019-06-10

## 2019-06-10 RX ORDER — METRONIDAZOLE 500 MG
500 TABLET ORAL ONCE
Refills: 0 | Status: COMPLETED | OUTPATIENT
Start: 2019-06-10 | End: 2019-06-10

## 2019-06-10 RX ORDER — CIPROFLOXACIN LACTATE 400MG/40ML
400 VIAL (ML) INTRAVENOUS EVERY 12 HOURS
Refills: 0 | Status: DISCONTINUED | OUTPATIENT
Start: 2019-06-10 | End: 2019-06-18

## 2019-06-10 RX ORDER — ENOXAPARIN SODIUM 100 MG/ML
40 INJECTION SUBCUTANEOUS DAILY
Refills: 0 | Status: DISCONTINUED | OUTPATIENT
Start: 2019-06-11 | End: 2019-06-19

## 2019-06-10 RX ORDER — ONDANSETRON 8 MG/1
4 TABLET, FILM COATED ORAL EVERY 6 HOURS
Refills: 0 | Status: DISCONTINUED | OUTPATIENT
Start: 2019-06-10 | End: 2019-06-19

## 2019-06-10 RX ORDER — GLUCAGON INJECTION, SOLUTION 0.5 MG/.1ML
1 INJECTION, SOLUTION SUBCUTANEOUS ONCE
Refills: 0 | Status: DISCONTINUED | OUTPATIENT
Start: 2019-06-10 | End: 2019-06-10

## 2019-06-10 RX ORDER — METRONIDAZOLE 500 MG
500 TABLET ORAL EVERY 8 HOURS
Refills: 0 | Status: DISCONTINUED | OUTPATIENT
Start: 2019-06-10 | End: 2019-06-18

## 2019-06-10 RX ORDER — CLONAZEPAM 1 MG
0.5 TABLET ORAL THREE TIMES A DAY
Refills: 0 | Status: DISCONTINUED | OUTPATIENT
Start: 2019-06-10 | End: 2019-06-14

## 2019-06-10 RX ORDER — NALOXONE HYDROCHLORIDE 4 MG/.1ML
0.1 SPRAY NASAL
Refills: 0 | Status: DISCONTINUED | OUTPATIENT
Start: 2019-06-10 | End: 2019-06-19

## 2019-06-10 RX ORDER — INSULIN LISPRO 100/ML
VIAL (ML) SUBCUTANEOUS
Refills: 0 | Status: DISCONTINUED | OUTPATIENT
Start: 2019-06-10 | End: 2019-06-10

## 2019-06-10 RX ORDER — HYDROMORPHONE HYDROCHLORIDE 2 MG/ML
30 INJECTION INTRAMUSCULAR; INTRAVENOUS; SUBCUTANEOUS
Refills: 0 | Status: DISCONTINUED | OUTPATIENT
Start: 2019-06-10 | End: 2019-06-10

## 2019-06-10 RX ORDER — HEPARIN SODIUM 5000 [USP'U]/ML
5000 INJECTION INTRAVENOUS; SUBCUTANEOUS ONCE
Refills: 0 | Status: COMPLETED | OUTPATIENT
Start: 2019-06-10 | End: 2019-06-10

## 2019-06-10 RX ORDER — CIPROFLOXACIN LACTATE 400MG/40ML
400 VIAL (ML) INTRAVENOUS ONCE
Refills: 0 | Status: COMPLETED | OUTPATIENT
Start: 2019-06-10 | End: 2019-06-10

## 2019-06-10 RX ORDER — INSULIN LISPRO 100/ML
VIAL (ML) SUBCUTANEOUS EVERY 6 HOURS
Refills: 0 | Status: DISCONTINUED | OUTPATIENT
Start: 2019-06-10 | End: 2019-06-12

## 2019-06-10 RX ORDER — DEXTROSE 50 % IN WATER 50 %
15 SYRINGE (ML) INTRAVENOUS ONCE
Refills: 0 | Status: DISCONTINUED | OUTPATIENT
Start: 2019-06-10 | End: 2019-06-19

## 2019-06-10 RX ORDER — SODIUM CHLORIDE 9 MG/ML
1000 INJECTION, SOLUTION INTRAVENOUS
Refills: 0 | Status: DISCONTINUED | OUTPATIENT
Start: 2019-06-10 | End: 2019-06-14

## 2019-06-10 RX ORDER — I.V. FAT EMULSION 20 G/100ML
0.55 EMULSION INTRAVENOUS
Qty: 35 | Refills: 0 | Status: DISCONTINUED | OUTPATIENT
Start: 2019-06-10 | End: 2019-06-10

## 2019-06-10 RX ORDER — SODIUM CHLORIDE 9 MG/ML
1000 INJECTION, SOLUTION INTRAVENOUS
Refills: 0 | Status: DISCONTINUED | OUTPATIENT
Start: 2019-06-10 | End: 2019-06-11

## 2019-06-10 RX ORDER — SODIUM CHLORIDE 9 MG/ML
1000 INJECTION, SOLUTION INTRAVENOUS
Refills: 0 | Status: DISCONTINUED | OUTPATIENT
Start: 2019-06-10 | End: 2019-06-19

## 2019-06-10 RX ORDER — NALOXONE HYDROCHLORIDE 4 MG/.1ML
0.1 SPRAY NASAL
Refills: 0 | Status: DISCONTINUED | OUTPATIENT
Start: 2019-06-10 | End: 2019-06-10

## 2019-06-10 RX ORDER — MORPHINE SULFATE 50 MG/1
3 CAPSULE, EXTENDED RELEASE ORAL
Refills: 0 | Status: DISCONTINUED | OUTPATIENT
Start: 2019-06-10 | End: 2019-06-14

## 2019-06-10 RX ORDER — TRAZODONE HCL 50 MG
50 TABLET ORAL AT BEDTIME
Refills: 0 | Status: DISCONTINUED | OUTPATIENT
Start: 2019-06-10 | End: 2019-06-13

## 2019-06-10 RX ORDER — DEXTROSE 50 % IN WATER 50 %
15 SYRINGE (ML) INTRAVENOUS ONCE
Refills: 0 | Status: DISCONTINUED | OUTPATIENT
Start: 2019-06-10 | End: 2019-06-10

## 2019-06-10 RX ORDER — MORPHINE SULFATE 50 MG/1
30 CAPSULE, EXTENDED RELEASE ORAL
Refills: 0 | Status: DISCONTINUED | OUTPATIENT
Start: 2019-06-10 | End: 2019-06-13

## 2019-06-10 RX ORDER — ONDANSETRON 8 MG/1
4 TABLET, FILM COATED ORAL ONCE
Refills: 0 | Status: DISCONTINUED | OUTPATIENT
Start: 2019-06-10 | End: 2019-06-10

## 2019-06-10 RX ORDER — INSULIN LISPRO 100/ML
VIAL (ML) SUBCUTANEOUS EVERY 6 HOURS
Refills: 0 | Status: DISCONTINUED | OUTPATIENT
Start: 2019-06-10 | End: 2019-06-10

## 2019-06-10 RX ORDER — CHLORHEXIDINE GLUCONATE 213 G/1000ML
1 SOLUTION TOPICAL
Refills: 0 | Status: DISCONTINUED | OUTPATIENT
Start: 2019-06-10 | End: 2019-06-19

## 2019-06-10 RX ORDER — DEXTROSE 50 % IN WATER 50 %
12.5 SYRINGE (ML) INTRAVENOUS ONCE
Refills: 0 | Status: DISCONTINUED | OUTPATIENT
Start: 2019-06-10 | End: 2019-06-10

## 2019-06-10 RX ORDER — PANTOPRAZOLE SODIUM 20 MG/1
40 TABLET, DELAYED RELEASE ORAL
Refills: 0 | Status: DISCONTINUED | OUTPATIENT
Start: 2019-06-10 | End: 2019-06-19

## 2019-06-10 RX ORDER — MORPHINE SULFATE 50 MG/1
30 CAPSULE, EXTENDED RELEASE ORAL
Refills: 0 | Status: DISCONTINUED | OUTPATIENT
Start: 2019-06-10 | End: 2019-06-10

## 2019-06-10 RX ORDER — GLUCAGON INJECTION, SOLUTION 0.5 MG/.1ML
1 INJECTION, SOLUTION SUBCUTANEOUS ONCE
Refills: 0 | Status: DISCONTINUED | OUTPATIENT
Start: 2019-06-10 | End: 2019-06-19

## 2019-06-10 RX ORDER — ONDANSETRON 8 MG/1
4 TABLET, FILM COATED ORAL EVERY 6 HOURS
Refills: 0 | Status: DISCONTINUED | OUTPATIENT
Start: 2019-06-10 | End: 2019-06-10

## 2019-06-10 RX ORDER — ELECTROLYTE SOLUTION,INJ
1 VIAL (ML) INTRAVENOUS
Refills: 0 | Status: DISCONTINUED | OUTPATIENT
Start: 2019-06-10 | End: 2019-06-10

## 2019-06-10 RX ORDER — DEXTROSE 50 % IN WATER 50 %
12.5 SYRINGE (ML) INTRAVENOUS ONCE
Refills: 0 | Status: DISCONTINUED | OUTPATIENT
Start: 2019-06-10 | End: 2019-06-19

## 2019-06-10 RX ADMIN — HYDROMORPHONE HYDROCHLORIDE 0.5 MILLIGRAM(S): 2 INJECTION INTRAMUSCULAR; INTRAVENOUS; SUBCUTANEOUS at 01:42

## 2019-06-10 RX ADMIN — MORPHINE SULFATE 30 MILLILITER(S): 50 CAPSULE, EXTENDED RELEASE ORAL at 18:54

## 2019-06-10 RX ADMIN — MORPHINE SULFATE 30 MILLILITER(S): 50 CAPSULE, EXTENDED RELEASE ORAL at 17:43

## 2019-06-10 RX ADMIN — HYDROMORPHONE HYDROCHLORIDE 0.5 MILLIGRAM(S): 2 INJECTION INTRAMUSCULAR; INTRAVENOUS; SUBCUTANEOUS at 13:39

## 2019-06-10 RX ADMIN — HYDROMORPHONE HYDROCHLORIDE 0.5 MILLIGRAM(S): 2 INJECTION INTRAMUSCULAR; INTRAVENOUS; SUBCUTANEOUS at 14:26

## 2019-06-10 RX ADMIN — HYDROMORPHONE HYDROCHLORIDE 0.5 MILLIGRAM(S): 2 INJECTION INTRAMUSCULAR; INTRAVENOUS; SUBCUTANEOUS at 13:00

## 2019-06-10 RX ADMIN — SODIUM CHLORIDE 100 MILLILITER(S): 9 INJECTION, SOLUTION INTRAVENOUS at 13:40

## 2019-06-10 RX ADMIN — HYDROMORPHONE HYDROCHLORIDE 0.5 MILLIGRAM(S): 2 INJECTION INTRAMUSCULAR; INTRAVENOUS; SUBCUTANEOUS at 14:18

## 2019-06-10 RX ADMIN — Medication 1 EACH: at 20:11

## 2019-06-10 RX ADMIN — Medication 0.5 MILLIGRAM(S): at 17:21

## 2019-06-10 RX ADMIN — MORPHINE SULFATE 30 MILLILITER(S): 50 CAPSULE, EXTENDED RELEASE ORAL at 15:07

## 2019-06-10 RX ADMIN — HEPARIN SODIUM 5000 UNIT(S): 5000 INJECTION INTRAVENOUS; SUBCUTANEOUS at 07:39

## 2019-06-10 RX ADMIN — I.V. FAT EMULSION 21.88 GM/KG/DAY: 20 EMULSION INTRAVENOUS at 20:11

## 2019-06-10 RX ADMIN — Medication 50 MILLIGRAM(S): at 22:18

## 2019-06-10 RX ADMIN — MORPHINE SULFATE 30 MILLILITER(S): 50 CAPSULE, EXTENDED RELEASE ORAL at 19:30

## 2019-06-10 RX ADMIN — HYDROMORPHONE HYDROCHLORIDE 0.5 MILLIGRAM(S): 2 INJECTION INTRAMUSCULAR; INTRAVENOUS; SUBCUTANEOUS at 13:14

## 2019-06-10 RX ADMIN — Medication 100 MILLIGRAM(S): at 22:22

## 2019-06-10 RX ADMIN — Medication 200 MILLIGRAM(S): at 20:31

## 2019-06-10 RX ADMIN — Medication 1000 MILLIGRAM(S): at 20:43

## 2019-06-10 RX ADMIN — Medication 100 MILLIGRAM(S): at 17:26

## 2019-06-10 RX ADMIN — Medication 5 MILLIGRAM(S): at 15:42

## 2019-06-10 RX ADMIN — Medication 400 MILLIGRAM(S): at 20:27

## 2019-06-10 RX ADMIN — Medication 50 MILLIGRAM(S): at 22:13

## 2019-06-10 RX ADMIN — HYDROMORPHONE HYDROCHLORIDE 0.5 MILLIGRAM(S): 2 INJECTION INTRAMUSCULAR; INTRAVENOUS; SUBCUTANEOUS at 14:49

## 2019-06-10 RX ADMIN — Medication 1 EACH: at 13:41

## 2019-06-10 RX ADMIN — Medication 400 MILLIGRAM(S): at 15:58

## 2019-06-10 RX ADMIN — HYDROMORPHONE HYDROCHLORIDE 0.5 MILLIGRAM(S): 2 INJECTION INTRAMUSCULAR; INTRAVENOUS; SUBCUTANEOUS at 14:08

## 2019-06-10 NOTE — CHART NOTE - NSCHARTNOTEFT_GEN_A_CORE
S: Called to PACU for patient tachy in 140s-150s with BP of 150s/100s. Patient bedside completely asymptomatic, denies CP, SOB, nausea, dizziness, lightheadedness, pain, abdominal pain, palpitations.     O:   Vital Signs:   HR: 155  BP: 150/105  RR: 22  SO2: 98 on SO2  T: 37.8    Currently on 200cc/hr of IVF, 52cc/hr TPN. Making >50cc's of urine per hour, urine appears concentrated.    PE:  Lungs: clear to auscultation bilaterally  Heart: tachycardic, regular rhythm, no murmurs, rubs, gallops.  Abd: soft, non-tender, non-distended, absent BS  Neuro: A&Ox3    EKG: sinus tachycardia, no ST elevations, ekg changes from prior EKG.     A: 62yo M with PMHx of anxiety, UC, HLD s/p total colectomy with ileostomy two hours prior, currently with post-op tachycardia.    Plan:  - Pt to be transferred from medicine to Dr. Velazquez's service  - STAT EKG ordered, sinus tachycardia, 5mg IV lopressor given  - will continue to monitor in PACU for now  - ICU consulted, anesthesia aware of plan  -Repeat CBC ordered

## 2019-06-10 NOTE — BRIEF OPERATIVE NOTE - NSICDXBRIEFPROCEDURE_GEN_ALL_CORE_FT
PROCEDURES:  Mobilization of splenic flexure 10-Luis-2019 13:34:35  Zion Carmen  Hand-assisted laparoscopic total colectomy 10-Luis-2019 13:34:06 with end ileostomy Zion Carmen

## 2019-06-10 NOTE — PROVIDER CONTACT NOTE (OTHER) - REASON
Pt Heart rate within acceptable range 134  Blood Pressure within acceptable range 134/101  134, T 37.8

## 2019-06-10 NOTE — PROGRESS NOTE ADULT - SUBJECTIVE AND OBJECTIVE BOX
Patient has no complaints.  He is feeling mild cramping.  He had diarrhea with preparation a portion was bloody.  Patient has no nausea or vomiting.    ROS  GI abdominal pain and diarrhea  ALl other ROS are negative    PE  ABdomen soft nt nd

## 2019-06-10 NOTE — PROVIDER CONTACT NOTE (OTHER) - ACTION/TREATMENT ORDERED:
is going to d/c monitor
aware of
Dr Velazquez requested we call PA and or hospitalized
EKG done, Lopressor 5 mg

## 2019-06-10 NOTE — PROGRESS NOTE ADULT - ASSESSMENT
Dx Ulcerative colitis severe colitis not responding to medical treatment  Patient to OR today.  I reviewed him once again risks benefits and alternatives to the procedure.  The risks including but not limited to bleeding requiring blood transfusion or reopoperative surgery, infection intra-abdominal can result in pertonitis sepsis even death.  Patient require reoperative surgery, percutanoeous drainage by radiology or result in a chronic fistula that may require IV nutrition for prolonged period of time.  Patient could have a wound infection that may need to be opened and drained and chronic dressing changes even visiting nurse.  We discussed cardiopulmonary events MI CHF, Stroke Seziure, Pneumonia, atectasis, DVT, PE and prophalxsis againist each.  We discused urinary Tract infect, urinary retention and prolonged erwin, and ureteral injury and hernia at the incision.  Patient alternatives including donothing with continued severe disease and inability to eat was discussed.   Patient has no other complaints.  All questions were answered.  He displayed full understanding.

## 2019-06-10 NOTE — PROGRESS NOTE ADULT - SUBJECTIVE AND OBJECTIVE BOX
CHIEF COMPLAINT/INTERVAL HISTORY:  Pt. seen and evaluated for severe ulcerative colitis/proctocolitis.  Pt. is POD#0 s/p total colectomy.  Now with Post op sinus tachycardia 140's with improvement 120's.  Pt. currently denies any significant pain.      REVIEW OF SYSTEMS:  No fever, CP, or SOB.     Vital Signs Last 24 Hrs  T(C): 37 (10 Luis 2019 13:55), Max: 37.6 (10 Luis 2019 13:25)  T(F): 98.6 (10 Luis 2019 13:55), Max: 99.7 (10 Luis 2019 13:25)  HR: 103 (10 Luis 2019 13:55) (87 - 134)  BP: 142/93 (10 Luis 2019 13:55) (94/67 - 165/110)  BP(mean): --  RR: 17 (10 Luis 2019 13:55) (16 - 20)  SpO2: 98% (10 Luis 2019 13:55) (98% - 100%)    PHYSICAL EXAM:  GENERAL: NAD  HEENT: EOMI, hearing normal, conjunctiva and sclera clear  Chest: CTA bilaterally, no wheezing  CV: S1S2, tachycardic  GI: absent bowel sounds, + ileostomy  Musculoskeletal: no edema  Psychiatric: affect nL, mood nL  Skin: warm and dry    LABS:                        11.2   16.27 )-----------( 303      ( 10 Luis 2019 14:03 )             33.5     06-10    136  |  102  |  8   ----------------------------<  252<H>  4.1   |  25  |  0.58    Ca    6.9<L>      10 Luis 2019 14:03  Phos  2.0     06-09  Mg     2.2     06-10    TPro  5.0<L>  /  Alb  1.3<L>  /  TBili  0.1<L>  /  DBili  x   /  AST  13<L>  /  ALT  11<L>  /  AlkPhos  67  06-10    PT/INR - ( 10 Luis 2019 07:01 )   PT: 13.8 sec;   INR: 1.20 ratio        Assessment and Plan:  -Proctocolitis/Ulcerative colitis:  s/p laparoscopic total colectomy with ileostomy.   Started on IV Cipro and Flagyl.  Continue LR@100cc/hr and TPN.  Analgesics as necessary.  ICU consulted per surgery for post op tachycardia.    -Dysphagia: s/p diflucan.   -Drug abuse:  was on Suboxone.  Now receiving narcotics for pain control post-op.  -Hypocalcemia:  resolved.  -Herpes Zoster:  Completed Valtrex  -Anemia:  Of chronic disease.  Will monitor hbg.  -Latent TB:  appreciated ID input.    -VTE ppx:  Lovenox 40mg SQ daily

## 2019-06-10 NOTE — CHART NOTE - NSCHARTNOTEFT_GEN_A_CORE
Called for tachycardia post-op    62 y/o male with hx UC underwent total colectomy today     Surgery lasted 5hrs, he received 2L crystalloids and 2 PRBCs intra-op, EBL was 100ml and UO was 500ml  He tolerated surgery well     Post-op in PACU, his HR went upto 140s and received a dose of metoprolol 5mg ivp    When I saw him, his HR is in low 120s and SBP is 130s    He c/o soreness and appears anxious/fidgety/angry  He c/o feeling dry and thirsty   Denies CP, headache, SOB nausea  He is usually on Suboxone and Klonopin     He refused to be examined    EKG with sinus tach, no ischemic ST/T changes or arrythmia     Recs:  Increase ivf to 125ml/hr, consider a bolus if UO drops   Continue with morphine PCA - check with pharmacy if its ok given recent Suboxone use   Agree with iv Tylenol, may consider Toradol if pain persists  Start prn Ativan for anxiety/benzo withdrawal   Start stress dose steroids as he is on daily prednisone (has been receiving sporadically during this hospitalization)   Other mx per surgery    No need for ICU at this time, may place on remote tele to monitor HR however he has multiple reasons to have sinus tachycardia at this time    D/w surgery PA

## 2019-06-11 LAB
ALBUMIN SERPL ELPH-MCNC: 1.1 G/DL — LOW (ref 3.3–5)
ALP SERPL-CCNC: 58 U/L — SIGNIFICANT CHANGE UP (ref 40–120)
ALT FLD-CCNC: 11 U/L — LOW (ref 12–78)
ANION GAP SERPL CALC-SCNC: 5 MMOL/L — SIGNIFICANT CHANGE UP (ref 5–17)
AST SERPL-CCNC: 9 U/L — LOW (ref 15–37)
BASOPHILS # BLD AUTO: 0 K/UL — SIGNIFICANT CHANGE UP (ref 0–0.2)
BASOPHILS NFR BLD AUTO: 0 % — SIGNIFICANT CHANGE UP (ref 0–2)
BILIRUB SERPL-MCNC: 0.2 MG/DL — SIGNIFICANT CHANGE UP (ref 0.2–1.2)
BUN SERPL-MCNC: 6 MG/DL — LOW (ref 7–23)
CALCIUM SERPL-MCNC: 7 MG/DL — LOW (ref 8.5–10.1)
CHLORIDE SERPL-SCNC: 102 MMOL/L — SIGNIFICANT CHANGE UP (ref 96–108)
CO2 SERPL-SCNC: 29 MMOL/L — SIGNIFICANT CHANGE UP (ref 22–31)
CREAT SERPL-MCNC: 0.44 MG/DL — LOW (ref 0.5–1.3)
EOSINOPHIL # BLD AUTO: 0 K/UL — SIGNIFICANT CHANGE UP (ref 0–0.5)
EOSINOPHIL NFR BLD AUTO: 0 % — SIGNIFICANT CHANGE UP (ref 0–6)
GLUCOSE SERPL-MCNC: 196 MG/DL — HIGH (ref 70–99)
HCT VFR BLD CALC: 23.1 % — LOW (ref 39–50)
HCT VFR BLD CALC: 25.1 % — LOW (ref 39–50)
HGB BLD-MCNC: 7.9 G/DL — LOW (ref 13–17)
HGB BLD-MCNC: 8.6 G/DL — LOW (ref 13–17)
LYMPHOCYTES # BLD AUTO: 1.59 K/UL — SIGNIFICANT CHANGE UP (ref 1–3.3)
LYMPHOCYTES # BLD AUTO: 7 % — LOW (ref 13–44)
MAGNESIUM SERPL-MCNC: 1.6 MG/DL — SIGNIFICANT CHANGE UP (ref 1.6–2.6)
MCHC RBC-ENTMCNC: 28.8 PG — SIGNIFICANT CHANGE UP (ref 27–34)
MCHC RBC-ENTMCNC: 29.4 PG — SIGNIFICANT CHANGE UP (ref 27–34)
MCHC RBC-ENTMCNC: 34.2 GM/DL — SIGNIFICANT CHANGE UP (ref 32–36)
MCHC RBC-ENTMCNC: 34.3 GM/DL — SIGNIFICANT CHANGE UP (ref 32–36)
MCV RBC AUTO: 84.3 FL — SIGNIFICANT CHANGE UP (ref 80–100)
MCV RBC AUTO: 85.7 FL — SIGNIFICANT CHANGE UP (ref 80–100)
MONOCYTES # BLD AUTO: 1.59 K/UL — HIGH (ref 0–0.9)
MONOCYTES NFR BLD AUTO: 7 % — SIGNIFICANT CHANGE UP (ref 2–14)
NEUTROPHILS # BLD AUTO: 19.06 K/UL — HIGH (ref 1.8–7.4)
NEUTROPHILS NFR BLD AUTO: 71 % — SIGNIFICANT CHANGE UP (ref 43–77)
NRBC # BLD: 0 /100 WBCS — SIGNIFICANT CHANGE UP (ref 0–0)
NRBC # BLD: SIGNIFICANT CHANGE UP /100 WBCS (ref 0–0)
PHOSPHATE SERPL-MCNC: 2.7 MG/DL — SIGNIFICANT CHANGE UP (ref 2.5–4.5)
PLATELET # BLD AUTO: 333 K/UL — SIGNIFICANT CHANGE UP (ref 150–400)
PLATELET # BLD AUTO: 348 K/UL — SIGNIFICANT CHANGE UP (ref 150–400)
POTASSIUM SERPL-MCNC: 4 MMOL/L — SIGNIFICANT CHANGE UP (ref 3.5–5.3)
POTASSIUM SERPL-SCNC: 4 MMOL/L — SIGNIFICANT CHANGE UP (ref 3.5–5.3)
PROT SERPL-MCNC: 4.3 G/DL — LOW (ref 6–8.3)
RBC # BLD: 2.74 M/UL — LOW (ref 4.2–5.8)
RBC # BLD: 2.93 M/UL — LOW (ref 4.2–5.8)
RBC # FLD: 16.1 % — HIGH (ref 10.3–14.5)
RBC # FLD: 16.1 % — HIGH (ref 10.3–14.5)
SODIUM SERPL-SCNC: 136 MMOL/L — SIGNIFICANT CHANGE UP (ref 135–145)
TRIGL SERPL-MCNC: 46 MG/DL — SIGNIFICANT CHANGE UP (ref 10–149)
WBC # BLD: 22.69 K/UL — HIGH (ref 3.8–10.5)
WBC # BLD: 24.38 K/UL — HIGH (ref 3.8–10.5)
WBC # FLD AUTO: 22.69 K/UL — HIGH (ref 3.8–10.5)
WBC # FLD AUTO: 24.38 K/UL — HIGH (ref 3.8–10.5)

## 2019-06-11 PROCEDURE — 99232 SBSQ HOSP IP/OBS MODERATE 35: CPT

## 2019-06-11 RX ORDER — I.V. FAT EMULSION 20 G/100ML
0.55 EMULSION INTRAVENOUS
Qty: 35 | Refills: 0 | Status: DISCONTINUED | OUTPATIENT
Start: 2019-06-11 | End: 2019-06-11

## 2019-06-11 RX ORDER — ELECTROLYTE SOLUTION,INJ
1 VIAL (ML) INTRAVENOUS
Refills: 0 | Status: DISCONTINUED | OUTPATIENT
Start: 2019-06-11 | End: 2019-06-11

## 2019-06-11 RX ADMIN — Medication 100 MILLIGRAM(S): at 21:42

## 2019-06-11 RX ADMIN — Medication 1 EACH: at 20:07

## 2019-06-11 RX ADMIN — Medication 400 MILLIGRAM(S): at 05:06

## 2019-06-11 RX ADMIN — Medication 0.5 MILLIGRAM(S): at 03:55

## 2019-06-11 RX ADMIN — Medication 1000 MILLIGRAM(S): at 13:04

## 2019-06-11 RX ADMIN — Medication 1: at 12:15

## 2019-06-11 RX ADMIN — Medication 1: at 18:46

## 2019-06-11 RX ADMIN — Medication 50 MILLIGRAM(S): at 21:42

## 2019-06-11 RX ADMIN — Medication 200 MILLIGRAM(S): at 18:20

## 2019-06-11 RX ADMIN — Medication 2: at 00:27

## 2019-06-11 RX ADMIN — Medication 100 MILLIGRAM(S): at 15:47

## 2019-06-11 RX ADMIN — Medication 400 MILLIGRAM(S): at 12:22

## 2019-06-11 RX ADMIN — MORPHINE SULFATE 30 MILLILITER(S): 50 CAPSULE, EXTENDED RELEASE ORAL at 19:20

## 2019-06-11 RX ADMIN — MORPHINE SULFATE 30 MILLILITER(S): 50 CAPSULE, EXTENDED RELEASE ORAL at 07:31

## 2019-06-11 RX ADMIN — SODIUM CHLORIDE 125 MILLILITER(S): 9 INJECTION, SOLUTION INTRAVENOUS at 05:20

## 2019-06-11 RX ADMIN — Medication 1: at 06:24

## 2019-06-11 RX ADMIN — Medication 50 MILLIGRAM(S): at 15:47

## 2019-06-11 RX ADMIN — PANTOPRAZOLE SODIUM 40 MILLIGRAM(S): 20 TABLET, DELAYED RELEASE ORAL at 18:20

## 2019-06-11 RX ADMIN — CHLORHEXIDINE GLUCONATE 1 APPLICATION(S): 213 SOLUTION TOPICAL at 12:07

## 2019-06-11 RX ADMIN — I.V. FAT EMULSION 21.87 GM/KG/DAY: 20 EMULSION INTRAVENOUS at 20:07

## 2019-06-11 RX ADMIN — Medication 200 MILLIGRAM(S): at 06:50

## 2019-06-11 RX ADMIN — Medication 100 MILLIGRAM(S): at 05:20

## 2019-06-11 RX ADMIN — Medication 1000 MILLIGRAM(S): at 05:36

## 2019-06-11 RX ADMIN — PANTOPRAZOLE SODIUM 40 MILLIGRAM(S): 20 TABLET, DELAYED RELEASE ORAL at 08:43

## 2019-06-11 RX ADMIN — Medication 50 MILLIGRAM(S): at 05:10

## 2019-06-11 NOTE — PROGRESS NOTE ADULT - SUBJECTIVE AND OBJECTIVE BOX
GENERAL SURGERY DAILY PROGRESS NOTE:       Subjective:    Pt tachy overnight stated he was in pain better now with morphine. Current HR of tele monitor is 82 nsr.   Uo- 3200cc  Ostomy- no output    Objective:  Gen- NAD  ABD- appropriately tender, mildly distended, ostomy- viable, no air or stool in bag  incision - ecchymosis surrounding area but c/d/i    MEDICATIONS  (STANDING):  acetaminophen  IVPB .. 1000 milliGRAM(s) IV Intermittent once  chlorhexidine 4% Liquid 1 Application(s) Topical <User Schedule>  ciprofloxacin   IVPB 400 milliGRAM(s) IV Intermittent every 12 hours  dextrose 5%. 1000 milliLiter(s) (50 mL/Hr) IV Continuous <Continuous>  dextrose 50% Injectable 12.5 Gram(s) IV Push once  dextrose 50% Injectable 25 Gram(s) IV Push once  dextrose 50% Injectable 25 Gram(s) IV Push once  enoxaparin Injectable 40 milliGRAM(s) SubCutaneous daily  hydrocortisone sodium succinate Injectable 50 milliGRAM(s) IV Push every 8 hours  insulin lispro (HumaLOG) corrective regimen sliding scale   SubCutaneous every 6 hours  lactated ringers. 1000 milliLiter(s) (125 mL/Hr) IV Continuous <Continuous>  metroNIDAZOLE  IVPB 500 milliGRAM(s) IV Intermittent every 8 hours  morphine PCA (5 mG/mL) 30 milliLiter(s) PCA Continuous PCA Continuous  pantoprazole    Tablet 40 milliGRAM(s) Oral two times a day  Parenteral Nutrition - Adult 1 Each (52 mL/Hr) TPN Continuous <Continuous>  traZODone 50 milliGRAM(s) Oral at bedtime    MEDICATIONS  (PRN):  clonazePAM  Tablet 0.5 milliGRAM(s) Oral three times a day PRN anxiety  dextrose 40% Gel 15 Gram(s) Oral once PRN Blood Glucose LESS THAN 70 milliGRAM(s)/deciliter  glucagon  Injectable 1 milliGRAM(s) IntraMuscular once PRN Glucose LESS THAN 70 milligrams/deciliter  LORazepam   Injectable 0.5 milliGRAM(s) IV Push every 6 hours PRN Anxiety  morphine PCA (5 mG/mL) Rescue Clinician Bolus 3 milliGRAM(s) IV Push every 15 minutes PRN for Pain Scale GREATER THAN 6  naloxone Injectable 0.1 milliGRAM(s) IV Push every 3 minutes PRN For ANY of the following changes in patient status:  A. RR LESS THAN 10 breaths per minute, B. Oxygen saturation LESS THAN 90%, C. Sedation score of 6  ondansetron Injectable 4 milliGRAM(s) IV Push every 6 hours PRN Nausea      Vital Signs Last 24 Hrs  T(C): 36.9 (11 Jun 2019 06:54), Max: 37.6 (10 Luis 2019 13:25)  T(F): 98.4 (11 Jun 2019 06:54), Max: 99.7 (10 Luis 2019 13:25)  HR: 85 (11 Jun 2019 06:54) (85 - 150)  BP: 129/80 (11 Jun 2019 06:54) (114/77 - 165/110)  BP(mean): --  RR: 18 (11 Jun 2019 06:54) (17 - 24)  SpO2: 96% (11 Jun 2019 06:54) (93% - 100%)    I&O's Detail    10 Luis 2019 07:01  -  11 Jun 2019 07:00  --------------------------------------------------------  IN:    fat emulsion (Fish Oil and Plant Based) 20% Infusion: 174.4 mL    lactated ringers.: 675 mL    lactated ringers.: 200 mL    Solution: 100 mL    TPN (Total Parenteral Nutrition): 572 mL  Total IN: 1721.4 mL    OUT:    Indwelling Catheter - Urethral: 3200 mL  Total OUT: 3200 mL    Total NET: -1478.6 mL          Daily     Daily     LABS:                        7.9    22.69 )-----------( 333      ( 11 Jun 2019 07:21 )             23.1     06-11    136  |  102  |  6<L>  ----------------------------<  196<H>  4.0   |  29  |  0.44<L>    Ca    7.0<L>      11 Jun 2019 07:21  Phos  2.7     06-11  Mg     1.6     06-11    TPro  4.3<L>  /  Alb  1.1<L>  /  TBili  0.2  /  DBili  x   /  AST  9<L>  /  ALT  11<L>  /  AlkPhos  58  06-11    PT/INR - ( 10 Luis 2019 07:01 )   PT: 13.8 sec;   INR: 1.20 ratio

## 2019-06-11 NOTE — PROGRESS NOTE ADULT - SUBJECTIVE AND OBJECTIVE BOX
CHIEF COMPLAINT/INTERVAL HISTORY:  Pt. seen and evaluated for proctocolitis/severe ulcerative colitis.  Pt. is POD#1 s/p total colectomy with end ileostomy.  Pt. reports having soreness.  Tolerating IV antibiotics.  Reports pain better controlled on morphine PCA.     REVIEW OF SYSTEMS:  No fever, CP, or SOB.      Vital Signs Last 24 Hrs  T(C): 36.9 (11 Jun 2019 06:54), Max: 37.6 (10 Luis 2019 13:25)  T(F): 98.4 (11 Jun 2019 06:54), Max: 99.7 (10 Luis 2019 13:25)  HR: 85 (11 Jun 2019 06:54) (85 - 150)  BP: 129/80 (11 Jun 2019 06:54) (114/77 - 165/110)  BP(mean): --  RR: 18 (11 Jun 2019 06:54) (17 - 24)  SpO2: 96% (11 Jun 2019 06:54) (93% - 100%)    PHYSICAL EXAM:  GENERAL: NAD  HEENT: EOMI, hearing normal, conjunctiva and sclera clear  Chest: CTA bilaterally, no wheezing  CV: S1S2, RRR,   GI: soft, hypoactive BS, ND/NT, +ileostomy  Musculoskeletal: no edema  Psychiatric: affect nL, mood nL  Skin: warm and dry    LABS:                        8.6    24.38 )-----------( 348      ( 11 Jun 2019 08:28 )             25.1     06-11    136  |  102  |  6<L>  ----------------------------<  196<H>  4.0   |  29  |  0.44<L>    Ca    7.0<L>      11 Jun 2019 07:21  Phos  2.7     06-11  Mg     1.6     06-11    TPro  4.3<L>  /  Alb  1.1<L>  /  TBili  0.2  /  DBili  x   /  AST  9<L>  /  ALT  11<L>  /  AlkPhos  58  06-11    PT/INR - ( 10 Luis 2019 07:01 )   PT: 13.8 sec;   INR: 1.20 ratio         Assessment and Plan:  -Proctocolitis/Ulcerative colitis: POD#1 s/p laparoscopic total colectomy with end ileostomy.   Continue on IV Cipro and Flagyl.  Continue LR@125cc/hr and TPN.  Morphine PCA.  On stress dose steroids.  CRS f/u  -Dysphagia: s/p diflucan.   -Drug abuse:  was on Suboxone.  Now on morphine PCA.  -Hypocalcemia:  resolved.  corrected calcium is 9.3 today  -Herpes Zoster:  Completed Valtrex  -Anemia:  Of chronic disease.  Will monitor hbg.  -Latent TB:  appreciated ID input.    -VTE ppx:  Lovenox 40mg SQ daily

## 2019-06-11 NOTE — PROGRESS NOTE ADULT - PROBLEM SELECTOR PLAN 1
sp colon- findings of severe ulceration, no mucosa, completely denuded w deep ulcers and severe inflammatory pseudopolyps from rectum to ascending colon  path cw active colitis, no dysplasia  cont delzicol 1200mg qid, bacid tid, pred 40 qd  cont liquid diet +ensure supps +tpn  while on tpn monitor fs, lytes, lfts, tg  id input appreciated  monitor abd exam/stool output  will follow 6/10 sp OR for lap total colectomy w end ileostomy  npo/tpn  monitor fs, lfts, lytes, tg  f/u crs recs  pain control, ppi ppx, anti emetics prn  monitor exam/gi function  oob as tolerated  will follow

## 2019-06-11 NOTE — PROGRESS NOTE ADULT - SUBJECTIVE AND OBJECTIVE BOX
Post Op Day 1 of Anesthesia Pain Management Service    SUBJECTIVE:  		  OBJECTIVE:   Pain Score:  7-8 /10  Therapy:	[ x] IV PCA	[ ] Epidural   [ ] s/p Spinal Opioid	[ ] Peripheral nerve block  	  Vital Signs Last 24 Hrs  T(C): 36.9 (11 Jun 2019 06:54), Max: 37.6 (10 Luis 2019 13:25)  T(F): 98.4 (11 Jun 2019 06:54), Max: 99.7 (10 Luis 2019 13:25)  HR: 85 (11 Jun 2019 06:54) (85 - 150)  BP: 129/80 (11 Jun 2019 06:54) (114/77 - 165/110)  BP(mean): --  RR: 18 (11 Jun 2019 06:54) (17 - 24)  SpO2: 96% (11 Jun 2019 06:54) (93% - 100%)    (x ) Alert & Oriented     (x ) No motor/sensory block     (- ) Nausea     ( -) Pruritis     ( -) Headache      Patient encouraged to increase use of demand. Will continue same settings at present (patient request)

## 2019-06-11 NOTE — PROGRESS NOTE ADULT - SUBJECTIVE AND OBJECTIVE BOX
The patient was interviewed and evaluated  63y Male    T(C): 36.9 (06-11-19 @ 06:54), Max: 37.6 (06-10-19 @ 13:25)  HR: 85 (06-11-19 @ 06:54) (85 - 150)  BP: 129/80 (06-11-19 @ 06:54) (114/77 - 165/110)  RR: 18 (06-11-19 @ 06:54) (17 - 24)  SpO2: 96% (06-11-19 @ 06:54) (93% - 100%)  Wt(kg): --    Pt seen, doing well, no anesthesia complications or complaints noted or reported.   No Nausea    All questions answered    No additional recommendations.     Pain adequately controlled

## 2019-06-11 NOTE — PROGRESS NOTE ADULT - ASSESSMENT
62 yo M s/p TAC w/ diverting ileostomy    - repeat cbc  - oob/amb  - pain control- cont pca  - DTV  - monitor vitals  - if hct drops further we will transfuse 1-2 units  - ostomy nurse consult    bhavana davis  crssny

## 2019-06-11 NOTE — PROGRESS NOTE ADULT - SUBJECTIVE AND OBJECTIVE BOX
INTERVAL HPI/OVERNIGHT EVENTS:  pt seen and examined, friend at bedside  sp OR yesterday  c/o generalized abd pain, denies n/v    MEDICATIONS  (STANDING):  acetaminophen  IVPB .. 1000 milliGRAM(s) IV Intermittent once  chlorhexidine 4% Liquid 1 Application(s) Topical <User Schedule>  ciprofloxacin   IVPB 400 milliGRAM(s) IV Intermittent every 12 hours  dextrose 5%. 1000 milliLiter(s) (50 mL/Hr) IV Continuous <Continuous>  dextrose 50% Injectable 12.5 Gram(s) IV Push once  dextrose 50% Injectable 25 Gram(s) IV Push once  dextrose 50% Injectable 25 Gram(s) IV Push once  enoxaparin Injectable 40 milliGRAM(s) SubCutaneous daily  fat emulsion (Plant Based) 20% Infusion 0.55 Gm/kG/Day (21.87 mL/Hr) IV Continuous <Continuous>  hydrocortisone sodium succinate Injectable 50 milliGRAM(s) IV Push every 8 hours  insulin lispro (HumaLOG) corrective regimen sliding scale   SubCutaneous every 6 hours  lactated ringers. 1000 milliLiter(s) (125 mL/Hr) IV Continuous <Continuous>  metroNIDAZOLE  IVPB 500 milliGRAM(s) IV Intermittent every 8 hours  morphine PCA (5 mG/mL) 30 milliLiter(s) PCA Continuous PCA Continuous  pantoprazole    Tablet 40 milliGRAM(s) Oral two times a day  Parenteral Nutrition - Adult 1 Each (52 mL/Hr) TPN Continuous <Continuous>  Parenteral Nutrition - Adult 1 Each (52 mL/Hr) TPN Continuous <Continuous>  traZODone 50 milliGRAM(s) Oral at bedtime    MEDICATIONS  (PRN):  clonazePAM  Tablet 0.5 milliGRAM(s) Oral three times a day PRN anxiety  dextrose 40% Gel 15 Gram(s) Oral once PRN Blood Glucose LESS THAN 70 milliGRAM(s)/deciliter  glucagon  Injectable 1 milliGRAM(s) IntraMuscular once PRN Glucose LESS THAN 70 milligrams/deciliter  LORazepam   Injectable 0.5 milliGRAM(s) IV Push every 6 hours PRN Anxiety  morphine PCA (5 mG/mL) Rescue Clinician Bolus 3 milliGRAM(s) IV Push every 15 minutes PRN for Pain Scale GREATER THAN 6  naloxone Injectable 0.1 milliGRAM(s) IV Push every 3 minutes PRN For ANY of the following changes in patient status:  A. RR LESS THAN 10 breaths per minute, B. Oxygen saturation LESS THAN 90%, C. Sedation score of 6  ondansetron Injectable 4 milliGRAM(s) IV Push every 6 hours PRN Nausea      Allergies    penicillin (Swelling)    Intolerances        Review of Systems:    General:  No wt loss, fevers, chills, night sweats, fatigue   Eyes:  Good vision, no reported pain  ENT:  No sore throat, pain, runny nose, dysphagia  CV:  No pain, palpitations, hypo/hypertension  Resp:  No dyspnea, cough, tachypnea, wheezing  GI:  + pain, No nausea, No vomiting, No diarrhea, No constipation, No weight loss, No fever, No pruritis, No rectal bleeding, No melena, No dysphagia  :  No pain, bleeding, incontinence, nocturia  Muscle:  No pain, weakness  Neuro:  No weakness, tingling, memory problems  Psych:  No fatigue, insomnia, mood problems, depression  Endocrine:  No polyuria, polydypsia, cold/heat intolerance  Heme:  No petechiae, ecchymosis, easy bruisability  Skin:  No rash, tattoos, scars, edema      Vital Signs Last 24 Hrs  T(C): 36.9 (11 Jun 2019 06:54), Max: 37.6 (10 Luis 2019 13:25)  T(F): 98.4 (11 Jun 2019 06:54), Max: 99.7 (10 Luis 2019 13:25)  HR: 85 (11 Jun 2019 06:54) (85 - 150)  BP: 129/80 (11 Jun 2019 06:54) (114/77 - 165/110)  BP(mean): --  RR: 18 (11 Jun 2019 06:54) (17 - 24)  SpO2: 96% (11 Jun 2019 06:54) (93% - 100%)    PHYSICAL EXAM:    General:  n  HEENT:  NC/AT  Chest:  dec bs  Cardiovascular:  rrr S1, S2  Abdomen:  Soft, mild lq ttp no guarding mild dt  Extremities:  no edema  Neuro/Psych:  A&Ox3      LABS:                        8.6    24.38 )-----------( 348      ( 11 Jun 2019 08:28 )             25.1     06-11    136  |  102  |  6<L>  ----------------------------<  196<H>  4.0   |  29  |  0.44<L>    Ca    7.0<L>      11 Jun 2019 07:21  Phos  2.7     06-11  Mg     1.6     06-11    TPro  4.3<L>  /  Alb  1.1<L>  /  TBili  0.2  /  DBili  x   /  AST  9<L>  /  ALT  11<L>  /  AlkPhos  58  06-11    PT/INR - ( 10 Luis 2019 07:01 )   PT: 13.8 sec;   INR: 1.20 ratio               RADIOLOGY & ADDITIONAL TESTS: INTERVAL HPI/OVERNIGHT EVENTS:  pt seen and examined, friend at bedside  sp OR yesterday  c/o generalized abd pain, denies n/v    MEDICATIONS  (STANDING):  acetaminophen  IVPB .. 1000 milliGRAM(s) IV Intermittent once  chlorhexidine 4% Liquid 1 Application(s) Topical <User Schedule>  ciprofloxacin   IVPB 400 milliGRAM(s) IV Intermittent every 12 hours  dextrose 5%. 1000 milliLiter(s) (50 mL/Hr) IV Continuous <Continuous>  dextrose 50% Injectable 12.5 Gram(s) IV Push once  dextrose 50% Injectable 25 Gram(s) IV Push once  dextrose 50% Injectable 25 Gram(s) IV Push once  enoxaparin Injectable 40 milliGRAM(s) SubCutaneous daily  fat emulsion (Plant Based) 20% Infusion 0.55 Gm/kG/Day (21.87 mL/Hr) IV Continuous <Continuous>  hydrocortisone sodium succinate Injectable 50 milliGRAM(s) IV Push every 8 hours  insulin lispro (HumaLOG) corrective regimen sliding scale   SubCutaneous every 6 hours  lactated ringers. 1000 milliLiter(s) (125 mL/Hr) IV Continuous <Continuous>  metroNIDAZOLE  IVPB 500 milliGRAM(s) IV Intermittent every 8 hours  morphine PCA (5 mG/mL) 30 milliLiter(s) PCA Continuous PCA Continuous  pantoprazole    Tablet 40 milliGRAM(s) Oral two times a day  Parenteral Nutrition - Adult 1 Each (52 mL/Hr) TPN Continuous <Continuous>  Parenteral Nutrition - Adult 1 Each (52 mL/Hr) TPN Continuous <Continuous>  traZODone 50 milliGRAM(s) Oral at bedtime    MEDICATIONS  (PRN):  clonazePAM  Tablet 0.5 milliGRAM(s) Oral three times a day PRN anxiety  dextrose 40% Gel 15 Gram(s) Oral once PRN Blood Glucose LESS THAN 70 milliGRAM(s)/deciliter  glucagon  Injectable 1 milliGRAM(s) IntraMuscular once PRN Glucose LESS THAN 70 milligrams/deciliter  LORazepam   Injectable 0.5 milliGRAM(s) IV Push every 6 hours PRN Anxiety  morphine PCA (5 mG/mL) Rescue Clinician Bolus 3 milliGRAM(s) IV Push every 15 minutes PRN for Pain Scale GREATER THAN 6  naloxone Injectable 0.1 milliGRAM(s) IV Push every 3 minutes PRN For ANY of the following changes in patient status:  A. RR LESS THAN 10 breaths per minute, B. Oxygen saturation LESS THAN 90%, C. Sedation score of 6  ondansetron Injectable 4 milliGRAM(s) IV Push every 6 hours PRN Nausea      Allergies    penicillin (Swelling)    Intolerances        Review of Systems:    General:  No wt loss, fevers, chills, night sweats, fatigue   Eyes:  Good vision, no reported pain  ENT:  No sore throat, pain, runny nose, dysphagia  CV:  No pain, palpitations, hypo/hypertension  Resp:  No dyspnea, cough, tachypnea, wheezing  GI:  + pain, No nausea, No vomiting, No diarrhea, No constipation, No weight loss, No fever, No pruritis, No rectal bleeding, No melena, No dysphagia  :  No pain, bleeding, incontinence, nocturia  Muscle:  No pain, weakness  Neuro:  No weakness, tingling, memory problems  Psych:  No fatigue, insomnia, mood problems, depression  Endocrine:  No polyuria, polydypsia, cold/heat intolerance  Heme:  No petechiae, ecchymosis, easy bruisability  Skin:  No rash, tattoos, scars, edema      Vital Signs Last 24 Hrs  T(C): 36.9 (11 Jun 2019 06:54), Max: 37.6 (10 Luis 2019 13:25)  T(F): 98.4 (11 Jun 2019 06:54), Max: 99.7 (10 Luis 2019 13:25)  HR: 85 (11 Jun 2019 06:54) (85 - 150)  BP: 129/80 (11 Jun 2019 06:54) (114/77 - 165/110)  BP(mean): --  RR: 18 (11 Jun 2019 06:54) (17 - 24)  SpO2: 96% (11 Jun 2019 06:54) (93% - 100%)    PHYSICAL EXAM:  General:  lying in bed  HEENT:  NC/AT  Chest:  dec bs  Cardiovascular: s1 s2 rrr  Abdomen:  Soft, generalized ttp mild dt dressings c/d/i +ostomy  Extremities:  no edema  Neuro/Psych:  A&Ox3        LABS:                        8.6    24.38 )-----------( 348      ( 11 Jun 2019 08:28 )             25.1     06-11    136  |  102  |  6<L>  ----------------------------<  196<H>  4.0   |  29  |  0.44<L>    Ca    7.0<L>      11 Jun 2019 07:21  Phos  2.7     06-11  Mg     1.6     06-11    TPro  4.3<L>  /  Alb  1.1<L>  /  TBili  0.2  /  DBili  x   /  AST  9<L>  /  ALT  11<L>  /  AlkPhos  58  06-11    PT/INR - ( 10 Luis 2019 07:01 )   PT: 13.8 sec;   INR: 1.20 ratio               RADIOLOGY & ADDITIONAL TESTS:

## 2019-06-11 NOTE — PROGRESS NOTE ADULT - PROVIDER SPECIALTY LIST ADULT
[facility] Cardiology  INPATIENT PROGRESS NOTE    Name: Mando Hill  Age/Sex: 75 y.o. male  :  1943        PCP: Valerie Marvin MD    Vital Signs  Temp:  [97.8 °F (36.6 °C)-98.2 °F (36.8 °C)] 98.1 °F (36.7 °C)  Heart Rate:  [] 78  Resp:  [17-24] 17  BP: ()/(50-73) 139/72  Body mass index is 28.42 kg/(m^2).      Subjective   patient with cellulitis and sepsis was in atrial flutter with variable block now the rate is controlled with Cardizem CD, Bystolic and amiodarone.  Once the sepsis and cellulitis is better we will tentatively plan for flutter ablation on next Thursday by Dr. Londono.      Patient Active Problem List   Diagnosis   • Osteoarthritis of right shoulder   • Sepsis   • Sacral decubitus ulcer   • UTI (urinary tract infection) due to urinary indwelling catheter   • Hypotension   • Atrial fibrillation       Past Medical History:   Diagnosis Date   • Arthritis    • Atherosclerosis    • Atrial fibrillation    • BPH (benign prostatic hyperplasia)    • Brain injury    • Chronic bronchitis    • Hypertension    • Left hemiplegia    • UTI (urinary tract infection) due to urinary indwelling catheter 3/5/2018       Current Facility-Administered Medications   Medication Dose Route Frequency Provider Last Rate Last Dose   • 8-hydroxyquinoline sulfate (BAG BALM) ointment   Apply externally Daily Evelio Christensen MD       • albuterol (PROVENTIL) nebulizer solution 0.5% 2.5 mg/0.5mL  2.5 mg Nebulization Q6H PRN Yoan Hamilton MD       • amiodarone (PACERONE) tablet 200 mg  200 mg Oral Q24H Sai Muñoz MD   200 mg at 18   • aspirin EC tablet 81 mg  81 mg Oral Daily Yoan Hamilton MD   81 mg at 18   • baclofen (LIORESAL) tablet 10 mg  10 mg Oral Q12H Jordi Marie MD   10 mg at 18   • budesonide-formoterol (SYMBICORT) 160-4.5 MCG/ACT inhaler 2 puff  2 puff Inhalation BID - RT Hardy Fisher MD       • diltiaZEM CD (CARDIZEM  Anesthesia CD) 24 hr capsule 240 mg  240 mg Oral Daily With Dinner Sai Muñoz MD   240 mg at 03/08/18 1807   • docusate sodium (COLACE) capsule 100 mg  100 mg Oral Daily Jordi Marie MD   100 mg at 03/09/18 0913   • finasteride (PROSCAR) tablet 5 mg  5 mg Oral Daily Yoan Hamilton MD   5 mg at 03/09/18 0913   • fluticasone (FLONASE) 50 MCG/ACT nasal spray 2 spray  2 spray Each Nare Daily Hardy Fisehr MD   2 spray at 03/09/18 1113   • furosemide (LASIX) tablet 40 mg  40 mg Oral Daily Hardy Fisher MD   40 mg at 03/09/18 0912   • HYDROcodone-acetaminophen (NORCO)  MG per tablet 1 tablet  1 tablet Oral Q4H PRN Yoan Hamilton MD   1 tablet at 03/06/18 2023   • ipratropium-albuterol (DUO-NEB) nebulizer solution 3 mL  3 mL Nebulization 4x Daily - RT Hardy Fisher MD   3 mL at 03/09/18 1124   • magic butt ointment   Topical BID Evelio Christensen MD       • nebivolol (BYSTOLIC) tablet 20 mg  20 mg Oral Daily Hardy Fisher MD   20 mg at 03/09/18 0911   • ondansetron (ZOFRAN) injection 4 mg  4 mg Intravenous Q4H PRN Evelio Christensen MD   4 mg at 03/07/18 0134   • phenytoin (DILANTIN) ER capsule 100 mg  100 mg Oral Q12H Yoan Hamilton MD   100 mg at 03/09/18 0913   • piperacillin-tazobactam (ZOSYN) 3.375 g in iso-osmotic dextrose 50 ml (premix)  3.375 g Intravenous Q8H Hardy Fisher MD 0 mL/hr at 03/09/18 0800 3.375 g at 03/09/18 1300   • polyethylene glycol 3350 powder (packet)  17 g Oral Daily PRN Hardy Fisher MD   17 g at 03/09/18 0911   • predniSONE (DELTASONE) tablet 20 mg  20 mg Oral Daily With Breakfast Hardy Fisher MD   20 mg at 03/09/18 0913   • promethazine (PHENERGAN) injection 12.5 mg  12.5 mg Intravenous Q6H PRN Jodri Marie MD   12.5 mg at 03/07/18 1613   • rivaroxaban (XARELTO) tablet 20 mg  20 mg Oral Daily Yoan Hamilton MD   20 mg at 03/09/18 0913   • sodium chloride (OCEAN) nasal spray 2 spray  2 spray Each Nare PRN Hardy Fisher MD   2 spray at 03/09/18  0920   • sodium chloride 0.9 % flush 1-10 mL  1-10 mL Intravenous PRN Yoan Hamilton MD   10 mL at 03/07/18 1615   • sodium chloride 0.9 % flush 10 mL  10 mL Intravenous PRN Robert Anderson MD       • sodium chloride 0.9 % flush 10 mL  10 mL Intracatheter Q12H Hardy Fisher MD   10 mL at 03/09/18 0918   • sodium chloride 0.9 % flush 10 mL  10 mL Intracatheter PRN Hardy Fisher MD       • sodium chloride 0.9 % flush 10 mL  10 mL Intracatheter PRN Hardy Fisher MD   10 mL at 03/08/18 1946   • sodium chloride 0.9 % flush 10 mL  10 mL Intracatheter PRN Hardy Fisher MD       • tamsulosin (FLOMAX) 24 hr capsule 0.4 mg  0.4 mg Oral Nightly Yoan Hamilton MD   0.4 mg at 03/08/18 1951   • temazepam (RESTORIL) capsule 15 mg  15 mg Oral Nightly PRN Jordi Marie MD   15 mg at 03/08/18 2138   • vitamin C (ASCORBIC ACID) tablet 500 mg  500 mg Oral Daily Yoan Hamilton MD   500 mg at 03/09/18 0912       Past Surgical History:   Procedure Laterality Date   • CHOLECYSTECTOMY     • EYE SURGERY     • SUPRAPUBIC CATHETER INSERTION         Social History     Social History   • Marital status:      Spouse name: N/A   • Number of children: N/A   • Years of education: N/A     Occupational History   • Not on file.     Social History Main Topics   • Smoking status: Former Smoker   • Smokeless tobacco: Never Used   • Alcohol use No   • Drug use: No   • Sexual activity: Not Currently     Other Topics Concern   • Not on file     Social History Narrative       O/E    Neck supple  no Jvd , no thyroid enlargement  Chest air entry equal, normal respiration   no rhonchi or creps  Cardiovascular system irregular rate and rythem ,   Abdomen soft no tenderness,   CNS - alert , oriented to place and time  Left hemiparesis  Extremeties  redness of both legs on the thigh and calf  Sacral decubitus        Lab Results (last 24 hours)     Procedure Component Value Units Date/Time    Blood Gas, Arterial [[id number]]   (Abnormal) Collected:  03/08/18 1638    Specimen:  Arterial Blood Updated:  03/08/18 1641     Site --      Not performed at this site.        Kevin's Test --      Not performed at this site.        pH, Arterial 7.333 (L) pH units      pCO2, Arterial 49.9 (H) mm Hg      pO2, Arterial 72.8 (L) mm Hg      HCO3, Arterial 25.9 mmol/L      Base Excess, Arterial -0.6 mmol/L      O2 Saturation, Arterial 94.4 %      Hemoglobin, Blood Gas 14.3 g/dL      Hematocrit, Blood Gas 42.0 %      CO2 Content 27.4 (H)     Sodium, Arterial 138.3 mmol/L      Potassium, Arterial 3.69 mmol/L      Glucose, Arterial 152 mmol/L      Barometric Pressure for Blood Gas -- mmHg       Not performed at this site.        Modality --      Not performed at this site.        Ionized Calcium 4.70 mg/dL     CBC & Differential [[id number]] Collected:  03/09/18 0356    Specimen:  Blood Updated:  03/09/18 0431    Narrative:       The following orders were created for panel order CBC & Differential.  Procedure                               Abnormality         Status                     ---------                               -----------         ------                     CBC Auto Differential[[id number]]        Abnormal            Final result                 Please view results for these tests on the individual orders.    CBC Auto Differential [[id number]]  (Abnormal) Collected:  03/09/18 0356    Specimen:  Blood Updated:  03/09/18 0431     WBC 15.17 (H) 10*3/mm3      RBC 4.46 10*6/mm3      Hemoglobin 14.2 g/dL      Hematocrit 41.4 %      MCV 92.8 fL      MCH 31.8 pg      MCHC 34.3 g/dL      RDW 13.3 %      RDW-SD 45.5 (H) fl      MPV 9.9 fL      Platelets 219 10*3/mm3      Neutrophil % 87.3 (H) %      Lymphocyte % 4.6 (L) %      Monocyte % 7.3 %      Eosinophil % 0.0 %      Basophil % 0.1 %      Immature Grans % 0.7 (H) %      Neutrophils, Absolute 13.24 (H) 10*3/mm3      Lymphocytes, Absolute 0.70 10*3/mm3      Monocytes, Absolute 1.11 (H) 10*3/mm3       Eosinophils, Absolute 0.00 10*3/mm3      Basophils, Absolute 0.02 10*3/mm3      Immature Grans, Absolute 0.10 (H) 10*3/mm3      nRBC 0.0 /100 WBC     POC Glucose Once [[id number]]  (Normal) Collected:  03/09/18 0606    Specimen:  Blood Updated:  03/09/18 0630     Glucose 116 mg/dL       Sliding Scale AdminMeter: [id number]: [id number] [facility]       Comprehensive Metabolic Panel [[id number]]  (Abnormal) Collected:  03/09/18 0834    Specimen:  Blood Updated:  03/09/18 0903     Glucose 129 (H) mg/dL      BUN 19 mg/dL      Creatinine 1.21 mg/dL      Sodium 140 mmol/L      Potassium 4.1 mmol/L      Chloride 103 mmol/L      CO2 26.0 mmol/L      Calcium 8.6 mg/dL      Total Protein 6.6 g/dL      Albumin 3.20 (L) g/dL      ALT (SGPT) 21 U/L      AST (SGOT) 15 (L) U/L      Alkaline Phosphatase 63 U/L      Total Bilirubin 0.3 mg/dL      eGFR Non African Amer 58 (L) mL/min/1.73      Globulin 3.4 gm/dL      A/G Ratio 0.9 (L) g/dL      BUN/Creatinine Ratio 15.7     Anion Gap 11.0 mmol/L     Narrative:       The MDRD GFR formula is only valid for adults with stable renal function between ages 18 and 70.            A/P    Atrial flutter with RVR-currently rate controlled with Cardizem CD, Bystolic and amiodarone, on anticoagulation with xarelto.    Sepsis with cellulitis on IV antibiotics.  Patient can go to the floor when okay with hospitalist service.    Patient will have atrial flutter ablation before he goes home as an inpatient on Thursday, if the infection and the cellulitis is clear.            This document has been electronically signed by Sai Muñoz MD on March 9, 2018 2:19 PM         EMR Dragon/Transcription disclaimer:   Some of this note may be an electronic transcription/translation of spoken language to printed text. The electronic translation of spoken language may permit erroneous, or at times, nonsensical words or phrases to be inadvertently transcribed; Although I have reviewed the note  for such errors, some may still exist.

## 2019-06-11 NOTE — PROGRESS NOTE ADULT - PROBLEM SELECTOR PLAN 3
resolved  sp egd- findings of mild esophagitis and gastritis  path cw gastritis, esophagitis pending stains  cont ppi bid, carafate bid, anti emetics prn  monitor lytes, replete prn  aspiration prec resolved  sp egd- findings of mild esophagitis and gastritis  path neg for acute

## 2019-06-11 NOTE — PROGRESS NOTE ADULT - PROBLEM SELECTOR PLAN 2
see above, hgb low but stable  monitor cbc, transfuse prn  cont ppi see above, now also post op  monitor cbc, transfuse prn  cont ppi

## 2019-06-11 NOTE — CHART NOTE - NSCHARTNOTEFT_GEN_A_CORE
Called by RN for severe abdominal pain. Patient is POD #1 for laparoscopic total colectomy with end ileostomy. Patient is complaining of severe generalized abdominal pain post op. He is on morphine PCA for pain post op but notes minimal relief. He was evaluated by ICU Dr. Lopez last PM due to tachycardia and was recommended to start prn Ativan for anxiety and Tylenol IV for pain. Admits difficulty with deep breathing due to pain. Currently denies fevers, chills, headaches, dizziness, chest pain, palpitaions, nausea, vomiting. Physical exam: Calm, lying in bed. Resp: CTA bl, Cardio: S1, S2, tachy, no murmurs, abdomen: mild tenderness to palpation of all quadrants, hypoactive bs. No rebound or guarding.   A/P   64 yo male, hx of uc, recently admitted for flare on abx/steroids at that time, presents with n/v and dysphagia s/p laparoscopic total colectomy with end ileostomy POD #1    - RN discussed with anesthesia, will give PRN Klonopin now in addition to PCA  - Continue to monitor vitals  - RN to call with any changes    Vital Signs Last 24 Hrs  T(C): 36.4 (11 Jun 2019 03:21), Max: 37.6 (10 Luis 2019 13:25)  T(F): 97.5 (11 Jun 2019 03:21), Max: 99.7 (10 Luis 2019 13:25)  HR: 109 (11 Jun 2019 03:21) (90 - 150)  BP: 143/89 (11 Jun 2019 03:21) (94/67 - 165/110)  BP(mean): --  RR: 20 (11 Jun 2019 03:21) (16 - 24)  SpO2: 96% (11 Jun 2019 03:21) (93% - 100%)

## 2019-06-12 LAB
ALBUMIN SERPL ELPH-MCNC: 1.1 G/DL — LOW (ref 3.3–5)
ALP SERPL-CCNC: 73 U/L — SIGNIFICANT CHANGE UP (ref 40–120)
ALT FLD-CCNC: 13 U/L — SIGNIFICANT CHANGE UP (ref 12–78)
ANION GAP SERPL CALC-SCNC: 6 MMOL/L — SIGNIFICANT CHANGE UP (ref 5–17)
AST SERPL-CCNC: 9 U/L — LOW (ref 15–37)
BILIRUB SERPL-MCNC: 0.2 MG/DL — SIGNIFICANT CHANGE UP (ref 0.2–1.2)
BUN SERPL-MCNC: 7 MG/DL — SIGNIFICANT CHANGE UP (ref 7–23)
CALCIUM SERPL-MCNC: 7 MG/DL — LOW (ref 8.5–10.1)
CHLORIDE SERPL-SCNC: 103 MMOL/L — SIGNIFICANT CHANGE UP (ref 96–108)
CO2 SERPL-SCNC: 31 MMOL/L — SIGNIFICANT CHANGE UP (ref 22–31)
CREAT SERPL-MCNC: 0.41 MG/DL — LOW (ref 0.5–1.3)
GLUCOSE SERPL-MCNC: 137 MG/DL — HIGH (ref 70–99)
HCT VFR BLD CALC: 22.2 % — LOW (ref 39–50)
HCT VFR BLD CALC: 22.6 % — LOW (ref 39–50)
HGB BLD-MCNC: 7.5 G/DL — LOW (ref 13–17)
HGB BLD-MCNC: 7.7 G/DL — LOW (ref 13–17)
MCHC RBC-ENTMCNC: 28.6 PG — SIGNIFICANT CHANGE UP (ref 27–34)
MCHC RBC-ENTMCNC: 29.1 PG — SIGNIFICANT CHANGE UP (ref 27–34)
MCHC RBC-ENTMCNC: 33.8 GM/DL — SIGNIFICANT CHANGE UP (ref 32–36)
MCHC RBC-ENTMCNC: 34.1 GM/DL — SIGNIFICANT CHANGE UP (ref 32–36)
MCV RBC AUTO: 84.7 FL — SIGNIFICANT CHANGE UP (ref 80–100)
MCV RBC AUTO: 85.3 FL — SIGNIFICANT CHANGE UP (ref 80–100)
NRBC # BLD: 0 /100 WBCS — SIGNIFICANT CHANGE UP (ref 0–0)
NRBC # BLD: 0 /100 WBCS — SIGNIFICANT CHANGE UP (ref 0–0)
PLATELET # BLD AUTO: 419 K/UL — HIGH (ref 150–400)
PLATELET # BLD AUTO: 555 K/UL — HIGH (ref 150–400)
POTASSIUM SERPL-MCNC: 3.4 MMOL/L — LOW (ref 3.5–5.3)
POTASSIUM SERPL-SCNC: 3.4 MMOL/L — LOW (ref 3.5–5.3)
PROT SERPL-MCNC: 4.6 G/DL — LOW (ref 6–8.3)
RBC # BLD: 2.62 M/UL — LOW (ref 4.2–5.8)
RBC # BLD: 2.65 M/UL — LOW (ref 4.2–5.8)
RBC # FLD: 15.7 % — HIGH (ref 10.3–14.5)
RBC # FLD: 15.9 % — HIGH (ref 10.3–14.5)
SODIUM SERPL-SCNC: 140 MMOL/L — SIGNIFICANT CHANGE UP (ref 135–145)
TRIGL SERPL-MCNC: 44 MG/DL — SIGNIFICANT CHANGE UP (ref 10–149)
WBC # BLD: 29.17 K/UL — HIGH (ref 3.8–10.5)
WBC # BLD: 31.2 K/UL — HIGH (ref 3.8–10.5)
WBC # FLD AUTO: 29.17 K/UL — HIGH (ref 3.8–10.5)
WBC # FLD AUTO: 31.2 K/UL — HIGH (ref 3.8–10.5)

## 2019-06-12 PROCEDURE — 99233 SBSQ HOSP IP/OBS HIGH 50: CPT

## 2019-06-12 RX ORDER — INSULIN LISPRO 100/ML
VIAL (ML) SUBCUTANEOUS
Refills: 0 | Status: DISCONTINUED | OUTPATIENT
Start: 2019-06-12 | End: 2019-06-18

## 2019-06-12 RX ORDER — INSULIN LISPRO 100/ML
VIAL (ML) SUBCUTANEOUS AT BEDTIME
Refills: 0 | Status: DISCONTINUED | OUTPATIENT
Start: 2019-06-12 | End: 2019-06-18

## 2019-06-12 RX ORDER — I.V. FAT EMULSION 20 G/100ML
0.55 EMULSION INTRAVENOUS
Qty: 35 | Refills: 0 | Status: DISCONTINUED | OUTPATIENT
Start: 2019-06-12 | End: 2019-06-12

## 2019-06-12 RX ORDER — ELECTROLYTE SOLUTION,INJ
1 VIAL (ML) INTRAVENOUS
Refills: 0 | Status: DISCONTINUED | OUTPATIENT
Start: 2019-06-12 | End: 2019-06-12

## 2019-06-12 RX ORDER — ZOLPIDEM TARTRATE 10 MG/1
5 TABLET ORAL AT BEDTIME
Refills: 0 | Status: DISCONTINUED | OUTPATIENT
Start: 2019-06-12 | End: 2019-06-19

## 2019-06-12 RX ADMIN — MORPHINE SULFATE 30 MILLILITER(S): 50 CAPSULE, EXTENDED RELEASE ORAL at 07:24

## 2019-06-12 RX ADMIN — Medication 100 MILLIGRAM(S): at 15:22

## 2019-06-12 RX ADMIN — I.V. FAT EMULSION 14.35 GM/KG/DAY: 20 EMULSION INTRAVENOUS at 20:11

## 2019-06-12 RX ADMIN — Medication 50 MILLIGRAM(S): at 21:02

## 2019-06-12 RX ADMIN — Medication 100 MILLIGRAM(S): at 05:01

## 2019-06-12 RX ADMIN — Medication 1: at 18:37

## 2019-06-12 RX ADMIN — MORPHINE SULFATE 30 MILLILITER(S): 50 CAPSULE, EXTENDED RELEASE ORAL at 19:20

## 2019-06-12 RX ADMIN — ENOXAPARIN SODIUM 40 MILLIGRAM(S): 100 INJECTION SUBCUTANEOUS at 12:32

## 2019-06-12 RX ADMIN — Medication 100 MILLIGRAM(S): at 21:01

## 2019-06-12 RX ADMIN — Medication 1 EACH: at 20:11

## 2019-06-12 RX ADMIN — CHLORHEXIDINE GLUCONATE 1 APPLICATION(S): 213 SOLUTION TOPICAL at 12:06

## 2019-06-12 RX ADMIN — Medication 50 MILLIGRAM(S): at 15:21

## 2019-06-12 RX ADMIN — Medication 200 MILLIGRAM(S): at 18:01

## 2019-06-12 RX ADMIN — Medication 1: at 12:06

## 2019-06-12 RX ADMIN — Medication 200 MILLIGRAM(S): at 06:15

## 2019-06-12 RX ADMIN — ZOLPIDEM TARTRATE 5 MILLIGRAM(S): 10 TABLET ORAL at 21:10

## 2019-06-12 RX ADMIN — Medication 1: at 00:00

## 2019-06-12 RX ADMIN — PANTOPRAZOLE SODIUM 40 MILLIGRAM(S): 20 TABLET, DELAYED RELEASE ORAL at 06:14

## 2019-06-12 RX ADMIN — PANTOPRAZOLE SODIUM 40 MILLIGRAM(S): 20 TABLET, DELAYED RELEASE ORAL at 18:01

## 2019-06-12 RX ADMIN — Medication 50 MILLIGRAM(S): at 06:14

## 2019-06-12 NOTE — PROGRESS NOTE ADULT - SUBJECTIVE AND OBJECTIVE BOX
INTERVAL HPI/OVERNIGHT EVENTS:  pt seen and examined  states pain controlled  denies n/v  stool/gas in ostomy    MEDICATIONS  (STANDING):  chlorhexidine 4% Liquid 1 Application(s) Topical <User Schedule>  ciprofloxacin   IVPB 400 milliGRAM(s) IV Intermittent every 12 hours  dextrose 5%. 1000 milliLiter(s) (50 mL/Hr) IV Continuous <Continuous>  dextrose 50% Injectable 12.5 Gram(s) IV Push once  dextrose 50% Injectable 25 Gram(s) IV Push once  dextrose 50% Injectable 25 Gram(s) IV Push once  enoxaparin Injectable 40 milliGRAM(s) SubCutaneous daily  hydrocortisone sodium succinate Injectable 50 milliGRAM(s) IV Push every 8 hours  insulin lispro (HumaLOG) corrective regimen sliding scale   SubCutaneous every 6 hours  lactated ringers. 1000 milliLiter(s) (125 mL/Hr) IV Continuous <Continuous>  metroNIDAZOLE  IVPB 500 milliGRAM(s) IV Intermittent every 8 hours  morphine PCA (5 mG/mL) 30 milliLiter(s) PCA Continuous PCA Continuous  pantoprazole    Tablet 40 milliGRAM(s) Oral two times a day  Parenteral Nutrition - Adult 1 Each (52 mL/Hr) TPN Continuous <Continuous>  traZODone 50 milliGRAM(s) Oral at bedtime    MEDICATIONS  (PRN):  clonazePAM  Tablet 0.5 milliGRAM(s) Oral three times a day PRN anxiety  dextrose 40% Gel 15 Gram(s) Oral once PRN Blood Glucose LESS THAN 70 milliGRAM(s)/deciliter  glucagon  Injectable 1 milliGRAM(s) IntraMuscular once PRN Glucose LESS THAN 70 milligrams/deciliter  LORazepam   Injectable 0.5 milliGRAM(s) IV Push every 6 hours PRN Anxiety  morphine PCA (5 mG/mL) Rescue Clinician Bolus 3 milliGRAM(s) IV Push every 15 minutes PRN for Pain Scale GREATER THAN 6  naloxone Injectable 0.1 milliGRAM(s) IV Push every 3 minutes PRN For ANY of the following changes in patient status:  A. RR LESS THAN 10 breaths per minute, B. Oxygen saturation LESS THAN 90%, C. Sedation score of 6  ondansetron Injectable 4 milliGRAM(s) IV Push every 6 hours PRN Nausea      Allergies    penicillin (Swelling)    Intolerances        Review of Systems:    General:  No wt loss, fevers, chills, night sweats, fatigue   Eyes:  Good vision, no reported pain  ENT:  No sore throat, pain, runny nose, dysphagia  CV:  No pain, palpitations, hypo/hypertension  Resp:  No dyspnea, cough, tachypnea, wheezing  GI:  No pain, No nausea, No vomiting, No diarrhea, No constipation, No weight loss, No fever, No pruritis, No rectal bleeding, No melena, No dysphagia  :  No pain, bleeding, incontinence, nocturia  Muscle:  No pain, weakness  Neuro:  No weakness, tingling, memory problems  Psych:  No fatigue, insomnia, mood problems, depression  Endocrine:  No polyuria, polydypsia, cold/heat intolerance  Heme:  No petechiae, ecchymosis, easy bruisability  Skin:  No rash, tattoos, scars, edema      Vital Signs Last 24 Hrs  T(C): 36.4 (12 Jun 2019 07:45), Max: 37.3 (11 Jun 2019 23:50)  T(F): 97.5 (12 Jun 2019 07:45), Max: 99.1 (11 Jun 2019 23:50)  HR: 87 (12 Jun 2019 07:45) (87 - 100)  BP: 127/80 (12 Jun 2019 07:45) (120/71 - 128/79)  BP(mean): --  RR: 18 (12 Jun 2019 07:45) (18 - 20)  SpO2: 96% (12 Jun 2019 07:45) (94% - 98%)    PHYSICAL EXAM:    General:  lying in bed  HEENT:  NC/AT  Chest:  dec bs  Cardiovascular: s1 s2 rrr  Abdomen:  Soft, incisional ttp mild dt dressings c/d/i +ostomy w dark stool/gas  Extremities:  no edema  Neuro/Psych:  A&Ox3      LABS:                        7.5    29.17 )-----------( 419      ( 12 Jun 2019 05:53 )             22.2     06-12    140  |  103  |  7   ----------------------------<  137<H>  3.4<L>   |  31  |  0.41<L>    Ca    7.0<L>      12 Jun 2019 05:53  Phos  2.7     06-11  Mg     1.6     06-11    TPro  4.6<L>  /  Alb  1.1<L>  /  TBili  0.2  /  DBili  x   /  AST  9<L>  /  ALT  13  /  AlkPhos  73  06-12          RADIOLOGY & ADDITIONAL TESTS:

## 2019-06-12 NOTE — CHART NOTE - NSCHARTNOTEFT_GEN_A_CORE
Assessment: Pt seen for (mal)nutrition follow up.  Chart reviewed, hospital course noted.  POD#2 post lap total colectomy with end ileostomy.  +output in ostomy bag.  Pt remains on TPN 1250 mL/day  with 20% Intralipids (1520 gita, 80 gm protein).  Trigs WNL at 46 on 6/11.    Approximate 20# wt gain ?? noted since admission.      Factors impacting intake: [ ] none [ ] nausea  [ ] vomiting [ ] diarrhea [ ] constipation  [ ]chewing problems [ ] swallowing issues  [x] other: s/p bowel resection, NPO    Diet Presciption: Diet, NPO:   Except Medications     Special Instructions for Nursing:  Except Medications (06-10-19 @ 13:18)    Intake: n/a    Current Weight: ~20# wt gain noted since admission.  ??  No edema noted.     Pertinent Medications: MEDICATIONS  (STANDING):  chlorhexidine 4% Liquid 1 Application(s) Topical <User Schedule>  ciprofloxacin   IVPB 400 milliGRAM(s) IV Intermittent every 12 hours  dextrose 5%. 1000 milliLiter(s) (50 mL/Hr) IV Continuous <Continuous>  dextrose 50% Injectable 12.5 Gram(s) IV Push once  dextrose 50% Injectable 25 Gram(s) IV Push once  dextrose 50% Injectable 25 Gram(s) IV Push once  enoxaparin Injectable 40 milliGRAM(s) SubCutaneous daily  hydrocortisone sodium succinate Injectable 50 milliGRAM(s) IV Push every 8 hours  insulin lispro (HumaLOG) corrective regimen sliding scale   SubCutaneous every 6 hours  lactated ringers. 1000 milliLiter(s) (125 mL/Hr) IV Continuous <Continuous>  metroNIDAZOLE  IVPB 500 milliGRAM(s) IV Intermittent every 8 hours  morphine PCA (5 mG/mL) 30 milliLiter(s) PCA Continuous PCA Continuous  pantoprazole    Tablet 40 milliGRAM(s) Oral two times a day  Parenteral Nutrition - Adult 1 Each (52 mL/Hr) TPN Continuous <Continuous>  traZODone 50 milliGRAM(s) Oral at bedtime    MEDICATIONS  (PRN):  clonazePAM  Tablet 0.5 milliGRAM(s) Oral three times a day PRN anxiety  dextrose 40% Gel 15 Gram(s) Oral once PRN Blood Glucose LESS THAN 70 milliGRAM(s)/deciliter  glucagon  Injectable 1 milliGRAM(s) IntraMuscular once PRN Glucose LESS THAN 70 milligrams/deciliter  LORazepam   Injectable 0.5 milliGRAM(s) IV Push every 6 hours PRN Anxiety  morphine PCA (5 mG/mL) Rescue Clinician Bolus 3 milliGRAM(s) IV Push every 15 minutes PRN for Pain Scale GREATER THAN 6  naloxone Injectable 0.1 milliGRAM(s) IV Push every 3 minutes PRN For ANY of the following changes in patient status:  A. RR LESS THAN 10 breaths per minute, B. Oxygen saturation LESS THAN 90%, C. Sedation score of 6  ondansetron Injectable 4 milliGRAM(s) IV Push every 6 hours PRN Nausea    Pertinent Labs: 06-12 Na140 mmol/L Glu 137 mg/dL<H> K+ 3.4 mmol/L<L> Cr  0.41 mg/dL<L> BUN 7 mg/dL 06-11 Phos 2.7 mg/dL 06-12 Alb 1.1 g/dL<L> 06-01 PAB 3 mg/dL<L> 06-11 Chol --    LDL --    HDL --    Trig 46 mg/dL     CAPILLARY BLOOD GLUCOSE      POCT Blood Glucose.: 146 mg/dL (12 Jun 2019 05:34)  POCT Blood Glucose.: 156 mg/dL (11 Jun 2019 23:50)  POCT Blood Glucose.: 154 mg/dL (11 Jun 2019 18:09)  POCT Blood Glucose.: 166 mg/dL (11 Jun 2019 12:02)    Skin: no pressure injuries  Edema: none noted    Estimated Needs:   [x] no change since previous assessment  [ ] recalculated:     Previous Nutrition Diagnosis:    [x] Malnutrition (moderate, acute)    Nutrition Diagnosis is [x] ongoing  [ ] resolved [ ] not applicable     New Nutrition Diagnosis: [x] Altered GI function related to change in GI tract structure/ function evidenced by (total colectomy with end ileostomy).      Interventions:   Recommend  [x] Change Diet To: initiate clear liquids with Ensure Clear tid.   [ ] Nutrition Supplement  [x] Nutrition Support continue TPN as ordered.  Initiate wean off.  [x] Other: ostomy diet education when diet advanced    Monitoring and Evaluation:   [ ] PO intake [ x ] Tolerance to diet prescription [ x ] weights [ x ] labs[ x ] follow up per protocol  [x] other: ostomy output

## 2019-06-12 NOTE — PROGRESS NOTE ADULT - SUBJECTIVE AND OBJECTIVE BOX
STATUS POST:  Hand-assisted laparoscopic total colectomy and mobilization of splenic flexure    POST OPERATIVE DAY #:  2    SUBJECTIVE:  Patient seen and examined at bedside.  No overnight events.  Patient complaining of some incisional pain, currently NPO on TPN.  Denies dizziness, lightheadedness, fevers, chills, chest pain, shortness of breath, nausea or vomiting.    Vital Signs Last 24 Hrs  T(C): 36.4 (12 Jun 2019 07:45), Max: 37.3 (11 Jun 2019 23:50)  T(F): 97.5 (12 Jun 2019 07:45), Max: 99.1 (11 Jun 2019 23:50)  HR: 87 (12 Jun 2019 07:45) (87 - 100)  BP: 127/80 (12 Jun 2019 07:45) (120/71 - 128/79)  BP(mean): --  RR: 18 (12 Jun 2019 07:45) (18 - 20)  SpO2: 96% (12 Jun 2019 07:45) (94% - 98%)    PHYSICAL EXAM  GENERAL:  Malnourished male lying in bed in NAD  HEAD:  Normocephalic, atraumatic  CARDIO:  Regular rate and rhythm.  No murmur, gallop or rub appreciated.  RESPIRATORY:  Clear to auscultation bilaterally.  No wheezing, rales or rhonchi appreciated.  ABDOMEN:  Soft, nondistended, mild TTP near incision; normoactive bowel sounds in all 4 quadrants.  Ostomy pink, bag with air and small amount brown stool.  No rebound tenderness or guarding.  Midline dressing clean and dry.  NEURO:  A&O x 3    I&O's Summary    11 Jun 2019 07:01  -  12 Jun 2019 07:00  --------------------------------------------------------  IN: 2522 mL / OUT: 1750 mL / NET: 772 mL    12 Jun 2019 07:01  -  12 Jun 2019 09:10  --------------------------------------------------------  IN: 0 mL / OUT: 250 mL / NET: -250 mL    I&O's Detail    11 Jun 2019 07:01  -  12 Jun 2019 07:00  --------------------------------------------------------  IN:    fat emulsion (Plant Based) 20% Infusion: 175 mL    lactated ringers.: 1375 mL    Solution: 200 mL    Solution: 200 mL    TPN (Total Parenteral Nutrition): 572 mL  Total IN: 2522 mL    OUT:    Voided: 1750 mL  Total OUT: 1750 mL    Total NET: 772 mL    12 Jun 2019 07:01  -  12 Jun 2019 09:10  --------------------------------------------------------  IN:  Total IN: 0 mL    OUT:    Voided: 250 mL  Total OUT: 250 mL    Total NET: -250 mL    MEDICATIONS  (STANDING):  chlorhexidine 4% Liquid 1 Application(s) Topical <User Schedule>  ciprofloxacin   IVPB 400 milliGRAM(s) IV Intermittent every 12 hours  dextrose 5%. 1000 milliLiter(s) (50 mL/Hr) IV Continuous <Continuous>  dextrose 50% Injectable 12.5 Gram(s) IV Push once  dextrose 50% Injectable 25 Gram(s) IV Push once  dextrose 50% Injectable 25 Gram(s) IV Push once  enoxaparin Injectable 40 milliGRAM(s) SubCutaneous daily  hydrocortisone sodium succinate Injectable 50 milliGRAM(s) IV Push every 8 hours  insulin lispro (HumaLOG) corrective regimen sliding scale   SubCutaneous every 6 hours  lactated ringers. 1000 milliLiter(s) (125 mL/Hr) IV Continuous <Continuous>  metroNIDAZOLE  IVPB 500 milliGRAM(s) IV Intermittent every 8 hours  morphine PCA (5 mG/mL) 30 milliLiter(s) PCA Continuous PCA Continuous  pantoprazole    Tablet 40 milliGRAM(s) Oral two times a day  Parenteral Nutrition - Adult 1 Each (52 mL/Hr) TPN Continuous <Continuous>  traZODone 50 milliGRAM(s) Oral at bedtime    MEDICATIONS  (PRN):  clonazePAM  Tablet 0.5 milliGRAM(s) Oral three times a day PRN anxiety  dextrose 40% Gel 15 Gram(s) Oral once PRN Blood Glucose LESS THAN 70 milliGRAM(s)/deciliter  glucagon  Injectable 1 milliGRAM(s) IntraMuscular once PRN Glucose LESS THAN 70 milligrams/deciliter  LORazepam   Injectable 0.5 milliGRAM(s) IV Push every 6 hours PRN Anxiety  morphine PCA (5 mG/mL) Rescue Clinician Bolus 3 milliGRAM(s) IV Push every 15 minutes PRN for Pain Scale GREATER THAN 6  naloxone Injectable 0.1 milliGRAM(s) IV Push every 3 minutes PRN For ANY of the following changes in patient status:  A. RR LESS THAN 10 breaths per minute, B. Oxygen saturation LESS THAN 90%, C. Sedation score of 6  ondansetron Injectable 4 milliGRAM(s) IV Push every 6 hours PRN Nausea    LABS:                        7.5    29.17 )-----------( 419      ( 12 Jun 2019 05:53 )             22.2     06-12    140  |  103  |  7   ----------------------------<  137<H>  3.4<L>   |  31  |  0.41<L>    Ca    7.0<L>      12 Jun 2019 05:53  Phos  2.7     06-11  Mg     1.6     06-11    TPro  4.6<L>  /  Alb  1.1<L>  /  TBili  0.2  /  DBili  x   /  AST  9<L>  /  ALT  13  /  AlkPhos  73  06-12    ASSESSMENT & PLAN  63 year old male POD#2 s/p hand-assisted laparoscopic total colectomy and mobilization of splenic flexure, with +ostomy function, currently on TPN.  Hgb 7.5 noted, patient asymptomatic.  VSS.    - Continue NPO/TPN  - Continue cipro/flagyl  - Serial abdominal exams, continue to monitor ostomy function  - Trend CBC and transfuse PRN  - DVT prophylaxis with lovenox  - OOB, pain control   - Case to be discussed with Dr. Velazquez

## 2019-06-12 NOTE — PROGRESS NOTE ADULT - ATTENDING COMMENTS
Note written by attending, see above.  Time spent: 40min. More than 50% of the visit was spent counseling the patient on his condition - UC flare, colectomy, anemia.

## 2019-06-12 NOTE — PROGRESS NOTE ADULT - SUBJECTIVE AND OBJECTIVE BOX
Patient is a 63y old  Male who presents with a chief complaint of inability to eat and vomiting.       INTERVAL HPI/OVERNIGHT EVENTS: Pt states that he has abdominal pain post-operatively especially near incisions. Control is improving with analgesics. Denies fever, chills, SOB, palpitations, CP, nausea, vomiting. Pt has gas and stool in the ostomy now.     MEDICATIONS  (STANDING):  chlorhexidine 4% Liquid 1 Application(s) Topical <User Schedule>  ciprofloxacin   IVPB 400 milliGRAM(s) IV Intermittent every 12 hours  dextrose 5%. 1000 milliLiter(s) (50 mL/Hr) IV Continuous <Continuous>  dextrose 50% Injectable 12.5 Gram(s) IV Push once  dextrose 50% Injectable 25 Gram(s) IV Push once  dextrose 50% Injectable 25 Gram(s) IV Push once  enoxaparin Injectable 40 milliGRAM(s) SubCutaneous daily  fat emulsion (Fish Oil and Plant Based) 20% Infusion 0.55 Gm/kG/Day (14.346 mL/Hr) IV Continuous <Continuous>  hydrocortisone sodium succinate Injectable 50 milliGRAM(s) IV Push every 8 hours  insulin lispro (HumaLOG) corrective regimen sliding scale   SubCutaneous three times a day before meals  insulin lispro (HumaLOG) corrective regimen sliding scale   SubCutaneous at bedtime  lactated ringers. 1000 milliLiter(s) (125 mL/Hr) IV Continuous <Continuous>  metroNIDAZOLE  IVPB 500 milliGRAM(s) IV Intermittent every 8 hours  morphine PCA (5 mG/mL) 30 milliLiter(s) PCA Continuous PCA Continuous  pantoprazole    Tablet 40 milliGRAM(s) Oral two times a day  Parenteral Nutrition - Adult 1 Each (52 mL/Hr) TPN Continuous <Continuous>  Parenteral Nutrition - Adult 1 Each (52 mL/Hr) TPN Continuous <Continuous>  traZODone 50 milliGRAM(s) Oral at bedtime    MEDICATIONS  (PRN):  clonazePAM  Tablet 0.5 milliGRAM(s) Oral three times a day PRN anxiety  dextrose 40% Gel 15 Gram(s) Oral once PRN Blood Glucose LESS THAN 70 milliGRAM(s)/deciliter  glucagon  Injectable 1 milliGRAM(s) IntraMuscular once PRN Glucose LESS THAN 70 milligrams/deciliter  LORazepam   Injectable 0.5 milliGRAM(s) IV Push every 6 hours PRN Anxiety  morphine PCA (5 mG/mL) Rescue Clinician Bolus 3 milliGRAM(s) IV Push every 15 minutes PRN for Pain Scale GREATER THAN 6  naloxone Injectable 0.1 milliGRAM(s) IV Push every 3 minutes PRN For ANY of the following changes in patient status:  A. RR LESS THAN 10 breaths per minute, B. Oxygen saturation LESS THAN 90%, C. Sedation score of 6  ondansetron Injectable 4 milliGRAM(s) IV Push every 6 hours PRN Nausea  zolpidem 5 milliGRAM(s) Oral at bedtime PRN Insomnia      Allergies    penicillin (Swelling)    Intolerances        REVIEW OF SYSTEMS:  CONSTITUTIONAL: No fever or chills  HEENT:  No headache, no sore throat  RESPIRATORY: No cough, wheezing, or shortness of breath  CARDIOVASCULAR: No chest pain, palpitations  GASTROINTESTINAL: +abd pain (slowly improving), No nausea, vomiting  GENITOURINARY: No dysuria, frequency, or hematuria  NEUROLOGICAL: no focal weakness or dizziness  MUSCULOSKELETAL: no myalgias     Vital Signs Last 24 Hrs  T(C): 37.2 (12 Jun 2019 20:15), Max: 37.4 (12 Jun 2019 16:16)  T(F): 98.9 (12 Jun 2019 20:15), Max: 99.3 (12 Jun 2019 16:16)  HR: 87 (12 Jun 2019 20:15) (75 - 100)  BP: 114/71 (12 Jun 2019 20:15) (114/71 - 129/81)  BP(mean): --  RR: 16 (12 Jun 2019 20:15) (16 - 20)  SpO2: 95% (12 Jun 2019 20:15) (94% - 96%)    PHYSICAL EXAM:  GENERAL: NAD at rest  HEENT:  anicteric, moist mucous membranes  CHEST/LUNG:  CTA b/l, no rales, wheezes, or rhonchi  HEART:  RRR, S1, S2  ABDOMEN:  +ileostomy with gas and dark liquid stool; BS+, soft, tenderness worse near incisions without guarding, mildly distended ; dry/crusting herpes zoster rash over dermatome on lower left abdomen   EXTREMITIES: no edema, cyanosis, or calf tenderness  NERVOUS SYSTEM: answers questions and follows commands appropriately    LABS:                        7.7    31.20 )-----------( 555      ( 12 Jun 2019 16:07 )             22.6     CBC Full  -  ( 12 Jun 2019 16:07 )  WBC Count : 31.20 K/uL  Hemoglobin : 7.7 g/dL  Hematocrit : 22.6 %  Platelet Count - Automated : 555 K/uL  Mean Cell Volume : 85.3 fl  Mean Cell Hemoglobin : 29.1 pg  Mean Cell Hemoglobin Concentration : 34.1 gm/dL  Auto Neutrophil # : x  Auto Lymphocyte # : x  Auto Monocyte # : x  Auto Eosinophil # : x  Auto Basophil # : x  Auto Neutrophil % : x  Auto Lymphocyte % : x  Auto Monocyte % : x  Auto Eosinophil % : x  Auto Basophil % : x    12 Jun 2019 05:53    140    |  103    |  7      ----------------------------<  137    3.4     |  31     |  0.41     Ca    7.0        12 Jun 2019 05:53    TPro  4.6    /  Alb  1.1    /  TBili  0.2    /  DBili  x      /  AST  9      /  ALT  13     /  AlkPhos  73     12 Jun 2019 05:53        CAPILLARY BLOOD GLUCOSE      POCT Blood Glucose.: 185 mg/dL (12 Jun 2019 18:11)  POCT Blood Glucose.: 160 mg/dL (12 Jun 2019 11:48)  POCT Blood Glucose.: 146 mg/dL (12 Jun 2019 05:34)  POCT Blood Glucose.: 156 mg/dL (11 Jun 2019 23:50)          RADIOLOGY & ADDITIONAL TESTS:    Personally reviewed.     Consultant(s) Notes Reviewed:  [x] YES  [ ] NO Patient is a 63y old  Male who presents with a chief complaint of inability to eat and vomiting.     INTERVAL HPI/OVERNIGHT EVENTS: Pt states that he has abdominal pain post-operatively especially near incisions. Control is improving with analgesics. Denies fever, chills, SOB, palpitations, CP, nausea, vomiting. Pt has gas and stool in the ostomy now.     MEDICATIONS  (STANDING):  chlorhexidine 4% Liquid 1 Application(s) Topical <User Schedule>  ciprofloxacin   IVPB 400 milliGRAM(s) IV Intermittent every 12 hours  dextrose 5%. 1000 milliLiter(s) (50 mL/Hr) IV Continuous <Continuous>  dextrose 50% Injectable 12.5 Gram(s) IV Push once  dextrose 50% Injectable 25 Gram(s) IV Push once  dextrose 50% Injectable 25 Gram(s) IV Push once  enoxaparin Injectable 40 milliGRAM(s) SubCutaneous daily  fat emulsion (Fish Oil and Plant Based) 20% Infusion 0.55 Gm/kG/Day (14.346 mL/Hr) IV Continuous <Continuous>  hydrocortisone sodium succinate Injectable 50 milliGRAM(s) IV Push every 8 hours  insulin lispro (HumaLOG) corrective regimen sliding scale   SubCutaneous three times a day before meals  insulin lispro (HumaLOG) corrective regimen sliding scale   SubCutaneous at bedtime  lactated ringers. 1000 milliLiter(s) (125 mL/Hr) IV Continuous <Continuous>  metroNIDAZOLE  IVPB 500 milliGRAM(s) IV Intermittent every 8 hours  morphine PCA (5 mG/mL) 30 milliLiter(s) PCA Continuous PCA Continuous  pantoprazole    Tablet 40 milliGRAM(s) Oral two times a day  Parenteral Nutrition - Adult 1 Each (52 mL/Hr) TPN Continuous <Continuous>  Parenteral Nutrition - Adult 1 Each (52 mL/Hr) TPN Continuous <Continuous>  traZODone 50 milliGRAM(s) Oral at bedtime    MEDICATIONS  (PRN):  clonazePAM  Tablet 0.5 milliGRAM(s) Oral three times a day PRN anxiety  dextrose 40% Gel 15 Gram(s) Oral once PRN Blood Glucose LESS THAN 70 milliGRAM(s)/deciliter  glucagon  Injectable 1 milliGRAM(s) IntraMuscular once PRN Glucose LESS THAN 70 milligrams/deciliter  LORazepam   Injectable 0.5 milliGRAM(s) IV Push every 6 hours PRN Anxiety  morphine PCA (5 mG/mL) Rescue Clinician Bolus 3 milliGRAM(s) IV Push every 15 minutes PRN for Pain Scale GREATER THAN 6  naloxone Injectable 0.1 milliGRAM(s) IV Push every 3 minutes PRN For ANY of the following changes in patient status:  A. RR LESS THAN 10 breaths per minute, B. Oxygen saturation LESS THAN 90%, C. Sedation score of 6  ondansetron Injectable 4 milliGRAM(s) IV Push every 6 hours PRN Nausea  zolpidem 5 milliGRAM(s) Oral at bedtime PRN Insomnia      Allergies    penicillin (Swelling)    Intolerances        REVIEW OF SYSTEMS:  CONSTITUTIONAL: No fever or chills  HEENT:  No headache, no sore throat  RESPIRATORY: No cough, wheezing, or shortness of breath  CARDIOVASCULAR: No chest pain, palpitations  GASTROINTESTINAL: +abd pain (slowly improving), No nausea, vomiting  GENITOURINARY: No dysuria, frequency, or hematuria  NEUROLOGICAL: no focal weakness or dizziness  MUSCULOSKELETAL: no myalgias     Vital Signs Last 24 Hrs  T(C): 37.2 (12 Jun 2019 20:15), Max: 37.4 (12 Jun 2019 16:16)  T(F): 98.9 (12 Jun 2019 20:15), Max: 99.3 (12 Jun 2019 16:16)  HR: 87 (12 Jun 2019 20:15) (75 - 100)  BP: 114/71 (12 Jun 2019 20:15) (114/71 - 129/81)  BP(mean): --  RR: 16 (12 Jun 2019 20:15) (16 - 20)  SpO2: 95% (12 Jun 2019 20:15) (94% - 96%)    PHYSICAL EXAM:  GENERAL: NAD at rest  HEENT:  anicteric, moist mucous membranes  CHEST/LUNG:  CTA b/l, no rales, wheezes, or rhonchi  HEART:  RRR, S1, S2  ABDOMEN:  +ileostomy with gas and dark liquid stool; BS+, soft, tenderness worse near incisions without guarding, mildly distended ; dry/crusting herpes zoster rash over dermatome on lower left abdomen   EXTREMITIES: no edema, cyanosis, or calf tenderness  NERVOUS SYSTEM: answers questions and follows commands appropriately    LABS:                        7.7    31.20 )-----------( 555      ( 12 Jun 2019 16:07 )             22.6     CBC Full  -  ( 12 Jun 2019 16:07 )  WBC Count : 31.20 K/uL  Hemoglobin : 7.7 g/dL  Hematocrit : 22.6 %  Platelet Count - Automated : 555 K/uL  Mean Cell Volume : 85.3 fl  Mean Cell Hemoglobin : 29.1 pg  Mean Cell Hemoglobin Concentration : 34.1 gm/dL  Auto Neutrophil # : x  Auto Lymphocyte # : x  Auto Monocyte # : x  Auto Eosinophil # : x  Auto Basophil # : x  Auto Neutrophil % : x  Auto Lymphocyte % : x  Auto Monocyte % : x  Auto Eosinophil % : x  Auto Basophil % : x    12 Jun 2019 05:53    140    |  103    |  7      ----------------------------<  137    3.4     |  31     |  0.41     Ca    7.0        12 Jun 2019 05:53    TPro  4.6    /  Alb  1.1    /  TBili  0.2    /  DBili  x      /  AST  9      /  ALT  13     /  AlkPhos  73     12 Jun 2019 05:53        CAPILLARY BLOOD GLUCOSE      POCT Blood Glucose.: 185 mg/dL (12 Jun 2019 18:11)  POCT Blood Glucose.: 160 mg/dL (12 Jun 2019 11:48)  POCT Blood Glucose.: 146 mg/dL (12 Jun 2019 05:34)  POCT Blood Glucose.: 156 mg/dL (11 Jun 2019 23:50)          RADIOLOGY & ADDITIONAL TESTS:    Personally reviewed.     Consultant(s) Notes Reviewed:  [x] YES  [ ] NO

## 2019-06-12 NOTE — PROGRESS NOTE ADULT - ASSESSMENT
64yo M w/ PMH of ulcerative colitis, HLD, anxiety presents to ED with complaints of inability to eat, vomiting found to have gastritis and severe pancolitis with completely denuded mucosa and deep ulcerations not deemed amenable to medical therapy, now s/p total colectomy with end-ileostomy on 6/10, course also complicated by herpes zoster flare.   1. Proctocolitis / Ulcerative colitis exacerbation:   -colonoscopy revealed severe pancolitis with completely denuded mucosa and deep ulcerations not deemed amenable to medical therapy, now s/p total colectomy with end-ileostomy on 6/10  -now having gas and dark liquid stool output in the ostomy and no nausea/vomiting.   -CRS rec to start clear liquid diet and monitor how pt tolerates   -Continue with IV Cipro and Flagyl.    -consider decreasing IVF rate   -c/w TPN for now, pt with severe hypoalbuminemia   -c/w Morphine PCA.    -On stress dose steroids -- consider de-escalating dose as pt further from surgery (would suggest trial of half the dose -- hydrocortisone 25mg IV q8h)  -CRS f/up    2. Dysphagia:   -found to have gastritis and esophagitis that likely contributed  -will advance diet to clear liquids now and see how pt tolerates    3. Drug abuse:    -was on Suboxone.  Now on morphine PCA post-op  -consider addiction medicine (Dr. Plata, who is also pt's PMD) consult, when ready to de-escalate pain meds and go back to suboxone     4. Hypocalcemia:  resolved.    - corrected calcium is 8.4 today  -monitor BMP    5. Herpes Zoster:    -Completed Valtrex  -lesions dry, pain improved     6. Anemia: multifactorial - combination of acute blood loss anemia given significant surgery and anemia of chronic disease.  Will monitor Hgb.    7. Latent TB:   - appreciated ID input -- recommended INH if pt were to start on biologics  - given pt had total colectomy, would not need to start on biologics and would not befin      8 VTE ppx:  Lovenox 40mg SQ daily

## 2019-06-12 NOTE — PROGRESS NOTE ADULT - ATTENDING COMMENTS
Advanced care planning was discussed with patient and family.  Advanced care planning forms were reviewed and discussed.  Risks, benefits and alternatives of gastroenterologic procedures were discussed in detail and all questions were answered.    30 minutes spent. tolerating clears  minimal pain  good ostomy function  requesting sleep aid

## 2019-06-12 NOTE — PROGRESS NOTE ADULT - PROBLEM SELECTOR PLAN 1
6/10 sp OR for lap total colectomy w end ileostomy  npo/tpn, diet as per sx  monitor fs, lfts, lytes, tg while on tpn  f/u crs recs  pain control, ppi ppx, anti emetics prn  monitor exam/ostomy output  trend leukocytosis  oob as tolerated  will follow

## 2019-06-13 LAB
ALBUMIN SERPL ELPH-MCNC: 1 G/DL — LOW (ref 3.3–5)
ALP SERPL-CCNC: 76 U/L — SIGNIFICANT CHANGE UP (ref 40–120)
ALT FLD-CCNC: 14 U/L — SIGNIFICANT CHANGE UP (ref 12–78)
ANION GAP SERPL CALC-SCNC: 8 MMOL/L — SIGNIFICANT CHANGE UP (ref 5–17)
AST SERPL-CCNC: 15 U/L — SIGNIFICANT CHANGE UP (ref 15–37)
BILIRUB SERPL-MCNC: 0.1 MG/DL — LOW (ref 0.2–1.2)
BUN SERPL-MCNC: 9 MG/DL — SIGNIFICANT CHANGE UP (ref 7–23)
CALCIUM SERPL-MCNC: 7 MG/DL — LOW (ref 8.5–10.1)
CHLORIDE SERPL-SCNC: 102 MMOL/L — SIGNIFICANT CHANGE UP (ref 96–108)
CO2 SERPL-SCNC: 30 MMOL/L — SIGNIFICANT CHANGE UP (ref 22–31)
CREAT SERPL-MCNC: 0.35 MG/DL — LOW (ref 0.5–1.3)
GLUCOSE SERPL-MCNC: 176 MG/DL — HIGH (ref 70–99)
HCT VFR BLD CALC: 21.7 % — LOW (ref 39–50)
HGB BLD-MCNC: 7.3 G/DL — LOW (ref 13–17)
MAGNESIUM SERPL-MCNC: 1.5 MG/DL — LOW (ref 1.6–2.6)
MCHC RBC-ENTMCNC: 28.7 PG — SIGNIFICANT CHANGE UP (ref 27–34)
MCHC RBC-ENTMCNC: 33.6 GM/DL — SIGNIFICANT CHANGE UP (ref 32–36)
MCV RBC AUTO: 85.4 FL — SIGNIFICANT CHANGE UP (ref 80–100)
NRBC # BLD: 0 /100 WBCS — SIGNIFICANT CHANGE UP (ref 0–0)
PHOSPHATE SERPL-MCNC: 3.9 MG/DL — SIGNIFICANT CHANGE UP (ref 2.5–4.5)
PLATELET # BLD AUTO: 564 K/UL — HIGH (ref 150–400)
POTASSIUM SERPL-MCNC: 3 MMOL/L — LOW (ref 3.5–5.3)
POTASSIUM SERPL-SCNC: 3 MMOL/L — LOW (ref 3.5–5.3)
PROT SERPL-MCNC: 4.5 G/DL — LOW (ref 6–8.3)
RBC # BLD: 2.54 M/UL — LOW (ref 4.2–5.8)
RBC # FLD: 15.4 % — HIGH (ref 10.3–14.5)
SODIUM SERPL-SCNC: 140 MMOL/L — SIGNIFICANT CHANGE UP (ref 135–145)
WBC # BLD: 24.22 K/UL — HIGH (ref 3.8–10.5)
WBC # FLD AUTO: 24.22 K/UL — HIGH (ref 3.8–10.5)

## 2019-06-13 PROCEDURE — 99233 SBSQ HOSP IP/OBS HIGH 50: CPT

## 2019-06-13 RX ORDER — ACETAMINOPHEN 500 MG
650 TABLET ORAL EVERY 6 HOURS
Refills: 0 | Status: DISCONTINUED | OUTPATIENT
Start: 2019-06-13 | End: 2019-06-19

## 2019-06-13 RX ORDER — I.V. FAT EMULSION 20 G/100ML
0.55 EMULSION INTRAVENOUS
Qty: 35 | Refills: 0 | Status: DISCONTINUED | OUTPATIENT
Start: 2019-06-13 | End: 2019-06-13

## 2019-06-13 RX ORDER — MAGNESIUM SULFATE 500 MG/ML
2 VIAL (ML) INJECTION ONCE
Refills: 0 | Status: COMPLETED | OUTPATIENT
Start: 2019-06-13 | End: 2019-06-13

## 2019-06-13 RX ORDER — OXYCODONE AND ACETAMINOPHEN 5; 325 MG/1; MG/1
2 TABLET ORAL EVERY 6 HOURS
Refills: 0 | Status: DISCONTINUED | OUTPATIENT
Start: 2019-06-13 | End: 2019-06-19

## 2019-06-13 RX ORDER — ELECTROLYTE SOLUTION,INJ
1 VIAL (ML) INTRAVENOUS
Refills: 0 | Status: DISCONTINUED | OUTPATIENT
Start: 2019-06-13 | End: 2019-06-13

## 2019-06-13 RX ORDER — POTASSIUM CHLORIDE 20 MEQ
10 PACKET (EA) ORAL ONCE
Refills: 0 | Status: COMPLETED | OUTPATIENT
Start: 2019-06-13 | End: 2019-06-13

## 2019-06-13 RX ORDER — OXYCODONE AND ACETAMINOPHEN 5; 325 MG/1; MG/1
1 TABLET ORAL EVERY 6 HOURS
Refills: 0 | Status: DISCONTINUED | OUTPATIENT
Start: 2019-06-13 | End: 2019-06-19

## 2019-06-13 RX ORDER — MORPHINE SULFATE 50 MG/1
2 CAPSULE, EXTENDED RELEASE ORAL EVERY 4 HOURS
Refills: 0 | Status: DISCONTINUED | OUTPATIENT
Start: 2019-06-13 | End: 2019-06-19

## 2019-06-13 RX ORDER — POTASSIUM CHLORIDE 20 MEQ
40 PACKET (EA) ORAL ONCE
Refills: 0 | Status: COMPLETED | OUTPATIENT
Start: 2019-06-13 | End: 2019-06-13

## 2019-06-13 RX ADMIN — OXYCODONE AND ACETAMINOPHEN 2 TABLET(S): 5; 325 TABLET ORAL at 21:50

## 2019-06-13 RX ADMIN — MORPHINE SULFATE 2 MILLIGRAM(S): 50 CAPSULE, EXTENDED RELEASE ORAL at 21:39

## 2019-06-13 RX ADMIN — Medication 40 MILLIEQUIVALENT(S): at 18:33

## 2019-06-13 RX ADMIN — Medication 50 GRAM(S): at 13:24

## 2019-06-13 RX ADMIN — Medication 1: at 12:06

## 2019-06-13 RX ADMIN — Medication 50 MILLIGRAM(S): at 06:16

## 2019-06-13 RX ADMIN — MORPHINE SULFATE 2 MILLIGRAM(S): 50 CAPSULE, EXTENDED RELEASE ORAL at 17:41

## 2019-06-13 RX ADMIN — Medication 50 MILLIGRAM(S): at 21:38

## 2019-06-13 RX ADMIN — Medication 2: at 07:53

## 2019-06-13 RX ADMIN — MORPHINE SULFATE 2 MILLIGRAM(S): 50 CAPSULE, EXTENDED RELEASE ORAL at 16:38

## 2019-06-13 RX ADMIN — MORPHINE SULFATE 30 MILLILITER(S): 50 CAPSULE, EXTENDED RELEASE ORAL at 07:11

## 2019-06-13 RX ADMIN — I.V. FAT EMULSION 14.35 GM/KG/DAY: 20 EMULSION INTRAVENOUS at 20:30

## 2019-06-13 RX ADMIN — Medication 200 MILLIGRAM(S): at 19:20

## 2019-06-13 RX ADMIN — Medication 100 MILLIGRAM(S): at 06:16

## 2019-06-13 RX ADMIN — Medication 50 MILLIGRAM(S): at 13:23

## 2019-06-13 RX ADMIN — Medication 200 MILLIGRAM(S): at 06:16

## 2019-06-13 RX ADMIN — CHLORHEXIDINE GLUCONATE 1 APPLICATION(S): 213 SOLUTION TOPICAL at 10:30

## 2019-06-13 RX ADMIN — MORPHINE SULFATE 2 MILLIGRAM(S): 50 CAPSULE, EXTENDED RELEASE ORAL at 22:11

## 2019-06-13 RX ADMIN — Medication 1 EACH: at 20:30

## 2019-06-13 RX ADMIN — ENOXAPARIN SODIUM 40 MILLIGRAM(S): 100 INJECTION SUBCUTANEOUS at 12:06

## 2019-06-13 RX ADMIN — ZOLPIDEM TARTRATE 5 MILLIGRAM(S): 10 TABLET ORAL at 22:24

## 2019-06-13 RX ADMIN — OXYCODONE AND ACETAMINOPHEN 2 TABLET(S): 5; 325 TABLET ORAL at 19:34

## 2019-06-13 RX ADMIN — Medication 100 MILLIGRAM(S): at 21:38

## 2019-06-13 RX ADMIN — Medication 1: at 16:44

## 2019-06-13 RX ADMIN — Medication 100 MILLIEQUIVALENT(S): at 11:15

## 2019-06-13 RX ADMIN — OXYCODONE AND ACETAMINOPHEN 1 TABLET(S): 5; 325 TABLET ORAL at 13:21

## 2019-06-13 RX ADMIN — Medication 100 MILLIGRAM(S): at 13:24

## 2019-06-13 RX ADMIN — OXYCODONE AND ACETAMINOPHEN 1 TABLET(S): 5; 325 TABLET ORAL at 14:00

## 2019-06-13 RX ADMIN — PANTOPRAZOLE SODIUM 40 MILLIGRAM(S): 20 TABLET, DELAYED RELEASE ORAL at 06:16

## 2019-06-13 RX ADMIN — PANTOPRAZOLE SODIUM 40 MILLIGRAM(S): 20 TABLET, DELAYED RELEASE ORAL at 18:33

## 2019-06-13 NOTE — PROGRESS NOTE ADULT - ASSESSMENT
Dx s/p TAC ileostomy  1 advance LR diet  2 needs to be out of bed ambulate  3. needs blood transfusion for anemia of chronic diseae post op

## 2019-06-13 NOTE — PROGRESS NOTE ADULT - ASSESSMENT
62yo M w/ PMH of ulcerative colitis, HLD, anxiety presents to ED with complaints of inability to eat, vomiting found to have gastritis and severe pancolitis with completely denuded mucosa and deep ulcerations not deemed amenable to medical therapy, now s/p total colectomy with end-ileostomy on 6/10, course also complicated by herpes zoster flare.   1. Proctocolitis / Ulcerative colitis exacerbation:   -colonoscopy revealed severe pancolitis with completely denuded mucosa and deep ulcerations not deemed amenable to medical therapy, now s/p total colectomy with end-ileostomy on 6/10  -now having gas and brown liquid stool output in the ostomy and no nausea/vomiting.   -CRS rec to advance to full liquid diet and monitor how pt tolerates   -Continue with IV Cipro and Flagyl.    -consider decreasing IVF rate   -c/w TPN for now, pt with severe hypoalbuminemia   -Morphine PCA discontinued -- switched to percocet and morphine PRN for pain control  -On stress dose steroids -- recommend de-escalating dose as pt further from surgery (would suggest trial of half the dose -- hydrocortisone 25mg IV q8h)  -CRS f/up    2. Dysphagia:   -found to have gastritis and esophagitis that likely contributed  -diet was advanced to full liquids now; monitor how pt tolerates    3. Drug abuse:    -was on Suboxone.  now on oxycodone / morphine PRN  -consider addiction medicine (Dr. Plata, who is also pt's PMD) consult, when ready to de-escalate pain meds and go back to suboxone     4. Hypocalcemia:  resolved.    - corrected calcium is 8.4 today  -monitor BMP    5. Herpes Zoster:    -Completed Valtrex  -lesions dry, pain improved     6. Anemia: multifactorial - combination of acute blood loss anemia given significant surgery and anemia of chronic disease.  Will monitor Hgb.  -transfused 2un PRBC per CRS rec today    7. Latent TB:   - appreciated ID input -- recommended INH if pt were to start on biologics  - given pt had total colectomy, would not need to start on biologics and would not need INH    8. Hypokalemia / hypomagnesemia:  -repleted with KCl and MgSO4   -monitor electrolytes     9. VTE ppx:  Lovenox 40mg SQ daily

## 2019-06-13 NOTE — PROGRESS NOTE ADULT - SUBJECTIVE AND OBJECTIVE BOX
Patient has no complaints of pain> he is tolerating diet.  Patient has passed flatus and BM.    ROS  Gi abdominal pain  All other ROS are negative    PE  Male in NAD  ABdomen soft mild tender, non distened  Extremties no edema

## 2019-06-13 NOTE — PROGRESS NOTE ADULT - SUBJECTIVE AND OBJECTIVE BOX
INTERVAL HPI/OVERNIGHT EVENTS:  pt seen and examined  pain controlled  denies n/v  tolerating clears, drinking ensure    MEDICATIONS  (STANDING):  chlorhexidine 4% Liquid 1 Application(s) Topical <User Schedule>  ciprofloxacin   IVPB 400 milliGRAM(s) IV Intermittent every 12 hours  dextrose 5%. 1000 milliLiter(s) (50 mL/Hr) IV Continuous <Continuous>  dextrose 50% Injectable 12.5 Gram(s) IV Push once  dextrose 50% Injectable 25 Gram(s) IV Push once  dextrose 50% Injectable 25 Gram(s) IV Push once  enoxaparin Injectable 40 milliGRAM(s) SubCutaneous daily  fat emulsion (Fish Oil and Plant Based) 20% Infusion 0.55 Gm/kG/Day (14.346 mL/Hr) IV Continuous <Continuous>  hydrocortisone sodium succinate Injectable 50 milliGRAM(s) IV Push every 8 hours  insulin lispro (HumaLOG) corrective regimen sliding scale   SubCutaneous three times a day before meals  insulin lispro (HumaLOG) corrective regimen sliding scale   SubCutaneous at bedtime  lactated ringers. 1000 milliLiter(s) (125 mL/Hr) IV Continuous <Continuous>  magnesium sulfate  IVPB 2 Gram(s) IV Intermittent once  metroNIDAZOLE  IVPB 500 milliGRAM(s) IV Intermittent every 8 hours  morphine PCA (5 mG/mL) 30 milliLiter(s) PCA Continuous PCA Continuous  pantoprazole    Tablet 40 milliGRAM(s) Oral two times a day  Parenteral Nutrition - Adult 1 Each (52 mL/Hr) TPN Continuous <Continuous>  potassium chloride  10 mEq/100 mL IVPB 10 milliEquivalent(s) IV Intermittent once    MEDICATIONS  (PRN):  clonazePAM  Tablet 0.5 milliGRAM(s) Oral three times a day PRN anxiety  dextrose 40% Gel 15 Gram(s) Oral once PRN Blood Glucose LESS THAN 70 milliGRAM(s)/deciliter  glucagon  Injectable 1 milliGRAM(s) IntraMuscular once PRN Glucose LESS THAN 70 milligrams/deciliter  LORazepam   Injectable 0.5 milliGRAM(s) IV Push every 6 hours PRN Anxiety  morphine PCA (5 mG/mL) Rescue Clinician Bolus 3 milliGRAM(s) IV Push every 15 minutes PRN for Pain Scale GREATER THAN 6  naloxone Injectable 0.1 milliGRAM(s) IV Push every 3 minutes PRN For ANY of the following changes in patient status:  A. RR LESS THAN 10 breaths per minute, B. Oxygen saturation LESS THAN 90%, C. Sedation score of 6  ondansetron Injectable 4 milliGRAM(s) IV Push every 6 hours PRN Nausea  zolpidem 5 milliGRAM(s) Oral at bedtime PRN Insomnia      Allergies    penicillin (Swelling)    Intolerances        Review of Systems:    General:  No wt loss, fevers, chills, night sweats, fatigue   Eyes:  Good vision, no reported pain  ENT:  No sore throat, pain, runny nose, dysphagia  CV:  No pain, palpitations, hypo/hypertension  Resp:  No dyspnea, cough, tachypnea, wheezing  GI:  No pain, No nausea, No vomiting, No diarrhea, No constipation, No weight loss, No fever, No pruritis, No rectal bleeding, No melena, No dysphagia  :  No pain, bleeding, incontinence, nocturia  Muscle:  No pain, weakness  Neuro:  No weakness, tingling, memory problems  Psych:  No fatigue, insomnia, mood problems, depression  Endocrine:  No polyuria, polydypsia, cold/heat intolerance  Heme:  No petechiae, ecchymosis, easy bruisability  Skin:  No rash, tattoos, scars, edema      Vital Signs Last 24 Hrs  T(C): 36.8 (13 Jun 2019 07:21), Max: 37.4 (12 Jun 2019 16:16)  T(F): 98.3 (13 Jun 2019 07:21), Max: 99.3 (12 Jun 2019 16:16)  HR: 92 (13 Jun 2019 07:21) (75 - 92)  BP: 114/69 (13 Jun 2019 07:21) (102/62 - 129/81)  BP(mean): --  RR: 16 (13 Jun 2019 07:21) (16 - 18)  SpO2: 92% (13 Jun 2019 07:21) (92% - 96%)    PHYSICAL EXAM:      General:  lying in bed  HEENT:  NC/AT  Chest:  dec bs  Cardiovascular: s1 s2 rrr  Abdomen:  Soft, mild incisional ttp mild dt dressings c/d/i +ostomy w liquid brown stool  Extremities:  no edema  Neuro/Psych:  A&Ox3      LABS:                        7.3    24.22 )-----------( 564      ( 13 Jun 2019 06:19 )             21.7     06-13    140  |  102  |  9   ----------------------------<  176<H>  3.0<L>   |  30  |  0.35<L>    Ca    7.0<L>      13 Jun 2019 06:19  Phos  3.9     06-13  Mg     1.5     06-13    TPro  4.5<L>  /  Alb  1.0<L>  /  TBili  0.1<L>  /  DBili  x   /  AST  15  /  ALT  14  /  AlkPhos  76  06-13          RADIOLOGY & ADDITIONAL TESTS:

## 2019-06-13 NOTE — PROGRESS NOTE ADULT - PROBLEM SELECTOR PLAN 1
6/10 sp OR for lap total colectomy w end ileostomy  npo/tpn, advance diet as per sx, will taper tpn once tolerating regular po  monitor fs, lfts, lytes, tg while on tpn  f/u crs recs  pain control, ppi ppx, anti emetics prn  monitor exam/ostomy output  trend leukocytosis  oob as tolerated  will follow

## 2019-06-13 NOTE — PROGRESS NOTE ADULT - PROBLEM SELECTOR PLAN 2
see above, now also post op  no evidence of overt gib  monitor cbc, transfuse prn  cont ppi bid  care per sx, for possible transfusion

## 2019-06-13 NOTE — PROGRESS NOTE ADULT - SUBJECTIVE AND OBJECTIVE BOX
Patient is a 63y old  Male who presents with a chief complaint of inability to eat and vomiting.     INTERVAL HPI/OVERNIGHT EVENTS: Pt states that he has abdominal pain post-operatively especially near incisions. Control is improving with analgesics. Denies fever, chills, SOB, palpitations, CP, nausea, vomiting. Pt has gas and liquid stool (brown) in the ostomy now.     MEDICATIONS  (STANDING):  chlorhexidine 4% Liquid 1 Application(s) Topical <User Schedule>  ciprofloxacin   IVPB 400 milliGRAM(s) IV Intermittent every 12 hours  dextrose 5%. 1000 milliLiter(s) (50 mL/Hr) IV Continuous <Continuous>  dextrose 50% Injectable 12.5 Gram(s) IV Push once  dextrose 50% Injectable 25 Gram(s) IV Push once  dextrose 50% Injectable 25 Gram(s) IV Push once  enoxaparin Injectable 40 milliGRAM(s) SubCutaneous daily  fat emulsion (Fish Oil and Plant Based) 20% Infusion 0.55 Gm/kG/Day (14.346 mL/Hr) IV Continuous <Continuous>  hydrocortisone sodium succinate Injectable 50 milliGRAM(s) IV Push every 8 hours  insulin lispro (HumaLOG) corrective regimen sliding scale   SubCutaneous three times a day before meals  insulin lispro (HumaLOG) corrective regimen sliding scale   SubCutaneous at bedtime  lactated ringers. 1000 milliLiter(s) (125 mL/Hr) IV Continuous <Continuous>  metroNIDAZOLE  IVPB 500 milliGRAM(s) IV Intermittent every 8 hours  pantoprazole    Tablet 40 milliGRAM(s) Oral two times a day  Parenteral Nutrition - Adult 1 Each (52 mL/Hr) TPN Continuous <Continuous>  Parenteral Nutrition - Adult 1 Each (52 mL/Hr) TPN Continuous <Continuous>    MEDICATIONS  (PRN):  acetaminophen   Tablet .. 650 milliGRAM(s) Oral every 6 hours PRN Mild Pain (1 - 3)  clonazePAM  Tablet 0.5 milliGRAM(s) Oral three times a day PRN anxiety  dextrose 40% Gel 15 Gram(s) Oral once PRN Blood Glucose LESS THAN 70 milliGRAM(s)/deciliter  glucagon  Injectable 1 milliGRAM(s) IntraMuscular once PRN Glucose LESS THAN 70 milligrams/deciliter  LORazepam   Injectable 0.5 milliGRAM(s) IV Push every 6 hours PRN Anxiety  morphine  - Injectable 2 milliGRAM(s) IV Push every 4 hours PRN for breakthrough pain  morphine PCA (5 mG/mL) Rescue Clinician Bolus 3 milliGRAM(s) IV Push every 15 minutes PRN for Pain Scale GREATER THAN 6  naloxone Injectable 0.1 milliGRAM(s) IV Push every 3 minutes PRN For ANY of the following changes in patient status:  A. RR LESS THAN 10 breaths per minute, B. Oxygen saturation LESS THAN 90%, C. Sedation score of 6  ondansetron Injectable 4 milliGRAM(s) IV Push every 6 hours PRN Nausea  oxyCODONE    5 mG/acetaminophen 325 mG 1 Tablet(s) Oral every 6 hours PRN Moderate Pain (4 - 6)  oxyCODONE    5 mG/acetaminophen 325 mG 2 Tablet(s) Oral every 6 hours PRN Severe Pain (7 - 10)  zolpidem 5 milliGRAM(s) Oral at bedtime PRN Insomnia        Allergies    penicillin (Swelling)    Intolerances        REVIEW OF SYSTEMS:  CONSTITUTIONAL: No fever or chills  HEENT:  No headache, no sore throat  RESPIRATORY: No cough, wheezing, or shortness of breath  CARDIOVASCULAR: No chest pain, palpitations  GASTROINTESTINAL: +abd pain, No nausea, vomiting  GENITOURINARY: No dysuria, frequency, or hematuria  NEUROLOGICAL: no focal weakness or dizziness  MUSCULOSKELETAL: no myalgias     Vital Signs Last 24 Hrs  T(C): 37.3 (13 Jun 2019 19:54), Max: 37.5 (13 Jun 2019 15:53)  T(F): 99.2 (13 Jun 2019 19:54), Max: 99.5 (13 Jun 2019 15:53)  HR: 75 (13 Jun 2019 19:54) (75 - 96)  BP: 134/77 (13 Jun 2019 19:54) (102/62 - 134/77)  BP(mean): --  RR: 16 (13 Jun 2019 19:54) (16 - 18)  SpO2: 97% (13 Jun 2019 19:54) (92% - 97%)    PHYSICAL EXAM:  GENERAL: NAD at rest  HEENT:  anicteric, moist mucous membranes  CHEST/LUNG:  CTA b/l, no rales, wheezes, or rhonchi  HEART:  RRR, S1, S2  ABDOMEN:  +ileostomy with gas and brown liquid stool; BS+, soft, tenderness worse near incisions with some guarding, mildly distended ; dry/crusting herpes zoster rash over dermatome on lower left abdomen   EXTREMITIES: no edema, cyanosis, or calf tenderness  NERVOUS SYSTEM: answers questions and follows commands appropriately    LABS:                                   7.3    24.22 )-----------( 564      ( 13 Jun 2019 06:19 )             21.7     CBC Full  -  ( 13 Jun 2019 06:19 )  WBC Count : 24.22 K/uL  Hemoglobin : 7.3 g/dL  Hematocrit : 21.7 %  Platelet Count - Automated : 564 K/uL  Mean Cell Volume : 85.4 fl  Mean Cell Hemoglobin : 28.7 pg  Mean Cell Hemoglobin Concentration : 33.6 gm/dL  Auto Neutrophil # : x  Auto Lymphocyte # : x  Auto Monocyte # : x  Auto Eosinophil # : x  Auto Basophil # : x  Auto Neutrophil % : x  Auto Lymphocyte % : x  Auto Monocyte % : x  Auto Eosinophil % : x  Auto Basophil % : x    06-13    140  |  102  |  9   ----------------------------<  176<H>  3.0<L>   |  30  |  0.35<L>    Ca    7.0<L>      13 Jun 2019 06:19  Phos  3.9     06-13  Mg     1.5     06-13    TPro  4.5<L>  /  Alb  1.0<L>  /  TBili  0.1<L>  /  DBili  x   /  AST  15  /  ALT  14  /  AlkPhos  76  06-13          CAPILLARY BLOOD GLUCOSE      POCT Blood Glucose.: 155 mg/dL (13 Jun 2019 16:41)  POCT Blood Glucose.: 188 mg/dL (13 Jun 2019 11:32)  POCT Blood Glucose.: 204 mg/dL (13 Jun 2019 07:42)  POCT Blood Glucose.: 162 mg/dL (12 Jun 2019 21:12)            RADIOLOGY & ADDITIONAL TESTS:    Personally reviewed.     Consultant(s) Notes Reviewed:  [x] YES  [ ] NO

## 2019-06-14 DIAGNOSIS — F43.23 ADJUSTMENT DISORDER WITH MIXED ANXIETY AND DEPRESSED MOOD: ICD-10-CM

## 2019-06-14 LAB
ALBUMIN SERPL ELPH-MCNC: 1.2 G/DL — LOW (ref 3.3–5)
ALP SERPL-CCNC: 92 U/L — SIGNIFICANT CHANGE UP (ref 40–120)
ALT FLD-CCNC: 21 U/L — SIGNIFICANT CHANGE UP (ref 12–78)
ANION GAP SERPL CALC-SCNC: 8 MMOL/L — SIGNIFICANT CHANGE UP (ref 5–17)
ANION GAP SERPL CALC-SCNC: 9 MMOL/L — SIGNIFICANT CHANGE UP (ref 5–17)
ANISOCYTOSIS BLD QL: SLIGHT — SIGNIFICANT CHANGE UP
AST SERPL-CCNC: 23 U/L — SIGNIFICANT CHANGE UP (ref 15–37)
BASOPHILS # BLD AUTO: 0 K/UL — SIGNIFICANT CHANGE UP (ref 0–0.2)
BASOPHILS NFR BLD AUTO: 0 % — SIGNIFICANT CHANGE UP (ref 0–2)
BILIRUB SERPL-MCNC: 0.2 MG/DL — SIGNIFICANT CHANGE UP (ref 0.2–1.2)
BUN SERPL-MCNC: 11 MG/DL — SIGNIFICANT CHANGE UP (ref 7–23)
BUN SERPL-MCNC: 13 MG/DL — SIGNIFICANT CHANGE UP (ref 7–23)
BURR CELLS BLD QL SMEAR: PRESENT — SIGNIFICANT CHANGE UP
CALCIUM SERPL-MCNC: 7.4 MG/DL — LOW (ref 8.5–10.1)
CALCIUM SERPL-MCNC: 7.5 MG/DL — LOW (ref 8.5–10.1)
CHLORIDE SERPL-SCNC: 105 MMOL/L — SIGNIFICANT CHANGE UP (ref 96–108)
CHLORIDE SERPL-SCNC: 106 MMOL/L — SIGNIFICANT CHANGE UP (ref 96–108)
CO2 SERPL-SCNC: 27 MMOL/L — SIGNIFICANT CHANGE UP (ref 22–31)
CO2 SERPL-SCNC: 29 MMOL/L — SIGNIFICANT CHANGE UP (ref 22–31)
CREAT SERPL-MCNC: 0.5 MG/DL — SIGNIFICANT CHANGE UP (ref 0.5–1.3)
CREAT SERPL-MCNC: 0.51 MG/DL — SIGNIFICANT CHANGE UP (ref 0.5–1.3)
EOSINOPHIL # BLD AUTO: 0 K/UL — SIGNIFICANT CHANGE UP (ref 0–0.5)
EOSINOPHIL NFR BLD AUTO: 0 % — SIGNIFICANT CHANGE UP (ref 0–6)
GLUCOSE SERPL-MCNC: 135 MG/DL — HIGH (ref 70–99)
GLUCOSE SERPL-MCNC: 168 MG/DL — HIGH (ref 70–99)
HCT VFR BLD CALC: 33.3 % — LOW (ref 39–50)
HGB BLD-MCNC: 11.4 G/DL — LOW (ref 13–17)
LG PLATELETS BLD QL AUTO: SLIGHT — SIGNIFICANT CHANGE UP
LYMPHOCYTES # BLD AUTO: 0.7 K/UL — LOW (ref 1–3.3)
LYMPHOCYTES # BLD AUTO: 3 % — LOW (ref 13–44)
MACROCYTES BLD QL: SLIGHT — SIGNIFICANT CHANGE UP
MANUAL SMEAR VERIFICATION: SIGNIFICANT CHANGE UP
MCHC RBC-ENTMCNC: 28.4 PG — SIGNIFICANT CHANGE UP (ref 27–34)
MCHC RBC-ENTMCNC: 34.2 GM/DL — SIGNIFICANT CHANGE UP (ref 32–36)
MCV RBC AUTO: 83 FL — SIGNIFICANT CHANGE UP (ref 80–100)
METAMYELOCYTES # FLD: 1 % — HIGH (ref 0–0)
MICROCYTES BLD QL: SLIGHT — SIGNIFICANT CHANGE UP
MONOCYTES # BLD AUTO: 1.17 K/UL — HIGH (ref 0–0.9)
MONOCYTES NFR BLD AUTO: 5 % — SIGNIFICANT CHANGE UP (ref 2–14)
MYELOCYTES NFR BLD: 1 % — HIGH (ref 0–0)
NEUTROPHILS # BLD AUTO: 20.67 K/UL — HIGH (ref 1.8–7.4)
NEUTROPHILS NFR BLD AUTO: 85 % — HIGH (ref 43–77)
NEUTS BAND # BLD: 3 % — SIGNIFICANT CHANGE UP (ref 0–8)
NRBC # BLD: 1 — SIGNIFICANT CHANGE UP
NRBC # BLD: SIGNIFICANT CHANGE UP /100 WBCS (ref 0–0)
OVALOCYTES BLD QL SMEAR: SLIGHT — SIGNIFICANT CHANGE UP
PLAT MORPH BLD: NORMAL — SIGNIFICANT CHANGE UP
PLATELET # BLD AUTO: 786 K/UL — HIGH (ref 150–400)
POIKILOCYTOSIS BLD QL AUTO: SLIGHT — SIGNIFICANT CHANGE UP
POLYCHROMASIA BLD QL SMEAR: SLIGHT — SIGNIFICANT CHANGE UP
POTASSIUM SERPL-MCNC: 2.7 MMOL/L — CRITICAL LOW (ref 3.5–5.3)
POTASSIUM SERPL-MCNC: 3.3 MMOL/L — LOW (ref 3.5–5.3)
POTASSIUM SERPL-SCNC: 2.7 MMOL/L — CRITICAL LOW (ref 3.5–5.3)
POTASSIUM SERPL-SCNC: 3.3 MMOL/L — LOW (ref 3.5–5.3)
PROT SERPL-MCNC: 5.1 G/DL — LOW (ref 6–8.3)
RBC # BLD: 4.01 M/UL — LOW (ref 4.2–5.8)
RBC # FLD: 15.3 % — HIGH (ref 10.3–14.5)
RBC BLD AUTO: ABNORMAL
SODIUM SERPL-SCNC: 141 MMOL/L — SIGNIFICANT CHANGE UP (ref 135–145)
SODIUM SERPL-SCNC: 143 MMOL/L — SIGNIFICANT CHANGE UP (ref 135–145)
SURGICAL PATHOLOGY STUDY: SIGNIFICANT CHANGE UP
TARGETS BLD QL SMEAR: SLIGHT — SIGNIFICANT CHANGE UP
VARIANT LYMPHS # BLD: 2 % — SIGNIFICANT CHANGE UP (ref 0–6)
WBC # BLD: 23.49 K/UL — HIGH (ref 3.8–10.5)
WBC # FLD AUTO: 23.49 K/UL — HIGH (ref 3.8–10.5)

## 2019-06-14 PROCEDURE — 99233 SBSQ HOSP IP/OBS HIGH 50: CPT

## 2019-06-14 RX ORDER — HYDROCORTISONE 20 MG
50 TABLET ORAL EVERY 12 HOURS
Refills: 0 | Status: DISCONTINUED | OUTPATIENT
Start: 2019-06-14 | End: 2019-06-14

## 2019-06-14 RX ORDER — MIRTAZAPINE 45 MG/1
15 TABLET, ORALLY DISINTEGRATING ORAL AT BEDTIME
Refills: 0 | Status: DISCONTINUED | OUTPATIENT
Start: 2019-06-14 | End: 2019-06-19

## 2019-06-14 RX ORDER — SODIUM CHLORIDE 0.65 %
1 AEROSOL, SPRAY (ML) NASAL ONCE
Refills: 0 | Status: DISCONTINUED | OUTPATIENT
Start: 2019-06-14 | End: 2019-06-19

## 2019-06-14 RX ORDER — ELECTROLYTE SOLUTION,INJ
1 VIAL (ML) INTRAVENOUS
Refills: 0 | Status: DISCONTINUED | OUTPATIENT
Start: 2019-06-14 | End: 2019-06-14

## 2019-06-14 RX ORDER — POTASSIUM CHLORIDE 20 MEQ
10 PACKET (EA) ORAL
Refills: 0 | Status: COMPLETED | OUTPATIENT
Start: 2019-06-14 | End: 2019-06-14

## 2019-06-14 RX ORDER — POTASSIUM CHLORIDE 20 MEQ
10 PACKET (EA) ORAL ONCE
Refills: 0 | Status: COMPLETED | OUTPATIENT
Start: 2019-06-14 | End: 2019-06-14

## 2019-06-14 RX ORDER — I.V. FAT EMULSION 20 G/100ML
0.55 EMULSION INTRAVENOUS
Qty: 35 | Refills: 0 | Status: DISCONTINUED | OUTPATIENT
Start: 2019-06-14 | End: 2019-06-14

## 2019-06-14 RX ORDER — POTASSIUM CHLORIDE 20 MEQ
40 PACKET (EA) ORAL EVERY 6 HOURS
Refills: 0 | Status: COMPLETED | OUTPATIENT
Start: 2019-06-14 | End: 2019-06-14

## 2019-06-14 RX ORDER — HYDROCORTISONE 20 MG
50 TABLET ORAL EVERY 12 HOURS
Refills: 0 | Status: DISCONTINUED | OUTPATIENT
Start: 2019-06-14 | End: 2019-06-17

## 2019-06-14 RX ADMIN — Medication 1: at 11:55

## 2019-06-14 RX ADMIN — MIRTAZAPINE 15 MILLIGRAM(S): 45 TABLET, ORALLY DISINTEGRATING ORAL at 21:40

## 2019-06-14 RX ADMIN — Medication 40 MILLIEQUIVALENT(S): at 10:22

## 2019-06-14 RX ADMIN — CHLORHEXIDINE GLUCONATE 1 APPLICATION(S): 213 SOLUTION TOPICAL at 10:28

## 2019-06-14 RX ADMIN — Medication 50 MILLIGRAM(S): at 06:44

## 2019-06-14 RX ADMIN — SODIUM CHLORIDE 50 MILLILITER(S): 9 INJECTION, SOLUTION INTRAVENOUS at 06:22

## 2019-06-14 RX ADMIN — Medication 100 MILLIEQUIVALENT(S): at 11:56

## 2019-06-14 RX ADMIN — Medication 100 MILLIGRAM(S): at 06:21

## 2019-06-14 RX ADMIN — Medication 40 MILLIEQUIVALENT(S): at 16:36

## 2019-06-14 RX ADMIN — PANTOPRAZOLE SODIUM 40 MILLIGRAM(S): 20 TABLET, DELAYED RELEASE ORAL at 18:21

## 2019-06-14 RX ADMIN — Medication 100 MILLIEQUIVALENT(S): at 10:33

## 2019-06-14 RX ADMIN — Medication 1: at 07:44

## 2019-06-14 RX ADMIN — Medication 100 MILLIEQUIVALENT(S): at 18:14

## 2019-06-14 RX ADMIN — Medication 1 EACH: at 19:52

## 2019-06-14 RX ADMIN — Medication 200 MILLIGRAM(S): at 06:19

## 2019-06-14 RX ADMIN — ENOXAPARIN SODIUM 40 MILLIGRAM(S): 100 INJECTION SUBCUTANEOUS at 11:55

## 2019-06-14 RX ADMIN — Medication 200 MILLIGRAM(S): at 18:20

## 2019-06-14 RX ADMIN — Medication 650 MILLIGRAM(S): at 18:20

## 2019-06-14 RX ADMIN — PANTOPRAZOLE SODIUM 40 MILLIGRAM(S): 20 TABLET, DELAYED RELEASE ORAL at 06:20

## 2019-06-14 RX ADMIN — I.V. FAT EMULSION 21.52 GM/KG/DAY: 20 EMULSION INTRAVENOUS at 19:50

## 2019-06-14 RX ADMIN — Medication 650 MILLIGRAM(S): at 19:20

## 2019-06-14 RX ADMIN — Medication 100 MILLIEQUIVALENT(S): at 09:08

## 2019-06-14 RX ADMIN — Medication 0.5 MILLIGRAM(S): at 03:32

## 2019-06-14 RX ADMIN — Medication 100 MILLIGRAM(S): at 21:40

## 2019-06-14 RX ADMIN — Medication 100 MILLIEQUIVALENT(S): at 17:05

## 2019-06-14 RX ADMIN — Medication 50 MILLIGRAM(S): at 18:27

## 2019-06-14 RX ADMIN — Medication 100 MILLIGRAM(S): at 14:19

## 2019-06-14 NOTE — PROGRESS NOTE ADULT - PROBLEM SELECTOR PLAN 2
see above, now also post op  no evidence of overt gib, sp transfusion  monitor cbc, transfuse prn  cont ppi bid

## 2019-06-14 NOTE — BEHAVIORAL HEALTH ASSESSMENT NOTE - NSBHCHARTREVIEWVS_PSY_A_CORE FT
Vital Signs Last 24 Hrs  T(C): 37.4 (14 Jun 2019 16:07), Max: 37.7 (14 Jun 2019 04:10)  T(F): 99.3 (14 Jun 2019 16:07), Max: 99.8 (14 Jun 2019 04:10)  HR: 78 (14 Jun 2019 16:07) (68 - 81)  BP: 128/79 (14 Jun 2019 16:07) (128/79 - 136/77)  BP(mean): --  RR: 15 (14 Jun 2019 16:07) (15 - 18)  SpO2: 98% (14 Jun 2019 16:07) (93% - 98%)

## 2019-06-14 NOTE — BEHAVIORAL HEALTH ASSESSMENT NOTE - HPI (INCLUDE ILLNESS QUALITY, SEVERITY, DURATION, TIMING, CONTEXT, MODIFYING FACTORS, ASSOCIATED SIGNS AND SYMPTOMS)
Patient seen, evaluated and chart reviewed. Patient is a 62 yo M PMHx ulcerative colitis, HLD, anxiety presents to ED  with complaints of inability to eat, tolerate PO since discharge from hospital recently. Patient states that after discharge on 5/13 for uc flare(on steroids) has been unable to swallow pills or tolerate po. He is complaining of dysphagia (to both solids and liquids) He also complains of nausea, vomiting, and fevers at home. He denies any diarrhea, admits seeing GI in office after discharge, stopped oral steroids and antibiotics recently.   Patient was treated for GI obstruction and was operated on and was put on a bag, that is reportedly irreversible. Patient admits that he is overwhelmed with his medical problems and the fact that his wife of 20 years recently passed away from the complications of lung cancer. He admits to poor sleep, worsened appetite, psychomotor retardation, poor energy, poor concentration and difficulty dealing with the current situation.

## 2019-06-14 NOTE — BEHAVIORAL HEALTH ASSESSMENT NOTE - NSBHCHARTREVIEWINVESTIGATE_PSY_A_CORE FT
< from: 12 Lead ECG (06.10.19 @ 15:43) >    Ventricular Rate 145 BPM    Atrial Rate 145 BPM    P-R Interval 116 ms    QRS Duration 78 ms    Q-T Interval 278 ms    QTC Calculation(Bezet) 431 ms    P Axis 13 degrees    R Axis 19 degrees    T Axis 46 degrees    Diagnosis Line Sinus tachycardia  Otherwise normal ECG  When compared with ECG of 08-JUN-2019 08:37,  No significant change was found  Confirmed by liliana Bobby [id number]) on 6/11/2019 3:35:18 PM    < end of copied text >

## 2019-06-14 NOTE — PROGRESS NOTE ADULT - SUBJECTIVE AND OBJECTIVE BOX
Patient is a 63y old  Male who presents with a chief complaint of inability to eat and vomiting.      INTERVAL HPI/OVERNIGHT EVENTS: Pt states abd pain improved compared to the early morning. Denies nausea, vomiting, fever, chills, CP, SOB.     MEDICATIONS  (STANDING):  chlorhexidine 4% Liquid 1 Application(s) Topical <User Schedule>  ciprofloxacin   IVPB 400 milliGRAM(s) IV Intermittent every 12 hours  dextrose 5%. 1000 milliLiter(s) (50 mL/Hr) IV Continuous <Continuous>  dextrose 50% Injectable 12.5 Gram(s) IV Push once  dextrose 50% Injectable 25 Gram(s) IV Push once  dextrose 50% Injectable 25 Gram(s) IV Push once  enoxaparin Injectable 40 milliGRAM(s) SubCutaneous daily  fat emulsion (Plant Based) 20% Infusion 0.55 Gm/kG/Day (21.519 mL/Hr) IV Continuous <Continuous>  hydrocortisone sodium succinate Injectable 50 milliGRAM(s) IV Push every 12 hours  insulin lispro (HumaLOG) corrective regimen sliding scale   SubCutaneous three times a day before meals  insulin lispro (HumaLOG) corrective regimen sliding scale   SubCutaneous at bedtime  metroNIDAZOLE  IVPB 500 milliGRAM(s) IV Intermittent every 8 hours  mirtazapine 15 milliGRAM(s) Oral at bedtime  pantoprazole    Tablet 40 milliGRAM(s) Oral two times a day  Parenteral Nutrition - Adult 1 Each (52 mL/Hr) TPN Continuous <Continuous>  sodium chloride 0.65% Nasal 1 Spray(s) Both Nostrils once    MEDICATIONS  (PRN):  acetaminophen   Tablet .. 650 milliGRAM(s) Oral every 6 hours PRN Mild Pain (1 - 3)  clonazePAM  Tablet 0.5 milliGRAM(s) Oral three times a day PRN anxiety  dextrose 40% Gel 15 Gram(s) Oral once PRN Blood Glucose LESS THAN 70 milliGRAM(s)/deciliter  glucagon  Injectable 1 milliGRAM(s) IntraMuscular once PRN Glucose LESS THAN 70 milligrams/deciliter  LORazepam   Injectable 0.5 milliGRAM(s) IV Push every 6 hours PRN Anxiety  morphine  - Injectable 2 milliGRAM(s) IV Push every 4 hours PRN for breakthrough pain  naloxone Injectable 0.1 milliGRAM(s) IV Push every 3 minutes PRN For ANY of the following changes in patient status:  A. RR LESS THAN 10 breaths per minute, B. Oxygen saturation LESS THAN 90%, C. Sedation score of 6  ondansetron Injectable 4 milliGRAM(s) IV Push every 6 hours PRN Nausea  oxyCODONE    5 mG/acetaminophen 325 mG 1 Tablet(s) Oral every 6 hours PRN Moderate Pain (4 - 6)  oxyCODONE    5 mG/acetaminophen 325 mG 2 Tablet(s) Oral every 6 hours PRN Severe Pain (7 - 10)  zolpidem 5 milliGRAM(s) Oral at bedtime PRN Insomnia      Allergies    penicillin (Swelling)    Intolerances        REVIEW OF SYSTEMS:  CONSTITUTIONAL: No fever or chills  HEENT:  No headache, no sore throat  RESPIRATORY: No cough, wheezing, or shortness of breath  CARDIOVASCULAR: No chest pain, palpitations  GASTROINTESTINAL: No abd pain, nausea, vomiting, or diarrhea  GENITOURINARY: No dysuria, frequency, or hematuria  NEUROLOGICAL: no focal weakness or dizziness  MUSCULOSKELETAL: no myalgias     Vital Signs Last 24 Hrs  T(C): 36.8 (14 Jun 2019 20:34), Max: 37.7 (14 Jun 2019 04:10)  T(F): 98.3 (14 Jun 2019 20:34), Max: 99.8 (14 Jun 2019 04:10)  HR: 80 (14 Jun 2019 20:34) (68 - 81)  BP: 156/89 (14 Jun 2019 20:34) (128/79 - 156/89)  BP(mean): --  RR: 16 (14 Jun 2019 20:34) (15 - 18)  SpO2: 96% (14 Jun 2019 20:34) (93% - 98%)    PHYSICAL EXAM:  GENERAL: NAD at rest  HEENT:  anicteric, moist mucous membranes  CHEST/LUNG:  CTA b/l, no rales, wheezes, or rhonchi  HEART:  RRR, S1, S2  ABDOMEN:  +ileostomy with gas and brown liquid stool; BS+, soft, tenderness worse near incisions with some guarding, mildly distended ; dry/crusting herpes zoster rash over dermatome on lower left abdomen   EXTREMITIES: no edema, cyanosis, or calf tenderness  NERVOUS SYSTEM: answers questions and follows commands appropriately    LABS:                        11.4   23.49 )-----------( 786      ( 14 Jun 2019 06:53 )             33.3     CBC Full  -  ( 14 Jun 2019 06:53 )  WBC Count : 23.49 K/uL  Hemoglobin : 11.4 g/dL  Hematocrit : 33.3 %  Platelet Count - Automated : 786 K/uL  Mean Cell Volume : 83.0 fl  Mean Cell Hemoglobin : 28.4 pg  Mean Cell Hemoglobin Concentration : 34.2 gm/dL  Auto Neutrophil # : 20.67 K/uL  Auto Lymphocyte # : 0.70 K/uL  Auto Monocyte # : 1.17 K/uL  Auto Eosinophil # : 0.00 K/uL  Auto Basophil # : 0.00 K/uL  Auto Neutrophil % : 85.0 %  Auto Lymphocyte % : 3.0 %  Auto Monocyte % : 5.0 %  Auto Eosinophil % : 0.0 %  Auto Basophil % : 0.0 %    14 Jun 2019 14:53    141    |  106    |  13     ----------------------------<  135    3.3     |  27     |  0.50     Ca    7.5        14 Jun 2019 14:53  Mg     2.0       14 Jun 2019 06:53    TPro  5.1    /  Alb  1.2    /  TBili  0.2    /  DBili  x      /  AST  23     /  ALT  21     /  AlkPhos  92     14 Jun 2019 06:53        CAPILLARY BLOOD GLUCOSE      POCT Blood Glucose.: 131 mg/dL (14 Jun 2019 21:43)  POCT Blood Glucose.: 142 mg/dL (14 Jun 2019 16:39)  POCT Blood Glucose.: 178 mg/dL (14 Jun 2019 11:46)  POCT Blood Glucose.: 186 mg/dL (14 Jun 2019 07:33)          RADIOLOGY & ADDITIONAL TESTS:    Personally reviewed.     Consultant(s) Notes Reviewed:  [x] YES  [ ] NO Patient is a 63y old  Male who presents with a chief complaint of inability to eat and vomiting.    INTERVAL HPI/OVERNIGHT EVENTS: Pt states abd pain improved compared to the early morning. Denies nausea, vomiting, fever, chills, CP, SOB.     MEDICATIONS  (STANDING):  chlorhexidine 4% Liquid 1 Application(s) Topical <User Schedule>  ciprofloxacin   IVPB 400 milliGRAM(s) IV Intermittent every 12 hours  dextrose 5%. 1000 milliLiter(s) (50 mL/Hr) IV Continuous <Continuous>  dextrose 50% Injectable 12.5 Gram(s) IV Push once  dextrose 50% Injectable 25 Gram(s) IV Push once  dextrose 50% Injectable 25 Gram(s) IV Push once  enoxaparin Injectable 40 milliGRAM(s) SubCutaneous daily  fat emulsion (Plant Based) 20% Infusion 0.55 Gm/kG/Day (21.519 mL/Hr) IV Continuous <Continuous>  hydrocortisone sodium succinate Injectable 50 milliGRAM(s) IV Push every 12 hours  insulin lispro (HumaLOG) corrective regimen sliding scale   SubCutaneous three times a day before meals  insulin lispro (HumaLOG) corrective regimen sliding scale   SubCutaneous at bedtime  metroNIDAZOLE  IVPB 500 milliGRAM(s) IV Intermittent every 8 hours  mirtazapine 15 milliGRAM(s) Oral at bedtime  pantoprazole    Tablet 40 milliGRAM(s) Oral two times a day  Parenteral Nutrition - Adult 1 Each (52 mL/Hr) TPN Continuous <Continuous>  sodium chloride 0.65% Nasal 1 Spray(s) Both Nostrils once    MEDICATIONS  (PRN):  acetaminophen   Tablet .. 650 milliGRAM(s) Oral every 6 hours PRN Mild Pain (1 - 3)  clonazePAM  Tablet 0.5 milliGRAM(s) Oral three times a day PRN anxiety  dextrose 40% Gel 15 Gram(s) Oral once PRN Blood Glucose LESS THAN 70 milliGRAM(s)/deciliter  glucagon  Injectable 1 milliGRAM(s) IntraMuscular once PRN Glucose LESS THAN 70 milligrams/deciliter  LORazepam   Injectable 0.5 milliGRAM(s) IV Push every 6 hours PRN Anxiety  morphine  - Injectable 2 milliGRAM(s) IV Push every 4 hours PRN for breakthrough pain  naloxone Injectable 0.1 milliGRAM(s) IV Push every 3 minutes PRN For ANY of the following changes in patient status:  A. RR LESS THAN 10 breaths per minute, B. Oxygen saturation LESS THAN 90%, C. Sedation score of 6  ondansetron Injectable 4 milliGRAM(s) IV Push every 6 hours PRN Nausea  oxyCODONE    5 mG/acetaminophen 325 mG 1 Tablet(s) Oral every 6 hours PRN Moderate Pain (4 - 6)  oxyCODONE    5 mG/acetaminophen 325 mG 2 Tablet(s) Oral every 6 hours PRN Severe Pain (7 - 10)  zolpidem 5 milliGRAM(s) Oral at bedtime PRN Insomnia      Allergies    penicillin (Swelling)    Intolerances        REVIEW OF SYSTEMS:  CONSTITUTIONAL: No fever or chills  HEENT:  No headache, no sore throat  RESPIRATORY: No cough, wheezing, or shortness of breath  CARDIOVASCULAR: No chest pain, palpitations  GASTROINTESTINAL: No abd pain, nausea, vomiting, or diarrhea  GENITOURINARY: No dysuria, frequency, or hematuria  NEUROLOGICAL: no focal weakness or dizziness  MUSCULOSKELETAL: no myalgias     Vital Signs Last 24 Hrs  T(C): 36.8 (14 Jun 2019 20:34), Max: 37.7 (14 Jun 2019 04:10)  T(F): 98.3 (14 Jun 2019 20:34), Max: 99.8 (14 Jun 2019 04:10)  HR: 80 (14 Jun 2019 20:34) (68 - 81)  BP: 156/89 (14 Jun 2019 20:34) (128/79 - 156/89)  BP(mean): --  RR: 16 (14 Jun 2019 20:34) (15 - 18)  SpO2: 96% (14 Jun 2019 20:34) (93% - 98%)    PHYSICAL EXAM:  GENERAL: NAD at rest  HEENT:  anicteric, moist mucous membranes  CHEST/LUNG:  CTA b/l, no rales, wheezes, or rhonchi  HEART:  RRR, S1, S2  ABDOMEN:  +ileostomy with gas and brown liquid stool; BS+, soft, tenderness worse near incisions with some guarding, mildly distended ; dry/crusting herpes zoster rash over dermatome on lower left abdomen   EXTREMITIES: no edema, cyanosis, or calf tenderness  NERVOUS SYSTEM: answers questions and follows commands appropriately    LABS:                        11.4   23.49 )-----------( 786      ( 14 Jun 2019 06:53 )             33.3     CBC Full  -  ( 14 Jun 2019 06:53 )  WBC Count : 23.49 K/uL  Hemoglobin : 11.4 g/dL  Hematocrit : 33.3 %  Platelet Count - Automated : 786 K/uL  Mean Cell Volume : 83.0 fl  Mean Cell Hemoglobin : 28.4 pg  Mean Cell Hemoglobin Concentration : 34.2 gm/dL  Auto Neutrophil # : 20.67 K/uL  Auto Lymphocyte # : 0.70 K/uL  Auto Monocyte # : 1.17 K/uL  Auto Eosinophil # : 0.00 K/uL  Auto Basophil # : 0.00 K/uL  Auto Neutrophil % : 85.0 %  Auto Lymphocyte % : 3.0 %  Auto Monocyte % : 5.0 %  Auto Eosinophil % : 0.0 %  Auto Basophil % : 0.0 %    14 Jun 2019 14:53    141    |  106    |  13     ----------------------------<  135    3.3     |  27     |  0.50     Ca    7.5        14 Jun 2019 14:53  Mg     2.0       14 Jun 2019 06:53    TPro  5.1    /  Alb  1.2    /  TBili  0.2    /  DBili  x      /  AST  23     /  ALT  21     /  AlkPhos  92     14 Jun 2019 06:53        CAPILLARY BLOOD GLUCOSE      POCT Blood Glucose.: 131 mg/dL (14 Jun 2019 21:43)  POCT Blood Glucose.: 142 mg/dL (14 Jun 2019 16:39)  POCT Blood Glucose.: 178 mg/dL (14 Jun 2019 11:46)  POCT Blood Glucose.: 186 mg/dL (14 Jun 2019 07:33)          RADIOLOGY & ADDITIONAL TESTS:    Personally reviewed.     Consultant(s) Notes Reviewed:  [x] YES  [ ] NO

## 2019-06-14 NOTE — PROGRESS NOTE ADULT - SUBJECTIVE AND OBJECTIVE BOX
INTERVAL HPI/OVERNIGHT EVENTS:  pt seen and examined  states he had a rough night  c/o nausea, belching, gas, crampy abd pain  no vomiting  reverted to clears  sp 2u prbc yesterday    MEDICATIONS  (STANDING):  chlorhexidine 4% Liquid 1 Application(s) Topical <User Schedule>  ciprofloxacin   IVPB 400 milliGRAM(s) IV Intermittent every 12 hours  dextrose 5%. 1000 milliLiter(s) (50 mL/Hr) IV Continuous <Continuous>  dextrose 50% Injectable 12.5 Gram(s) IV Push once  dextrose 50% Injectable 25 Gram(s) IV Push once  dextrose 50% Injectable 25 Gram(s) IV Push once  enoxaparin Injectable 40 milliGRAM(s) SubCutaneous daily  fat emulsion (Fish Oil and Plant Based) 20% Infusion 0.55 Gm/kG/Day (14.346 mL/Hr) IV Continuous <Continuous>  hydrocortisone sodium succinate Injectable 50 milliGRAM(s) IV Push every 12 hours  insulin lispro (HumaLOG) corrective regimen sliding scale   SubCutaneous three times a day before meals  insulin lispro (HumaLOG) corrective regimen sliding scale   SubCutaneous at bedtime  metroNIDAZOLE  IVPB 500 milliGRAM(s) IV Intermittent every 8 hours  pantoprazole    Tablet 40 milliGRAM(s) Oral two times a day  Parenteral Nutrition - Adult 1 Each (52 mL/Hr) TPN Continuous <Continuous>  potassium chloride   Powder 40 milliEquivalent(s) Oral every 6 hours  potassium chloride  10 mEq/100 mL IVPB 10 milliEquivalent(s) IV Intermittent once  sodium chloride 0.65% Nasal 1 Spray(s) Both Nostrils once    MEDICATIONS  (PRN):  acetaminophen   Tablet .. 650 milliGRAM(s) Oral every 6 hours PRN Mild Pain (1 - 3)  clonazePAM  Tablet 0.5 milliGRAM(s) Oral three times a day PRN anxiety  dextrose 40% Gel 15 Gram(s) Oral once PRN Blood Glucose LESS THAN 70 milliGRAM(s)/deciliter  glucagon  Injectable 1 milliGRAM(s) IntraMuscular once PRN Glucose LESS THAN 70 milligrams/deciliter  LORazepam   Injectable 0.5 milliGRAM(s) IV Push every 6 hours PRN Anxiety  morphine  - Injectable 2 milliGRAM(s) IV Push every 4 hours PRN for breakthrough pain  naloxone Injectable 0.1 milliGRAM(s) IV Push every 3 minutes PRN For ANY of the following changes in patient status:  A. RR LESS THAN 10 breaths per minute, B. Oxygen saturation LESS THAN 90%, C. Sedation score of 6  ondansetron Injectable 4 milliGRAM(s) IV Push every 6 hours PRN Nausea  oxyCODONE    5 mG/acetaminophen 325 mG 1 Tablet(s) Oral every 6 hours PRN Moderate Pain (4 - 6)  oxyCODONE    5 mG/acetaminophen 325 mG 2 Tablet(s) Oral every 6 hours PRN Severe Pain (7 - 10)  zolpidem 5 milliGRAM(s) Oral at bedtime PRN Insomnia      Allergies    penicillin (Swelling)    Intolerances        Review of Systems:    General:  No wt loss, fevers, chills, night sweats, fatigue   Eyes:  Good vision, no reported pain  ENT:  No sore throat, pain, runny nose, dysphagia  CV:  No pain, palpitations, hypo/hypertension  Resp:  No dyspnea, cough, tachypnea, wheezing  GI:  see above  :  No pain, bleeding, incontinence, nocturia  Muscle:  No pain, weakness  Neuro:  No weakness, tingling, memory problems  Psych:  No fatigue, insomnia, mood problems, depression  Endocrine:  No polyuria, polydypsia, cold/heat intolerance  Heme:  No petechiae, ecchymosis, easy bruisability  Skin:  No rash, tattoos, scars, edema      Vital Signs Last 24 Hrs  T(C): 37.5 (14 Jun 2019 08:00), Max: 37.7 (14 Jun 2019 04:10)  T(F): 99.5 (14 Jun 2019 08:00), Max: 99.8 (14 Jun 2019 04:10)  HR: 68 (14 Jun 2019 08:00) (68 - 96)  BP: 131/70 (14 Jun 2019 08:00) (111/74 - 136/77)  BP(mean): --  RR: 18 (14 Jun 2019 08:00) (16 - 18)  SpO2: 95% (14 Jun 2019 08:00) (93% - 97%)    PHYSICAL EXAM:    General:  lying in bed  HEENT:  NC/AT  Chest:  dec bs  Cardiovascular: s1 s2 rrr  Abdomen:  Soft, nt mild dt dressings c/d/i +ostomy w liquid brown stool  Extremities:  no edema  Neuro/Psych:  A&Ox3    LABS:                        11.4   23.49 )-----------( 786      ( 14 Jun 2019 06:53 )             33.3     06-14    143  |  105  |  11  ----------------------------<  168<H>  2.7<LL>   |  29  |  0.51    Ca    7.4<L>      14 Jun 2019 06:53  Phos  3.9     06-13  Mg     2.0     06-14    TPro  5.1<L>  /  Alb  1.2<L>  /  TBili  0.2  /  DBili  x   /  AST  23  /  ALT  21  /  AlkPhos  92  06-14          RADIOLOGY & ADDITIONAL TESTS:

## 2019-06-14 NOTE — PROGRESS NOTE ADULT - PROBLEM SELECTOR PLAN 1
6/10 sp OR for lap total colectomy w end ileostomy  clears/tpn, diet as per sx  monitor fs, lfts, lytes, tg while on tpn  f/u crs recs  pain control, ppi ppx, zofran prn  rec carafate bid/simethicone tid prn   management of ostomy output as per sx  monitor exam  trend leukocytosis  oob as tolerated  will follow

## 2019-06-14 NOTE — PROGRESS NOTE ADULT - SUBJECTIVE AND OBJECTIVE BOX
Patient is having more pain and mild nausea.  Patient is having ostomy output.  Patient has not been out of bed and ambulating.  Patient is still requiring iv narcotics for pain.    ROS  GI abdominal pain and nausea  All other ROS are negative    PE  Abdomen soft perincisionally tender incisions c/d/i  extremities mild edema

## 2019-06-14 NOTE — BEHAVIORAL HEALTH ASSESSMENT NOTE - SUMMARY
62 yo M PMHx ulcerative colitis, HLD, anxiety presents to ED  with complaints of inability to eat, tolerate PO since discharge from hospital recently. Patient states that after discharge on 5/13 for uc flare(on steroids) has been unable to swallow pills or tolerate po. He is complaining of dysphagia (to both solids and liquids) He also complains of nausea, vomiting, and fevers at home. He denies any diarrhea, admits seeing GI in office after discharge, stopped oral steroids and antibiotics recently.   Patient was treated for GI obstruction and was operated on and was put on a bag, that is reportedly irreversible. Patient admits that he is overwhelmed with his medical problems and the fact that his wife of 20 years recently passed away from the complications of lung cancer. He admits to poor sleep, worsened appetite, psychomotor retardation, poor energy, poor concentration and difficulty dealing with the current situation.

## 2019-06-14 NOTE — BEHAVIORAL HEALTH ASSESSMENT NOTE - NSBHCHARTREVIEWLAB_PSY_A_CORE FT
11.4   23.49 )-----------( 786      ( 14 Jun 2019 06:53 )             33.3   06-14    141  |  106  |  13  ----------------------------<  135<H>  3.3<L>   |  27  |  0.50    Ca    7.5<L>      14 Jun 2019 14:53  Phos  3.9     06-13  Mg     2.0     06-14    TPro  5.1<L>  /  Alb  1.2<L>  /  TBili  0.2  /  DBili  x   /  AST  23  /  ALT  21  /  AlkPhos  92  06-14

## 2019-06-14 NOTE — PROGRESS NOTE ADULT - ASSESSMENT
Dx s/p total abdominal colectomy and ileostomy  decrease diet to clears  patient depressed recently lost his wife, psych consult  out of bed and ambulate, PT ordered must be seen today  ween steroids to off  ebc elevated related still to steroids will follow.

## 2019-06-14 NOTE — PROGRESS NOTE ADULT - ASSESSMENT
62yo M w/ PMH of ulcerative colitis, HLD, anxiety presents to ED with complaints of inability to eat, vomiting found to have gastritis and severe pancolitis with completely denuded mucosa and deep ulcerations not deemed amenable to medical therapy, now s/p total colectomy with end-ileostomy on 6/10, course also complicated by herpes zoster flare.   1. Proctocolitis / Ulcerative colitis exacerbation:   -colonoscopy revealed severe pancolitis with completely denuded mucosa and deep ulcerations not deemed amenable to medical therapy, now s/p total colectomy with end-ileostomy on 6/10  -now having gas and brown liquid stool output in the ostomy and no nausea/vomiting.   -CRS rec to advance to full liquid diet and monitor how pt tolerates   -Continue with IV Cipro and Flagyl.    -consider decreasing IVF rate   -c/w TPN for now, pt with severe hypoalbuminemia   -Morphine PCA discontinued -- switched to oxycodone and morphine PRN for pain control  -Was on stress dose steroids -- recommend de-escalating dose as pt further from surgery (would suggest trial of half the dose -- hydrocortisone 25mg IV q12h)  -CRS f/up    2. Dysphagia:   -found to have gastritis and esophagitis that likely contributed  -diet was advanced to full liquids now; monitor how pt tolerates    3. Drug abuse:    -was on Suboxone.  now on oxycodone / morphine PRN  -consider addiction medicine (Dr. Plata, who is also pt's PMD) consult, when ready to de-escalate pain meds and go back to suboxone     4. Hypocalcemia:  resolved.    - corrected calcium is 8.4 today  -monitor BMP    5. Herpes Zoster:    -Completed Valtrex  -lesions dry, pain improved     6. Anemia: multifactorial - combination of acute blood loss anemia given significant surgery and anemia of chronic disease.  Will monitor Hgb.  -transfused 2un PRBC per CRS and Hgb up to 11    7. Latent TB:   - appreciated ID input -- recommended INH if pt were to start on biologics  - given pt had total colectomy, would not need to start on biologics and would not need INH    8. Hypokalemia / hypomagnesemia:  -aggressively repleted potassium  -monitor electrolytes     9. VTE ppx:  Lovenox 40mg SQ daily

## 2019-06-15 LAB
ALBUMIN SERPL ELPH-MCNC: 1.4 G/DL — LOW (ref 3.3–5)
ALP SERPL-CCNC: 89 U/L — SIGNIFICANT CHANGE UP (ref 40–120)
ALT FLD-CCNC: 20 U/L — SIGNIFICANT CHANGE UP (ref 12–78)
ANION GAP SERPL CALC-SCNC: 9 MMOL/L — SIGNIFICANT CHANGE UP (ref 5–17)
AST SERPL-CCNC: 14 U/L — LOW (ref 15–37)
BILIRUB SERPL-MCNC: 0.3 MG/DL — SIGNIFICANT CHANGE UP (ref 0.2–1.2)
BUN SERPL-MCNC: 12 MG/DL — SIGNIFICANT CHANGE UP (ref 7–23)
CALCIUM SERPL-MCNC: 7.1 MG/DL — LOW (ref 8.5–10.1)
CHLORIDE SERPL-SCNC: 105 MMOL/L — SIGNIFICANT CHANGE UP (ref 96–108)
CO2 SERPL-SCNC: 26 MMOL/L — SIGNIFICANT CHANGE UP (ref 22–31)
CREAT SERPL-MCNC: 0.43 MG/DL — LOW (ref 0.5–1.3)
GLUCOSE SERPL-MCNC: 144 MG/DL — HIGH (ref 70–99)
HCT VFR BLD CALC: 33.4 % — LOW (ref 39–50)
HGB BLD-MCNC: 11.2 G/DL — LOW (ref 13–17)
MAGNESIUM SERPL-MCNC: 1.8 MG/DL — SIGNIFICANT CHANGE UP (ref 1.6–2.6)
MAGNESIUM SERPL-MCNC: 1.9 MG/DL — SIGNIFICANT CHANGE UP (ref 1.6–2.6)
MCHC RBC-ENTMCNC: 28.4 PG — SIGNIFICANT CHANGE UP (ref 27–34)
MCHC RBC-ENTMCNC: 33.5 GM/DL — SIGNIFICANT CHANGE UP (ref 32–36)
MCV RBC AUTO: 84.6 FL — SIGNIFICANT CHANGE UP (ref 80–100)
NRBC # BLD: 0 /100 WBCS — SIGNIFICANT CHANGE UP (ref 0–0)
PHOSPHATE SERPL-MCNC: 3.5 MG/DL — SIGNIFICANT CHANGE UP (ref 2.5–4.5)
PLATELET # BLD AUTO: 779 K/UL — HIGH (ref 150–400)
POTASSIUM SERPL-MCNC: 3.3 MMOL/L — LOW (ref 3.5–5.3)
POTASSIUM SERPL-SCNC: 3.3 MMOL/L — LOW (ref 3.5–5.3)
PROT SERPL-MCNC: 4.9 G/DL — LOW (ref 6–8.3)
RBC # BLD: 3.95 M/UL — LOW (ref 4.2–5.8)
RBC # FLD: 16 % — HIGH (ref 10.3–14.5)
SODIUM SERPL-SCNC: 140 MMOL/L — SIGNIFICANT CHANGE UP (ref 135–145)
WBC # BLD: 27.15 K/UL — HIGH (ref 3.8–10.5)
WBC # FLD AUTO: 27.15 K/UL — HIGH (ref 3.8–10.5)

## 2019-06-15 PROCEDURE — 99233 SBSQ HOSP IP/OBS HIGH 50: CPT

## 2019-06-15 RX ORDER — POTASSIUM CHLORIDE 20 MEQ
40 PACKET (EA) ORAL ONCE
Refills: 0 | Status: COMPLETED | OUTPATIENT
Start: 2019-06-15 | End: 2019-06-15

## 2019-06-15 RX ORDER — I.V. FAT EMULSION 20 G/100ML
0.55 EMULSION INTRAVENOUS
Qty: 35 | Refills: 0 | Status: DISCONTINUED | OUTPATIENT
Start: 2019-06-15 | End: 2019-06-15

## 2019-06-15 RX ORDER — POTASSIUM CHLORIDE 20 MEQ
10 PACKET (EA) ORAL
Refills: 0 | Status: COMPLETED | OUTPATIENT
Start: 2019-06-15 | End: 2019-06-15

## 2019-06-15 RX ORDER — ELECTROLYTE SOLUTION,INJ
1 VIAL (ML) INTRAVENOUS
Refills: 0 | Status: DISCONTINUED | OUTPATIENT
Start: 2019-06-15 | End: 2019-06-15

## 2019-06-15 RX ADMIN — Medication 100 MILLIEQUIVALENT(S): at 14:28

## 2019-06-15 RX ADMIN — Medication 1 EACH: at 20:36

## 2019-06-15 RX ADMIN — Medication 40 MILLIEQUIVALENT(S): at 11:02

## 2019-06-15 RX ADMIN — Medication 100 MILLIEQUIVALENT(S): at 11:02

## 2019-06-15 RX ADMIN — Medication 100 MILLIEQUIVALENT(S): at 11:51

## 2019-06-15 RX ADMIN — Medication 100 MILLIGRAM(S): at 21:57

## 2019-06-15 RX ADMIN — PANTOPRAZOLE SODIUM 40 MILLIGRAM(S): 20 TABLET, DELAYED RELEASE ORAL at 05:49

## 2019-06-15 RX ADMIN — Medication 650 MILLIGRAM(S): at 09:39

## 2019-06-15 RX ADMIN — MIRTAZAPINE 15 MILLIGRAM(S): 45 TABLET, ORALLY DISINTEGRATING ORAL at 21:57

## 2019-06-15 RX ADMIN — Medication 100 MILLIGRAM(S): at 05:48

## 2019-06-15 RX ADMIN — Medication 50 MILLIGRAM(S): at 05:49

## 2019-06-15 RX ADMIN — MORPHINE SULFATE 2 MILLIGRAM(S): 50 CAPSULE, EXTENDED RELEASE ORAL at 14:34

## 2019-06-15 RX ADMIN — Medication 100 MILLIGRAM(S): at 14:28

## 2019-06-15 RX ADMIN — Medication 650 MILLIGRAM(S): at 10:57

## 2019-06-15 RX ADMIN — Medication 200 MILLIGRAM(S): at 18:40

## 2019-06-15 RX ADMIN — CHLORHEXIDINE GLUCONATE 1 APPLICATION(S): 213 SOLUTION TOPICAL at 10:16

## 2019-06-15 RX ADMIN — Medication 200 MILLIGRAM(S): at 05:48

## 2019-06-15 RX ADMIN — Medication 50 MILLIGRAM(S): at 18:39

## 2019-06-15 RX ADMIN — MORPHINE SULFATE 2 MILLIGRAM(S): 50 CAPSULE, EXTENDED RELEASE ORAL at 13:52

## 2019-06-15 RX ADMIN — I.V. FAT EMULSION 14.35 GM/KG/DAY: 20 EMULSION INTRAVENOUS at 20:36

## 2019-06-15 RX ADMIN — OXYCODONE AND ACETAMINOPHEN 2 TABLET(S): 5; 325 TABLET ORAL at 12:39

## 2019-06-15 RX ADMIN — PANTOPRAZOLE SODIUM 40 MILLIGRAM(S): 20 TABLET, DELAYED RELEASE ORAL at 19:09

## 2019-06-15 RX ADMIN — ENOXAPARIN SODIUM 40 MILLIGRAM(S): 100 INJECTION SUBCUTANEOUS at 11:56

## 2019-06-15 RX ADMIN — OXYCODONE AND ACETAMINOPHEN 2 TABLET(S): 5; 325 TABLET ORAL at 14:10

## 2019-06-15 NOTE — PROGRESS NOTE ADULT - SUBJECTIVE AND OBJECTIVE BOX
Patient is a 63y old  Male who presents with a chief complaint of inability to eat and vomiting.      INTERVAL HPI/OVERNIGHT EVENTS: Pt states abd pain improving and tolerating diet without pain or nausea. Denies nausea, vomiting, fever, chills, CP, SOB.     MEDICATIONS  (STANDING):  chlorhexidine 4% Liquid 1 Application(s) Topical <User Schedule>  ciprofloxacin   IVPB 400 milliGRAM(s) IV Intermittent every 12 hours  dextrose 5%. 1000 milliLiter(s) (50 mL/Hr) IV Continuous <Continuous>  dextrose 50% Injectable 12.5 Gram(s) IV Push once  dextrose 50% Injectable 25 Gram(s) IV Push once  dextrose 50% Injectable 25 Gram(s) IV Push once  enoxaparin Injectable 40 milliGRAM(s) SubCutaneous daily  fat emulsion (Fish Oil and Plant Based) 20% Infusion 0.55 Gm/kG/Day (14.346 mL/Hr) IV Continuous <Continuous>  hydrocortisone sodium succinate Injectable 50 milliGRAM(s) IV Push every 12 hours  insulin lispro (HumaLOG) corrective regimen sliding scale   SubCutaneous three times a day before meals  insulin lispro (HumaLOG) corrective regimen sliding scale   SubCutaneous at bedtime  metroNIDAZOLE  IVPB 500 milliGRAM(s) IV Intermittent every 8 hours  mirtazapine 15 milliGRAM(s) Oral at bedtime  pantoprazole    Tablet 40 milliGRAM(s) Oral two times a day  Parenteral Nutrition - Adult 1 Each (52 mL/Hr) TPN Continuous <Continuous>  sodium chloride 0.65% Nasal 1 Spray(s) Both Nostrils once    MEDICATIONS  (PRN):  acetaminophen   Tablet .. 650 milliGRAM(s) Oral every 6 hours PRN Mild Pain (1 - 3)  clonazePAM  Tablet 0.5 milliGRAM(s) Oral three times a day PRN anxiety  dextrose 40% Gel 15 Gram(s) Oral once PRN Blood Glucose LESS THAN 70 milliGRAM(s)/deciliter  glucagon  Injectable 1 milliGRAM(s) IntraMuscular once PRN Glucose LESS THAN 70 milligrams/deciliter  LORazepam   Injectable 0.5 milliGRAM(s) IV Push every 6 hours PRN Anxiety  morphine  - Injectable 2 milliGRAM(s) IV Push every 4 hours PRN for breakthrough pain  naloxone Injectable 0.1 milliGRAM(s) IV Push every 3 minutes PRN For ANY of the following changes in patient status:  A. RR LESS THAN 10 breaths per minute, B. Oxygen saturation LESS THAN 90%, C. Sedation score of 6  ondansetron Injectable 4 milliGRAM(s) IV Push every 6 hours PRN Nausea  oxyCODONE    5 mG/acetaminophen 325 mG 1 Tablet(s) Oral every 6 hours PRN Moderate Pain (4 - 6)  oxyCODONE    5 mG/acetaminophen 325 mG 2 Tablet(s) Oral every 6 hours PRN Severe Pain (7 - 10)  zolpidem 5 milliGRAM(s) Oral at bedtime PRN Insomnia      Allergies    penicillin (Swelling)    Intolerances        REVIEW OF SYSTEMS:  CONSTITUTIONAL: No fever or chills  HEENT:  No headache, no sore throat  RESPIRATORY: No cough, wheezing, or shortness of breath  CARDIOVASCULAR: No chest pain, palpitations  GASTROINTESTINAL: improving abd pain, No nausea, vomiting, or diarrhea  GENITOURINARY: No dysuria, frequency, or hematuria  NEUROLOGICAL: no focal weakness or dizziness  MUSCULOSKELETAL: no myalgias     Vital Signs Last 24 Hrs  T(C): 37.3 (15 Luis 2019 08:06), Max: 37.4 (14 Jun 2019 16:07)  T(F): 99.1 (15 Luis 2019 08:06), Max: 99.3 (14 Jun 2019 16:07)  HR: 77 (15 Luis 2019 08:06) (77 - 80)  BP: 135/80 (15 Luis 2019 08:06) (128/79 - 156/89)  BP(mean): --  RR: 18 (15 Luis 2019 08:06) (15 - 18)  SpO2: 96% (15 Luis 2019 08:06) (95% - 98%)    PHYSICAL EXAM:  GENERAL: NAD at rest  HEENT:  anicteric, moist mucous membranes  CHEST/LUNG:  CTA b/l, no rales, wheezes, or rhonchi  HEART:  RRR, S1, S2  ABDOMEN:  +ileostomy with gas and dark brown liquid stool; BS+, soft, mild tenderness near incisions without guarding, mildly distended ; dry/crusting herpes zoster rash over dermatome on lower left abdomen   EXTREMITIES: no edema, cyanosis, or calf tenderness  NERVOUS SYSTEM: answers questions and follows commands appropriately    LABS:                        11.2   27.15 )-----------( 779      ( 15 Luis 2019 09:59 )             33.4     CBC Full  -  ( 15 Luis 2019 09:59 )  WBC Count : 27.15 K/uL  Hemoglobin : 11.2 g/dL  Hematocrit : 33.4 %  Platelet Count - Automated : 779 K/uL  Mean Cell Volume : 84.6 fl  Mean Cell Hemoglobin : 28.4 pg  Mean Cell Hemoglobin Concentration : 33.5 gm/dL  Auto Neutrophil # : x  Auto Lymphocyte # : x  Auto Monocyte # : x  Auto Eosinophil # : x  Auto Basophil # : x  Auto Neutrophil % : x  Auto Lymphocyte % : x  Auto Monocyte % : x  Auto Eosinophil % : x  Auto Basophil % : x    15 Luis 2019 09:59    140    |  105    |  12     ----------------------------<  144    3.3     |  26     |  0.43     Ca    7.1        15 Luis 2019 09:59  Phos  3.5       15 Luis 2019 09:59  Mg     1.8       15 Luis 2019 15:09    TPro  4.9    /  Alb  1.4    /  TBili  0.3    /  DBili  x      /  AST  14     /  ALT  20     /  AlkPhos  89     15 Luis 2019 09:59        CAPILLARY BLOOD GLUCOSE        POCT Blood Glucose.: 141 mg/dL (15 Luis 2019 11:52)  POCT Blood Glucose.: 130 mg/dL (15 Luis 2019 07:44)          RADIOLOGY & ADDITIONAL TESTS:    Personally reviewed.     Consultant(s) Notes Reviewed:  [x] YES  [ ] NO Patient is a 63y old  Male who presents with a chief complaint of inability to eat and vomiting.    INTERVAL HPI/OVERNIGHT EVENTS: Pt states abd pain improving and tolerating diet without pain or nausea. Denies nausea, vomiting, fever, chills, CP, SOB.     MEDICATIONS  (STANDING):  chlorhexidine 4% Liquid 1 Application(s) Topical <User Schedule>  ciprofloxacin   IVPB 400 milliGRAM(s) IV Intermittent every 12 hours  dextrose 5%. 1000 milliLiter(s) (50 mL/Hr) IV Continuous <Continuous>  dextrose 50% Injectable 12.5 Gram(s) IV Push once  dextrose 50% Injectable 25 Gram(s) IV Push once  dextrose 50% Injectable 25 Gram(s) IV Push once  enoxaparin Injectable 40 milliGRAM(s) SubCutaneous daily  fat emulsion (Fish Oil and Plant Based) 20% Infusion 0.55 Gm/kG/Day (14.346 mL/Hr) IV Continuous <Continuous>  hydrocortisone sodium succinate Injectable 50 milliGRAM(s) IV Push every 12 hours  insulin lispro (HumaLOG) corrective regimen sliding scale   SubCutaneous three times a day before meals  insulin lispro (HumaLOG) corrective regimen sliding scale   SubCutaneous at bedtime  metroNIDAZOLE  IVPB 500 milliGRAM(s) IV Intermittent every 8 hours  mirtazapine 15 milliGRAM(s) Oral at bedtime  pantoprazole    Tablet 40 milliGRAM(s) Oral two times a day  Parenteral Nutrition - Adult 1 Each (52 mL/Hr) TPN Continuous <Continuous>  sodium chloride 0.65% Nasal 1 Spray(s) Both Nostrils once    MEDICATIONS  (PRN):  acetaminophen   Tablet .. 650 milliGRAM(s) Oral every 6 hours PRN Mild Pain (1 - 3)  clonazePAM  Tablet 0.5 milliGRAM(s) Oral three times a day PRN anxiety  dextrose 40% Gel 15 Gram(s) Oral once PRN Blood Glucose LESS THAN 70 milliGRAM(s)/deciliter  glucagon  Injectable 1 milliGRAM(s) IntraMuscular once PRN Glucose LESS THAN 70 milligrams/deciliter  LORazepam   Injectable 0.5 milliGRAM(s) IV Push every 6 hours PRN Anxiety  morphine  - Injectable 2 milliGRAM(s) IV Push every 4 hours PRN for breakthrough pain  naloxone Injectable 0.1 milliGRAM(s) IV Push every 3 minutes PRN For ANY of the following changes in patient status:  A. RR LESS THAN 10 breaths per minute, B. Oxygen saturation LESS THAN 90%, C. Sedation score of 6  ondansetron Injectable 4 milliGRAM(s) IV Push every 6 hours PRN Nausea  oxyCODONE    5 mG/acetaminophen 325 mG 1 Tablet(s) Oral every 6 hours PRN Moderate Pain (4 - 6)  oxyCODONE    5 mG/acetaminophen 325 mG 2 Tablet(s) Oral every 6 hours PRN Severe Pain (7 - 10)  zolpidem 5 milliGRAM(s) Oral at bedtime PRN Insomnia      Allergies    penicillin (Swelling)    Intolerances        REVIEW OF SYSTEMS:  CONSTITUTIONAL: No fever or chills  HEENT:  No headache, no sore throat  RESPIRATORY: No cough, wheezing, or shortness of breath  CARDIOVASCULAR: No chest pain, palpitations  GASTROINTESTINAL: improving abd pain, No nausea, vomiting, or diarrhea  GENITOURINARY: No dysuria, frequency, or hematuria  NEUROLOGICAL: no focal weakness or dizziness  MUSCULOSKELETAL: no myalgias     Vital Signs Last 24 Hrs  T(C): 37.3 (15 Luis 2019 08:06), Max: 37.4 (14 Jun 2019 16:07)  T(F): 99.1 (15 Luis 2019 08:06), Max: 99.3 (14 Jun 2019 16:07)  HR: 77 (15 Luis 2019 08:06) (77 - 80)  BP: 135/80 (15 Luis 2019 08:06) (128/79 - 156/89)  BP(mean): --  RR: 18 (15 Luis 2019 08:06) (15 - 18)  SpO2: 96% (15 Luis 2019 08:06) (95% - 98%)    PHYSICAL EXAM:  GENERAL: NAD at rest  HEENT:  anicteric, moist mucous membranes  CHEST/LUNG:  CTA b/l, no rales, wheezes, or rhonchi  HEART:  RRR, S1, S2  ABDOMEN:  +ileostomy with gas and dark brown liquid stool; BS+, soft, mild tenderness near incisions without guarding, mildly distended ; dry/crusting herpes zoster rash over dermatome on lower left abdomen   EXTREMITIES: no edema, cyanosis, or calf tenderness  NERVOUS SYSTEM: answers questions and follows commands appropriately    LABS:                        11.2   27.15 )-----------( 779      ( 15 Luis 2019 09:59 )             33.4     CBC Full  -  ( 15 Luis 2019 09:59 )  WBC Count : 27.15 K/uL  Hemoglobin : 11.2 g/dL  Hematocrit : 33.4 %  Platelet Count - Automated : 779 K/uL  Mean Cell Volume : 84.6 fl  Mean Cell Hemoglobin : 28.4 pg  Mean Cell Hemoglobin Concentration : 33.5 gm/dL  Auto Neutrophil # : x  Auto Lymphocyte # : x  Auto Monocyte # : x  Auto Eosinophil # : x  Auto Basophil # : x  Auto Neutrophil % : x  Auto Lymphocyte % : x  Auto Monocyte % : x  Auto Eosinophil % : x  Auto Basophil % : x    15 Luis 2019 09:59    140    |  105    |  12     ----------------------------<  144    3.3     |  26     |  0.43     Ca    7.1        15 Luis 2019 09:59  Phos  3.5       15 Luis 2019 09:59  Mg     1.8       15 Luis 2019 15:09    TPro  4.9    /  Alb  1.4    /  TBili  0.3    /  DBili  x      /  AST  14     /  ALT  20     /  AlkPhos  89     15 Luis 2019 09:59        CAPILLARY BLOOD GLUCOSE        POCT Blood Glucose.: 141 mg/dL (15 Luis 2019 11:52)  POCT Blood Glucose.: 130 mg/dL (15 Luis 2019 07:44)          RADIOLOGY & ADDITIONAL TESTS:    Personally reviewed.     Consultant(s) Notes Reviewed:  [x] YES  [ ] NO

## 2019-06-15 NOTE — PROGRESS NOTE ADULT - ASSESSMENT
64yo M w/ PMH of ulcerative colitis, HLD, anxiety presents to ED with complaints of inability to eat, vomiting found to have gastritis and severe pancolitis with completely denuded mucosa and deep ulcerations not deemed amenable to medical therapy, now s/p total colectomy with end-ileostomy on 6/10, course also complicated by herpes zoster flare.   1. Proctocolitis / Ulcerative colitis exacerbation:   -colonoscopy revealed severe pancolitis with completely denuded mucosa and deep ulcerations not deemed amenable to medical therapy, now s/p total colectomy with end-ileostomy on 6/10  -now having gas and dark brown liquid stool output in the ostomy and no nausea/vomiting.   -CRS rec have advanced to full liquid diet and will monitor how pt tolerates (well so far)  -Continue with prophylactic Cipro and Flagyl as per CRS protocol  -c/w TPN for now, pt with severe protein-calorie malnutrition  -Morphine PCA discontinued -- switched to oxycodone and morphine PRN for pain control which is providing sufficient pain control  -Was on stress dose steroids -- recommend de-escalating dose as pt further from surgery (would suggest trial of half the dose -- hydrocortisone 25mg IV q12h)  -CRS f/up    2. Dysphagia:   -found to have gastritis and esophagitis that likely contributed  -diet has been advanced to full liquids; monitor how pt tolerates    3. Drug abuse:    -was on Suboxone.  now on oxycodone / morphine PRN  -consider addiction medicine (Dr. Plata, who is also pt's PMD) consult, when ready to de-escalate pain meds and go back to suboxone     4. Hypocalcemia:  resolved.    -monitor BMP    5. Herpes Zoster:    -Completed Valtrex  -lesions dry, pain improved     6. Anemia: multifactorial - combination of acute blood loss anemia given significant surgery and anemia of chronic disease.  Will monitor Hgb.  -transfused 2un PRBC per CRS earlier in admission and Hgb up to ~11    7. Latent TB:   - appreciated ID input -- recommended INH if pt were to start on biologics  - given pt had total colectomy, would not need to start on biologics and would not need INH    8. Hypokalemia / hypomagnesemia:  -aggressively repleted potassium  -magnesium was repleted and now stable  -monitor electrolytes     9. VTE ppx:  Lovenox 40mg SQ daily

## 2019-06-15 NOTE — PHYSICAL THERAPY INITIAL EVALUATION ADULT - GENERAL OBSERVATIONS, REHAB EVAL
Pt received reclined in [facility] chair, cooperative with physical therapy evaluation

## 2019-06-15 NOTE — PROGRESS NOTE ADULT - PROBLEM SELECTOR PLAN 1
am labs pending  6/10 sp OR for lap total colectomy w end ileostomy  clears/tpn, diet as per sx  monitor fs, lfts, lytes, tg while on tpn  f/u crs recs  pain control, ppi ppx, zofran prn  management of ostomy output as per sx  monitor exam  trend leukocytosis  oob as tolerated  will follow

## 2019-06-15 NOTE — PHYSICAL THERAPY INITIAL EVALUATION ADULT - PERTINENT HX OF CURRENT PROBLEM, REHAB EVAL
64 yo M PMHx ulcerative colitis, HLD, anxiety presents to ED  with complaints of inability to eat, tolerate PO since discharge from hospital recently. Patient states that after discharge on 5/13 for uc flare(on steroids) has been unable to swallow pills or tolerate po. He is complaining of dysphagia (to both solids and liquids)

## 2019-06-15 NOTE — CHART NOTE - NSCHARTNOTEFT_GEN_A_CORE
Assessment:  Pt seen for (mal)nutrition follow up.  Chart reviewed, hospital course noted. Pt asleep, not disturbed.  Spoke to PUJA Bingham regarding pt.  Ok to resume Ensure Clears.  No new wt noted. Pt remains on TPN.  Ostomy functioning, + output.  Will need diet education prior to discharge.  Low K+ noted, recommend replace.     Factors impacting intake: [ ] none [ ] nausea  [ ] vomiting [ ] diarrhea [ ] constipation  [ ]chewing problems [ ] swallowing issues  [x] other: s/p total colectomy, end ileostomy    Diet Presciption: Diet, Clear Liquid (06-14-19 @ 06:23)    Intake: taking small amounts clears    Current Weight: no new    Pertinent Medications: MEDICATIONS  (STANDING):  chlorhexidine 4% Liquid 1 Application(s) Topical <User Schedule>  ciprofloxacin   IVPB 400 milliGRAM(s) IV Intermittent every 12 hours  dextrose 5%. 1000 milliLiter(s) (50 mL/Hr) IV Continuous <Continuous>  dextrose 50% Injectable 12.5 Gram(s) IV Push once  dextrose 50% Injectable 25 Gram(s) IV Push once  dextrose 50% Injectable 25 Gram(s) IV Push once  enoxaparin Injectable 40 milliGRAM(s) SubCutaneous daily  hydrocortisone sodium succinate Injectable 50 milliGRAM(s) IV Push every 12 hours  insulin lispro (HumaLOG) corrective regimen sliding scale   SubCutaneous three times a day before meals  insulin lispro (HumaLOG) corrective regimen sliding scale   SubCutaneous at bedtime  metroNIDAZOLE  IVPB 500 milliGRAM(s) IV Intermittent every 8 hours  mirtazapine 15 milliGRAM(s) Oral at bedtime  pantoprazole    Tablet 40 milliGRAM(s) Oral two times a day  Parenteral Nutrition - Adult 1 Each (52 mL/Hr) TPN Continuous <Continuous>  sodium chloride 0.65% Nasal 1 Spray(s) Both Nostrils once    MEDICATIONS  (PRN):  acetaminophen   Tablet .. 650 milliGRAM(s) Oral every 6 hours PRN Mild Pain (1 - 3)  clonazePAM  Tablet 0.5 milliGRAM(s) Oral three times a day PRN anxiety  dextrose 40% Gel 15 Gram(s) Oral once PRN Blood Glucose LESS THAN 70 milliGRAM(s)/deciliter  glucagon  Injectable 1 milliGRAM(s) IntraMuscular once PRN Glucose LESS THAN 70 milligrams/deciliter  LORazepam   Injectable 0.5 milliGRAM(s) IV Push every 6 hours PRN Anxiety  morphine  - Injectable 2 milliGRAM(s) IV Push every 4 hours PRN for breakthrough pain  naloxone Injectable 0.1 milliGRAM(s) IV Push every 3 minutes PRN For ANY of the following changes in patient status:  A. RR LESS THAN 10 breaths per minute, B. Oxygen saturation LESS THAN 90%, C. Sedation score of 6  ondansetron Injectable 4 milliGRAM(s) IV Push every 6 hours PRN Nausea  oxyCODONE    5 mG/acetaminophen 325 mG 1 Tablet(s) Oral every 6 hours PRN Moderate Pain (4 - 6)  oxyCODONE    5 mG/acetaminophen 325 mG 2 Tablet(s) Oral every 6 hours PRN Severe Pain (7 - 10)  zolpidem 5 milliGRAM(s) Oral at bedtime PRN Insomnia    Pertinent Labs: 06-14 Na141 mmol/L Glu 135 mg/dL<H> K+ 3.3 mmol/L<L> Cr  0.50 mg/dL BUN 13 mg/dL 06-13 Phos 3.9 mg/dL 06-14 Alb 1.2 g/dL<L> 06-01 PAB 3 mg/dL<L> 06-12 Chol --    LDL --    HDL --    Trig 44 mg/dL     CAPILLARY BLOOD GLUCOSE      POCT Blood Glucose.: 130 mg/dL (15 Luis 2019 07:44)  POCT Blood Glucose.: 131 mg/dL (14 Jun 2019 21:43)  POCT Blood Glucose.: 142 mg/dL (14 Jun 2019 16:39)  POCT Blood Glucose.: 178 mg/dL (14 Jun 2019 11:46)    Skin: no pressure injuries, incisional tenderness  Edema: none noted    Estimated Needs:   [x] no change since previous assessment  [ ] recalculated:     Previous Nutrition Diagnosis:    [x] Altered GI Function    [x] Malnutrition (moderate, acute)    Nutrition Diagnosis is [x] ongoing  [ ] resolved [ ] not applicable     New Nutrition Diagnosis: [x] not applicable       Interventions:   Recommend when medically feasible advance diet to Full Liquids  with Ensure Enlive bid with eventual transition to Low Fiber/Residue with Ensure Enlive bid.  When pt able to   [ ] Change Diet To:  [ ] Nutrition Supplement  [ ] Nutrition Support  [x] Other: diet education prior to discharge (Low Fiber/Ostomy)    Monitoring and Evaluation:   [x] PO intake [ x ] Tolerance to diet prescription [ x ] weights [ x ] labs[ x ] follow up per protocol  [x] other: ostomy output

## 2019-06-15 NOTE — PROGRESS NOTE ADULT - SUBJECTIVE AND OBJECTIVE BOX
CRSSNY  VSS, afeb (tmax 99.8)  Feels a little better than yesterday, still belching and burping but stoma working and some air coming per pt  Feels sad about wife, but OOB walking and in chair now  Labs:  WBC 27 (sl higher ?steroids), o/w labs ok lytes k slightly low  Stoma:  1500    PE:  Gen:  NAD  Abd:  soft, ND incis tenderness c/d/i  Stoma:  pink, viable, thin liq fluid in bag    a/P:  POD #5 doing a little better  --need to monitor i/os, may need imodium if output increases  --replace lytes, check mg, phos  --adv to fulls

## 2019-06-15 NOTE — PHYSICAL THERAPY INITIAL EVALUATION ADULT - ADDITIONAL COMMENTS
Pt lives with daughter in private home, + 6 IRVING with railing.  Pt lives in split level home, 2 flights to bedroom bathroom.  Pt was independent in all ADLs prior and ambulated without a device.  Pt has stall shower with seat and has 2 walking sticks

## 2019-06-15 NOTE — PROGRESS NOTE ADULT - SUBJECTIVE AND OBJECTIVE BOX
INTERVAL HPI/OVERNIGHT EVENTS:  pt seen and examined  feels better  denies n/v/abd pain  tolerating clears  oob yesterday  labs pending    MEDICATIONS  (STANDING):  chlorhexidine 4% Liquid 1 Application(s) Topical <User Schedule>  ciprofloxacin   IVPB 400 milliGRAM(s) IV Intermittent every 12 hours  dextrose 5%. 1000 milliLiter(s) (50 mL/Hr) IV Continuous <Continuous>  dextrose 50% Injectable 12.5 Gram(s) IV Push once  dextrose 50% Injectable 25 Gram(s) IV Push once  dextrose 50% Injectable 25 Gram(s) IV Push once  enoxaparin Injectable 40 milliGRAM(s) SubCutaneous daily  hydrocortisone sodium succinate Injectable 50 milliGRAM(s) IV Push every 12 hours  insulin lispro (HumaLOG) corrective regimen sliding scale   SubCutaneous three times a day before meals  insulin lispro (HumaLOG) corrective regimen sliding scale   SubCutaneous at bedtime  metroNIDAZOLE  IVPB 500 milliGRAM(s) IV Intermittent every 8 hours  mirtazapine 15 milliGRAM(s) Oral at bedtime  pantoprazole    Tablet 40 milliGRAM(s) Oral two times a day  Parenteral Nutrition - Adult 1 Each (52 mL/Hr) TPN Continuous <Continuous>  sodium chloride 0.65% Nasal 1 Spray(s) Both Nostrils once    MEDICATIONS  (PRN):  acetaminophen   Tablet .. 650 milliGRAM(s) Oral every 6 hours PRN Mild Pain (1 - 3)  clonazePAM  Tablet 0.5 milliGRAM(s) Oral three times a day PRN anxiety  dextrose 40% Gel 15 Gram(s) Oral once PRN Blood Glucose LESS THAN 70 milliGRAM(s)/deciliter  glucagon  Injectable 1 milliGRAM(s) IntraMuscular once PRN Glucose LESS THAN 70 milligrams/deciliter  LORazepam   Injectable 0.5 milliGRAM(s) IV Push every 6 hours PRN Anxiety  morphine  - Injectable 2 milliGRAM(s) IV Push every 4 hours PRN for breakthrough pain  naloxone Injectable 0.1 milliGRAM(s) IV Push every 3 minutes PRN For ANY of the following changes in patient status:  A. RR LESS THAN 10 breaths per minute, B. Oxygen saturation LESS THAN 90%, C. Sedation score of 6  ondansetron Injectable 4 milliGRAM(s) IV Push every 6 hours PRN Nausea  oxyCODONE    5 mG/acetaminophen 325 mG 1 Tablet(s) Oral every 6 hours PRN Moderate Pain (4 - 6)  oxyCODONE    5 mG/acetaminophen 325 mG 2 Tablet(s) Oral every 6 hours PRN Severe Pain (7 - 10)  zolpidem 5 milliGRAM(s) Oral at bedtime PRN Insomnia      Allergies    penicillin (Swelling)    Intolerances        Review of Systems:    General:  No wt loss, fevers, chills, night sweats, fatigue   Eyes:  Good vision, no reported pain  ENT:  No sore throat, pain, runny nose, dysphagia  CV:  No pain, palpitations, hypo/hypertension  Resp:  No dyspnea, cough, tachypnea, wheezing  GI:  No pain, No nausea, No vomiting, No diarrhea, No constipation, No weight loss, No fever, No pruritis, No rectal bleeding, No melena, No dysphagia  :  No pain, bleeding, incontinence, nocturia  Muscle:  No pain, weakness  Neuro:  No weakness, tingling, memory problems  Psych:  No fatigue, insomnia, mood problems, depression  Endocrine:  No polyuria, polydypsia, cold/heat intolerance  Heme:  No petechiae, ecchymosis, easy bruisability  Skin:  No rash, tattoos, scars, edema      Vital Signs Last 24 Hrs  T(C): 37.3 (15 Luis 2019 08:06), Max: 37.4 (14 Jun 2019 16:07)  T(F): 99.1 (15 Luis 2019 08:06), Max: 99.3 (14 Jun 2019 16:07)  HR: 77 (15 Luis 2019 08:06) (77 - 80)  BP: 135/80 (15 Luis 2019 08:06) (128/79 - 156/89)  BP(mean): --  RR: 18 (15 Luis 2019 08:06) (15 - 18)  SpO2: 96% (15 Luis 2019 08:06) (95% - 98%)    PHYSICAL EXAM:    General:  lying in bed  HEENT:  NC/AT  Chest:  dec bs  Cardiovascular: s1 s2 rrr  Abdomen:  Soft, nt mild dt dressings c/d/i +ostomy w liquid brown stool  Extremities:  no edema  Neuro/Psych:  A&Ox3    LABS:                        11.4   23.49 )-----------( 786      ( 14 Jun 2019 06:53 )             33.3     06-14    141  |  106  |  13  ----------------------------<  135<H>  3.3<L>   |  27  |  0.50    Ca    7.5<L>      14 Jun 2019 14:53  Mg     2.0     06-14    TPro  5.1<L>  /  Alb  1.2<L>  /  TBili  0.2  /  DBili  x   /  AST  23  /  ALT  21  /  AlkPhos  92  06-14          RADIOLOGY & ADDITIONAL TESTS:

## 2019-06-16 LAB
ALBUMIN SERPL ELPH-MCNC: 1.4 G/DL — LOW (ref 3.3–5)
ALP SERPL-CCNC: 89 U/L — SIGNIFICANT CHANGE UP (ref 40–120)
ALT FLD-CCNC: 18 U/L — SIGNIFICANT CHANGE UP (ref 12–78)
ANION GAP SERPL CALC-SCNC: 9 MMOL/L — SIGNIFICANT CHANGE UP (ref 5–17)
AST SERPL-CCNC: 12 U/L — LOW (ref 15–37)
BILIRUB SERPL-MCNC: 0.3 MG/DL — SIGNIFICANT CHANGE UP (ref 0.2–1.2)
BUN SERPL-MCNC: 12 MG/DL — SIGNIFICANT CHANGE UP (ref 7–23)
CALCIUM SERPL-MCNC: 7.2 MG/DL — LOW (ref 8.5–10.1)
CHLORIDE SERPL-SCNC: 105 MMOL/L — SIGNIFICANT CHANGE UP (ref 96–108)
CO2 SERPL-SCNC: 25 MMOL/L — SIGNIFICANT CHANGE UP (ref 22–31)
CREAT SERPL-MCNC: 0.41 MG/DL — LOW (ref 0.5–1.3)
GLUCOSE SERPL-MCNC: 147 MG/DL — HIGH (ref 70–99)
HCT VFR BLD CALC: 33.2 % — LOW (ref 39–50)
HGB BLD-MCNC: 11 G/DL — LOW (ref 13–17)
MAGNESIUM SERPL-MCNC: 1.8 MG/DL — SIGNIFICANT CHANGE UP (ref 1.6–2.6)
MCHC RBC-ENTMCNC: 28.3 PG — SIGNIFICANT CHANGE UP (ref 27–34)
MCHC RBC-ENTMCNC: 33.1 GM/DL — SIGNIFICANT CHANGE UP (ref 32–36)
MCV RBC AUTO: 85.3 FL — SIGNIFICANT CHANGE UP (ref 80–100)
NRBC # BLD: 0 /100 WBCS — SIGNIFICANT CHANGE UP (ref 0–0)
PHOSPHATE SERPL-MCNC: 3.4 MG/DL — SIGNIFICANT CHANGE UP (ref 2.5–4.5)
PLATELET # BLD AUTO: 784 K/UL — HIGH (ref 150–400)
POTASSIUM SERPL-MCNC: 3.4 MMOL/L — LOW (ref 3.5–5.3)
POTASSIUM SERPL-SCNC: 3.4 MMOL/L — LOW (ref 3.5–5.3)
PROT SERPL-MCNC: 5.2 G/DL — LOW (ref 6–8.3)
RBC # BLD: 3.89 M/UL — LOW (ref 4.2–5.8)
RBC # FLD: 17.1 % — HIGH (ref 10.3–14.5)
SODIUM SERPL-SCNC: 139 MMOL/L — SIGNIFICANT CHANGE UP (ref 135–145)
TRIGL SERPL-MCNC: 135 MG/DL — SIGNIFICANT CHANGE UP (ref 10–149)
WBC # BLD: 29.88 K/UL — HIGH (ref 3.8–10.5)
WBC # FLD AUTO: 29.88 K/UL — HIGH (ref 3.8–10.5)

## 2019-06-16 PROCEDURE — 99233 SBSQ HOSP IP/OBS HIGH 50: CPT

## 2019-06-16 RX ORDER — MAGNESIUM SULFATE 500 MG/ML
2 VIAL (ML) INJECTION ONCE
Refills: 0 | Status: COMPLETED | OUTPATIENT
Start: 2019-06-16 | End: 2019-06-16

## 2019-06-16 RX ORDER — ELECTROLYTE SOLUTION,INJ
1 VIAL (ML) INTRAVENOUS
Refills: 0 | Status: DISCONTINUED | OUTPATIENT
Start: 2019-06-16 | End: 2019-06-17

## 2019-06-16 RX ORDER — POTASSIUM CHLORIDE 20 MEQ
10 PACKET (EA) ORAL
Refills: 0 | Status: COMPLETED | OUTPATIENT
Start: 2019-06-16 | End: 2019-06-16

## 2019-06-16 RX ORDER — POTASSIUM CHLORIDE 20 MEQ
20 PACKET (EA) ORAL ONCE
Refills: 0 | Status: COMPLETED | OUTPATIENT
Start: 2019-06-16 | End: 2019-06-16

## 2019-06-16 RX ORDER — I.V. FAT EMULSION 20 G/100ML
0.55 EMULSION INTRAVENOUS
Qty: 35 | Refills: 0 | Status: DISCONTINUED | OUTPATIENT
Start: 2019-06-16 | End: 2019-06-17

## 2019-06-16 RX ADMIN — Medication 200 MILLIGRAM(S): at 18:03

## 2019-06-16 RX ADMIN — ENOXAPARIN SODIUM 40 MILLIGRAM(S): 100 INJECTION SUBCUTANEOUS at 12:09

## 2019-06-16 RX ADMIN — Medication 100 MILLIEQUIVALENT(S): at 12:53

## 2019-06-16 RX ADMIN — Medication 100 MILLIEQUIVALENT(S): at 12:06

## 2019-06-16 RX ADMIN — Medication 100 MILLIEQUIVALENT(S): at 13:20

## 2019-06-16 RX ADMIN — Medication 200 MILLIGRAM(S): at 05:16

## 2019-06-16 RX ADMIN — MORPHINE SULFATE 2 MILLIGRAM(S): 50 CAPSULE, EXTENDED RELEASE ORAL at 13:37

## 2019-06-16 RX ADMIN — Medication 1 EACH: at 20:45

## 2019-06-16 RX ADMIN — Medication 100 MILLIGRAM(S): at 14:27

## 2019-06-16 RX ADMIN — Medication 50 GRAM(S): at 15:34

## 2019-06-16 RX ADMIN — Medication 650 MILLIGRAM(S): at 13:22

## 2019-06-16 RX ADMIN — Medication 100 MILLIGRAM(S): at 06:36

## 2019-06-16 RX ADMIN — MIRTAZAPINE 15 MILLIGRAM(S): 45 TABLET, ORALLY DISINTEGRATING ORAL at 21:28

## 2019-06-16 RX ADMIN — ZOLPIDEM TARTRATE 5 MILLIGRAM(S): 10 TABLET ORAL at 00:47

## 2019-06-16 RX ADMIN — Medication 50 MILLIGRAM(S): at 18:02

## 2019-06-16 RX ADMIN — Medication 50 MILLIGRAM(S): at 05:11

## 2019-06-16 RX ADMIN — Medication 650 MILLIGRAM(S): at 12:56

## 2019-06-16 RX ADMIN — Medication 20 MILLIEQUIVALENT(S): at 12:09

## 2019-06-16 RX ADMIN — I.V. FAT EMULSION 14.35 GM/KG/DAY: 20 EMULSION INTRAVENOUS at 20:45

## 2019-06-16 RX ADMIN — CHLORHEXIDINE GLUCONATE 1 APPLICATION(S): 213 SOLUTION TOPICAL at 12:03

## 2019-06-16 RX ADMIN — Medication 100 MILLIGRAM(S): at 22:03

## 2019-06-16 RX ADMIN — PANTOPRAZOLE SODIUM 40 MILLIGRAM(S): 20 TABLET, DELAYED RELEASE ORAL at 05:13

## 2019-06-16 RX ADMIN — PANTOPRAZOLE SODIUM 40 MILLIGRAM(S): 20 TABLET, DELAYED RELEASE ORAL at 18:04

## 2019-06-16 RX ADMIN — MORPHINE SULFATE 2 MILLIGRAM(S): 50 CAPSULE, EXTENDED RELEASE ORAL at 13:22

## 2019-06-16 NOTE — PROGRESS NOTE ADULT - SUBJECTIVE AND OBJECTIVE BOX
INTERVAL HPI/OVERNIGHT EVENTS:  pt seen and examined  states he had a good day yesterday  oob in [place]  denies n/v/abd pain  tolerating liquids  labs pending    MEDICATIONS  (STANDING):  chlorhexidine 4% Liquid 1 Application(s) Topical <User Schedule>  ciprofloxacin   IVPB 400 milliGRAM(s) IV Intermittent every 12 hours  dextrose 5%. 1000 milliLiter(s) (50 mL/Hr) IV Continuous <Continuous>  dextrose 50% Injectable 12.5 Gram(s) IV Push once  dextrose 50% Injectable 25 Gram(s) IV Push once  dextrose 50% Injectable 25 Gram(s) IV Push once  enoxaparin Injectable 40 milliGRAM(s) SubCutaneous daily  fat emulsion (Fish Oil and Plant Based) 20% Infusion 0.55 Gm/kG/Day (14.346 mL/Hr) IV Continuous <Continuous>  hydrocortisone sodium succinate Injectable 50 milliGRAM(s) IV Push every 12 hours  insulin lispro (HumaLOG) corrective regimen sliding scale   SubCutaneous three times a day before meals  insulin lispro (HumaLOG) corrective regimen sliding scale   SubCutaneous at bedtime  metroNIDAZOLE  IVPB 500 milliGRAM(s) IV Intermittent every 8 hours  mirtazapine 15 milliGRAM(s) Oral at bedtime  pantoprazole    Tablet 40 milliGRAM(s) Oral two times a day  Parenteral Nutrition - Adult 1 Each (52 mL/Hr) TPN Continuous <Continuous>  sodium chloride 0.65% Nasal 1 Spray(s) Both Nostrils once    MEDICATIONS  (PRN):  acetaminophen   Tablet .. 650 milliGRAM(s) Oral every 6 hours PRN Mild Pain (1 - 3)  clonazePAM  Tablet 0.5 milliGRAM(s) Oral three times a day PRN anxiety  dextrose 40% Gel 15 Gram(s) Oral once PRN Blood Glucose LESS THAN 70 milliGRAM(s)/deciliter  glucagon  Injectable 1 milliGRAM(s) IntraMuscular once PRN Glucose LESS THAN 70 milligrams/deciliter  LORazepam   Injectable 0.5 milliGRAM(s) IV Push every 6 hours PRN Anxiety  morphine  - Injectable 2 milliGRAM(s) IV Push every 4 hours PRN for breakthrough pain  naloxone Injectable 0.1 milliGRAM(s) IV Push every 3 minutes PRN For ANY of the following changes in patient status:  A. RR LESS THAN 10 breaths per minute, B. Oxygen saturation LESS THAN 90%, C. Sedation score of 6  ondansetron Injectable 4 milliGRAM(s) IV Push every 6 hours PRN Nausea  oxyCODONE    5 mG/acetaminophen 325 mG 1 Tablet(s) Oral every 6 hours PRN Moderate Pain (4 - 6)  oxyCODONE    5 mG/acetaminophen 325 mG 2 Tablet(s) Oral every 6 hours PRN Severe Pain (7 - 10)  zolpidem 5 milliGRAM(s) Oral at bedtime PRN Insomnia      Allergies    penicillin (Swelling)    Intolerances        Review of Systems:    General:  No wt loss, fevers, chills, night sweats, fatigue   Eyes:  Good vision, no reported pain  ENT:  No sore throat, pain, runny nose, dysphagia  CV:  No pain, palpitations, hypo/hypertension  Resp:  No dyspnea, cough, tachypnea, wheezing  GI:  No pain, No nausea, No vomiting, No diarrhea, No constipation, No weight loss, No fever, No pruritis, No rectal bleeding, No melena, No dysphagia  :  No pain, bleeding, incontinence, nocturia  Muscle:  No pain, weakness  Neuro:  No weakness, tingling, memory problems  Psych:  No fatigue, insomnia, mood problems, depression  Endocrine:  No polyuria, polydypsia, cold/heat intolerance  Heme:  No petechiae, ecchymosis, easy bruisability  Skin:  No rash, tattoos, scars, edema      Vital Signs Last 24 Hrs  T(C): 37.7 (16 Jun 2019 07:45), Max: 37.7 (16 Jun 2019 07:45)  T(F): 99.9 (16 Jun 2019 07:45), Max: 99.9 (16 Jun 2019 07:45)  HR: 87 (16 Jun 2019 07:45) (73 - 87)  BP: 149/81 (16 Jun 2019 07:45) (121/79 - 149/81)  BP(mean): --  RR: 18 (16 Jun 2019 07:45) (16 - 18)  SpO2: 96% (16 Jun 2019 07:45) (96% - 99%)    PHYSICAL EXAM:    General:  lying in bed  HEENT:  NC/AT  Chest:  dec bs  Cardiovascular: s1 s2 rrr  Abdomen:  Soft, nt mild dt dressings c/d/i +ostomy   Extremities:  no edema  Neuro/Psych:  A&Ox3    LABS:                        11.2   27.15 )-----------( 779      ( 15 Luis 2019 09:59 )             33.4     06-15    140  |  105  |  12  ----------------------------<  144<H>  3.3<L>   |  26  |  0.43<L>    Ca    7.1<L>      15 Luis 2019 09:59  Phos  3.5     06-15  Mg     1.8     06-15    TPro  4.9<L>  /  Alb  1.4<L>  /  TBili  0.3  /  DBili  x   /  AST  14<L>  /  ALT  20  /  AlkPhos  89  06-15          RADIOLOGY & ADDITIONAL TESTS:

## 2019-06-16 NOTE — PROGRESS NOTE ADULT - SUBJECTIVE AND OBJECTIVE BOX
Patient is a 63y old  Male who presents with a chief complaint of inability to eat and vomiting.      INTERVAL HPI/OVERNIGHT EVENTS: Pt states abd pain improving and tolerating solid diet he was given for lunch without pain or nausea. Pt reports being more fatigued today, but otherwise fine. Denies nausea, vomiting, fever, chills, CP, SOB.    MEDICATIONS  (STANDING):  chlorhexidine 4% Liquid 1 Application(s) Topical <User Schedule>  ciprofloxacin   IVPB 400 milliGRAM(s) IV Intermittent every 12 hours  dextrose 5%. 1000 milliLiter(s) (50 mL/Hr) IV Continuous <Continuous>  dextrose 50% Injectable 12.5 Gram(s) IV Push once  dextrose 50% Injectable 25 Gram(s) IV Push once  dextrose 50% Injectable 25 Gram(s) IV Push once  enoxaparin Injectable 40 milliGRAM(s) SubCutaneous daily  fat emulsion (Plant Based) 20% Infusion 0.55 Gm/kG/Day (14.346 mL/Hr) IV Continuous <Continuous>  hydrocortisone sodium succinate Injectable 50 milliGRAM(s) IV Push every 12 hours  insulin lispro (HumaLOG) corrective regimen sliding scale   SubCutaneous three times a day before meals  insulin lispro (HumaLOG) corrective regimen sliding scale   SubCutaneous at bedtime  metroNIDAZOLE  IVPB 500 milliGRAM(s) IV Intermittent every 8 hours  mirtazapine 15 milliGRAM(s) Oral at bedtime  pantoprazole    Tablet 40 milliGRAM(s) Oral two times a day  Parenteral Nutrition - Adult 1 Each (52 mL/Hr) TPN Continuous <Continuous>  Parenteral Nutrition - Adult 1 Each (52 mL/Hr) TPN Continuous <Continuous>  sodium chloride 0.65% Nasal 1 Spray(s) Both Nostrils once    MEDICATIONS  (PRN):  acetaminophen   Tablet .. 650 milliGRAM(s) Oral every 6 hours PRN Mild Pain (1 - 3)  clonazePAM  Tablet 0.5 milliGRAM(s) Oral three times a day PRN anxiety  dextrose 40% Gel 15 Gram(s) Oral once PRN Blood Glucose LESS THAN 70 milliGRAM(s)/deciliter  glucagon  Injectable 1 milliGRAM(s) IntraMuscular once PRN Glucose LESS THAN 70 milligrams/deciliter  LORazepam   Injectable 0.5 milliGRAM(s) IV Push every 6 hours PRN Anxiety  morphine  - Injectable 2 milliGRAM(s) IV Push every 4 hours PRN for breakthrough pain  naloxone Injectable 0.1 milliGRAM(s) IV Push every 3 minutes PRN For ANY of the following changes in patient status:  A. RR LESS THAN 10 breaths per minute, B. Oxygen saturation LESS THAN 90%, C. Sedation score of 6  ondansetron Injectable 4 milliGRAM(s) IV Push every 6 hours PRN Nausea  oxyCODONE    5 mG/acetaminophen 325 mG 1 Tablet(s) Oral every 6 hours PRN Moderate Pain (4 - 6)  oxyCODONE    5 mG/acetaminophen 325 mG 2 Tablet(s) Oral every 6 hours PRN Severe Pain (7 - 10)  zolpidem 5 milliGRAM(s) Oral at bedtime PRN Insomnia      Allergies    penicillin (Swelling)    Intolerances        REVIEW OF SYSTEMS:  CONSTITUTIONAL: +fatigue; No fever or chills  HEENT:  No headache, no sore throat  RESPIRATORY: No cough, wheezing, or shortness of breath  CARDIOVASCULAR: No chest pain, palpitations  GASTROINTESTINAL: improving abd pain, No nausea, vomiting, or diarrhea  GENITOURINARY: No dysuria, frequency, or hematuria  NEUROLOGICAL: no focal weakness or dizziness  MUSCULOSKELETAL: no myalgias      Vital Signs Last 24 Hrs  T(C): 37.7 (16 Jun 2019 07:45), Max: 37.7 (16 Jun 2019 07:45)  T(F): 99.9 (16 Jun 2019 07:45), Max: 99.9 (16 Jun 2019 07:45)  HR: 87 (16 Jun 2019 07:45) (73 - 87)  BP: 149/81 (16 Jun 2019 07:45) (121/79 - 149/81)  BP(mean): --  RR: 18 (16 Jun 2019 07:45) (16 - 18)  SpO2: 96% (16 Jun 2019 07:45) (96% - 99%)    PHYSICAL EXAM:  GENERAL: NAD at rest  HEENT:  anicteric, moist mucous membranes  CHEST/LUNG:  CTA b/l, no rales, wheezes, or rhonchi  HEART:  RRR, S1, S2  ABDOMEN:  +ileostomy with gas and brown liquid stool; BS+, soft, mild tenderness near incisions without guarding, nondistended ; dry, healing herpes zoster rash over dermatome on lower left abdomen   EXTREMITIES: no edema, cyanosis, or calf tenderness  NERVOUS SYSTEM: answers questions and follows commands appropriately    LABS:                        11.0   29.88 )-----------( 784      ( 16 Jun 2019 09:29 )             33.2     CBC Full  -  ( 16 Jun 2019 09:29 )  WBC Count : 29.88 K/uL  Hemoglobin : 11.0 g/dL  Hematocrit : 33.2 %  Platelet Count - Automated : 784 K/uL  Mean Cell Volume : 85.3 fl  Mean Cell Hemoglobin : 28.3 pg  Mean Cell Hemoglobin Concentration : 33.1 gm/dL  Auto Neutrophil # : x  Auto Lymphocyte # : x  Auto Monocyte # : x  Auto Eosinophil # : x  Auto Basophil # : x  Auto Neutrophil % : x  Auto Lymphocyte % : x  Auto Monocyte % : x  Auto Eosinophil % : x  Auto Basophil % : x    16 Jun 2019 08:57    139    |  105    |  12     ----------------------------<  147    3.4     |  25     |  0.41     Ca    7.2        16 Jun 2019 08:57  Phos  3.4       16 Jun 2019 08:57  Mg     1.8       16 Jun 2019 08:57    TPro  5.2    /  Alb  1.4    /  TBili  0.3    /  DBili  x      /  AST  12     /  ALT  18     /  AlkPhos  89     16 Jun 2019 08:57        CAPILLARY BLOOD GLUCOSE      POCT Blood Glucose.: 137 mg/dL (16 Jun 2019 11:47)  POCT Blood Glucose.: 129 mg/dL (16 Jun 2019 07:36)          RADIOLOGY & ADDITIONAL TESTS:    Personally reviewed.     Consultant(s) Notes Reviewed:  [x] YES  [ ] NO Patient is a 63y old  Male who presents with a chief complaint of inability to eat and vomiting.    INTERVAL HPI/OVERNIGHT EVENTS: Pt states abd pain improving and tolerating solid diet he was given for lunch without pain or nausea. Pt reports being more fatigued today, but otherwise fine. Denies nausea, vomiting, fever, chills, CP, SOB.    MEDICATIONS  (STANDING):  chlorhexidine 4% Liquid 1 Application(s) Topical <User Schedule>  ciprofloxacin   IVPB 400 milliGRAM(s) IV Intermittent every 12 hours  dextrose 5%. 1000 milliLiter(s) (50 mL/Hr) IV Continuous <Continuous>  dextrose 50% Injectable 12.5 Gram(s) IV Push once  dextrose 50% Injectable 25 Gram(s) IV Push once  dextrose 50% Injectable 25 Gram(s) IV Push once  enoxaparin Injectable 40 milliGRAM(s) SubCutaneous daily  fat emulsion (Plant Based) 20% Infusion 0.55 Gm/kG/Day (14.346 mL/Hr) IV Continuous <Continuous>  hydrocortisone sodium succinate Injectable 50 milliGRAM(s) IV Push every 12 hours  insulin lispro (HumaLOG) corrective regimen sliding scale   SubCutaneous three times a day before meals  insulin lispro (HumaLOG) corrective regimen sliding scale   SubCutaneous at bedtime  metroNIDAZOLE  IVPB 500 milliGRAM(s) IV Intermittent every 8 hours  mirtazapine 15 milliGRAM(s) Oral at bedtime  pantoprazole    Tablet 40 milliGRAM(s) Oral two times a day  Parenteral Nutrition - Adult 1 Each (52 mL/Hr) TPN Continuous <Continuous>  Parenteral Nutrition - Adult 1 Each (52 mL/Hr) TPN Continuous <Continuous>  sodium chloride 0.65% Nasal 1 Spray(s) Both Nostrils once    MEDICATIONS  (PRN):  acetaminophen   Tablet .. 650 milliGRAM(s) Oral every 6 hours PRN Mild Pain (1 - 3)  clonazePAM  Tablet 0.5 milliGRAM(s) Oral three times a day PRN anxiety  dextrose 40% Gel 15 Gram(s) Oral once PRN Blood Glucose LESS THAN 70 milliGRAM(s)/deciliter  glucagon  Injectable 1 milliGRAM(s) IntraMuscular once PRN Glucose LESS THAN 70 milligrams/deciliter  LORazepam   Injectable 0.5 milliGRAM(s) IV Push every 6 hours PRN Anxiety  morphine  - Injectable 2 milliGRAM(s) IV Push every 4 hours PRN for breakthrough pain  naloxone Injectable 0.1 milliGRAM(s) IV Push every 3 minutes PRN For ANY of the following changes in patient status:  A. RR LESS THAN 10 breaths per minute, B. Oxygen saturation LESS THAN 90%, C. Sedation score of 6  ondansetron Injectable 4 milliGRAM(s) IV Push every 6 hours PRN Nausea  oxyCODONE    5 mG/acetaminophen 325 mG 1 Tablet(s) Oral every 6 hours PRN Moderate Pain (4 - 6)  oxyCODONE    5 mG/acetaminophen 325 mG 2 Tablet(s) Oral every 6 hours PRN Severe Pain (7 - 10)  zolpidem 5 milliGRAM(s) Oral at bedtime PRN Insomnia      Allergies    penicillin (Swelling)    Intolerances        REVIEW OF SYSTEMS:  CONSTITUTIONAL: +fatigue; No fever or chills  HEENT:  No headache, no sore throat  RESPIRATORY: No cough, wheezing, or shortness of breath  CARDIOVASCULAR: No chest pain, palpitations  GASTROINTESTINAL: improving abd pain, No nausea, vomiting, or diarrhea  GENITOURINARY: No dysuria, frequency, or hematuria  NEUROLOGICAL: no focal weakness or dizziness  MUSCULOSKELETAL: no myalgias      Vital Signs Last 24 Hrs  T(C): 37.7 (16 Jun 2019 07:45), Max: 37.7 (16 Jun 2019 07:45)  T(F): 99.9 (16 Jun 2019 07:45), Max: 99.9 (16 Jun 2019 07:45)  HR: 87 (16 Jun 2019 07:45) (73 - 87)  BP: 149/81 (16 Jun 2019 07:45) (121/79 - 149/81)  BP(mean): --  RR: 18 (16 Jun 2019 07:45) (16 - 18)  SpO2: 96% (16 Jun 2019 07:45) (96% - 99%)    PHYSICAL EXAM:  GENERAL: NAD at rest  HEENT:  anicteric, moist mucous membranes  CHEST/LUNG:  CTA b/l, no rales, wheezes, or rhonchi  HEART:  RRR, S1, S2  ABDOMEN:  +ileostomy with gas and brown liquid stool; BS+, soft, mild tenderness near incisions without guarding, nondistended ; dry, healing herpes zoster rash over dermatome on lower left abdomen   EXTREMITIES: no edema, cyanosis, or calf tenderness  NERVOUS SYSTEM: answers questions and follows commands appropriately    LABS:                        11.0   29.88 )-----------( 784      ( 16 Jun 2019 09:29 )             33.2     CBC Full  -  ( 16 Jun 2019 09:29 )  WBC Count : 29.88 K/uL  Hemoglobin : 11.0 g/dL  Hematocrit : 33.2 %  Platelet Count - Automated : 784 K/uL  Mean Cell Volume : 85.3 fl  Mean Cell Hemoglobin : 28.3 pg  Mean Cell Hemoglobin Concentration : 33.1 gm/dL  Auto Neutrophil # : x  Auto Lymphocyte # : x  Auto Monocyte # : x  Auto Eosinophil # : x  Auto Basophil # : x  Auto Neutrophil % : x  Auto Lymphocyte % : x  Auto Monocyte % : x  Auto Eosinophil % : x  Auto Basophil % : x    16 Jun 2019 08:57    139    |  105    |  12     ----------------------------<  147    3.4     |  25     |  0.41     Ca    7.2        16 Jun 2019 08:57  Phos  3.4       16 Jun 2019 08:57  Mg     1.8       16 Jun 2019 08:57    TPro  5.2    /  Alb  1.4    /  TBili  0.3    /  DBili  x      /  AST  12     /  ALT  18     /  AlkPhos  89     16 Jun 2019 08:57        CAPILLARY BLOOD GLUCOSE      POCT Blood Glucose.: 137 mg/dL (16 Jun 2019 11:47)  POCT Blood Glucose.: 129 mg/dL (16 Jun 2019 07:36)          RADIOLOGY & ADDITIONAL TESTS:    Personally reviewed.     Consultant(s) Notes Reviewed:  [x] YES  [ ] NO

## 2019-06-16 NOTE — PROGRESS NOTE ADULT - PROBLEM SELECTOR PLAN 1
am labs pending  6/10 sp OR for lap total colectomy w end ileostomy  fulls/tpn, diet as per sx, will taper tpn once tolerating reg diet  monitor fs, lfts, lytes, tg while on tpn  f/u crs recs  pain control, ppi ppx, zofran prn  management of ostomy output as per sx  monitor exam  trend leukocytosis  oob as tolerated  will follow

## 2019-06-16 NOTE — CHART NOTE - NSCHARTNOTEFT_GEN_A_CORE
Called by RN for right hand swelling. Pt denies any pain or decreased ROM. Has PICC line in right arm for TPN which is continuous. Pt denies any other sx including fever, chills, headaches, dizziness, blurred vision, difficulty swallowing, chest pain, palpitations, numbness/tingling, abdominal pain.          T(C): 37.6 (06-16-19 @ 15:54), Max: 37.7 (06-16-19 @ 07:45)  HR: 81 (06-16-19 @ 15:54) (75 - 87)  BP: 129/77 (06-16-19 @ 15:54) (121/79 - 149/81)  RR: 19 (06-16-19 @ 15:54) (16 - 19)  SpO2: 98% (06-16-19 @ 15:54) (96% - 98%)  Wt(kg): --    Physical :  Gen- NAD, Calm, pleasant  HEENT: NCAT  Cardio - s+1,s+2, rrr, no murmur  Lung - cta b/l, no wheeze, no rhonchi, no rales   Abdomen- +ileostomy with gas and dark brown liquid stool; BS+, soft, mild tenderness near incision site without guarding  Ext- no sig edema, 2+ pulses b/l, full ROM of RUE, sensation intact    LABS:                        11.0   29.88 )-----------( 784      ( 16 Jun 2019 09:29 )             33.2     06-16    139  |  105  |  12  ----------------------------<  147<H>  3.4<L>   |  25  |  0.41<L>    Ca    7.2<L>      16 Jun 2019 08:57  Phos  3.4     06-16  Mg     1.8     06-16    TPro  5.2<L>  /  Alb  1.4<L>  /  TBili  0.3  /  DBili  x   /  AST  12<L>  /  ALT  18  /  AlkPhos  89  06-16                Assessment/Plan  64yo M w/ PMH of ulcerative colitis, HLD, anxiety presents to ED with complaints of inability to eat, vomiting found to have gastritis and severe pancolitis with completely denuded mucosa and deep ulcerations not deemed amenable to medical therapy, now s/p total colectomy with end-ileostomy on 6/10, course also complicated by herpes zoster flare.     Concern for DVT, US UE  Warm compress,  Elevate extremity  Continue to monitor vitals  RN to call with any changes

## 2019-06-16 NOTE — PROGRESS NOTE ADULT - ASSESSMENT
64yo M w/ PMH of ulcerative colitis, HLD, anxiety presents to ED with complaints of inability to eat, vomiting found to have gastritis and severe pancolitis with completely denuded mucosa and deep ulcerations not deemed amenable to medical therapy, now s/p total colectomy with end-ileostomy on 6/10, course also complicated by herpes zoster flare.   1. Proctocolitis / Ulcerative colitis exacerbation:   -colonoscopy revealed severe pancolitis with completely denuded mucosa and deep ulcerations not deemed amenable to medical therapy, now s/p total colectomy with end-ileostomy on 6/10  -now having gas and brown liquid stool output in the ostomy and no nausea/vomiting.   -CRS rec have advanced to regular (low fiber) diet and will monitor how pt tolerates (well so far)  -Continue with prophylactic Cipro and Flagyl as per CRS's protocol  -c/w TPN for now, pt with severe protein-calorie malnutrition  -Morphine PCA discontinued -- switched to oxycodone and morphine PRN for pain control which is providing sufficient pain control  -Was on stress dose steroids -- recommend de-escalating dose as pt further from surgery (suggest trial of half the dose -- hydrocortisone 25mg IV q12h)  -CRS f/up    2. Dysphagia:   -found to have gastritis and esophagitis that likely contributed  -diet has been advanced to solid food (low fiber); monitor how pt tolerates    3. Drug abuse:    -was on Suboxone.  now on oxycodone / morphine PRN  -consider addiction medicine (Dr. Plata, who is also pt's PMD) consult, when ready to de-escalate pain meds and go back to suboxone     4. Hypocalcemia:  resolved.    -monitor BMP    5. Herpes Zoster:    -Completed Valtrex  -lesions dry/healing, pain improved     6. Anemia: multifactorial - combination of acute blood loss anemia given significant surgery and anemia of chronic disease.  Will monitor Hgb.  -transfused 2un PRBC per CRS earlier in admission and Hgb up to ~11    7. Latent TB:   - appreciated ID input -- recommended INH if pt were to start on biologics  - given pt had total colectomy, would not need to start on biologics and would not need INH    8. Hypokalemia / hypomagnesemia:  -aggressively repleting potassium  -magnesium was repleted and now stable  -monitor electrolytes     9. VTE ppx:  Lovenox 40mg SQ daily 64yo M w/ PMH of ulcerative colitis, HLD, anxiety presents to ED with complaints of inability to eat, vomiting found to have gastritis and severe pancolitis with completely denuded mucosa and deep ulcerations not deemed amenable to medical therapy, now s/p total colectomy with end-ileostomy on 6/10, course also complicated by herpes zoster flare.   1. Proctocolitis / Ulcerative colitis exacerbation:   -colonoscopy revealed severe pancolitis with completely denuded mucosa and deep ulcerations not deemed amenable to medical therapy, now s/p total colectomy with end-ileostomy on 6/10  -now having gas and brown liquid stool output in the ostomy and no nausea/vomiting.   -CRS rec have advanced to regular (low fiber) diet and will monitor how pt tolerates (well so far)  -Continue with prophylactic Cipro and Flagyl as per CRS's protocol  -c/w TPN for now, pt with severe protein-calorie malnutrition  -Morphine PCA discontinued -- switched to oxycodone and morphine PRN for pain control which is providing sufficient pain control  -Was on stress dose steroids -- recommend de-escalating dose as pt further from surgery (suggest trial of half the dose -- hydrocortisone 25mg IV q12h)  -CRS f/up    2. Dysphagia:   -found to have gastritis and esophagitis that likely contributed  -diet has been advanced to solid food (low fiber); monitor how pt tolerates    3. Drug abuse:    -was on Suboxone.  now on oxycodone / morphine PRN  -consider addiction medicine (Dr. Plata, who is also pt's PMD) consult, when ready to de-escalate pain meds and go back to suboxone     4. Hypocalcemia:  resolved.   -monitor BMP    5. Herpes Zoster:    -Completed Valtrex  -lesions dry/healing, pain improved     6. Anemia: multifactorial - combination of acute blood loss anemia given significant surgery and anemia of chronic disease.  Will monitor Hgb.  -transfused 2un PRBC per CRS earlier in admission and Hgb up to ~11    7. Latent TB:   - appreciated ID input -- recommended INH if pt were to start on biologics  - given pt had total colectomy, would not need to start on biologics and would not need INH    8. Hypokalemia / hypomagnesemia:  -aggressively repleting potassium  -magnesium was repleted and now stable  -monitor electrolytes     9. VTE ppx:  Lovenox 40mg SQ daily

## 2019-06-16 NOTE — PROGRESS NOTE ADULT - SUBJECTIVE AND OBJECTIVE BOX
CRSSNY  VSS, afeb  Herbie fulls, no n/v, slept well, feels better  Stoma:  1220 (down from 1500)      PE:  Gen:  sleeping comfortably  Abd:  soft, incis tenderness but ND  Stoma:  pink, effluent a little thicker than yesterday    a/p:  POD #6  --adv diet LRD, stoma teaching  --check labs including CBC  --monitor stoma output

## 2019-06-16 NOTE — ADVANCED PRACTICE NURSE CONSULT - ASSESSMENT
New end ileostomy moist, smooth, red, round about 1.5’, no odor, liquid effluent + BS four quadrants. Appliance intact and not due to be changed. Patient educated in what to expect during hospital stay, rectal pressure in common to sit on toilet if pressure in felt, low fiber diet, application of wafer and pouch. Common concerns pouch showing through clothing, showering, swimming. Patient was given pouch to practice with he placed it on the end table, I offered to turn on ileostomy video he said later he will watch it, I gave him Bayron secure start form this to was placed on end table. Will continue to follow patient.   RN educated n this patients visit and will attempt to turn video on at a later point in today

## 2019-06-16 NOTE — PROVIDER CONTACT NOTE (OTHER) - RECOMMENDATIONS
Elevate extremity, Apply warm compresses, Venous Doppler to RUE. Offered medication to allievate discomfort. Pt. refused.

## 2019-06-16 NOTE — PROGRESS NOTE ADULT - PROBLEM SELECTOR PLAN 2
see above, now also post op; labs pending  no evidence of overt gib, sp transfusion  monitor cbc, transfuse prn  cont ppi bid

## 2019-06-16 NOTE — PROGRESS NOTE ADULT - ATTENDING COMMENTS
Note written by attending, see above.  Time spent: 40min. More than 50% of the visit was spent counseling the patient on his condition - UC flare, colectomy, anemia, hypokalemia.

## 2019-06-17 LAB
ALBUMIN SERPL ELPH-MCNC: 1.4 G/DL — LOW (ref 3.3–5)
ALP SERPL-CCNC: 88 U/L — SIGNIFICANT CHANGE UP (ref 40–120)
ALT FLD-CCNC: 15 U/L — SIGNIFICANT CHANGE UP (ref 12–78)
ANION GAP SERPL CALC-SCNC: 9 MMOL/L — SIGNIFICANT CHANGE UP (ref 5–17)
AST SERPL-CCNC: 10 U/L — LOW (ref 15–37)
BASOPHILS # BLD AUTO: 0 K/UL — SIGNIFICANT CHANGE UP (ref 0–0.2)
BASOPHILS NFR BLD AUTO: 0 % — SIGNIFICANT CHANGE UP (ref 0–2)
BILIRUB SERPL-MCNC: 0.3 MG/DL — SIGNIFICANT CHANGE UP (ref 0.2–1.2)
BUN SERPL-MCNC: 12 MG/DL — SIGNIFICANT CHANGE UP (ref 7–23)
CALCIUM SERPL-MCNC: 7.3 MG/DL — LOW (ref 8.5–10.1)
CHLORIDE SERPL-SCNC: 108 MMOL/L — SIGNIFICANT CHANGE UP (ref 96–108)
CO2 SERPL-SCNC: 25 MMOL/L — SIGNIFICANT CHANGE UP (ref 22–31)
CREAT SERPL-MCNC: 0.39 MG/DL — LOW (ref 0.5–1.3)
EOSINOPHIL # BLD AUTO: 0.47 K/UL — SIGNIFICANT CHANGE UP (ref 0–0.5)
EOSINOPHIL NFR BLD AUTO: 2 % — SIGNIFICANT CHANGE UP (ref 0–6)
GLUCOSE SERPL-MCNC: 122 MG/DL — HIGH (ref 70–99)
HCT VFR BLD CALC: 30.9 % — LOW (ref 39–50)
HGB BLD-MCNC: 10.2 G/DL — LOW (ref 13–17)
LYMPHOCYTES # BLD AUTO: 1.41 K/UL — SIGNIFICANT CHANGE UP (ref 1–3.3)
LYMPHOCYTES # BLD AUTO: 6 % — LOW (ref 13–44)
MCHC RBC-ENTMCNC: 28.3 PG — SIGNIFICANT CHANGE UP (ref 27–34)
MCHC RBC-ENTMCNC: 33 GM/DL — SIGNIFICANT CHANGE UP (ref 32–36)
MCV RBC AUTO: 85.6 FL — SIGNIFICANT CHANGE UP (ref 80–100)
MONOCYTES # BLD AUTO: 1.41 K/UL — HIGH (ref 0–0.9)
MONOCYTES NFR BLD AUTO: 6 % — SIGNIFICANT CHANGE UP (ref 2–14)
NEUTROPHILS # BLD AUTO: 19.46 K/UL — HIGH (ref 1.8–7.4)
NEUTROPHILS NFR BLD AUTO: 80 % — HIGH (ref 43–77)
NRBC # BLD: SIGNIFICANT CHANGE UP /100 WBCS (ref 0–0)
PHOSPHATE SERPL-MCNC: 3.4 MG/DL — SIGNIFICANT CHANGE UP (ref 2.5–4.5)
PLATELET # BLD AUTO: 733 K/UL — HIGH (ref 150–400)
POTASSIUM SERPL-MCNC: 3.5 MMOL/L — SIGNIFICANT CHANGE UP (ref 3.5–5.3)
POTASSIUM SERPL-SCNC: 3.5 MMOL/L — SIGNIFICANT CHANGE UP (ref 3.5–5.3)
PROT SERPL-MCNC: 4.9 G/DL — LOW (ref 6–8.3)
RBC # BLD: 3.61 M/UL — LOW (ref 4.2–5.8)
RBC # FLD: 16.9 % — HIGH (ref 10.3–14.5)
SODIUM SERPL-SCNC: 142 MMOL/L — SIGNIFICANT CHANGE UP (ref 135–145)
TRIGL SERPL-MCNC: 156 MG/DL — HIGH (ref 10–149)
WBC # BLD: 23.45 K/UL — HIGH (ref 3.8–10.5)
WBC # FLD AUTO: 23.45 K/UL — HIGH (ref 3.8–10.5)

## 2019-06-17 PROCEDURE — 99233 SBSQ HOSP IP/OBS HIGH 50: CPT

## 2019-06-17 PROCEDURE — 93971 EXTREMITY STUDY: CPT | Mod: 26,RT

## 2019-06-17 RX ORDER — HYDROCORTISONE 20 MG
25 TABLET ORAL EVERY 12 HOURS
Refills: 0 | Status: DISCONTINUED | OUTPATIENT
Start: 2019-06-17 | End: 2019-06-19

## 2019-06-17 RX ORDER — DIPHENOXYLATE HCL/ATROPINE 2.5-.025MG
1 TABLET ORAL DAILY
Refills: 0 | Status: DISCONTINUED | OUTPATIENT
Start: 2019-06-17 | End: 2019-06-19

## 2019-06-17 RX ORDER — LOPERAMIDE HCL 2 MG
4 TABLET ORAL
Refills: 0 | Status: DISCONTINUED | OUTPATIENT
Start: 2019-06-17 | End: 2019-06-19

## 2019-06-17 RX ORDER — DEXTROSE 10 % IN WATER 10 %
250 INTRAVENOUS SOLUTION INTRAVENOUS
Refills: 0 | Status: DISCONTINUED | OUTPATIENT
Start: 2019-06-17 | End: 2019-06-19

## 2019-06-17 RX ORDER — POTASSIUM CHLORIDE 20 MEQ
10 PACKET (EA) ORAL
Refills: 0 | Status: COMPLETED | OUTPATIENT
Start: 2019-06-17 | End: 2019-06-17

## 2019-06-17 RX ORDER — LOPERAMIDE HCL 2 MG
2 TABLET ORAL
Refills: 0 | Status: DISCONTINUED | OUTPATIENT
Start: 2019-06-17 | End: 2019-06-17

## 2019-06-17 RX ADMIN — Medication 50 MILLILITER(S): at 10:40

## 2019-06-17 RX ADMIN — PANTOPRAZOLE SODIUM 40 MILLIGRAM(S): 20 TABLET, DELAYED RELEASE ORAL at 06:11

## 2019-06-17 RX ADMIN — Medication 100 MILLIGRAM(S): at 15:59

## 2019-06-17 RX ADMIN — Medication 100 MILLIEQUIVALENT(S): at 13:23

## 2019-06-17 RX ADMIN — Medication 200 MILLIGRAM(S): at 06:11

## 2019-06-17 RX ADMIN — Medication 200 MILLIGRAM(S): at 18:32

## 2019-06-17 RX ADMIN — Medication 100 MILLIGRAM(S): at 06:11

## 2019-06-17 RX ADMIN — Medication 4 MILLIGRAM(S): at 18:31

## 2019-06-17 RX ADMIN — Medication 100 MILLIEQUIVALENT(S): at 12:33

## 2019-06-17 RX ADMIN — Medication 25 MILLIGRAM(S): at 18:31

## 2019-06-17 RX ADMIN — Medication 1 TABLET(S): at 12:33

## 2019-06-17 RX ADMIN — PANTOPRAZOLE SODIUM 40 MILLIGRAM(S): 20 TABLET, DELAYED RELEASE ORAL at 18:31

## 2019-06-17 RX ADMIN — Medication 100 MILLIGRAM(S): at 22:14

## 2019-06-17 RX ADMIN — ENOXAPARIN SODIUM 40 MILLIGRAM(S): 100 INJECTION SUBCUTANEOUS at 12:33

## 2019-06-17 RX ADMIN — CHLORHEXIDINE GLUCONATE 1 APPLICATION(S): 213 SOLUTION TOPICAL at 11:19

## 2019-06-17 RX ADMIN — Medication 100 MILLIEQUIVALENT(S): at 15:13

## 2019-06-17 RX ADMIN — Medication 50 MILLIGRAM(S): at 06:11

## 2019-06-17 NOTE — PROGRESS NOTE ADULT - ATTENDING COMMENTS
Note written by attending, see above.  Time spent: 30min. More than 50% of the visit was spent counseling the patient on his condition - UC flare, colectomy, anemia, hypokalemia.

## 2019-06-17 NOTE — PROGRESS NOTE ADULT - ASSESSMENT
64yo M w/ PMH of ulcerative colitis, HLD, anxiety presents to ED with complaints of inability to eat, vomiting found to have gastritis and severe pancolitis with completely denuded mucosa and deep ulcerations not deemed amenable to medical therapy, now s/p total colectomy with end-ileostomy on 6/10, course also complicated by herpes zoster flare.   1. Proctocolitis / Ulcerative colitis exacerbation:   -colonoscopy revealed severe pancolitis with completely denuded mucosa and deep ulcerations not deemed amenable to medical therapy, now s/p total colectomy with end-ileostomy on 6/10  -now having gas and brown liquid stool output in the ostomy and no nausea/vomiting.   -CRS rec have advanced to regular (low fiber) diet and will monitor how pt tolerates (well so far)  -Continue with prophylactic Cipro and Flagyl for duration as per CRS's protocol  -TPN discontinued today as pt not eating well; pt with severe protein-calorie malnutrition  -on oxycodone and morphine PRN for pain control which is providing sufficient pain control  -Was on stress dose steroids -- recommend de-escalating dose as pt further from surgery (decreased to hydrocortisone 25mg IV q12h today)  -CRS f/up    2. Dysphagia:   -found to have gastritis and esophagitis that likely contributed  -diet has been advanced to solid food (low fiber); monitor how pt tolerates    3. Drug abuse:    -was on Suboxone.  now on oxycodone / morphine PRN  -consider addiction medicine (Dr. Plata, who is also pt's PMD) consult, when ready to de-escalate pain meds and go back to suboxone     4. Hypocalcemia:  resolved.   -monitor BMP    5. Herpes Zoster:    -Completed Valtrex  -lesions dry/healing, pain resolved    6. Anemia: multifactorial - combination of acute blood loss anemia given significant surgery and anemia of chronic disease.  Will monitor Hgb.  -transfused 2un PRBC per CRS earlier in admission and Hgb up to ~11    7. Latent TB:   - appreciated ID input -- recommended INH if pt were to start on biologics  - given pt had total colectomy, would not need to start on biologics and would not need INH    8. Hypokalemia / hypomagnesemia:  -aggressively repleting potassium  -magnesium was repleted and now stable  -likely losses are via ostomy -- started on loperamide now to decrease output  -monitor electrolytes     9. VTE ppx:  Lovenox 40mg SQ daily

## 2019-06-17 NOTE — PROGRESS NOTE ADULT - ASSESSMENT
ASSESSMENT & PLAN  63 year old male with PMH of UC, POD#7 s/p hand-assisted laparoscopic total colectomy and mobilization of splenic flexure with ileostomy, with +ostomy function (1100/24hr output), currently on TPN and low fiber diet, albumin 1.4.  H/H stabilized (10.2/30.9, last transfusion 6/13/19).    - Continue low fiber diet  - TPN to be d/c and start on d10 as per GI  - Imodium 2 tabs BID, Lomotil QD, and continue to monitor ostomy output  - Daily CBC  - DVT prophylaxis with lovenox  - OOB, pain control  - D/c planning, possibly within next 2 days ostomy care  still needs pt and out of bed  - Case discussed with Dr. Velazquez  Agree with above

## 2019-06-17 NOTE — PROGRESS NOTE ADULT - SUBJECTIVE AND OBJECTIVE BOX
STATUS POST:  Hand-assisted laparoscopic total colectomy and mobilization of splenic flexure with ileostomy    POST OPERATIVE DAY #:  7    SUBJECTIVE:  Patient seen and examined at bedside.  No overnight events.  Patient states he is feeling tired, not having any pain, tolerating low fiber diet with ensure supplement.  Patient denies any fevers, chills, chest pain, shortness of breath, abdominal pain, nausea or vomiting.    Vital Signs Last 24 Hrs  T(C): 36.7 (17 Jun 2019 07:29), Max: 37.6 (16 Jun 2019 15:54)  T(F): 98.1 (17 Jun 2019 07:29), Max: 99.6 (16 Jun 2019 15:54)  HR: 80 (17 Jun 2019 07:29) (80 - 83)  BP: 138/88 (17 Jun 2019 07:29) (122/80 - 138/88)  BP(mean): --  RR: 18 (17 Jun 2019 07:29) (18 - 19)  SpO2: 97% (17 Jun 2019 07:29) (95% - 98%)    PHYSICAL EXAM  GENERAL:  Well-developed male lying comfortably in bed in NAD  HEAD:  Normocephalic, atraumatic  ABDOMEN:  Soft, nondistended, nontender; Incision stapled with no erythema, drainage or foul odor.  Stoma pink with air and loose brown stool in ostomy bag.  Normoactive bowel sounds in all 4 quadrants.  No rebound tenderness or guarding.  NEURO:  A&O x 3    I&O's Summary    16 Jun 2019 07:01  -  17 Jun 2019 07:00  --------------------------------------------------------  IN: 1181.3 mL / OUT: 2950 mL / NET: -1768.7 mL    17 Jun 2019 07:01  -  17 Jun 2019 09:38  --------------------------------------------------------  IN: 0 mL / OUT: 100 mL / NET: -100 mL    I&O's Detail    16 Jun 2019 07:01  -  17 Jun 2019 07:00  --------------------------------------------------------  IN:    fat emulsion (Plant Based) 20% Infusion: 157.3 mL    Solution: 200 mL    Solution: 200 mL    TPN (Total Parenteral Nutrition): 624 mL  Total IN: 1181.3 mL    OUT:    Ileostomy: 1100 mL    Voided: 1850 mL  Total OUT: 2950 mL    Total NET: -1768.7 mL    17 Jun 2019 07:01  -  17 Jun 2019 09:38  --------------------------------------------------------  IN:  Total IN: 0 mL    OUT:    Voided: 100 mL  Total OUT: 100 mL    Total NET: -100 mL    MEDICATIONS  (STANDING):  chlorhexidine 4% Liquid 1 Application(s) Topical <User Schedule>  ciprofloxacin   IVPB 400 milliGRAM(s) IV Intermittent every 12 hours  dextrose 10%. 300 milliLiter(s) (50 mL/Hr) IV Continuous <Continuous>  dextrose 5%. 1000 milliLiter(s) (50 mL/Hr) IV Continuous <Continuous>  dextrose 50% Injectable 12.5 Gram(s) IV Push once  dextrose 50% Injectable 25 Gram(s) IV Push once  dextrose 50% Injectable 25 Gram(s) IV Push once  enoxaparin Injectable 40 milliGRAM(s) SubCutaneous daily  fat emulsion (Plant Based) 20% Infusion 0.55 Gm/kG/Day (14.346 mL/Hr) IV Continuous <Continuous>  hydrocortisone sodium succinate Injectable 50 milliGRAM(s) IV Push every 12 hours  insulin lispro (HumaLOG) corrective regimen sliding scale   SubCutaneous three times a day before meals  insulin lispro (HumaLOG) corrective regimen sliding scale   SubCutaneous at bedtime  metroNIDAZOLE  IVPB 500 milliGRAM(s) IV Intermittent every 8 hours  mirtazapine 15 milliGRAM(s) Oral at bedtime  pantoprazole    Tablet 40 milliGRAM(s) Oral two times a day  Parenteral Nutrition - Adult 1 Each (52 mL/Hr) TPN Continuous <Continuous>  sodium chloride 0.65% Nasal 1 Spray(s) Both Nostrils once    MEDICATIONS  (PRN):  acetaminophen   Tablet .. 650 milliGRAM(s) Oral every 6 hours PRN Mild Pain (1 - 3)  clonazePAM  Tablet 0.5 milliGRAM(s) Oral three times a day PRN anxiety  dextrose 40% Gel 15 Gram(s) Oral once PRN Blood Glucose LESS THAN 70 milliGRAM(s)/deciliter  glucagon  Injectable 1 milliGRAM(s) IntraMuscular once PRN Glucose LESS THAN 70 milligrams/deciliter  LORazepam   Injectable 0.5 milliGRAM(s) IV Push every 6 hours PRN Anxiety  morphine  - Injectable 2 milliGRAM(s) IV Push every 4 hours PRN for breakthrough pain  naloxone Injectable 0.1 milliGRAM(s) IV Push every 3 minutes PRN For ANY of the following changes in patient status:  A. RR LESS THAN 10 breaths per minute, B. Oxygen saturation LESS THAN 90%, C. Sedation score of 6  ondansetron Injectable 4 milliGRAM(s) IV Push every 6 hours PRN Nausea  oxyCODONE    5 mG/acetaminophen 325 mG 1 Tablet(s) Oral every 6 hours PRN Moderate Pain (4 - 6)  oxyCODONE    5 mG/acetaminophen 325 mG 2 Tablet(s) Oral every 6 hours PRN Severe Pain (7 - 10)  zolpidem 5 milliGRAM(s) Oral at bedtime PRN Insomnia    LABS:                        10.2   23.45 )-----------( 733      ( 17 Jun 2019 06:15 )             30.9     06-17    142  |  108  |  12  ----------------------------<  122<H>  3.5   |  25  |  0.39<L>    Ca    7.3<L>      17 Jun 2019 06:15  Phos  3.4     06-17  Mg     1.8     06-16    TPro  4.9<L>  /  Alb  1.4<L>  /  TBili  0.3  /  DBili  x   /  AST  10<L>  /  ALT  15  /  AlkPhos  88  06-17    ASSESSMENT & PLAN  63 year old male with PMH of UC, POD#7 s/p hand-assisted laparoscopic total colectomy and mobilization of splenic flexure with ileostomy, with +ostomy function (1100/24hr output), currently on TPN and low fiber diet, albumin 1.4.  H/H stabilized (10.2/30.9, last transfusion 6/13/19).    - Continue low fiber diet  - TPN to be d/c and start on d10 as per GI  - Imodium 2 tabs BID, Lomotil QD, and continue to monitor ostomy output  - Daily CBC  - DVT prophylaxis with lovenox  - OOB, pain control  - D/c planning, possibly within next 2 days  - Case discussed with Dr. Velazquez STATUS POST:  Hand-assisted laparoscopic total colectomy and mobilization of splenic flexure with ileostomy    POST OPERATIVE DAY #:  7    SUBJECTIVE:  Patient seen and examined at bedside.  No overnight events.  Patient states he is feeling tired, not having any pain, tolerating low fiber diet with ensure supplement.  Patient denies any fevers, chills, chest pain, shortness of breath, abdominal pain, nausea or vomiting.    Vital Signs Last 24 Hrs  T(C): 36.7 (17 Jun 2019 07:29), Max: 37.6 (16 Jun 2019 15:54)  T(F): 98.1 (17 Jun 2019 07:29), Max: 99.6 (16 Jun 2019 15:54)  HR: 80 (17 Jun 2019 07:29) (80 - 83)  BP: 138/88 (17 Jun 2019 07:29) (122/80 - 138/88)  BP(mean): --  RR: 18 (17 Jun 2019 07:29) (18 - 19)  SpO2: 97% (17 Jun 2019 07:29) (95% - 98%)    PHYSICAL EXAM  GENERAL:  Well-developed male lying comfortably in bed in NAD  HEAD:  Normocephalic, atraumatic  ABDOMEN:  Soft, nondistended, nontender; Incision stapled with no erythema, drainage or foul odor.  Stoma pink with air and loose brown stool in ostomy bag.  Normoactive bowel sounds in all 4 quadrants.  No rebound tenderness or guarding.  NEURO:  A&O x 3    I&O's Summary    16 Jun 2019 07:01  -  17 Jun 2019 07:00  --------------------------------------------------------  IN: 1181.3 mL / OUT: 2950 mL / NET: -1768.7 mL    17 Jun 2019 07:01  -  17 Jun 2019 09:38  --------------------------------------------------------  IN: 0 mL / OUT: 100 mL / NET: -100 mL    I&O's Detail    16 Jun 2019 07:01  -  17 Jun 2019 07:00  --------------------------------------------------------  IN:    fat emulsion (Plant Based) 20% Infusion: 157.3 mL    Solution: 200 mL    Solution: 200 mL    TPN (Total Parenteral Nutrition): 624 mL  Total IN: 1181.3 mL    OUT:    Ileostomy: 1100 mL    Voided: 1850 mL  Total OUT: 2950 mL    Total NET: -1768.7 mL    17 Jun 2019 07:01  -  17 Jun 2019 09:38  --------------------------------------------------------  IN:  Total IN: 0 mL    OUT:    Voided: 100 mL  Total OUT: 100 mL    Total NET: -100 mL    MEDICATIONS  (STANDING):  chlorhexidine 4% Liquid 1 Application(s) Topical <User Schedule>  ciprofloxacin   IVPB 400 milliGRAM(s) IV Intermittent every 12 hours  dextrose 10%. 300 milliLiter(s) (50 mL/Hr) IV Continuous <Continuous>  dextrose 5%. 1000 milliLiter(s) (50 mL/Hr) IV Continuous <Continuous>  dextrose 50% Injectable 12.5 Gram(s) IV Push once  dextrose 50% Injectable 25 Gram(s) IV Push once  dextrose 50% Injectable 25 Gram(s) IV Push once  enoxaparin Injectable 40 milliGRAM(s) SubCutaneous daily  fat emulsion (Plant Based) 20% Infusion 0.55 Gm/kG/Day (14.346 mL/Hr) IV Continuous <Continuous>  hydrocortisone sodium succinate Injectable 50 milliGRAM(s) IV Push every 12 hours  insulin lispro (HumaLOG) corrective regimen sliding scale   SubCutaneous three times a day before meals  insulin lispro (HumaLOG) corrective regimen sliding scale   SubCutaneous at bedtime  metroNIDAZOLE  IVPB 500 milliGRAM(s) IV Intermittent every 8 hours  mirtazapine 15 milliGRAM(s) Oral at bedtime  pantoprazole    Tablet 40 milliGRAM(s) Oral two times a day  Parenteral Nutrition - Adult 1 Each (52 mL/Hr) TPN Continuous <Continuous>  sodium chloride 0.65% Nasal 1 Spray(s) Both Nostrils once    MEDICATIONS  (PRN):  acetaminophen   Tablet .. 650 milliGRAM(s) Oral every 6 hours PRN Mild Pain (1 - 3)  clonazePAM  Tablet 0.5 milliGRAM(s) Oral three times a day PRN anxiety  dextrose 40% Gel 15 Gram(s) Oral once PRN Blood Glucose LESS THAN 70 milliGRAM(s)/deciliter  glucagon  Injectable 1 milliGRAM(s) IntraMuscular once PRN Glucose LESS THAN 70 milligrams/deciliter  LORazepam   Injectable 0.5 milliGRAM(s) IV Push every 6 hours PRN Anxiety  morphine  - Injectable 2 milliGRAM(s) IV Push every 4 hours PRN for breakthrough pain  naloxone Injectable 0.1 milliGRAM(s) IV Push every 3 minutes PRN For ANY of the following changes in patient status:  A. RR LESS THAN 10 breaths per minute, B. Oxygen saturation LESS THAN 90%, C. Sedation score of 6  ondansetron Injectable 4 milliGRAM(s) IV Push every 6 hours PRN Nausea  oxyCODONE    5 mG/acetaminophen 325 mG 1 Tablet(s) Oral every 6 hours PRN Moderate Pain (4 - 6)  oxyCODONE    5 mG/acetaminophen 325 mG 2 Tablet(s) Oral every 6 hours PRN Severe Pain (7 - 10)  zolpidem 5 milliGRAM(s) Oral at bedtime PRN Insomnia    LABS:                        10.2   23.45 )-----------( 733      ( 17 Jun 2019 06:15 )             30.9     06-17    142  |  108  |  12  ----------------------------<  122<H>  3.5   |  25  |  0.39<L>    Ca    7.3<L>      17 Jun 2019 06:15  Phos  3.4     06-17  Mg     1.8     06-16    TPro  4.9<L>  /  Alb  1.4<L>  /  TBili  0.3  /  DBili  x   /  AST  10<L>  /  ALT  15  /  AlkPhos  88  06-17

## 2019-06-17 NOTE — PROGRESS NOTE ADULT - PROBLEM SELECTOR PLAN 1
6/10 sp OR for lap total colectomy w end ileostomy  tolerating low res diet, will dc tpn and give d10 x 6 hrs as ordered  monitor fs while on d10  f/u crs recs  monitor exam  management of ostomy output as per sx  trend leukocytosis  oob as tolerated  will follow, plan dw attg and agreeable; elinor rn

## 2019-06-17 NOTE — ADVANCED PRACTICE NURSE CONSULT - ASSESSMENT
New end ileostomy moist, smooth, red, round about 1.5’, no odor, liquid effluent + BS four quadrants. Appliance intact and not due to be changed. Patient educated in what to expect during hospital stay, rectal pressure in common to sit on toilet if pressure in felt, low fiber diet, application of wafer and pouch. Common concerns pouch showing through clothing, showering, swimming. Patient states pouch I gave him to practice disappeared but he had a chance to practice with it and was able to provide a return demonstration of opening, emptying and closing/ securing pouch I offered to turn on ileostomy video he said later he will watch it.   Will continue to follow patient.   RN educated in this patients visit and will attempt to turn video when patients daughter arrived  later on today

## 2019-06-17 NOTE — PROGRESS NOTE ADULT - SUBJECTIVE AND OBJECTIVE BOX
Patient is a 63y old  Male who presents with a chief complaint of inability to eat and vomiting.    INTERVAL HPI/OVERNIGHT EVENTS: Pt states abd pain improving and tolerating solid diet without pain or nausea. Denies nausea, vomiting, fever, chills, CP, SOB. TPN discontinued today.    MEDICATIONS  (STANDING):  chlorhexidine 4% Liquid 1 Application(s) Topical <User Schedule>  ciprofloxacin   IVPB 400 milliGRAM(s) IV Intermittent every 12 hours  dextrose 10%. 250 milliLiter(s) (50 mL/Hr) IV Continuous <Continuous>  dextrose 5%. 1000 milliLiter(s) (50 mL/Hr) IV Continuous <Continuous>  dextrose 50% Injectable 12.5 Gram(s) IV Push once  dextrose 50% Injectable 25 Gram(s) IV Push once  dextrose 50% Injectable 25 Gram(s) IV Push once  diphenoxylate/atropine 1 Tablet(s) Oral daily  enoxaparin Injectable 40 milliGRAM(s) SubCutaneous daily  hydrocortisone sodium succinate Injectable 25 milliGRAM(s) IV Push every 12 hours  insulin lispro (HumaLOG) corrective regimen sliding scale   SubCutaneous three times a day before meals  insulin lispro (HumaLOG) corrective regimen sliding scale   SubCutaneous at bedtime  loperamide 4 milliGRAM(s) Oral two times a day  metroNIDAZOLE  IVPB 500 milliGRAM(s) IV Intermittent every 8 hours  mirtazapine 15 milliGRAM(s) Oral at bedtime  pantoprazole    Tablet 40 milliGRAM(s) Oral two times a day  sodium chloride 0.65% Nasal 1 Spray(s) Both Nostrils once    MEDICATIONS  (PRN):  acetaminophen   Tablet .. 650 milliGRAM(s) Oral every 6 hours PRN Mild Pain (1 - 3)  dextrose 40% Gel 15 Gram(s) Oral once PRN Blood Glucose LESS THAN 70 milliGRAM(s)/deciliter  glucagon  Injectable 1 milliGRAM(s) IntraMuscular once PRN Glucose LESS THAN 70 milligrams/deciliter  morphine  - Injectable 2 milliGRAM(s) IV Push every 4 hours PRN for breakthrough pain  naloxone Injectable 0.1 milliGRAM(s) IV Push every 3 minutes PRN For ANY of the following changes in patient status:  A. RR LESS THAN 10 breaths per minute, B. Oxygen saturation LESS THAN 90%, C. Sedation score of 6  ondansetron Injectable 4 milliGRAM(s) IV Push every 6 hours PRN Nausea  oxyCODONE    5 mG/acetaminophen 325 mG 1 Tablet(s) Oral every 6 hours PRN Moderate Pain (4 - 6)  oxyCODONE    5 mG/acetaminophen 325 mG 2 Tablet(s) Oral every 6 hours PRN Severe Pain (7 - 10)  zolpidem 5 milliGRAM(s) Oral at bedtime PRN Insomnia        Allergies    penicillin (Swelling)    Intolerances        REVIEW OF SYSTEMS:  CONSTITUTIONAL: No fever or chills  HEENT:  No headache, no sore throat  RESPIRATORY: No cough, wheezing, or shortness of breath  CARDIOVASCULAR: No chest pain, palpitations  GASTROINTESTINAL: improving abd pain, No nausea, vomiting, or diarrhea  GENITOURINARY: No dysuria, frequency, or hematuria  NEUROLOGICAL: no focal weakness or dizziness  MUSCULOSKELETAL: no myalgias      Vital Signs Last 24 Hrs  T(C): 36.7 (17 Jun 2019 07:29), Max: 37.6 (16 Jun 2019 15:54)  T(F): 98.1 (17 Jun 2019 07:29), Max: 99.6 (16 Jun 2019 15:54)  HR: 80 (17 Jun 2019 07:29) (80 - 83)  BP: 138/88 (17 Jun 2019 07:29) (122/80 - 138/88)  BP(mean): --  RR: 18 (17 Jun 2019 07:29) (18 - 19)  SpO2: 97% (17 Jun 2019 07:29) (95% - 98%)    PHYSICAL EXAM:  GENERAL: NAD at rest  HEENT:  anicteric, moist mucous membranes  CHEST/LUNG:  CTA b/l, no rales, wheezes, or rhonchi  HEART:  RRR, S1, S2  ABDOMEN:  +ileostomy with gas and brown liquid stool; BS+, soft, slight tenderness near incisions without guarding, nondistended ; dry, healing herpes zoster rash over dermatome on lower left abdomen   EXTREMITIES: no edema, cyanosis, or calf tenderness  NERVOUS SYSTEM: answers questions and follows commands appropriately    LABS:                                   10.2   23.45 )-----------( 733      ( 17 Jun 2019 06:15 )             30.9     CBC Full  -  ( 17 Jun 2019 06:15 )  WBC Count : 23.45 K/uL  Hemoglobin : 10.2 g/dL  Hematocrit : 30.9 %  Platelet Count - Automated : 733 K/uL  Mean Cell Volume : 85.6 fl  Mean Cell Hemoglobin : 28.3 pg  Mean Cell Hemoglobin Concentration : 33.0 gm/dL  Auto Neutrophil # : 19.46 K/uL  Auto Lymphocyte # : 1.41 K/uL  Auto Monocyte # : 1.41 K/uL  Auto Eosinophil # : 0.47 K/uL  Auto Basophil # : 0.00 K/uL  Auto Neutrophil % : 80.0 %  Auto Lymphocyte % : 6.0 %  Auto Monocyte % : 6.0 %  Auto Eosinophil % : 2.0 %  Auto Basophil % : 0.0 %    06-17    142  |  108  |  12  ----------------------------<  122<H>  3.5   |  25  |  0.39<L>    Ca    7.3<L>      17 Jun 2019 06:15  Phos  3.4     06-17  Mg     1.8     06-16    TPro  4.9<L>  /  Alb  1.4<L>  /  TBili  0.3  /  DBili  x   /  AST  10<L>  /  ALT  15  /  AlkPhos  88  06-17          CAPILLARY BLOOD GLUCOSE        POCT Blood Glucose.: 116 mg/dL (17 Jun 2019 11:13)  POCT Blood Glucose.: 128 mg/dL (17 Jun 2019 07:20)            RADIOLOGY & ADDITIONAL TESTS:    Personally reviewed.     Consultant(s) Notes Reviewed:  [x] YES  [ ] NO Patient is a 63y old  Male who presents with a chief complaint of inability to eat and vomiting.     INTERVAL HPI/OVERNIGHT EVENTS: Pt states abd pain improving and tolerating solid diet without pain or nausea. Denies nausea, vomiting, fever, chills, CP, SOB. TPN discontinued today.    MEDICATIONS  (STANDING):  chlorhexidine 4% Liquid 1 Application(s) Topical <User Schedule>  ciprofloxacin   IVPB 400 milliGRAM(s) IV Intermittent every 12 hours  dextrose 10%. 250 milliLiter(s) (50 mL/Hr) IV Continuous <Continuous>  dextrose 5%. 1000 milliLiter(s) (50 mL/Hr) IV Continuous <Continuous>  dextrose 50% Injectable 12.5 Gram(s) IV Push once  dextrose 50% Injectable 25 Gram(s) IV Push once  dextrose 50% Injectable 25 Gram(s) IV Push once  diphenoxylate/atropine 1 Tablet(s) Oral daily  enoxaparin Injectable 40 milliGRAM(s) SubCutaneous daily  hydrocortisone sodium succinate Injectable 25 milliGRAM(s) IV Push every 12 hours  insulin lispro (HumaLOG) corrective regimen sliding scale   SubCutaneous three times a day before meals  insulin lispro (HumaLOG) corrective regimen sliding scale   SubCutaneous at bedtime  loperamide 4 milliGRAM(s) Oral two times a day  metroNIDAZOLE  IVPB 500 milliGRAM(s) IV Intermittent every 8 hours  mirtazapine 15 milliGRAM(s) Oral at bedtime  pantoprazole    Tablet 40 milliGRAM(s) Oral two times a day  sodium chloride 0.65% Nasal 1 Spray(s) Both Nostrils once    MEDICATIONS  (PRN):  acetaminophen   Tablet .. 650 milliGRAM(s) Oral every 6 hours PRN Mild Pain (1 - 3)  dextrose 40% Gel 15 Gram(s) Oral once PRN Blood Glucose LESS THAN 70 milliGRAM(s)/deciliter  glucagon  Injectable 1 milliGRAM(s) IntraMuscular once PRN Glucose LESS THAN 70 milligrams/deciliter  morphine  - Injectable 2 milliGRAM(s) IV Push every 4 hours PRN for breakthrough pain  naloxone Injectable 0.1 milliGRAM(s) IV Push every 3 minutes PRN For ANY of the following changes in patient status:  A. RR LESS THAN 10 breaths per minute, B. Oxygen saturation LESS THAN 90%, C. Sedation score of 6  ondansetron Injectable 4 milliGRAM(s) IV Push every 6 hours PRN Nausea  oxyCODONE    5 mG/acetaminophen 325 mG 1 Tablet(s) Oral every 6 hours PRN Moderate Pain (4 - 6)  oxyCODONE    5 mG/acetaminophen 325 mG 2 Tablet(s) Oral every 6 hours PRN Severe Pain (7 - 10)  zolpidem 5 milliGRAM(s) Oral at bedtime PRN Insomnia        Allergies    penicillin (Swelling)    Intolerances        REVIEW OF SYSTEMS:  CONSTITUTIONAL: No fever or chills  HEENT:  No headache, no sore throat  RESPIRATORY: No cough, wheezing, or shortness of breath  CARDIOVASCULAR: No chest pain, palpitations  GASTROINTESTINAL: improving abd pain, No nausea, vomiting, or diarrhea  GENITOURINARY: No dysuria, frequency, or hematuria  NEUROLOGICAL: no focal weakness or dizziness  MUSCULOSKELETAL: no myalgias      Vital Signs Last 24 Hrs  T(C): 36.7 (17 Jun 2019 07:29), Max: 37.6 (16 Jun 2019 15:54)  T(F): 98.1 (17 Jun 2019 07:29), Max: 99.6 (16 Jun 2019 15:54)  HR: 80 (17 Jun 2019 07:29) (80 - 83)  BP: 138/88 (17 Jun 2019 07:29) (122/80 - 138/88)  BP(mean): --  RR: 18 (17 Jun 2019 07:29) (18 - 19)  SpO2: 97% (17 Jun 2019 07:29) (95% - 98%)    PHYSICAL EXAM:  GENERAL: NAD at rest  HEENT:  anicteric, moist mucous membranes  CHEST/LUNG:  CTA b/l, no rales, wheezes, or rhonchi  HEART:  RRR, S1, S2  ABDOMEN:  +ileostomy with gas and brown liquid stool; BS+, soft, slight tenderness near incisions without guarding, nondistended ; dry, healing herpes zoster rash over dermatome on lower left abdomen   EXTREMITIES: no edema, cyanosis, or calf tenderness  NERVOUS SYSTEM: answers questions and follows commands appropriately    LABS:                                   10.2   23.45 )-----------( 733      ( 17 Jun 2019 06:15 )             30.9     CBC Full  -  ( 17 Jun 2019 06:15 )  WBC Count : 23.45 K/uL  Hemoglobin : 10.2 g/dL  Hematocrit : 30.9 %  Platelet Count - Automated : 733 K/uL  Mean Cell Volume : 85.6 fl  Mean Cell Hemoglobin : 28.3 pg  Mean Cell Hemoglobin Concentration : 33.0 gm/dL  Auto Neutrophil # : 19.46 K/uL  Auto Lymphocyte # : 1.41 K/uL  Auto Monocyte # : 1.41 K/uL  Auto Eosinophil # : 0.47 K/uL  Auto Basophil # : 0.00 K/uL  Auto Neutrophil % : 80.0 %  Auto Lymphocyte % : 6.0 %  Auto Monocyte % : 6.0 %  Auto Eosinophil % : 2.0 %  Auto Basophil % : 0.0 %    06-17    142  |  108  |  12  ----------------------------<  122<H>  3.5   |  25  |  0.39<L>    Ca    7.3<L>      17 Jun 2019 06:15  Phos  3.4     06-17  Mg     1.8     06-16    TPro  4.9<L>  /  Alb  1.4<L>  /  TBili  0.3  /  DBili  x   /  AST  10<L>  /  ALT  15  /  AlkPhos  88  06-17          CAPILLARY BLOOD GLUCOSE        POCT Blood Glucose.: 116 mg/dL (17 Jun 2019 11:13)  POCT Blood Glucose.: 128 mg/dL (17 Jun 2019 07:20)            RADIOLOGY & ADDITIONAL TESTS:    Personally reviewed.     Consultant(s) Notes Reviewed:  [x] YES  [ ] NO Patient is a 63y old  Male who presents with a chief complaint of inability to eat and vomiting.     INTERVAL HPI/OVERNIGHT EVENTS: Pt states abd pain improving and tolerating solid diet without pain or nausea. Denies nausea, vomiting, fever, chills, CP, SOB. TPN discontinued today.     MEDICATIONS  (STANDING):  chlorhexidine 4% Liquid 1 Application(s) Topical <User Schedule>  ciprofloxacin   IVPB 400 milliGRAM(s) IV Intermittent every 12 hours  dextrose 10%. 250 milliLiter(s) (50 mL/Hr) IV Continuous <Continuous>  dextrose 5%. 1000 milliLiter(s) (50 mL/Hr) IV Continuous <Continuous>  dextrose 50% Injectable 12.5 Gram(s) IV Push once  dextrose 50% Injectable 25 Gram(s) IV Push once  dextrose 50% Injectable 25 Gram(s) IV Push once  diphenoxylate/atropine 1 Tablet(s) Oral daily  enoxaparin Injectable 40 milliGRAM(s) SubCutaneous daily  hydrocortisone sodium succinate Injectable 25 milliGRAM(s) IV Push every 12 hours  insulin lispro (HumaLOG) corrective regimen sliding scale   SubCutaneous three times a day before meals  insulin lispro (HumaLOG) corrective regimen sliding scale   SubCutaneous at bedtime  loperamide 4 milliGRAM(s) Oral two times a day  metroNIDAZOLE  IVPB 500 milliGRAM(s) IV Intermittent every 8 hours  mirtazapine 15 milliGRAM(s) Oral at bedtime  pantoprazole    Tablet 40 milliGRAM(s) Oral two times a day  sodium chloride 0.65% Nasal 1 Spray(s) Both Nostrils once    MEDICATIONS  (PRN):  acetaminophen   Tablet .. 650 milliGRAM(s) Oral every 6 hours PRN Mild Pain (1 - 3)  dextrose 40% Gel 15 Gram(s) Oral once PRN Blood Glucose LESS THAN 70 milliGRAM(s)/deciliter  glucagon  Injectable 1 milliGRAM(s) IntraMuscular once PRN Glucose LESS THAN 70 milligrams/deciliter  morphine  - Injectable 2 milliGRAM(s) IV Push every 4 hours PRN for breakthrough pain  naloxone Injectable 0.1 milliGRAM(s) IV Push every 3 minutes PRN For ANY of the following changes in patient status:  A. RR LESS THAN 10 breaths per minute, B. Oxygen saturation LESS THAN 90%, C. Sedation score of 6  ondansetron Injectable 4 milliGRAM(s) IV Push every 6 hours PRN Nausea  oxyCODONE    5 mG/acetaminophen 325 mG 1 Tablet(s) Oral every 6 hours PRN Moderate Pain (4 - 6)  oxyCODONE    5 mG/acetaminophen 325 mG 2 Tablet(s) Oral every 6 hours PRN Severe Pain (7 - 10)  zolpidem 5 milliGRAM(s) Oral at bedtime PRN Insomnia        Allergies    penicillin (Swelling)    Intolerances        REVIEW OF SYSTEMS:  CONSTITUTIONAL: No fever or chills  HEENT:  No headache, no sore throat  RESPIRATORY: No cough, wheezing, or shortness of breath  CARDIOVASCULAR: No chest pain, palpitations  GASTROINTESTINAL: improving abd pain, No nausea, vomiting, or diarrhea  GENITOURINARY: No dysuria, frequency, or hematuria  NEUROLOGICAL: no focal weakness or dizziness  MUSCULOSKELETAL: no myalgias      Vital Signs Last 24 Hrs  T(C): 36.7 (17 Jun 2019 07:29), Max: 37.6 (16 Jun 2019 15:54)  T(F): 98.1 (17 Jun 2019 07:29), Max: 99.6 (16 Jun 2019 15:54)  HR: 80 (17 Jun 2019 07:29) (80 - 83)  BP: 138/88 (17 Jun 2019 07:29) (122/80 - 138/88)  BP(mean): --  RR: 18 (17 Jun 2019 07:29) (18 - 19)  SpO2: 97% (17 Jun 2019 07:29) (95% - 98%)    PHYSICAL EXAM:  GENERAL: NAD at rest  HEENT:  anicteric, moist mucous membranes  CHEST/LUNG:  CTA b/l, no rales, wheezes, or rhonchi  HEART:  RRR, S1, S2  ABDOMEN:  +ileostomy with gas and brown liquid stool; BS+, soft, slight tenderness near incisions without guarding, nondistended ; dry, healing herpes zoster rash over dermatome on lower left abdomen   EXTREMITIES: no edema, cyanosis, or calf tenderness  NERVOUS SYSTEM: answers questions and follows commands appropriately    LABS:                                   10.2   23.45 )-----------( 733      ( 17 Jun 2019 06:15 )             30.9     CBC Full  -  ( 17 Jun 2019 06:15 )  WBC Count : 23.45 K/uL  Hemoglobin : 10.2 g/dL  Hematocrit : 30.9 %  Platelet Count - Automated : 733 K/uL  Mean Cell Volume : 85.6 fl  Mean Cell Hemoglobin : 28.3 pg  Mean Cell Hemoglobin Concentration : 33.0 gm/dL  Auto Neutrophil # : 19.46 K/uL  Auto Lymphocyte # : 1.41 K/uL  Auto Monocyte # : 1.41 K/uL  Auto Eosinophil # : 0.47 K/uL  Auto Basophil # : 0.00 K/uL  Auto Neutrophil % : 80.0 %  Auto Lymphocyte % : 6.0 %  Auto Monocyte % : 6.0 %  Auto Eosinophil % : 2.0 %  Auto Basophil % : 0.0 %    06-17    142  |  108  |  12  ----------------------------<  122<H>  3.5   |  25  |  0.39<L>    Ca    7.3<L>      17 Jun 2019 06:15  Phos  3.4     06-17  Mg     1.8     06-16    TPro  4.9<L>  /  Alb  1.4<L>  /  TBili  0.3  /  DBili  x   /  AST  10<L>  /  ALT  15  /  AlkPhos  88  06-17          CAPILLARY BLOOD GLUCOSE        POCT Blood Glucose.: 116 mg/dL (17 Jun 2019 11:13)  POCT Blood Glucose.: 128 mg/dL (17 Jun 2019 07:20)            RADIOLOGY & ADDITIONAL TESTS:    Personally reviewed.     Consultant(s) Notes Reviewed:  [x] YES  [ ] NO

## 2019-06-17 NOTE — PROGRESS NOTE ADULT - SUBJECTIVE AND OBJECTIVE BOX
INTERVAL HPI/OVERNIGHT EVENTS:  pt seen and examined  feels good  denies n/v/abd pain  tolerating reg diet  getting oob    MEDICATIONS  (STANDING):  chlorhexidine 4% Liquid 1 Application(s) Topical <User Schedule>  ciprofloxacin   IVPB 400 milliGRAM(s) IV Intermittent every 12 hours  dextrose 10%. 300 milliLiter(s) (50 mL/Hr) IV Continuous <Continuous>  dextrose 5%. 1000 milliLiter(s) (50 mL/Hr) IV Continuous <Continuous>  dextrose 50% Injectable 12.5 Gram(s) IV Push once  dextrose 50% Injectable 25 Gram(s) IV Push once  dextrose 50% Injectable 25 Gram(s) IV Push once  enoxaparin Injectable 40 milliGRAM(s) SubCutaneous daily  fat emulsion (Plant Based) 20% Infusion 0.55 Gm/kG/Day (14.346 mL/Hr) IV Continuous <Continuous>  hydrocortisone sodium succinate Injectable 50 milliGRAM(s) IV Push every 12 hours  insulin lispro (HumaLOG) corrective regimen sliding scale   SubCutaneous three times a day before meals  insulin lispro (HumaLOG) corrective regimen sliding scale   SubCutaneous at bedtime  metroNIDAZOLE  IVPB 500 milliGRAM(s) IV Intermittent every 8 hours  mirtazapine 15 milliGRAM(s) Oral at bedtime  pantoprazole    Tablet 40 milliGRAM(s) Oral two times a day  Parenteral Nutrition - Adult 1 Each (52 mL/Hr) TPN Continuous <Continuous>  sodium chloride 0.65% Nasal 1 Spray(s) Both Nostrils once    MEDICATIONS  (PRN):  acetaminophen   Tablet .. 650 milliGRAM(s) Oral every 6 hours PRN Mild Pain (1 - 3)  clonazePAM  Tablet 0.5 milliGRAM(s) Oral three times a day PRN anxiety  dextrose 40% Gel 15 Gram(s) Oral once PRN Blood Glucose LESS THAN 70 milliGRAM(s)/deciliter  glucagon  Injectable 1 milliGRAM(s) IntraMuscular once PRN Glucose LESS THAN 70 milligrams/deciliter  LORazepam   Injectable 0.5 milliGRAM(s) IV Push every 6 hours PRN Anxiety  morphine  - Injectable 2 milliGRAM(s) IV Push every 4 hours PRN for breakthrough pain  naloxone Injectable 0.1 milliGRAM(s) IV Push every 3 minutes PRN For ANY of the following changes in patient status:  A. RR LESS THAN 10 breaths per minute, B. Oxygen saturation LESS THAN 90%, C. Sedation score of 6  ondansetron Injectable 4 milliGRAM(s) IV Push every 6 hours PRN Nausea  oxyCODONE    5 mG/acetaminophen 325 mG 1 Tablet(s) Oral every 6 hours PRN Moderate Pain (4 - 6)  oxyCODONE    5 mG/acetaminophen 325 mG 2 Tablet(s) Oral every 6 hours PRN Severe Pain (7 - 10)  zolpidem 5 milliGRAM(s) Oral at bedtime PRN Insomnia      Allergies    penicillin (Swelling)    Intolerances        Review of Systems:    General:  No wt loss, fevers, chills, night sweats, fatigue   Eyes:  Good vision, no reported pain  ENT:  No sore throat, pain, runny nose, dysphagia  CV:  No pain, palpitations, hypo/hypertension  Resp:  No dyspnea, cough, tachypnea, wheezing  GI:  No pain, No nausea, No vomiting, No diarrhea, No constipation, No weight loss, No fever, No pruritis, No rectal bleeding, No melena, No dysphagia  :  No pain, bleeding, incontinence, nocturia  Muscle:  No pain, weakness  Neuro:  No weakness, tingling, memory problems  Psych:  No fatigue, insomnia, mood problems, depression  Endocrine:  No polyuria, polydypsia, cold/heat intolerance  Heme:  No petechiae, ecchymosis, easy bruisability  Skin:  No rash, tattoos, scars, edema      Vital Signs Last 24 Hrs  T(C): 36.7 (17 Jun 2019 07:29), Max: 37.6 (16 Jun 2019 15:54)  T(F): 98.1 (17 Jun 2019 07:29), Max: 99.6 (16 Jun 2019 15:54)  HR: 80 (17 Jun 2019 07:29) (80 - 83)  BP: 138/88 (17 Jun 2019 07:29) (122/80 - 138/88)  BP(mean): --  RR: 18 (17 Jun 2019 07:29) (18 - 19)  SpO2: 97% (17 Jun 2019 07:29) (95% - 98%)    PHYSICAL EXAM:    General:  lying in bed  HEENT:  NC/AT  Chest:  dec bs  Cardiovascular: s1 s2 rrr  Abdomen:  Soft, nt mild dt staple line c/d/i ostomy w liquid greenish stool  Extremities:  no le edemea  Neuro/Psych:  A&Ox3        LABS:                        10.2   23.45 )-----------( 733      ( 17 Jun 2019 06:15 )             30.9     06-17    142  |  108  |  12  ----------------------------<  122<H>  3.5   |  25  |  0.39<L>    Ca    7.3<L>      17 Jun 2019 06:15  Phos  3.4     06-17  Mg     1.8     06-16    TPro  4.9<L>  /  Alb  1.4<L>  /  TBili  0.3  /  DBili  x   /  AST  10<L>  /  ALT  15  /  AlkPhos  88  06-17          RADIOLOGY & ADDITIONAL TESTS:

## 2019-06-18 LAB
ALBUMIN SERPL ELPH-MCNC: 1.5 G/DL — LOW (ref 3.3–5)
ALP SERPL-CCNC: 115 U/L — SIGNIFICANT CHANGE UP (ref 40–120)
ALT FLD-CCNC: 17 U/L — SIGNIFICANT CHANGE UP (ref 12–78)
ANION GAP SERPL CALC-SCNC: 9 MMOL/L — SIGNIFICANT CHANGE UP (ref 5–17)
AST SERPL-CCNC: 16 U/L — SIGNIFICANT CHANGE UP (ref 15–37)
BILIRUB SERPL-MCNC: 0.2 MG/DL — SIGNIFICANT CHANGE UP (ref 0.2–1.2)
BUN SERPL-MCNC: 12 MG/DL — SIGNIFICANT CHANGE UP (ref 7–23)
CALCIUM SERPL-MCNC: 7.4 MG/DL — LOW (ref 8.5–10.1)
CHLORIDE SERPL-SCNC: 107 MMOL/L — SIGNIFICANT CHANGE UP (ref 96–108)
CO2 SERPL-SCNC: 24 MMOL/L — SIGNIFICANT CHANGE UP (ref 22–31)
CREAT SERPL-MCNC: 0.54 MG/DL — SIGNIFICANT CHANGE UP (ref 0.5–1.3)
GLUCOSE SERPL-MCNC: 85 MG/DL — SIGNIFICANT CHANGE UP (ref 70–99)
HCT VFR BLD CALC: 31.6 % — LOW (ref 39–50)
HGB BLD-MCNC: 10.3 G/DL — LOW (ref 13–17)
MAGNESIUM SERPL-MCNC: 1.8 MG/DL — SIGNIFICANT CHANGE UP (ref 1.6–2.6)
MCHC RBC-ENTMCNC: 28.2 PG — SIGNIFICANT CHANGE UP (ref 27–34)
MCHC RBC-ENTMCNC: 32.6 GM/DL — SIGNIFICANT CHANGE UP (ref 32–36)
MCV RBC AUTO: 86.6 FL — SIGNIFICANT CHANGE UP (ref 80–100)
NRBC # BLD: 0 /100 WBCS — SIGNIFICANT CHANGE UP (ref 0–0)
PHOSPHATE SERPL-MCNC: 3.9 MG/DL — SIGNIFICANT CHANGE UP (ref 2.5–4.5)
PLATELET # BLD AUTO: 801 K/UL — HIGH (ref 150–400)
POTASSIUM SERPL-MCNC: 3.4 MMOL/L — LOW (ref 3.5–5.3)
POTASSIUM SERPL-SCNC: 3.4 MMOL/L — LOW (ref 3.5–5.3)
PROT SERPL-MCNC: 5.3 G/DL — LOW (ref 6–8.3)
RBC # BLD: 3.65 M/UL — LOW (ref 4.2–5.8)
RBC # FLD: 16.9 % — HIGH (ref 10.3–14.5)
SODIUM SERPL-SCNC: 140 MMOL/L — SIGNIFICANT CHANGE UP (ref 135–145)
WBC # BLD: 21.47 K/UL — HIGH (ref 3.8–10.5)
WBC # FLD AUTO: 21.47 K/UL — HIGH (ref 3.8–10.5)

## 2019-06-18 PROCEDURE — 99233 SBSQ HOSP IP/OBS HIGH 50: CPT

## 2019-06-18 RX ORDER — LOPERAMIDE HCL 2 MG
2 TABLET ORAL
Qty: 80 | Refills: 0
Start: 2019-06-18

## 2019-06-18 RX ORDER — POTASSIUM CHLORIDE 20 MEQ
40 PACKET (EA) ORAL ONCE
Refills: 0 | Status: COMPLETED | OUTPATIENT
Start: 2019-06-18 | End: 2019-06-18

## 2019-06-18 RX ORDER — DIPHENOXYLATE HCL/ATROPINE 2.5-.025MG
1 TABLET ORAL
Qty: 20 | Refills: 0
Start: 2019-06-18

## 2019-06-18 RX ORDER — MIRTAZAPINE 45 MG/1
1 TABLET, ORALLY DISINTEGRATING ORAL
Qty: 20 | Refills: 0
Start: 2019-06-18

## 2019-06-18 RX ORDER — MAGNESIUM SULFATE 500 MG/ML
2 VIAL (ML) INJECTION ONCE
Refills: 0 | Status: COMPLETED | OUTPATIENT
Start: 2019-06-18 | End: 2019-06-18

## 2019-06-18 RX ADMIN — Medication 100 MILLIGRAM(S): at 05:08

## 2019-06-18 RX ADMIN — Medication 1 TABLET(S): at 12:47

## 2019-06-18 RX ADMIN — Medication 200 MILLIGRAM(S): at 05:12

## 2019-06-18 RX ADMIN — CHLORHEXIDINE GLUCONATE 1 APPLICATION(S): 213 SOLUTION TOPICAL at 11:29

## 2019-06-18 RX ADMIN — Medication 25 MILLIGRAM(S): at 05:08

## 2019-06-18 RX ADMIN — MIRTAZAPINE 15 MILLIGRAM(S): 45 TABLET, ORALLY DISINTEGRATING ORAL at 21:34

## 2019-06-18 RX ADMIN — ENOXAPARIN SODIUM 40 MILLIGRAM(S): 100 INJECTION SUBCUTANEOUS at 12:37

## 2019-06-18 RX ADMIN — PANTOPRAZOLE SODIUM 40 MILLIGRAM(S): 20 TABLET, DELAYED RELEASE ORAL at 05:10

## 2019-06-18 RX ADMIN — Medication 25 MILLIGRAM(S): at 17:40

## 2019-06-18 RX ADMIN — Medication 50 GRAM(S): at 12:38

## 2019-06-18 RX ADMIN — PANTOPRAZOLE SODIUM 40 MILLIGRAM(S): 20 TABLET, DELAYED RELEASE ORAL at 17:41

## 2019-06-18 RX ADMIN — Medication 4 MILLIGRAM(S): at 17:41

## 2019-06-18 RX ADMIN — Medication 4 MILLIGRAM(S): at 05:09

## 2019-06-18 RX ADMIN — Medication 40 MILLIEQUIVALENT(S): at 12:37

## 2019-06-18 NOTE — PROGRESS NOTE ADULT - NSHPATTENDINGPLANDISCUSS_GEN_ALL_CORE
pt, RN, SW, CM, residency overnight team, GI team - re: tx plan, dispo planning
pt, RN, family @ bedside - re: tx plan, plan for EGD Monday
pt, pt's family, consultants, primary CRS team.
pt, pt's family, consultants, primary CRS team.
pt, pt's mother, consultants, primary CRS team.
PA
pt, RN, SW, CM, residency overnight team, GI team - re: tx plan, dispo planning
pt, RN - re: tx plan, plan for EGD Monday
pt, RN, SW, CM, gi attending/team - re: tx plan, plan for EGD Monday
pt, RN, SW, CM, residency team, gi attending/team - re: tx plan
pt, RN, SW, CM, gi attending/team - re: tx plan, plan for EGD

## 2019-06-18 NOTE — PROGRESS NOTE ADULT - SUBJECTIVE AND OBJECTIVE BOX
Patient is a 63y old  Male who presents with a chief complaint of UC (18 Jun 2019 08:21)       INTERVAL HPI/OVERNIGHT EVENTS: No new events, complaints. Denies chest pain, palpitation, sob     MEDICATIONS  (STANDING):  chlorhexidine 4% Liquid 1 Application(s) Topical <User Schedule>  ciprofloxacin   IVPB 400 milliGRAM(s) IV Intermittent every 12 hours  dextrose 10%. 250 milliLiter(s) (50 mL/Hr) IV Continuous <Continuous>  dextrose 5%. 1000 milliLiter(s) (50 mL/Hr) IV Continuous <Continuous>  dextrose 50% Injectable 12.5 Gram(s) IV Push once  dextrose 50% Injectable 25 Gram(s) IV Push once  dextrose 50% Injectable 25 Gram(s) IV Push once  diphenoxylate/atropine 1 Tablet(s) Oral daily  enoxaparin Injectable 40 milliGRAM(s) SubCutaneous daily  hydrocortisone sodium succinate Injectable 25 milliGRAM(s) IV Push every 12 hours  insulin lispro (HumaLOG) corrective regimen sliding scale   SubCutaneous three times a day before meals  insulin lispro (HumaLOG) corrective regimen sliding scale   SubCutaneous at bedtime  loperamide 4 milliGRAM(s) Oral two times a day  magnesium sulfate  IVPB 2 Gram(s) IV Intermittent once  metroNIDAZOLE  IVPB 500 milliGRAM(s) IV Intermittent every 8 hours  mirtazapine 15 milliGRAM(s) Oral at bedtime  pantoprazole    Tablet 40 milliGRAM(s) Oral two times a day  potassium chloride    Tablet ER 40 milliEquivalent(s) Oral once  sodium chloride 0.65% Nasal 1 Spray(s) Both Nostrils once    MEDICATIONS  (PRN):  acetaminophen   Tablet .. 650 milliGRAM(s) Oral every 6 hours PRN Mild Pain (1 - 3)  dextrose 40% Gel 15 Gram(s) Oral once PRN Blood Glucose LESS THAN 70 milliGRAM(s)/deciliter  glucagon  Injectable 1 milliGRAM(s) IntraMuscular once PRN Glucose LESS THAN 70 milligrams/deciliter  morphine  - Injectable 2 milliGRAM(s) IV Push every 4 hours PRN for breakthrough pain  naloxone Injectable 0.1 milliGRAM(s) IV Push every 3 minutes PRN For ANY of the following changes in patient status:  A. RR LESS THAN 10 breaths per minute, B. Oxygen saturation LESS THAN 90%, C. Sedation score of 6  ondansetron Injectable 4 milliGRAM(s) IV Push every 6 hours PRN Nausea  oxyCODONE    5 mG/acetaminophen 325 mG 1 Tablet(s) Oral every 6 hours PRN Moderate Pain (4 - 6)  oxyCODONE    5 mG/acetaminophen 325 mG 2 Tablet(s) Oral every 6 hours PRN Severe Pain (7 - 10)  zolpidem 5 milliGRAM(s) Oral at bedtime PRN Insomnia      Allergies    penicillin (Swelling)    Intolerances        REVIEW OF SYSTEMS:  CONSTITUTIONAL: No fever or chills  HEENT:  No headache, no sore throat  RESPIRATORY: No cough, wheezing, or shortness of breath  CARDIOVASCULAR: No chest pain, palpitations  GASTROINTESTINAL: improving abd pain, No nausea, vomiting, or diarrhea  GENITOURINARY: No dysuria, frequency, or hematuria  NEUROLOGICAL: no focal weakness or dizziness  MUSCULOSKELETAL: no myalgias    Vital Signs Last 24 Hrs  T(C): 36.9 (18 Jun 2019 07:31), Max: 37.1 (17 Jun 2019 16:29)  T(F): 98.5 (18 Jun 2019 07:31), Max: 98.7 (17 Jun 2019 16:29)  HR: 83 (18 Jun 2019 07:31) (79 - 83)  BP: 123/79 (18 Jun 2019 07:31) (123/79 - 134/88)  BP(mean): --  RR: 18 (18 Jun 2019 07:31) (18 - 18)  SpO2: 95% (18 Jun 2019 07:31) (95% - 98%)    PHYSICAL EXAM:  GENERAL: NAD at rest  HEENT:  anicteric, moist mucous membranes  CHEST/LUNG:  CTA b/l, no rales, wheezes, or rhonchi  HEART:  RRR, S1, S2  ABDOMEN:  +ileostomy with gas and brown liquid stool; BS+, soft, slight tenderness near incisions without guarding, nondistended ; dry, healing herpes zoster rash over dermatome on lower left abdomen   EXTREMITIES: no edema, cyanosis, or calf tenderness  NERVOUS SYSTEM: answers questions and follows commands appropriately    LABS:                        10.3   21.47 )-----------( 801      ( 18 Jun 2019 07:15 )             31.6     18 Jun 2019 07:15    140    |  107    |  12     ----------------------------<  85     3.4     |  24     |  0.54     Ca    7.4        18 Jun 2019 07:15  Phos  3.9       18 Jun 2019 07:15  Mg     1.8       18 Jun 2019 07:15    TPro  5.3    /  Alb  1.5    /  TBili  0.2    /  DBili  x      /  AST  16     /  ALT  17     /  AlkPhos  115    18 Jun 2019 07:15      CAPILLARY BLOOD GLUCOSE      POCT Blood Glucose.: 128 mg/dL (17 Jun 2019 21:34)  POCT Blood Glucose.: 106 mg/dL (17 Jun 2019 17:04)  POCT Blood Glucose.: 116 mg/dL (17 Jun 2019 11:13)    BLOOD CULTURE    RADIOLOGY & ADDITIONAL TESTS:    Imaging Personally Reviewed:  [ ] YES     Consultant(s) Notes Reviewed:      Care Discussed with Consultants/Other Providers:

## 2019-06-18 NOTE — PROGRESS NOTE ADULT - ASSESSMENT
64 yo M s/p tac w/ ileostomy  - monitor ostomy output  - cont lrd  - oob/amb  - wound care  - d/c to rehab today vs tomorrow  - cont imodium/ lomitol    bhavana davis 62 yo M s/p tac w/ ileostomy  - monitor ostomy output  - cont lrd  - oob/amb  - wound care  - d/c to rehab today vs tomorrow  - cont imodium/ lomitol  - cont to taper barbra davis

## 2019-06-18 NOTE — CHART NOTE - NSCHARTNOTEFT_GEN_A_CORE
Patient seen and examined at bedside. Right arm antecubital region cleaned with alcohol swabs prior to removal. No sutures present. PICC line removed and pressure applied for 5-10 minutes with gauze. Hemastasis achieved, no signs of active bleeding noted. Gauze/tape used to create pressure dressing to maintain pressure on PICC line site. Patient tolerated well without complications. Will continue to follow. RN to call/page if any changes.

## 2019-06-18 NOTE — CHART NOTE - NSCHARTNOTEFT_GEN_A_CORE
Assessment: patient seen for malnutrition follow up. now with TPN discontinued on low fiber diet tolerating with good PO per patient . with ostomy output liquid brown. K 3.4 KCl will follow on supplement. patient educated on ostomy diet education with good understanding.     Factors impacting intake: [x ] none [ ] nausea  [ ] vomiting [ ] diarrhea [ ] constipation  [ ]chewing problems [ ] swallowing issues  [ ] other:     Diet Presciption: Diet, Low Fiber (06-16-19 @ 12:03)    Intake: good PO per patient     Current Weight: 6/12 wt 157.1# will follow for new weight      Pertinent Medications: MEDICATIONS  (STANDING):  chlorhexidine 4% Liquid 1 Application(s) Topical <User Schedule>  ciprofloxacin   IVPB 400 milliGRAM(s) IV Intermittent every 12 hours  dextrose 10%. 250 milliLiter(s) (50 mL/Hr) IV Continuous <Continuous>  dextrose 5%. 1000 milliLiter(s) (50 mL/Hr) IV Continuous <Continuous>  dextrose 50% Injectable 12.5 Gram(s) IV Push once  dextrose 50% Injectable 25 Gram(s) IV Push once  dextrose 50% Injectable 25 Gram(s) IV Push once  diphenoxylate/atropine 1 Tablet(s) Oral daily  enoxaparin Injectable 40 milliGRAM(s) SubCutaneous daily  hydrocortisone sodium succinate Injectable 25 milliGRAM(s) IV Push every 12 hours  insulin lispro (HumaLOG) corrective regimen sliding scale   SubCutaneous three times a day before meals  insulin lispro (HumaLOG) corrective regimen sliding scale   SubCutaneous at bedtime  loperamide 4 milliGRAM(s) Oral two times a day  magnesium sulfate  IVPB 2 Gram(s) IV Intermittent once  metroNIDAZOLE  IVPB 500 milliGRAM(s) IV Intermittent every 8 hours  mirtazapine 15 milliGRAM(s) Oral at bedtime  pantoprazole    Tablet 40 milliGRAM(s) Oral two times a day  potassium chloride    Tablet ER 40 milliEquivalent(s) Oral once  sodium chloride 0.65% Nasal 1 Spray(s) Both Nostrils once    MEDICATIONS  (PRN):  acetaminophen   Tablet .. 650 milliGRAM(s) Oral every 6 hours PRN Mild Pain (1 - 3)  dextrose 40% Gel 15 Gram(s) Oral once PRN Blood Glucose LESS THAN 70 milliGRAM(s)/deciliter  glucagon  Injectable 1 milliGRAM(s) IntraMuscular once PRN Glucose LESS THAN 70 milligrams/deciliter  morphine  - Injectable 2 milliGRAM(s) IV Push every 4 hours PRN for breakthrough pain  naloxone Injectable 0.1 milliGRAM(s) IV Push every 3 minutes PRN For ANY of the following changes in patient status:  A. RR LESS THAN 10 breaths per minute, B. Oxygen saturation LESS THAN 90%, C. Sedation score of 6  ondansetron Injectable 4 milliGRAM(s) IV Push every 6 hours PRN Nausea  oxyCODONE    5 mG/acetaminophen 325 mG 1 Tablet(s) Oral every 6 hours PRN Moderate Pain (4 - 6)  oxyCODONE    5 mG/acetaminophen 325 mG 2 Tablet(s) Oral every 6 hours PRN Severe Pain (7 - 10)  zolpidem 5 milliGRAM(s) Oral at bedtime PRN Insomnia    Pertinent Labs: 06-18 Na140 mmol/L Glu 85 mg/dL K+ 3.4 mmol/L<L> Cr  0.54 mg/dL BUN 12 mg/dL 06-18 Phos 3.9 mg/dL 06-18 Alb 1.5 g/dL<L> 06-01 PAB 3 mg/dL<L> 06-17 Chol --    LDL --    HDL --    Trig 156 mg/dL<H>     CAPILLARY BLOOD GLUCOSE      POCT Blood Glucose.: 128 mg/dL (17 Jun 2019 21:34)  POCT Blood Glucose.: 106 mg/dL (17 Jun 2019 17:04)  POCT Blood Glucose.: 116 mg/dL (17 Jun 2019 11:13)    Skin: chata 20 no pressure injury     Estimated Needs:   [x ] no change since previous assessment  [ ] recalculated:     Previous Nutrition Diagnosis:   [ ] Inadequate Energy Intake [ ]Inadequate Oral Intake [ ] Excessive Energy Intake   [ ] Underweight [ ] Increased Nutrient Needs [ ] Overweight/Obesity   [ x] Altered GI Function [ ] Unintended Weight Loss [ ] Food & Nutrition Related Knowledge Deficit [ x] Malnutrition moderate acute with PO intake improved     Nutrition Diagnosis is [x ] ongoing  [ ] resolved [ ] not applicable     New Nutrition Diagnosis: [ x] not applicable       Interventions:   Recommend  [ ] Change Diet To:  [ ] Nutrition Supplement  [ ] Nutrition Support  [ x] Other: continue diet as ordered if patient to remain in hospital suggest ensure enlive BID follow weights , patient left with copy of low fiber ostomy diet with RD name and phone #    Monitoring and Evaluation:   [x ] PO intake [ x ] Tolerance to diet prescription [ x ] weights [ x ] labs[ x ] follow up per protocol  [ ] other:

## 2019-06-18 NOTE — PROGRESS NOTE ADULT - PROBLEM SELECTOR PLAN 2
see above, now also post op  no evidence of overt gib, hgb stable  monitor cbc, transfuse prn  cont ppi bid

## 2019-06-18 NOTE — PROGRESS NOTE ADULT - PROBLEM SELECTOR PLAN 1
6/10 sp OR for lap total colectomy w end ileostomy  sp tpn, diet as per sx  f/u crs recs  cont lomotil/imodium, monitor ostomy output  trend leukocytosis  oob as tolerated  will follow

## 2019-06-18 NOTE — PROGRESS NOTE ADULT - SUBJECTIVE AND OBJECTIVE BOX
INTERVAL HPI/OVERNIGHT EVENTS:  pt seen and examined  feels good  denies n/v/abd pain  ate >75%of breakfast  output improved  off tpn    MEDICATIONS  (STANDING):  chlorhexidine 4% Liquid 1 Application(s) Topical <User Schedule>  ciprofloxacin   IVPB 400 milliGRAM(s) IV Intermittent every 12 hours  dextrose 10%. 250 milliLiter(s) (50 mL/Hr) IV Continuous <Continuous>  dextrose 5%. 1000 milliLiter(s) (50 mL/Hr) IV Continuous <Continuous>  dextrose 50% Injectable 12.5 Gram(s) IV Push once  dextrose 50% Injectable 25 Gram(s) IV Push once  dextrose 50% Injectable 25 Gram(s) IV Push once  diphenoxylate/atropine 1 Tablet(s) Oral daily  enoxaparin Injectable 40 milliGRAM(s) SubCutaneous daily  hydrocortisone sodium succinate Injectable 25 milliGRAM(s) IV Push every 12 hours  insulin lispro (HumaLOG) corrective regimen sliding scale   SubCutaneous three times a day before meals  insulin lispro (HumaLOG) corrective regimen sliding scale   SubCutaneous at bedtime  loperamide 4 milliGRAM(s) Oral two times a day  magnesium sulfate  IVPB 2 Gram(s) IV Intermittent once  metroNIDAZOLE  IVPB 500 milliGRAM(s) IV Intermittent every 8 hours  mirtazapine 15 milliGRAM(s) Oral at bedtime  pantoprazole    Tablet 40 milliGRAM(s) Oral two times a day  potassium chloride    Tablet ER 40 milliEquivalent(s) Oral once  sodium chloride 0.65% Nasal 1 Spray(s) Both Nostrils once    MEDICATIONS  (PRN):  acetaminophen   Tablet .. 650 milliGRAM(s) Oral every 6 hours PRN Mild Pain (1 - 3)  dextrose 40% Gel 15 Gram(s) Oral once PRN Blood Glucose LESS THAN 70 milliGRAM(s)/deciliter  glucagon  Injectable 1 milliGRAM(s) IntraMuscular once PRN Glucose LESS THAN 70 milligrams/deciliter  morphine  - Injectable 2 milliGRAM(s) IV Push every 4 hours PRN for breakthrough pain  naloxone Injectable 0.1 milliGRAM(s) IV Push every 3 minutes PRN For ANY of the following changes in patient status:  A. RR LESS THAN 10 breaths per minute, B. Oxygen saturation LESS THAN 90%, C. Sedation score of 6  ondansetron Injectable 4 milliGRAM(s) IV Push every 6 hours PRN Nausea  oxyCODONE    5 mG/acetaminophen 325 mG 1 Tablet(s) Oral every 6 hours PRN Moderate Pain (4 - 6)  oxyCODONE    5 mG/acetaminophen 325 mG 2 Tablet(s) Oral every 6 hours PRN Severe Pain (7 - 10)  zolpidem 5 milliGRAM(s) Oral at bedtime PRN Insomnia      Allergies    penicillin (Swelling)    Intolerances        Review of Systems:    General:  No wt loss, fevers, chills, night sweats, fatigue   Eyes:  Good vision, no reported pain  ENT:  No sore throat, pain, runny nose, dysphagia  CV:  No pain, palpitations, hypo/hypertension  Resp:  No dyspnea, cough, tachypnea, wheezing  GI:  No pain, No nausea, No vomiting, No diarrhea, No constipation, No weight loss, No fever, No pruritis, No rectal bleeding, No melena, No dysphagia  :  No pain, bleeding, incontinence, nocturia  Muscle:  No pain, weakness  Neuro:  No weakness, tingling, memory problems  Psych:  No fatigue, insomnia, mood problems, depression  Endocrine:  No polyuria, polydypsia, cold/heat intolerance  Heme:  No petechiae, ecchymosis, easy bruisability  Skin:  No rash, tattoos, scars, edema      Vital Signs Last 24 Hrs  T(C): 36.9 (18 Jun 2019 07:31), Max: 37.1 (17 Jun 2019 16:29)  T(F): 98.5 (18 Jun 2019 07:31), Max: 98.7 (17 Jun 2019 16:29)  HR: 83 (18 Jun 2019 07:31) (79 - 83)  BP: 123/79 (18 Jun 2019 07:31) (123/79 - 134/88)  BP(mean): --  RR: 18 (18 Jun 2019 07:31) (18 - 18)  SpO2: 95% (18 Jun 2019 07:31) (95% - 98%)    PHYSICAL EXAM:    General:  lying in bed  HEENT:  NC/AT  Chest:  dec bs  Cardiovascular: s1 s2 rrr  Abdomen:  Soft, nt nd staple line c/d/i ostomy w pasty stool and gas  Extremities:  no le edema   Neuro/Psych:  A&Ox3      LABS:                        10.3   21.47 )-----------( 801      ( 18 Jun 2019 07:15 )             31.6     06-18    140  |  107  |  12  ----------------------------<  85  3.4<L>   |  24  |  0.54    Ca    7.4<L>      18 Jun 2019 07:15  Phos  3.9     06-18  Mg     1.8     06-18    TPro  5.3<L>  /  Alb  1.5<L>  /  TBili  0.2  /  DBili  x   /  AST  16  /  ALT  17  /  AlkPhos  115  06-18          RADIOLOGY & ADDITIONAL TESTS:

## 2019-06-18 NOTE — PROGRESS NOTE ADULT - SUBJECTIVE AND OBJECTIVE BOX
GENERAL SURGERY DAILY PROGRESS NOTE:       Subjective:  Pt stable over night. Ostomy ouput 1100cc. Pt feeling well.   Herbie LRD, ambulating well as per patient.   Pt states wound leaked once yesterday.     Objective:  Gen- NAD  ABD- non tender ostomy viable with air and stool in bag  midline wound- mild erythema, i probed the wound wiht a q tip some min ss fluid was expressed, covered with a dry 4x4     MEDICATIONS  (STANDING):  chlorhexidine 4% Liquid 1 Application(s) Topical <User Schedule>  ciprofloxacin   IVPB 400 milliGRAM(s) IV Intermittent every 12 hours  dextrose 10%. 250 milliLiter(s) (50 mL/Hr) IV Continuous <Continuous>  dextrose 5%. 1000 milliLiter(s) (50 mL/Hr) IV Continuous <Continuous>  dextrose 50% Injectable 12.5 Gram(s) IV Push once  dextrose 50% Injectable 25 Gram(s) IV Push once  dextrose 50% Injectable 25 Gram(s) IV Push once  diphenoxylate/atropine 1 Tablet(s) Oral daily  enoxaparin Injectable 40 milliGRAM(s) SubCutaneous daily  hydrocortisone sodium succinate Injectable 25 milliGRAM(s) IV Push every 12 hours  insulin lispro (HumaLOG) corrective regimen sliding scale   SubCutaneous three times a day before meals  insulin lispro (HumaLOG) corrective regimen sliding scale   SubCutaneous at bedtime  loperamide 4 milliGRAM(s) Oral two times a day  metroNIDAZOLE  IVPB 500 milliGRAM(s) IV Intermittent every 8 hours  mirtazapine 15 milliGRAM(s) Oral at bedtime  pantoprazole    Tablet 40 milliGRAM(s) Oral two times a day  sodium chloride 0.65% Nasal 1 Spray(s) Both Nostrils once    MEDICATIONS  (PRN):  acetaminophen   Tablet .. 650 milliGRAM(s) Oral every 6 hours PRN Mild Pain (1 - 3)  dextrose 40% Gel 15 Gram(s) Oral once PRN Blood Glucose LESS THAN 70 milliGRAM(s)/deciliter  glucagon  Injectable 1 milliGRAM(s) IntraMuscular once PRN Glucose LESS THAN 70 milligrams/deciliter  morphine  - Injectable 2 milliGRAM(s) IV Push every 4 hours PRN for breakthrough pain  naloxone Injectable 0.1 milliGRAM(s) IV Push every 3 minutes PRN For ANY of the following changes in patient status:  A. RR LESS THAN 10 breaths per minute, B. Oxygen saturation LESS THAN 90%, C. Sedation score of 6  ondansetron Injectable 4 milliGRAM(s) IV Push every 6 hours PRN Nausea  oxyCODONE    5 mG/acetaminophen 325 mG 1 Tablet(s) Oral every 6 hours PRN Moderate Pain (4 - 6)  oxyCODONE    5 mG/acetaminophen 325 mG 2 Tablet(s) Oral every 6 hours PRN Severe Pain (7 - 10)  zolpidem 5 milliGRAM(s) Oral at bedtime PRN Insomnia      Vital Signs Last 24 Hrs  T(C): 36.9 (18 Jun 2019 07:31), Max: 37.1 (17 Jun 2019 16:29)  T(F): 98.5 (18 Jun 2019 07:31), Max: 98.7 (17 Jun 2019 16:29)  HR: 83 (18 Jun 2019 07:31) (79 - 83)  BP: 123/79 (18 Jun 2019 07:31) (123/79 - 134/88)  BP(mean): --  RR: 18 (18 Jun 2019 07:31) (18 - 18)  SpO2: 95% (18 Jun 2019 07:31) (95% - 98%)    I&O's Detail    17 Jun 2019 07:01  -  18 Jun 2019 07:00  --------------------------------------------------------  IN:    Solution: 200 mL    Solution: 200 mL  Total IN: 400 mL    OUT:    Ileostomy: 200 mL    Voided: 800 mL  Total OUT: 1000 mL    Total NET: -600 mL          Daily     Daily     LABS:                        10.3   21.47 )-----------( 801      ( 18 Jun 2019 07:15 )             31.6     06-18    140  |  107  |  12  ----------------------------<  85  3.4<L>   |  24  |  0.54    Ca    7.4<L>      18 Jun 2019 07:15  Phos  3.9     06-18  Mg     1.8     06-18    TPro  5.3<L>  /  Alb  1.5<L>  /  TBili  0.2  /  DBili  x   /  AST  16  /  ALT  17  /  AlkPhos  115  06-18

## 2019-06-18 NOTE — PROGRESS NOTE ADULT - ATTENDING COMMENTS
Monitor electrolytes.  Dc planning per primary team Monitor electrolytes.  Dc planning per primary team  D/w PCP Garett Nicholas to discharge patient off of Narcotics and Suboxone. F/u with him in the office in 3 to 5 days. D/w Surgical PA Arlen.  Patient is medically stable for discharge. Will sign off for now. Please call as needed.

## 2019-06-19 ENCOUNTER — TRANSCRIPTION ENCOUNTER (OUTPATIENT)
Age: 64
End: 2019-06-19

## 2019-06-19 VITALS
HEART RATE: 90 BPM | SYSTOLIC BLOOD PRESSURE: 123 MMHG | TEMPERATURE: 99 F | RESPIRATION RATE: 16 BRPM | DIASTOLIC BLOOD PRESSURE: 81 MMHG | OXYGEN SATURATION: 97 %

## 2019-06-19 PROCEDURE — 84100 ASSAY OF PHOSPHORUS: CPT

## 2019-06-19 PROCEDURE — 82746 ASSAY OF FOLIC ACID SERUM: CPT

## 2019-06-19 PROCEDURE — 99285 EMERGENCY DEPT VISIT HI MDM: CPT | Mod: 25

## 2019-06-19 PROCEDURE — 86480 TB TEST CELL IMMUN MEASURE: CPT

## 2019-06-19 PROCEDURE — 36573 INSJ PICC RS&I 5 YR+: CPT

## 2019-06-19 PROCEDURE — 80076 HEPATIC FUNCTION PANEL: CPT

## 2019-06-19 PROCEDURE — 80307 DRUG TEST PRSMV CHEM ANLYZR: CPT

## 2019-06-19 PROCEDURE — 97530 THERAPEUTIC ACTIVITIES: CPT

## 2019-06-19 PROCEDURE — 80053 COMPREHEN METABOLIC PANEL: CPT

## 2019-06-19 PROCEDURE — 88312 SPECIAL STAINS GROUP 1: CPT

## 2019-06-19 PROCEDURE — 96374 THER/PROPH/DIAG INJ IV PUSH: CPT

## 2019-06-19 PROCEDURE — 71045 X-RAY EXAM CHEST 1 VIEW: CPT

## 2019-06-19 PROCEDURE — 97161 PT EVAL LOW COMPLEX 20 MIN: CPT

## 2019-06-19 PROCEDURE — 76937 US GUIDE VASCULAR ACCESS: CPT

## 2019-06-19 PROCEDURE — 83735 ASSAY OF MAGNESIUM: CPT

## 2019-06-19 PROCEDURE — 86900 BLOOD TYPING SEROLOGIC ABO: CPT

## 2019-06-19 PROCEDURE — 93971 EXTREMITY STUDY: CPT

## 2019-06-19 PROCEDURE — 85610 PROTHROMBIN TIME: CPT

## 2019-06-19 PROCEDURE — 88344 IMHCHEM/IMCYTCHM EA MLT ANTB: CPT

## 2019-06-19 PROCEDURE — 96375 TX/PRO/DX INJ NEW DRUG ADDON: CPT

## 2019-06-19 PROCEDURE — 86901 BLOOD TYPING SEROLOGIC RH(D): CPT

## 2019-06-19 PROCEDURE — 84478 ASSAY OF TRIGLYCERIDES: CPT

## 2019-06-19 PROCEDURE — 82272 OCCULT BLD FECES 1-3 TESTS: CPT

## 2019-06-19 PROCEDURE — 85027 COMPLETE CBC AUTOMATED: CPT

## 2019-06-19 PROCEDURE — P9016: CPT

## 2019-06-19 PROCEDURE — 36430 TRANSFUSION BLD/BLD COMPNT: CPT

## 2019-06-19 PROCEDURE — 88305 TISSUE EXAM BY PATHOLOGIST: CPT

## 2019-06-19 PROCEDURE — 80048 BASIC METABOLIC PNL TOTAL CA: CPT

## 2019-06-19 PROCEDURE — 86850 RBC ANTIBODY SCREEN: CPT

## 2019-06-19 PROCEDURE — 82962 GLUCOSE BLOOD TEST: CPT

## 2019-06-19 PROCEDURE — C1889: CPT

## 2019-06-19 PROCEDURE — 88309 TISSUE EXAM BY PATHOLOGIST: CPT

## 2019-06-19 PROCEDURE — 83605 ASSAY OF LACTIC ACID: CPT

## 2019-06-19 PROCEDURE — 86923 COMPATIBILITY TEST ELECTRIC: CPT

## 2019-06-19 PROCEDURE — 81001 URINALYSIS AUTO W/SCOPE: CPT

## 2019-06-19 PROCEDURE — 84132 ASSAY OF SERUM POTASSIUM: CPT

## 2019-06-19 PROCEDURE — 82728 ASSAY OF FERRITIN: CPT

## 2019-06-19 PROCEDURE — 88342 IMHCHEM/IMCYTCHM 1ST ANTB: CPT

## 2019-06-19 PROCEDURE — 97116 GAIT TRAINING THERAPY: CPT

## 2019-06-19 PROCEDURE — 36415 COLL VENOUS BLD VENIPUNCTURE: CPT

## 2019-06-19 PROCEDURE — 84134 ASSAY OF PREALBUMIN: CPT

## 2019-06-19 PROCEDURE — 93005 ELECTROCARDIOGRAM TRACING: CPT

## 2019-06-19 PROCEDURE — 83690 ASSAY OF LIPASE: CPT

## 2019-06-19 PROCEDURE — 83550 IRON BINDING TEST: CPT

## 2019-06-19 PROCEDURE — 83540 ASSAY OF IRON: CPT

## 2019-06-19 PROCEDURE — 88313 SPECIAL STAINS GROUP 2: CPT

## 2019-06-19 PROCEDURE — 77001 FLUOROGUIDE FOR VEIN DEVICE: CPT

## 2019-06-19 PROCEDURE — C1751: CPT

## 2019-06-19 PROCEDURE — 82607 VITAMIN B-12: CPT

## 2019-06-19 PROCEDURE — 87040 BLOOD CULTURE FOR BACTERIA: CPT

## 2019-06-19 RX ADMIN — PANTOPRAZOLE SODIUM 40 MILLIGRAM(S): 20 TABLET, DELAYED RELEASE ORAL at 05:07

## 2019-06-19 RX ADMIN — Medication 4 MILLIGRAM(S): at 05:07

## 2019-06-19 RX ADMIN — Medication 25 MILLIGRAM(S): at 05:07
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
## 2019-06-19 NOTE — PROGRESS NOTE ADULT - SUBJECTIVE AND OBJECTIVE BOX
INTERVAL HPI/OVERNIGHT EVENTS:  pt seen and examined  feels well, offers no gi complaints  states he is for dc today  picc removed yesterday  no new labs    MEDICATIONS  (STANDING):  dextrose 10%. 250 milliLiter(s) (50 mL/Hr) IV Continuous <Continuous>  dextrose 5%. 1000 milliLiter(s) (50 mL/Hr) IV Continuous <Continuous>  dextrose 50% Injectable 12.5 Gram(s) IV Push once  dextrose 50% Injectable 25 Gram(s) IV Push once  dextrose 50% Injectable 25 Gram(s) IV Push once  diphenoxylate/atropine 1 Tablet(s) Oral daily  enoxaparin Injectable 40 milliGRAM(s) SubCutaneous daily  hydrocortisone sodium succinate Injectable 25 milliGRAM(s) IV Push every 12 hours  loperamide 4 milliGRAM(s) Oral two times a day  mirtazapine 15 milliGRAM(s) Oral at bedtime  pantoprazole    Tablet 40 milliGRAM(s) Oral two times a day  sodium chloride 0.65% Nasal 1 Spray(s) Both Nostrils once    MEDICATIONS  (PRN):  acetaminophen   Tablet .. 650 milliGRAM(s) Oral every 6 hours PRN Mild Pain (1 - 3)  dextrose 40% Gel 15 Gram(s) Oral once PRN Blood Glucose LESS THAN 70 milliGRAM(s)/deciliter  glucagon  Injectable 1 milliGRAM(s) IntraMuscular once PRN Glucose LESS THAN 70 milligrams/deciliter  morphine  - Injectable 2 milliGRAM(s) IV Push every 4 hours PRN for breakthrough pain  naloxone Injectable 0.1 milliGRAM(s) IV Push every 3 minutes PRN For ANY of the following changes in patient status:  A. RR LESS THAN 10 breaths per minute, B. Oxygen saturation LESS THAN 90%, C. Sedation score of 6  ondansetron Injectable 4 milliGRAM(s) IV Push every 6 hours PRN Nausea  oxyCODONE    5 mG/acetaminophen 325 mG 1 Tablet(s) Oral every 6 hours PRN Moderate Pain (4 - 6)  oxyCODONE    5 mG/acetaminophen 325 mG 2 Tablet(s) Oral every 6 hours PRN Severe Pain (7 - 10)  zolpidem 5 milliGRAM(s) Oral at bedtime PRN Insomnia      Allergies    penicillin (Swelling)    Intolerances        Review of Systems:    General:  No wt loss, fevers, chills, night sweats, fatigue   Eyes:  Good vision, no reported pain  ENT:  No sore throat, pain, runny nose, dysphagia  CV:  No pain, palpitations, hypo/hypertension  Resp:  No dyspnea, cough, tachypnea, wheezing  GI:  No pain, No nausea, No vomiting, No diarrhea, No constipation, No weight loss, No fever, No pruritis, No rectal bleeding, No melena, No dysphagia  :  No pain, bleeding, incontinence, nocturia  Muscle:  No pain, weakness  Neuro:  No weakness, tingling, memory problems  Psych:  No fatigue, insomnia, mood problems, depression  Endocrine:  No polyuria, polydypsia, cold/heat intolerance  Heme:  No petechiae, ecchymosis, easy bruisability  Skin:  No rash, tattoos, scars, edema      Vital Signs Last 24 Hrs  T(C): 37 (19 Jun 2019 07:59), Max: 37 (18 Jun 2019 16:17)  T(F): 98.6 (19 Jun 2019 07:59), Max: 98.6 (18 Jun 2019 16:17)  HR: 90 (19 Jun 2019 07:59) (71 - 90)  BP: 123/81 (19 Jun 2019 07:59) (122/78 - 132/82)  BP(mean): --  RR: 16 (19 Jun 2019 07:59) (16 - 18)  SpO2: 97% (19 Jun 2019 07:59) (96% - 99%)    PHYSICAL EXAM:    General:  lying in bed  HEENT:  NC/AT  Chest:  dec bs  Cardiovascular: s1 s2 rrr  Abdomen:  Soft, nt nd dressing w scant serosan stainage ostomy w pasty stool and gas  Extremities:  no le edema   Neuro/Psych:  A&Ox3        LABS:                        10.3   21.47 )-----------( 801      ( 18 Jun 2019 07:15 )             31.6     06-18    140  |  107  |  12  ----------------------------<  85  3.4<L>   |  24  |  0.54    Ca    7.4<L>      18 Jun 2019 07:15  Phos  3.9     06-18  Mg     1.8     06-18    TPro  5.3<L>  /  Alb  1.5<L>  /  TBili  0.2  /  DBili  x   /  AST  16  /  ALT  17  /  AlkPhos  115  06-18          RADIOLOGY & ADDITIONAL TESTS:

## 2019-06-19 NOTE — DISCHARGE NOTE NURSING/CASE MANAGEMENT/SOCIAL WORK - NSDCDPATPORTLINK_GEN_ALL_CORE
You can access the [facility], offered by [facility], by registering with the following website: [email or web address]

## 2019-06-19 NOTE — PROGRESS NOTE ADULT - PROBLEM SELECTOR PROBLEM 3
Dysphagia
Ulcerative colitis
Dysphagia
Ulcerative colitis
Dysphagia
Herpes zoster without complication
Hypokalemia
Ulcerative colitis
Hypokalemia
Dysphagia
Dysphagia
Hypokalemia
Drug abuse

## 2019-06-19 NOTE — PROGRESS NOTE ADULT - PROBLEM SELECTOR PROBLEM 1
Dysphagia
Dysphagia
Ulcerative colitis
Dysphagia
Latent tuberculosis
Ulcerative colitis
Proctocolitis
Hypokalemia
Ulcerative colitis
Proctocolitis
Ulcerative colitis
Dysphagia
Hypokalemia

## 2019-06-19 NOTE — PROGRESS NOTE ADULT - PROBLEM SELECTOR PLAN 1
6/10 sp OR for lap total colectomy w end ileostomy  diet as tolerated  cont lomotil/imodium  steroid taper  I/O's  trend leukocytosis  oob as tolerated  dc planning in progress w crs/gi op f/u

## 2019-06-19 NOTE — PROGRESS NOTE ADULT - PROBLEM SELECTOR PROBLEM 4
GERD (gastroesophageal reflux disease)
Herpes zoster without complication
GERD (gastroesophageal reflux disease)
GERD (gastroesophageal reflux disease)
Anxiety
GERD (gastroesophageal reflux disease)
Hypokalemia
Ulcerative colitis
Dysphagia
Drug abuse
Dysphagia

## 2019-06-19 NOTE — PROGRESS NOTE ADULT - REASON FOR ADMISSION
unable to eat
UC
unable to eat
inability to eat and vomiting
unable to eat

## 2019-06-25 ENCOUNTER — APPOINTMENT (OUTPATIENT)
Dept: INTERNAL MEDICINE | Facility: CLINIC | Age: 64
End: 2019-06-25
Payer: COMMERCIAL

## 2019-06-25 VITALS
DIASTOLIC BLOOD PRESSURE: 74 MMHG | OXYGEN SATURATION: 97 % | SYSTOLIC BLOOD PRESSURE: 118 MMHG | HEART RATE: 133 BPM | TEMPERATURE: 99.1 F | HEIGHT: 65 IN | RESPIRATION RATE: 14 BRPM

## 2019-06-25 DIAGNOSIS — B02.9 ZOSTER W/OUT COMPLICATIONS: ICD-10-CM

## 2019-06-25 PROCEDURE — 99214 OFFICE O/P EST MOD 30 MIN: CPT

## 2019-06-25 NOTE — PHYSICAL EXAM
[Well Nourished] : well nourished [No Acute Distress] : no acute distress [Well Developed] : well developed [Normal Voice/Communication] : normal voice/communication [Well-Appearing] : well-appearing [PERRL] : pupils equal round and reactive to light [EOMI] : extraocular movements intact [Normal Sclera/Conjunctiva] : normal sclera/conjunctiva [Normal Oropharynx] : the oropharynx was normal [Normal Outer Ear/Nose] : the outer ears and nose were normal in appearance [No Lymphadenopathy] : no lymphadenopathy [Supple] : supple [No JVD] : no jugular venous distention [Thyroid Normal, No Nodules] : the thyroid was normal and there were no nodules present [Clear to Auscultation] : lungs were clear to auscultation bilaterally [No Respiratory Distress] : no respiratory distress  [No Accessory Muscle Use] : no accessory muscle use [Normal Rate] : normal rate  [Normal S1, S2] : normal S1 and S2 [Regular Rhythm] : with a regular rhythm [No Murmur] : no murmur heard [No Carotid Bruits] : no carotid bruits [No Abdominal Bruit] : a ~M bruit was not heard ~T in the abdomen [No Varicosities] : no varicosities [No Edema] : there was no peripheral edema [No Extremity Clubbing/Cyanosis] : no extremity clubbing/cyanosis [Pedal Pulses Present] : the pedal pulses are present [No Palpable Aorta] : no palpable aorta [Non Tender] : non-tender [Soft] : abdomen soft [No Masses] : no abdominal mass palpated [Non-distended] : non-distended [No HSM] : no HSM [Normal Bowel Sounds] : normal bowel sounds [Normal Posterior Cervical Nodes] : no posterior cervical lymphadenopathy [No CVA Tenderness] : no CVA  tenderness [Normal Anterior Cervical Nodes] : no anterior cervical lymphadenopathy [No Joint Swelling] : no joint swelling [No Spinal Tenderness] : no spinal tenderness [No Rash] : no rash [Normal Gait] : normal gait [Grossly Normal Strength/Tone] : grossly normal strength/tone [Coordination Grossly Intact] : coordination grossly intact [Deep Tendon Reflexes (DTR)] : deep tendon reflexes were 2+ and symmetric [No Focal Deficits] : no focal deficits [Speech Grossly Normal] : speech grossly normal [Normal Affect] : the affect was normal [Memory Grossly Normal] : memory grossly normal [Normal Mood] : the mood was normal [Alert and Oriented x3] : oriented to person, place, and time [Normal Insight/Judgement] : insight and judgment were intact [de-identified] : colostomy bag

## 2019-06-25 NOTE — HISTORY OF PRESENT ILLNESS
[FreeTextEntry1] : Had pan colitis had Surgery by Dr. Velazquez \par also had shingles \par has abdominal pain

## 2019-06-25 NOTE — HEALTH RISK ASSESSMENT
[No] : No [No falls in past year] : Patient reported no falls in the past year [0] : 1) Little interest or pleasure doing things: Not at all (0) [] : No

## 2019-07-30 ENCOUNTER — RX RENEWAL (OUTPATIENT)
Age: 64
End: 2019-07-30

## 2019-08-02 ENCOUNTER — RX RENEWAL (OUTPATIENT)
Age: 64
End: 2019-08-02

## 2019-10-02 ENCOUNTER — APPOINTMENT (OUTPATIENT)
Dept: INTERNAL MEDICINE | Facility: CLINIC | Age: 64
End: 2019-10-02

## 2019-10-02 NOTE — PRE-OP CHECKLIST - SKIN PREP
10/02/19 1543   Discharge Assessment   Assessment Type Discharge Planning Assessment   Confirmed/corrected address and phone number on facesheet? Yes   Assessment information obtained from? Patient   Prior to hospitilization cognitive status: Alert/Oriented   Prior to hospitalization functional status: Wheelchair Bound   Current cognitive status: Alert/Oriented   Current Functional Status: Wheelchair Bound   Lives With child(gerald), adult   Able to Return to Prior Arrangements yes   Is patient able to care for self after discharge? Yes   Patient's perception of discharge disposition home or selfcare;home health   Readmission Within the Last 30 Days no previous admission in last 30 days   Patient currently being followed by outpatient case management? No   Patient currently receives any other outside agency services? Yes   Name and contact number of agency or person providing outside services [facility]   Is it the patient/care giver preference to resume care with the current outside agency? No   Equipment Currently Used at Home wheelchair;hospital bed;bedside commode   Do you have any problems affording any of your prescribed medications? No   Is the patient taking medications as prescribed? yes   Does the patient have transportation home? Yes   Dialysis Name and Scheduled days [facility] on aging for transportation   Does the patient receive services at the [facility]? No   Discharge Plan A Home;Home with family;Home Health   DME Needed Upon Discharge  none   Patient/Family in Agreement with Plan yes     Shira Kaur, RN, CCM, CMSRN  RN Transition Navigator  [phone]       Augusto Wipes Done on 6/10/19 @0538/done

## 2019-10-04 ENCOUNTER — APPOINTMENT (OUTPATIENT)
Dept: INTERNAL MEDICINE | Facility: CLINIC | Age: 64
End: 2019-10-04
Payer: COMMERCIAL

## 2019-10-04 VITALS
SYSTOLIC BLOOD PRESSURE: 124 MMHG | OXYGEN SATURATION: 97 % | TEMPERATURE: 98.3 F | WEIGHT: 150 LBS | HEIGHT: 65 IN | DIASTOLIC BLOOD PRESSURE: 72 MMHG | BODY MASS INDEX: 24.99 KG/M2 | RESPIRATION RATE: 14 BRPM | HEART RATE: 86 BPM

## 2019-10-04 DIAGNOSIS — R25.1 TREMOR, UNSPECIFIED: ICD-10-CM

## 2019-10-04 PROCEDURE — 99214 OFFICE O/P EST MOD 30 MIN: CPT

## 2019-10-04 NOTE — PHYSICAL EXAM
[No Acute Distress] : no acute distress [Well Nourished] : well nourished [Well Developed] : well developed [Well-Appearing] : well-appearing [Normal Sclera/Conjunctiva] : normal sclera/conjunctiva [PERRL] : pupils equal round and reactive to light [EOMI] : extraocular movements intact [Normal Outer Ear/Nose] : the outer ears and nose were normal in appearance [Normal Oropharynx] : the oropharynx was normal [No JVD] : no jugular venous distention [No Lymphadenopathy] : no lymphadenopathy [Thyroid Normal, No Nodules] : the thyroid was normal and there were no nodules present [Supple] : supple [No Respiratory Distress] : no respiratory distress  [No Accessory Muscle Use] : no accessory muscle use [Clear to Auscultation] : lungs were clear to auscultation bilaterally [Normal Rate] : normal rate  [Regular Rhythm] : with a regular rhythm [Normal S1, S2] : normal S1 and S2 [No Murmur] : no murmur heard [No Carotid Bruits] : no carotid bruits [No Abdominal Bruit] : a ~M bruit was not heard ~T in the abdomen [No Varicosities] : no varicosities [Pedal Pulses Present] : the pedal pulses are present [No Palpable Aorta] : no palpable aorta [No Edema] : there was no peripheral edema [No Extremity Clubbing/Cyanosis] : no extremity clubbing/cyanosis [Soft] : abdomen soft [Non Tender] : non-tender [Non-distended] : non-distended [No HSM] : no HSM [No Masses] : no abdominal mass palpated [Normal Bowel Sounds] : normal bowel sounds [Normal Posterior Cervical Nodes] : no posterior cervical lymphadenopathy [Normal Anterior Cervical Nodes] : no anterior cervical lymphadenopathy [No CVA Tenderness] : no CVA  tenderness [No Spinal Tenderness] : no spinal tenderness [No Joint Swelling] : no joint swelling [Grossly Normal Strength/Tone] : grossly normal strength/tone [No Rash] : no rash [Coordination Grossly Intact] : coordination grossly intact [No Focal Deficits] : no focal deficits [Normal Gait] : normal gait [Deep Tendon Reflexes (DTR)] : deep tendon reflexes were 2+ and symmetric [Normal Affect] : the affect was normal [Normal Insight/Judgement] : insight and judgment were intact [de-identified] : positive Colostomy [de-identified] : tremors

## 2019-11-12 ENCOUNTER — RX RENEWAL (OUTPATIENT)
Age: 64
End: 2019-11-12

## 2019-11-18 ENCOUNTER — RX RENEWAL (OUTPATIENT)
Age: 64
End: 2019-11-18

## 2019-12-11 ENCOUNTER — APPOINTMENT (OUTPATIENT)
Dept: INTERNAL MEDICINE | Facility: CLINIC | Age: 64
End: 2019-12-11

## 2020-02-28 ENCOUNTER — APPOINTMENT (OUTPATIENT)
Dept: INTERNAL MEDICINE | Facility: CLINIC | Age: 65
End: 2020-02-28
Payer: COMMERCIAL

## 2020-02-28 VITALS
SYSTOLIC BLOOD PRESSURE: 130 MMHG | HEART RATE: 75 BPM | WEIGHT: 160 LBS | HEIGHT: 65 IN | BODY MASS INDEX: 26.66 KG/M2 | TEMPERATURE: 98.2 F | DIASTOLIC BLOOD PRESSURE: 82 MMHG | RESPIRATION RATE: 14 BRPM | OXYGEN SATURATION: 98 %

## 2020-02-28 PROCEDURE — 99214 OFFICE O/P EST MOD 30 MIN: CPT

## 2020-02-28 NOTE — PHYSICAL EXAM
[No Acute Distress] : no acute distress [Well Nourished] : well nourished [Well Developed] : well developed [Well-Appearing] : well-appearing [Normal Voice/Communication] : normal voice/communication [Normal Sclera/Conjunctiva] : normal sclera/conjunctiva [PERRL] : pupils equal round and reactive to light [EOMI] : extraocular movements intact [Normal Outer Ear/Nose] : the outer ears and nose were normal in appearance [Normal Oropharynx] : the oropharynx was normal [No JVD] : no jugular venous distention [No Lymphadenopathy] : no lymphadenopathy [Supple] : supple [Thyroid Normal, No Nodules] : the thyroid was normal and there were no nodules present [No Respiratory Distress] : no respiratory distress  [No Accessory Muscle Use] : no accessory muscle use [Clear to Auscultation] : lungs were clear to auscultation bilaterally [Normal Rate] : normal rate  [Regular Rhythm] : with a regular rhythm [Normal S1, S2] : normal S1 and S2 [No Murmur] : no murmur heard [No Carotid Bruits] : no carotid bruits [No Abdominal Bruit] : a ~M bruit was not heard ~T in the abdomen [No Varicosities] : no varicosities [Pedal Pulses Present] : the pedal pulses are present [No Edema] : there was no peripheral edema [No Palpable Aorta] : no palpable aorta [No Extremity Clubbing/Cyanosis] : no extremity clubbing/cyanosis [Soft] : abdomen soft [Non Tender] : non-tender [Non-distended] : non-distended [No Masses] : no abdominal mass palpated [No HSM] : no HSM [Normal Bowel Sounds] : normal bowel sounds [Normal Supraclavicular Nodes] : no supraclavicular lymphadenopathy [Normal Posterior Cervical Nodes] : no posterior cervical lymphadenopathy [Normal Anterior Cervical Nodes] : no anterior cervical lymphadenopathy [No CVA Tenderness] : no CVA  tenderness [No Spinal Tenderness] : no spinal tenderness [No Joint Swelling] : no joint swelling [Grossly Normal Strength/Tone] : grossly normal strength/tone [No Rash] : no rash [Coordination Grossly Intact] : coordination grossly intact [No Focal Deficits] : no focal deficits [Normal Gait] : normal gait [Deep Tendon Reflexes (DTR)] : deep tendon reflexes were 2+ and symmetric [Memory Grossly Normal] : memory grossly normal [Speech Grossly Normal] : speech grossly normal [Normal Affect] : the affect was normal [Alert and Oriented x3] : oriented to person, place, and time [Normal Mood] : the mood was normal [Normal Insight/Judgement] : insight and judgment were intact [de-identified] : Colostomy

## 2020-02-29 ENCOUNTER — TRANSCRIPTION ENCOUNTER (OUTPATIENT)
Age: 65
End: 2020-02-29

## 2020-04-27 ENCOUNTER — RX RENEWAL (OUTPATIENT)
Age: 65
End: 2020-04-27

## 2020-05-12 ENCOUNTER — RX RENEWAL (OUTPATIENT)
Age: 65
End: 2020-05-12

## 2020-05-12 RX ORDER — ERGOCALCIFEROL 1.25 MG/1
1.25 MG CAPSULE, LIQUID FILLED ORAL
Qty: 4 | Refills: 5 | Status: DISCONTINUED | COMMUNITY
Start: 2017-04-03 | End: 2020-05-12

## 2020-06-23 ENCOUNTER — LABORATORY RESULT (OUTPATIENT)
Age: 65
End: 2020-06-23

## 2020-06-23 ENCOUNTER — APPOINTMENT (OUTPATIENT)
Dept: INTERNAL MEDICINE | Facility: CLINIC | Age: 65
End: 2020-06-23
Payer: COMMERCIAL

## 2020-06-23 ENCOUNTER — NON-APPOINTMENT (OUTPATIENT)
Age: 65
End: 2020-06-23

## 2020-06-23 VITALS
RESPIRATION RATE: 14 BRPM | SYSTOLIC BLOOD PRESSURE: 136 MMHG | OXYGEN SATURATION: 98 % | WEIGHT: 170 LBS | BODY MASS INDEX: 28.32 KG/M2 | HEART RATE: 88 BPM | HEIGHT: 65 IN | TEMPERATURE: 97.9 F | DIASTOLIC BLOOD PRESSURE: 84 MMHG

## 2020-06-23 PROCEDURE — 93000 ELECTROCARDIOGRAM COMPLETE: CPT

## 2020-06-23 PROCEDURE — 36415 COLL VENOUS BLD VENIPUNCTURE: CPT

## 2020-06-23 PROCEDURE — 99396 PREV VISIT EST AGE 40-64: CPT | Mod: 25

## 2020-06-23 NOTE — HEALTH RISK ASSESSMENT
[No] : In the past 12 months have you used drugs other than those required for medical reasons? No [No falls in past year] : Patient reported no falls in the past year [0] : 2) Feeling down, depressed, or hopeless: Not at all (0) [] : No [[id number]] : 0

## 2020-06-23 NOTE — PHYSICAL EXAM
[No Acute Distress] : no acute distress [Well Nourished] : well nourished [Well Developed] : well developed [Well-Appearing] : well-appearing [Normal Voice/Communication] : normal voice/communication [Normal Sclera/Conjunctiva] : normal sclera/conjunctiva [PERRL] : pupils equal round and reactive to light [EOMI] : extraocular movements intact [Normal Outer Ear/Nose] : the outer ears and nose were normal in appearance [Normal TMs] : both tympanic membranes were normal [Normal Oropharynx] : the oropharynx was normal [No JVD] : no jugular venous distention [No Lymphadenopathy] : no lymphadenopathy [Supple] : supple [Thyroid Normal, No Nodules] : the thyroid was normal and there were no nodules present [No Respiratory Distress] : no respiratory distress  [No Accessory Muscle Use] : no accessory muscle use [Clear to Auscultation] : lungs were clear to auscultation bilaterally [Normal Rate] : normal rate  [Regular Rhythm] : with a regular rhythm [Normal S1, S2] : normal S1 and S2 [No Murmur] : no murmur heard [No Carotid Bruits] : no carotid bruits [No Varicosities] : no varicosities [No Abdominal Bruit] : a ~M bruit was not heard ~T in the abdomen [Pedal Pulses Present] : the pedal pulses are present [No Edema] : there was no peripheral edema [No Palpable Aorta] : no palpable aorta [No Extremity Clubbing/Cyanosis] : no extremity clubbing/cyanosis [Normal Appearance] : normal in appearance [Soft] : abdomen soft [Non Tender] : non-tender [Non-distended] : non-distended [No Masses] : no abdominal mass palpated [No HSM] : no HSM [Normal Bowel Sounds] : normal bowel sounds [No Hernias] : no hernias [Normal Supraclavicular Nodes] : no supraclavicular lymphadenopathy [Normal Axillary Nodes] : no axillary lymphadenopathy [Normal Posterior Cervical Nodes] : no posterior cervical lymphadenopathy [Normal Anterior Cervical Nodes] : no anterior cervical lymphadenopathy [No CVA Tenderness] : no CVA  tenderness [No Spinal Tenderness] : no spinal tenderness [No Joint Swelling] : no joint swelling [Grossly Normal Strength/Tone] : grossly normal strength/tone [No Rash] : no rash [Coordination Grossly Intact] : coordination grossly intact [No Focal Deficits] : no focal deficits [Normal Gait] : normal gait [Deep Tendon Reflexes (DTR)] : deep tendon reflexes were 2+ and symmetric [Speech Grossly Normal] : speech grossly normal [Memory Grossly Normal] : memory grossly normal [Normal Affect] : the affect was normal [Alert and Oriented x3] : oriented to person, place, and time [Normal Mood] : the mood was normal [Normal Insight/Judgement] : insight and judgment were intact [de-identified] : Colostomy

## 2020-06-25 LAB
25(OH)D3 SERPL-MCNC: 39.8 NG/ML
ALBUMIN SERPL ELPH-MCNC: 4.9 G/DL
ALP BLD-CCNC: 112 U/L
ALT SERPL-CCNC: 27 U/L
ANION GAP SERPL CALC-SCNC: 16 MMOL/L
APPEARANCE: ABNORMAL
AST SERPL-CCNC: 23 U/L
BASOPHILS # BLD AUTO: 0.12 K/UL
BASOPHILS NFR BLD AUTO: 1.6 %
BILIRUB SERPL-MCNC: 0.5 MG/DL
BILIRUBIN URINE: NEGATIVE
BLOOD URINE: NEGATIVE
BUN SERPL-MCNC: 13 MG/DL
CALCIUM SERPL-MCNC: 9.7 MG/DL
CHLORIDE SERPL-SCNC: 102 MMOL/L
CHOLEST SERPL-MCNC: 184 MG/DL
CHOLEST/HDLC SERPL: 2.4 RATIO
CK SERPL-CCNC: 113 U/L
CO2 SERPL-SCNC: 23 MMOL/L
COLOR: YELLOW
CREAT SERPL-MCNC: 1.05 MG/DL
EOSINOPHIL # BLD AUTO: 0.13 K/UL
EOSINOPHIL NFR BLD AUTO: 1.7 %
ESTIMATED AVERAGE GLUCOSE: 117 MG/DL
GLUCOSE QUALITATIVE U: NEGATIVE
GLUCOSE SERPL-MCNC: 92 MG/DL
HBA1C MFR BLD HPLC: 5.7 %
HCT VFR BLD CALC: 49 %
HDLC SERPL-MCNC: 77 MG/DL
HGB BLD-MCNC: 16.2 G/DL
IMM GRANULOCYTES NFR BLD AUTO: 0.3 %
KETONES URINE: NORMAL
LDLC SERPL CALC-MCNC: 92 MG/DL
LEUKOCYTE ESTERASE URINE: NEGATIVE
LYMPHOCYTES # BLD AUTO: 2.16 K/UL
LYMPHOCYTES NFR BLD AUTO: 28.5 %
MAN DIFF?: NORMAL
MCHC RBC-ENTMCNC: 30 PG
MCHC RBC-ENTMCNC: 33.1 GM/DL
MCV RBC AUTO: 90.7 FL
MONOCYTES # BLD AUTO: 0.95 K/UL
MONOCYTES NFR BLD AUTO: 12.5 %
NEUTROPHILS # BLD AUTO: 4.21 K/UL
NEUTROPHILS NFR BLD AUTO: 55.4 %
NITRITE URINE: NEGATIVE
PH URINE: 5.5
PLATELET # BLD AUTO: 214 K/UL
POTASSIUM SERPL-SCNC: 4.6 MMOL/L
PROT SERPL-MCNC: 7.2 G/DL
PROTEIN URINE: NORMAL
RBC # BLD: 5.4 M/UL
RBC # FLD: 14.6 %
SODIUM SERPL-SCNC: 140 MMOL/L
SPECIFIC GRAVITY URINE: 1.03
TRIGL SERPL-MCNC: 76 MG/DL
TSH SERPL-ACNC: 1.85 UIU/ML
UROBILINOGEN URINE: NORMAL
WBC # FLD AUTO: 7.59 K/UL

## 2020-07-15 NOTE — PATIENT PROFILE ADULT - HARM RISK FACTORS
Detail Level: Detailed
Add 07025 Cpt? (Important Note: In 2017 The Use Of 31630 Is Being Tracked By Cms To Determine Future Global Period Reimbursement For Global Periods): no
no

## 2020-07-19 ENCOUNTER — RX RENEWAL (OUTPATIENT)
Age: 65
End: 2020-07-19

## 2020-07-20 ENCOUNTER — APPOINTMENT (OUTPATIENT)
Dept: UROLOGY | Facility: CLINIC | Age: 65
End: 2020-07-20

## 2020-08-03 ENCOUNTER — APPOINTMENT (OUTPATIENT)
Dept: UROLOGY | Facility: CLINIC | Age: 65
End: 2020-08-03
Payer: COMMERCIAL

## 2020-08-03 VITALS
WEIGHT: 170 LBS | HEIGHT: 65 IN | HEART RATE: 77 BPM | OXYGEN SATURATION: 96 % | SYSTOLIC BLOOD PRESSURE: 124 MMHG | DIASTOLIC BLOOD PRESSURE: 84 MMHG | BODY MASS INDEX: 28.32 KG/M2

## 2020-08-03 DIAGNOSIS — N13.8 BENIGN PROSTATIC HYPERPLASIA WITH LOWER URINARY TRACT SYMPMS: ICD-10-CM

## 2020-08-03 DIAGNOSIS — N40.1 BENIGN PROSTATIC HYPERPLASIA WITH LOWER URINARY TRACT SYMPMS: ICD-10-CM

## 2020-08-03 PROCEDURE — 99214 OFFICE O/P EST MOD 30 MIN: CPT | Mod: 25

## 2020-08-03 PROCEDURE — 51798 US URINE CAPACITY MEASURE: CPT

## 2020-08-03 NOTE — PHYSICAL EXAM
[Scrotum] : the scrotum was normal [Epididymis] : the epididymides were normal [Urethral Meatus] : meatus normal [Penis Abnormality] : normal circumcised penis [Testes Tenderness] : no tenderness of the testes [Testes Mass (___cm)] : there were no testicular masses [Prostate Tenderness] : the prostate was not tender [No Prostate Nodules] : no prostate nodules [Prostate Size ___ gm] : prostate size [unfilled] gm [General Appearance - In No Acute Distress] : no acute distress [General Appearance - Well Developed] : well developed [Normal Appearance] : normal appearance [Abdomen Soft] : soft [Abdomen Mass (___ Cm)] : no abdominal mass palpated [Abdomen Tenderness] : non-tender [Costovertebral Angle Tenderness] : no ~M costovertebral angle tenderness [Skin Color & Pigmentation] : normal skin color and pigmentation [] : no respiratory distress [Oriented To Time, Place, And Person] : oriented to person, place, and time [No Focal Deficits] : no focal deficits [FreeTextEntry1] : normal peripheral circulation

## 2020-08-03 NOTE — HISTORY OF PRESENT ILLNESS
[FreeTextEntry1] : 65 yo male presents for follow up. \par Had Ileostomy for UC in June 2019 was not able to follow up. \par Reports reasonable stream, urinates every few hours or so during the day. Nocturia of 0-1 x. \par Endorses occasional hesitancy and intermittency when bladder full. \par Denies urgency, incontinence, sense of incomplete emptying.\par Denies dysuria, hematuria, lower abdominal or flank pain, fever, chills or rigors.\par Normal erections and libido. \par \par Initially seen for Elevated PSA. \par Denied any recent unintentional weight loss, night sweats and new bone or back pain.\par Family history of Prostate cancer- no. Father had a growth which was surgically removed- benign. \par Reported reasonable stream, urinates every few hours or so during the day. Nocturia of 1 x. \par Endorsed occasional hesitancy and intermittency when bladder full. \par Denied urgency, incontinence, sense of incomplete emptying.\par Denied dysuria, hematuria, lower abdominal or flank pain, fever, chills or rigors.\par Rated erections as 5/5. Has off and on problem maintaining erections. Reports normal libido. Has not tried Cialis/Viagra. \par \par

## 2020-09-15 ENCOUNTER — APPOINTMENT (OUTPATIENT)
Dept: INTERNAL MEDICINE | Facility: CLINIC | Age: 65
End: 2020-09-15
Payer: COMMERCIAL

## 2020-09-15 ENCOUNTER — MED ADMIN CHARGE (OUTPATIENT)
Age: 65
End: 2020-09-15

## 2020-09-15 DIAGNOSIS — Z23 ENCOUNTER FOR IMMUNIZATION: ICD-10-CM

## 2020-09-15 PROCEDURE — 90686 IIV4 VACC NO PRSV 0.5 ML IM: CPT

## 2020-09-15 PROCEDURE — G0008: CPT

## 2020-09-15 PROCEDURE — 90472 IMMUNIZATION ADMIN EACH ADD: CPT | Mod: 59

## 2020-09-15 PROCEDURE — 90750 HZV VACC RECOMBINANT IM: CPT

## 2020-10-09 NOTE — ED PROVIDER NOTE - SECONDARY DIAGNOSIS.
Spoke with Maurice Novka with [facility] pharmacy. All questions answered regarding patients prescription for Herb Wever verbalized understanding of information discussed w/ no further questions at this time. Proctitis

## 2020-10-23 ENCOUNTER — APPOINTMENT (OUTPATIENT)
Dept: INTERNAL MEDICINE | Facility: CLINIC | Age: 65
End: 2020-10-23

## 2020-11-16 ENCOUNTER — APPOINTMENT (OUTPATIENT)
Dept: INTERNAL MEDICINE | Facility: CLINIC | Age: 65
End: 2020-11-16

## 2020-11-19 ENCOUNTER — APPOINTMENT (OUTPATIENT)
Dept: INTERNAL MEDICINE | Facility: CLINIC | Age: 65
End: 2020-11-19
Payer: MEDICARE

## 2020-11-19 PROCEDURE — 90471 IMMUNIZATION ADMIN: CPT

## 2020-11-19 PROCEDURE — 90750 HZV VACC RECOMBINANT IM: CPT | Mod: GY

## 2020-11-24 ENCOUNTER — APPOINTMENT (OUTPATIENT)
Dept: INTERNAL MEDICINE | Facility: CLINIC | Age: 65
End: 2020-11-24
Payer: MEDICARE

## 2020-11-24 VITALS
TEMPERATURE: 98.1 F | SYSTOLIC BLOOD PRESSURE: 136 MMHG | DIASTOLIC BLOOD PRESSURE: 82 MMHG | HEIGHT: 65 IN | HEART RATE: 74 BPM | RESPIRATION RATE: 14 BRPM | BODY MASS INDEX: 28.16 KG/M2 | OXYGEN SATURATION: 98 % | WEIGHT: 169 LBS

## 2020-11-24 DIAGNOSIS — M16.11 UNILATERAL PRIMARY OSTEOARTHRITIS, RIGHT HIP: ICD-10-CM

## 2020-11-24 PROCEDURE — 99214 OFFICE O/P EST MOD 30 MIN: CPT

## 2020-11-24 NOTE — HEALTH RISK ASSESSMENT
[No] : No [No falls in past year] : Patient reported no falls in the past year [0] : 2) Feeling down, depressed, or hopeless: Not at all (0) [] : No [[id number]] : 0

## 2020-11-24 NOTE — END OF VISIT
[FreeTextEntry3] : "I, Miguel Velez, personally scribed the services dictated to me by Dr. Dennis Plata MD in this documentation on 11/24/2020 "\par \par "I Dr. Dennis Plata MD, personally performed the services described in this documentation on 11/24/2020 for the patient as scribed by Miguel Velez in my presence. I have reviewed and verified that all the information is accurate and true."

## 2020-11-24 NOTE — PHYSICAL EXAM
[No Acute Distress] : no acute distress [Well Nourished] : well nourished [Well Developed] : well developed [Well-Appearing] : well-appearing [Normal Voice/Communication] : normal voice/communication [Normal Sclera/Conjunctiva] : normal sclera/conjunctiva [PERRL] : pupils equal round and reactive to light [EOMI] : extraocular movements intact [Normal Outer Ear/Nose] : the outer ears and nose were normal in appearance [No JVD] : no jugular venous distention [No Lymphadenopathy] : no lymphadenopathy [Supple] : supple [Thyroid Normal, No Nodules] : the thyroid was normal and there were no nodules present [No Respiratory Distress] : no respiratory distress  [No Accessory Muscle Use] : no accessory muscle use [Clear to Auscultation] : lungs were clear to auscultation bilaterally [Normal Rate] : normal rate  [Regular Rhythm] : with a regular rhythm [Normal S1, S2] : normal S1 and S2 [No Murmur] : no murmur heard [No Carotid Bruits] : no carotid bruits [No Abdominal Bruit] : a ~M bruit was not heard ~T in the abdomen [No Varicosities] : no varicosities [Pedal Pulses Present] : the pedal pulses are present [No Edema] : there was no peripheral edema [No Palpable Aorta] : no palpable aorta [No Extremity Clubbing/Cyanosis] : no extremity clubbing/cyanosis [Soft] : abdomen soft [Non Tender] : non-tender [Non-distended] : non-distended [No Masses] : no abdominal mass palpated [No HSM] : no HSM [Normal Bowel Sounds] : normal bowel sounds [Normal Posterior Cervical Nodes] : no posterior cervical lymphadenopathy [Normal Anterior Cervical Nodes] : no anterior cervical lymphadenopathy [No CVA Tenderness] : no CVA  tenderness [No Spinal Tenderness] : no spinal tenderness [No Joint Swelling] : no joint swelling [Grossly Normal Strength/Tone] : grossly normal strength/tone [No Rash] : no rash [Coordination Grossly Intact] : coordination grossly intact [No Focal Deficits] : no focal deficits [Normal Gait] : normal gait [Speech Grossly Normal] : speech grossly normal [Memory Grossly Normal] : memory grossly normal [Normal Affect] : the affect was normal [Alert and Oriented x3] : oriented to person, place, and time [Normal Mood] : the mood was normal [Normal Insight/Judgement] : insight and judgment were intact [de-identified] : colostomy bag

## 2020-11-24 NOTE — HISTORY OF PRESENT ILLNESS
[FreeTextEntry1] : follow up, refill for medication [de-identified] : MONSE RAZO is a 65 year old M who presents today for a follow up to refill a medication. Patient has no new complaints\par right hip pain walking a lot

## 2020-12-19 ENCOUNTER — RX RENEWAL (OUTPATIENT)
Age: 65
End: 2020-12-19

## 2020-12-21 ENCOUNTER — RX RENEWAL (OUTPATIENT)
Age: 65
End: 2020-12-21

## 2021-01-05 ENCOUNTER — NON-APPOINTMENT (OUTPATIENT)
Age: 66
End: 2021-01-05

## 2021-01-05 LAB
PSA FREE FLD-MCNC: 13 %
PSA FREE SERPL-MCNC: 0.61 NG/ML
PSA SERPL-MCNC: 4.72 NG/ML

## 2021-01-29 NOTE — DISCHARGE NOTE PROVIDER - PROVIDER RX CONTACT NUMBER
Pre-charting complete. Care gaps identified will be addressed at the time of visit.     Pap: 1/8/19 [facility] HPV negative     [phone]

## 2021-04-09 ENCOUNTER — APPOINTMENT (OUTPATIENT)
Dept: INTERNAL MEDICINE | Facility: CLINIC | Age: 66
End: 2021-04-09
Payer: MEDICARE

## 2021-04-09 VITALS
HEART RATE: 91 BPM | OXYGEN SATURATION: 98 % | BODY MASS INDEX: 26.66 KG/M2 | HEIGHT: 65 IN | TEMPERATURE: 97 F | SYSTOLIC BLOOD PRESSURE: 136 MMHG | RESPIRATION RATE: 14 BRPM | WEIGHT: 160 LBS | DIASTOLIC BLOOD PRESSURE: 80 MMHG

## 2021-04-09 DIAGNOSIS — L60.0 INGROWING NAIL: ICD-10-CM

## 2021-04-09 PROCEDURE — 99214 OFFICE O/P EST MOD 30 MIN: CPT

## 2021-04-09 NOTE — HISTORY OF PRESENT ILLNESS
[FreeTextEntry8] : MONSE RAZO is a 65 year old M who presents today for an acute visit. Pt complains of an infected cuticle on his R great toe. Has anxiety, occasionally takes Valium

## 2021-04-09 NOTE — HEALTH RISK ASSESSMENT
[No] : In the past 12 months have you used drugs other than those required for medical reasons? No [No falls in past year] : Patient reported no falls in the past year [0] : 2) Feeling down, depressed, or hopeless: Not at all (0) [] : No [[id number]] : 0

## 2021-04-09 NOTE — PHYSICAL EXAM
[No Acute Distress] : no acute distress [Well Nourished] : well nourished [Well Developed] : well developed [Well-Appearing] : well-appearing [Normal Voice/Communication] : normal voice/communication [Normal Sclera/Conjunctiva] : normal sclera/conjunctiva [PERRL] : pupils equal round and reactive to light [EOMI] : extraocular movements intact [Normal Outer Ear/Nose] : the outer ears and nose were normal in appearance [No JVD] : no jugular venous distention [No Lymphadenopathy] : no lymphadenopathy [Supple] : supple [Thyroid Normal, No Nodules] : the thyroid was normal and there were no nodules present [No Respiratory Distress] : no respiratory distress  [No Accessory Muscle Use] : no accessory muscle use [Clear to Auscultation] : lungs were clear to auscultation bilaterally [Normal Rate] : normal rate  [Regular Rhythm] : with a regular rhythm [Normal S1, S2] : normal S1 and S2 [No Murmur] : no murmur heard [No Carotid Bruits] : no carotid bruits [No Abdominal Bruit] : a ~M bruit was not heard ~T in the abdomen [No Varicosities] : no varicosities [Pedal Pulses Present] : the pedal pulses are present [No Edema] : there was no peripheral edema [No Palpable Aorta] : no palpable aorta [No Extremity Clubbing/Cyanosis] : no extremity clubbing/cyanosis [Soft] : abdomen soft [Non Tender] : non-tender [Non-distended] : non-distended [No Masses] : no abdominal mass palpated [No HSM] : no HSM [Normal Bowel Sounds] : normal bowel sounds [Normal Supraclavicular Nodes] : no supraclavicular lymphadenopathy [Normal Posterior Cervical Nodes] : no posterior cervical lymphadenopathy [Normal Anterior Cervical Nodes] : no anterior cervical lymphadenopathy [No CVA Tenderness] : no CVA  tenderness [No Spinal Tenderness] : no spinal tenderness [No Joint Swelling] : no joint swelling [Grossly Normal Strength/Tone] : grossly normal strength/tone [No Rash] : no rash [Coordination Grossly Intact] : coordination grossly intact [No Focal Deficits] : no focal deficits [Normal Gait] : normal gait [Speech Grossly Normal] : speech grossly normal [Deep Tendon Reflexes (DTR)] : deep tendon reflexes were 2+ and symmetric [Memory Grossly Normal] : memory grossly normal [Normal Affect] : the affect was normal [Alert and Oriented x3] : oriented to person, place, and time [Normal Mood] : the mood was normal [Normal Insight/Judgement] : insight and judgment were intact [de-identified] : ingrown toenail R great toe

## 2021-04-09 NOTE — END OF VISIT
[FreeTextEntry3] : "I, Miguel Velez, personally scribed the services dictated to me by Dr. Dennis Plata MD in this documentation on 04/09/2021 "\par \par "I Dr. Dennis Plata MD, personally performed the services described in this documentation on 04/09/2021 for the patient as scribed by Miguel Velez in my presence. I have reviewed and verified that all the information is accurate and true."

## 2021-05-06 ENCOUNTER — APPOINTMENT (OUTPATIENT)
Dept: UROLOGY | Facility: CLINIC | Age: 66
End: 2021-05-06
Payer: MEDICARE

## 2021-05-06 VITALS
HEART RATE: 71 BPM | SYSTOLIC BLOOD PRESSURE: 138 MMHG | HEIGHT: 65 IN | DIASTOLIC BLOOD PRESSURE: 80 MMHG | OXYGEN SATURATION: 97 % | WEIGHT: 162 LBS | BODY MASS INDEX: 26.99 KG/M2

## 2021-05-06 DIAGNOSIS — N52.9 MALE ERECTILE DYSFUNCTION, UNSPECIFIED: ICD-10-CM

## 2021-05-06 PROCEDURE — 36415 COLL VENOUS BLD VENIPUNCTURE: CPT

## 2021-05-06 PROCEDURE — 99214 OFFICE O/P EST MOD 30 MIN: CPT

## 2021-05-06 NOTE — HISTORY OF PRESENT ILLNESS
[FreeTextEntry1] : 66 yo male presents for Erectile dysfunction. \par Having issues with erections. Rates erections as 3/5. Reports normal libido. \par Has not tried Phosphodiesterase 5 inhibitors in the past. \par Reports reasonable stream, urinates every few hours or so during the day. Nocturia of 0-1 x. \par Endorses occasional hesitancy and intermittency when bladder full, in the morning. \par Denies urgency, incontinence, sense of incomplete emptying.\par Denies dysuria, hematuria, lower abdominal or flank pain, fever, chills or rigors.\par \par Had Ileostomy for UC in June 2019.\par \par Initially seen for Elevated PSA. \par Denied any recent unintentional weight loss, night sweats and new bone or back pain.\par Family history of Prostate cancer- no. Father had a growth which was surgically removed- benign. \par Reported reasonable stream, urinates every few hours or so during the day. Nocturia of 1 x. \par Endorsed occasional hesitancy and intermittency when bladder full. \par Denied urgency, incontinence, sense of incomplete emptying.\par Denied dysuria, hematuria, lower abdominal or flank pain, fever, chills or rigors.\par Rated erections as 5/5. Has off and on problem maintaining erections. Reports normal libido. Has not tried Cialis/Viagra. \par \par

## 2021-05-06 NOTE — ASSESSMENT
[FreeTextEntry1] : Erectile dysfunction:\par Reviewed pathophysiology and differential diagnosis of erectile dysfunction with the patient. Discussed lifestyle changes. \par The patient was made aware that the current therapies for erectile dysfunction consisting of oral phosphodiesterase type 5 inhibitors, intra-urethral alprostadil, intracavernous vasoactive drug injection, vacuum constriction devices and penile prosthesis implantation. Relative risks and benefits, were discussed. All questions were answered.\par Wants to try Cialis/Tadalafil. Discussed proper use. \par Recommended that patient start with 5 mg and take it daily. \par If partial response can increase to 10 mg and up to 20 mg, to be taken as needed and not on 2 consecutive days.\par The patient understands that these medications are not initiators of erection and that he will still require sexual stimulation. \par He was advised to take the medication 30-60 minutes prior to sexual activity and that the efficacy of the medication is decreased following a high-fat meal or concurrent use of alcohol. \par Explained the common adverse effects of therapy including, but not limited to headache, flushing, upset stomach, nasal congestion, abnormal vision, back pain, and myalgia. Asked pt to stop taking the medicine if he develops chest pain, visual or auditory disturbance. \par Explained priapism- if erection does not go down after ejaculation or lasts more than 4 hrs to present to emergency room. \par \par Will get Total, Free and Bio available Testosterone.\par \par Elevated PSA\par Total and Free PSA today.\par \par Will inform results. \par \par Return to office in 3 months or sooner if any issues- will do Uroflo/PVR

## 2021-05-11 LAB
PSA FREE FLD-MCNC: 15 %
PSA FREE SERPL-MCNC: 0.75 NG/ML
PSA SERPL-MCNC: 4.96 NG/ML
TESTOSTERONE FREE/WEAKLY BND: 143.3 NG/DL
TESTOSTERONE TOTAL S: 573 NG/DL
TESTOSTERONE, % FREE/WEAKLY BND: 25 %

## 2021-05-16 ENCOUNTER — RX RENEWAL (OUTPATIENT)
Age: 66
End: 2021-05-16

## 2021-07-13 NOTE — PROGRESS NOTE ADULT - PROBLEM SELECTOR PROBLEM 2
GERD (gastroesophageal reflux disease)
89 year old male admitted to medical service for workup of shortness of breath. Vascular surgery consulted for bilateral lower extremity ulcers that have been present for several months. patient states that these wounds started as a blister and has progressed to be more diffuse almost completely encompassing his lower legs. He walks with a cane at baseline. Does not experience pain in his legs with ambulation or at rest.   outpatient documentation shows patient was being managed about 4 years ago with unna boots for bilateral edema. patient has not followed up since        MEDICATIONS  (STANDING):  amLODIPine   Tablet 5 milliGRAM(s) Oral daily  aspirin  chewable 81 milliGRAM(s) Oral daily  clopidogrel Tablet 75 milliGRAM(s) Oral daily  dextrose 40% Gel 15 Gram(s) Oral once  dextrose 5%. 1000 milliLiter(s) (50 mL/Hr) IV Continuous <Continuous>  dextrose 5%. 1000 milliLiter(s) (100 mL/Hr) IV Continuous <Continuous>  dextrose 50% Injectable 25 Gram(s) IV Push once  dextrose 50% Injectable 25 Gram(s) IV Push once  dextrose 50% Injectable 12.5 Gram(s) IV Push once  furosemide    Tablet 40 milliGRAM(s) Oral daily  gabapentin 300 milliGRAM(s) Oral two times a day  glucagon  Injectable 1 milliGRAM(s) IntraMuscular once  heparin   Injectable 5000 Unit(s) SubCutaneous every 8 hours  insulin glargine Injectable (LANTUS) 9 Unit(s) SubCutaneous at bedtime  insulin lispro (ADMELOG) corrective regimen sliding scale   SubCutaneous three times a day before meals  insulin lispro Injectable (ADMELOG) 3 Unit(s) SubCutaneous three times a day before meals  metoprolol tartrate 25 milliGRAM(s) Oral two times a day  senna 2 Tablet(s) Oral at bedtime  simvastatin 20 milliGRAM(s) Oral at bedtime  tamsulosin 0.8 milliGRAM(s) Oral at bedtime    MEDICATIONS  (PRN):  polyethylene glycol 3350 17 Gram(s) Oral daily PRN Constipation      Vital Signs Last 24 Hrs  T(C): 36.8 (13 Jul 2021 08:01), Max: 36.8 (13 Jul 2021 08:01)  T(F): 98.2 (13 Jul 2021 08:01), Max: 98.2 (13 Jul 2021 08:01)  HR: 70 (13 Jul 2021 10:15) (70 - 72)  BP: 98/57 (13 Jul 2021 10:15) (90/55 - 154/83)  BP(mean): --  RR: 18 (13 Jul 2021 08:01) (18 - 18)  SpO2: 97% (13 Jul 2021 08:01) (94% - 97%)    Physical Exam:    general: no acute distress, aox3    Respiratory: Breath Sounds equal & clear to auscultation, no accessory muscle use    Cardiovascular: Regular rate & rhythm, normal S1, S2; no murmurs, gallops or rubs    Gastrointestinal: Soft, non-tender, normal bowel sounds    Extremities: No peripheral edema, mild hyperpigmentation from ankle to knee bilaterally. multiple dry nonhealing ulcers almost circumferential lower legs. no ulcers over dorsum/plantar aspect of feet. no toe ulcerations. area on right posterior calf with mild oozing from fresh layer of epidermis missing.    Vascular: warm lower extremities; palpable DP bilaterally. capillary refill <3sec. motor/sensation intact        I&O's Detail    13 Jul 2021 07:01  -  13 Jul 2021 15:15  --------------------------------------------------------  IN:  Total IN: 0 mL    OUT:    Indwelling Catheter - Urethral (mL): 1000 mL    Voided (mL): 10 mL  Total OUT: 1010 mL    Total NET: -1010 mL          LABS:                        11.8   4.85  )-----------( 255      ( 13 Jul 2021 06:19 )             36.7     07-13    135  |  99  |  39.0<H>  ----------------------------<  121<H>  3.8   |  24.0  |  1.43<H>    Ca    8.4<L>      13 Jul 2021 06:19            RADIOLOGY & ADDITIONAL STUDIES:
Anemia
GERD (gastroesophageal reflux disease)
Pre-op exam
Anemia
GERD (gastroesophageal reflux disease)
Herpes zoster without complication
Ulcerative colitis
Zoster
Acute stress reaction
Anemia
Pre-op exam
Acute stress reaction
Anemia
Dysphagia

## 2021-07-14 ENCOUNTER — NON-APPOINTMENT (OUTPATIENT)
Age: 66
End: 2021-07-14

## 2021-07-14 ENCOUNTER — APPOINTMENT (OUTPATIENT)
Dept: INTERNAL MEDICINE | Facility: CLINIC | Age: 66
End: 2021-07-14
Payer: MEDICARE

## 2021-07-14 VITALS
TEMPERATURE: 97.5 F | HEART RATE: 75 BPM | DIASTOLIC BLOOD PRESSURE: 88 MMHG | OXYGEN SATURATION: 97 % | SYSTOLIC BLOOD PRESSURE: 138 MMHG | RESPIRATION RATE: 14 BRPM

## 2021-07-14 PROCEDURE — G0403: CPT

## 2021-07-14 PROCEDURE — G0439: CPT

## 2021-07-14 PROCEDURE — 93000 ELECTROCARDIOGRAM COMPLETE: CPT

## 2021-07-14 PROCEDURE — G0402 INITIAL PREVENTIVE EXAM: CPT

## 2021-07-14 NOTE — HEALTH RISK ASSESSMENT
[Good] : ~his/her~  mood as  good [No] : In the past 12 months have you used drugs other than those required for medical reasons? No [No falls in past year] : Patient reported no falls in the past year [0] : 2) Feeling down, depressed, or hopeless: Not at all (0) [PHQ-2 Negative - No further assessment needed] : PHQ-2 Negative - No further assessment needed [None] : None [With Family] : lives with family [] :  [Feels Safe at Home] : Feels safe at home [Fully functional (bathing, dressing, toileting, transferring, walking, feeding)] : Fully functional (bathing, dressing, toileting, transferring, walking, feeding) [Fully functional (using the telephone, shopping, preparing meals, housekeeping, doing laundry, using] : Fully functional and needs no help or supervision to perform IADLs (using the telephone, shopping, preparing meals, housekeeping, doing laundry, using transportation, managing medications and managing finances) [Smoke Detector] : smoke detector [Carbon Monoxide Detector] : carbon monoxide detector [Safety elements used in home] : safety elements used in home [Seat Belt] :  uses seat belt [Sunscreen] : uses sunscreen [] : No [[id number]] : 0 [Change in mental status noted] : No change in mental status noted [Language] : denies difficulty with language [Retired] : retired [Reports changes in hearing] : Reports no changes in hearing [Reports changes in vision] : Reports no changes in vision [Reports changes in dental health] : Reports no changes in dental health [Guns at Home] : no guns at home [Travel to Developing Areas] : does not  travel to developing areas [TB Exposure] : is not being exposed to tuberculosis [Caregiver Concerns] : does not have caregiver concerns

## 2021-07-14 NOTE — END OF VISIT
[FreeTextEntry3] : "I, Chaz Ramirez, personally scribed the services dictated to me by Dr. Dennis Plata MD in this documentation on 07/14/2021 "\par \par "I Dr. Dennis Plata MD, personally performed the services described in this documentation on 07/14/2021 for the patient as scribed by Chaz Ramirez in my presence. I have reviewed and verified that all the information is accurate and true."\par \par

## 2021-07-14 NOTE — HISTORY OF PRESENT ILLNESS
[FreeTextEntry1] : Annual physical [de-identified] : MONSE RAZO is a 65 year old M who presents today for an annual physical. Patient has no new complaints\par \par

## 2021-07-14 NOTE — PHYSICAL EXAM
[No Acute Distress] : no acute distress [Well Nourished] : well nourished [Well Developed] : well developed [Well-Appearing] : well-appearing [Normal Voice/Communication] : normal voice/communication [Normal Sclera/Conjunctiva] : normal sclera/conjunctiva [PERRL] : pupils equal round and reactive to light [EOMI] : extraocular movements intact [Normal Outer Ear/Nose] : the outer ears and nose were normal in appearance [No JVD] : no jugular venous distention [No Lymphadenopathy] : no lymphadenopathy [Supple] : supple [Thyroid Normal, No Nodules] : the thyroid was normal and there were no nodules present [No Respiratory Distress] : no respiratory distress  [No Accessory Muscle Use] : no accessory muscle use [Clear to Auscultation] : lungs were clear to auscultation bilaterally [Normal Rate] : normal rate  [Regular Rhythm] : with a regular rhythm [Normal S1, S2] : normal S1 and S2 [No Murmur] : no murmur heard [No Carotid Bruits] : no carotid bruits [No Abdominal Bruit] : a ~M bruit was not heard ~T in the abdomen [No Varicosities] : no varicosities [Pedal Pulses Present] : the pedal pulses are present [No Edema] : there was no peripheral edema [No Palpable Aorta] : no palpable aorta [No Extremity Clubbing/Cyanosis] : no extremity clubbing/cyanosis [Soft] : abdomen soft [Non Tender] : non-tender [Non-distended] : non-distended [No Masses] : no abdominal mass palpated [No HSM] : no HSM [Normal Bowel Sounds] : normal bowel sounds [No Hernias] : no hernias [Declined Rectal Exam] : declined rectal exam [Penis Abnormality] : normal circumcised penis [Scrotum] : the scrotum was normal [Testes Tenderness] : no tenderness of the testes [Normal Supraclavicular Nodes] : no supraclavicular lymphadenopathy [Normal Axillary Nodes] : no axillary lymphadenopathy [Normal Posterior Cervical Nodes] : no posterior cervical lymphadenopathy [Normal Anterior Cervical Nodes] : no anterior cervical lymphadenopathy [Normal Inguinal Nodes] : no inguinal lymphadenopathy [Normal Femoral Nodes] : no femoral lymphadenopathy [No CVA Tenderness] : no CVA  tenderness [No Spinal Tenderness] : no spinal tenderness [No Joint Swelling] : no joint swelling [Grossly Normal Strength/Tone] : grossly normal strength/tone [No Rash] : no rash [Coordination Grossly Intact] : coordination grossly intact [No Focal Deficits] : no focal deficits [Normal Gait] : normal gait [Deep Tendon Reflexes (DTR)] : deep tendon reflexes were 2+ and symmetric [Speech Grossly Normal] : speech grossly normal [Memory Grossly Normal] : memory grossly normal [Normal Affect] : the affect was normal [Alert and Oriented x3] : oriented to person, place, and time [Normal Mood] : the mood was normal [Normal Insight/Judgement] : insight and judgment were intact [de-identified] : colostomy bag

## 2021-07-15 ENCOUNTER — NON-APPOINTMENT (OUTPATIENT)
Age: 66
End: 2021-07-15

## 2021-07-15 DIAGNOSIS — R97.20 ELEVATED PROSTATE, SPECIFIC ANTIGEN [PSA]: ICD-10-CM

## 2021-07-15 LAB
25(OH)D3 SERPL-MCNC: 53.3 NG/ML
ALBUMIN SERPL ELPH-MCNC: 4.7 G/DL
ALP BLD-CCNC: 100 U/L
ALT SERPL-CCNC: 17 U/L
ANION GAP SERPL CALC-SCNC: 16 MMOL/L
APPEARANCE: CLEAR
AST SERPL-CCNC: 22 U/L
BASOPHILS # BLD AUTO: 0.13 K/UL
BASOPHILS NFR BLD AUTO: 1.4 %
BILIRUB SERPL-MCNC: 0.3 MG/DL
BILIRUBIN URINE: NEGATIVE
BLOOD URINE: NORMAL
BUN SERPL-MCNC: 17 MG/DL
CALCIUM SERPL-MCNC: 10 MG/DL
CHLORIDE SERPL-SCNC: 103 MMOL/L
CHOLEST SERPL-MCNC: 210 MG/DL
CK SERPL-CCNC: 119 U/L
CO2 SERPL-SCNC: 22 MMOL/L
COLOR: YELLOW
CREAT SERPL-MCNC: 0.9 MG/DL
EOSINOPHIL # BLD AUTO: 0.2 K/UL
EOSINOPHIL NFR BLD AUTO: 2.1 %
ESTIMATED AVERAGE GLUCOSE: 114 MG/DL
GLUCOSE QUALITATIVE U: NEGATIVE
GLUCOSE SERPL-MCNC: 97 MG/DL
HBA1C MFR BLD HPLC: 5.6 %
HCT VFR BLD CALC: 49 %
HDLC SERPL-MCNC: 81 MG/DL
HGB BLD-MCNC: 15.6 G/DL
IMM GRANULOCYTES NFR BLD AUTO: 0.2 %
KETONES URINE: NEGATIVE
LDLC SERPL CALC-MCNC: 100 MG/DL
LEUKOCYTE ESTERASE URINE: NEGATIVE
LYMPHOCYTES # BLD AUTO: 2.79 K/UL
LYMPHOCYTES NFR BLD AUTO: 29.1 %
MAN DIFF?: NORMAL
MCHC RBC-ENTMCNC: 30.5 PG
MCHC RBC-ENTMCNC: 31.8 GM/DL
MCV RBC AUTO: 95.9 FL
MONOCYTES # BLD AUTO: 1 K/UL
MONOCYTES NFR BLD AUTO: 10.4 %
NEUTROPHILS # BLD AUTO: 5.46 K/UL
NEUTROPHILS NFR BLD AUTO: 56.8 %
NITRITE URINE: NEGATIVE
NONHDLC SERPL-MCNC: 129 MG/DL
PH URINE: 5
PLATELET # BLD AUTO: 203 K/UL
POTASSIUM SERPL-SCNC: 4.2 MMOL/L
PROT SERPL-MCNC: 6.8 G/DL
PROTEIN URINE: NEGATIVE
PSA SERPL-MCNC: 5.16 NG/ML
RBC # BLD: 5.11 M/UL
RBC # FLD: 14.3 %
SODIUM SERPL-SCNC: 141 MMOL/L
SPECIFIC GRAVITY URINE: 1.03
TRIGL SERPL-MCNC: 148 MG/DL
TSH SERPL-ACNC: 1.69 UIU/ML
UROBILINOGEN URINE: NORMAL
WBC # FLD AUTO: 9.6 K/UL

## 2021-07-16 ENCOUNTER — RX RENEWAL (OUTPATIENT)
Age: 66
End: 2021-07-16

## 2021-08-01 ENCOUNTER — APPOINTMENT (OUTPATIENT)
Dept: MRI IMAGING | Facility: CLINIC | Age: 66
End: 2021-08-01

## 2021-08-14 ENCOUNTER — RX RENEWAL (OUTPATIENT)
Age: 66
End: 2021-08-14

## 2021-08-20 ENCOUNTER — APPOINTMENT (OUTPATIENT)
Dept: MRI IMAGING | Facility: CLINIC | Age: 66
End: 2021-08-20
Payer: MEDICARE

## 2021-08-20 ENCOUNTER — OUTPATIENT (OUTPATIENT)
Dept: OUTPATIENT SERVICES | Facility: HOSPITAL | Age: 66
LOS: 1 days | End: 2021-08-20
Payer: MEDICARE

## 2021-08-20 DIAGNOSIS — Z00.8 ENCOUNTER FOR OTHER GENERAL EXAMINATION: ICD-10-CM

## 2021-08-20 PROCEDURE — 70551 MRI BRAIN STEM W/O DYE: CPT | Mod: MH

## 2021-08-20 PROCEDURE — 70551 MRI BRAIN STEM W/O DYE: CPT | Mod: 26,MH

## 2021-10-18 ENCOUNTER — APPOINTMENT (OUTPATIENT)
Dept: INTERNAL MEDICINE | Facility: CLINIC | Age: 66
End: 2021-10-18
Payer: MEDICARE

## 2021-10-18 VITALS
SYSTOLIC BLOOD PRESSURE: 132 MMHG | HEIGHT: 65 IN | DIASTOLIC BLOOD PRESSURE: 80 MMHG | BODY MASS INDEX: 28.82 KG/M2 | WEIGHT: 173 LBS | TEMPERATURE: 97.4 F | HEART RATE: 73 BPM | OXYGEN SATURATION: 98 % | RESPIRATION RATE: 14 BRPM

## 2021-10-18 PROCEDURE — 99214 OFFICE O/P EST MOD 30 MIN: CPT | Mod: 25

## 2021-10-18 PROCEDURE — 90732 PPSV23 VACC 2 YRS+ SUBQ/IM: CPT

## 2021-10-18 PROCEDURE — G0009: CPT

## 2021-10-18 NOTE — HEALTH RISK ASSESSMENT
[No] : In the past 12 months have you used drugs other than those required for medical reasons? No [No falls in past year] : Patient reported no falls in the past year [0] : 2) Feeling down, depressed, or hopeless: Not at all (0) [PHQ-2 Negative - No further assessment needed] : PHQ-2 Negative - No further assessment needed [] : No [[id number]] : 0

## 2021-10-18 NOTE — END OF VISIT
[FreeTextEntry3] : "I, Ida Huang, personally scribed the services dictated to me by Dr. Dennis Plata MD in this documentation on 10/18/2021 "\par \par "I Dr. Dennis Plata MD, personally performed the services described in this documentation on 10/18/2021 for the patient as scribed by Ida Huang in my presence. I have reviewed and verified that all the information is accurate and true."\par \par

## 2021-10-18 NOTE — PHYSICAL EXAM
[No Acute Distress] : no acute distress [Well Nourished] : well nourished [Well Developed] : well developed [Well-Appearing] : well-appearing [Normal Voice/Communication] : normal voice/communication [Normal Sclera/Conjunctiva] : normal sclera/conjunctiva [PERRL] : pupils equal round and reactive to light [EOMI] : extraocular movements intact [Normal Outer Ear/Nose] : the outer ears and nose were normal in appearance [No JVD] : no jugular venous distention [No Lymphadenopathy] : no lymphadenopathy [Supple] : supple [Thyroid Normal, No Nodules] : the thyroid was normal and there were no nodules present [No Respiratory Distress] : no respiratory distress  [No Accessory Muscle Use] : no accessory muscle use [Clear to Auscultation] : lungs were clear to auscultation bilaterally [Normal Rate] : normal rate  [Regular Rhythm] : with a regular rhythm [Normal S1, S2] : normal S1 and S2 [No Murmur] : no murmur heard [No Carotid Bruits] : no carotid bruits [No Abdominal Bruit] : a ~M bruit was not heard ~T in the abdomen [No Varicosities] : no varicosities [Pedal Pulses Present] : the pedal pulses are present [No Edema] : there was no peripheral edema [No Palpable Aorta] : no palpable aorta [No Extremity Clubbing/Cyanosis] : no extremity clubbing/cyanosis [Soft] : abdomen soft [Non Tender] : non-tender [Non-distended] : non-distended [No Masses] : no abdominal mass palpated [No HSM] : no HSM [Normal Bowel Sounds] : normal bowel sounds [Normal Supraclavicular Nodes] : no supraclavicular lymphadenopathy [Normal Posterior Cervical Nodes] : no posterior cervical lymphadenopathy [Normal Anterior Cervical Nodes] : no anterior cervical lymphadenopathy [No CVA Tenderness] : no CVA  tenderness [No Spinal Tenderness] : no spinal tenderness [No Joint Swelling] : no joint swelling [Grossly Normal Strength/Tone] : grossly normal strength/tone [No Rash] : no rash [Coordination Grossly Intact] : coordination grossly intact [No Focal Deficits] : no focal deficits [Normal Gait] : normal gait [Deep Tendon Reflexes (DTR)] : deep tendon reflexes were 2+ and symmetric [Speech Grossly Normal] : speech grossly normal [Memory Grossly Normal] : memory grossly normal [Normal Affect] : the affect was normal [Alert and Oriented x3] : oriented to person, place, and time [Normal Mood] : the mood was normal [Normal Insight/Judgement] : insight and judgment were intact [Normal] : affect was normal and insight and judgment were intact [de-identified] : Colostomy

## 2021-10-18 NOTE — HISTORY OF PRESENT ILLNESS
[FreeTextEntry1] : Follow up [de-identified] : MONSE RAZO is a 66 year old M who presents today for follow up\par needs renewal of Valium \par has elevated Cholesterol has UC

## 2021-11-12 ENCOUNTER — RX RENEWAL (OUTPATIENT)
Age: 66
End: 2021-11-12

## 2022-01-23 ENCOUNTER — RX RENEWAL (OUTPATIENT)
Age: 67
End: 2022-01-23

## 2022-02-09 ENCOUNTER — APPOINTMENT (OUTPATIENT)
Dept: INTERNAL MEDICINE | Facility: CLINIC | Age: 67
End: 2022-02-09
Payer: MEDICARE

## 2022-02-09 VITALS
BODY MASS INDEX: 28.62 KG/M2 | HEART RATE: 82 BPM | SYSTOLIC BLOOD PRESSURE: 130 MMHG | OXYGEN SATURATION: 98 % | DIASTOLIC BLOOD PRESSURE: 80 MMHG | TEMPERATURE: 97 F | WEIGHT: 172 LBS

## 2022-02-09 PROCEDURE — 99213 OFFICE O/P EST LOW 20 MIN: CPT

## 2022-02-09 NOTE — HISTORY OF PRESENT ILLNESS
[FreeTextEntry1] : follow up [de-identified] : MONSE DANNI is a 66 year old M who presents today for follow up\par needs to renew medication

## 2022-02-09 NOTE — PHYSICAL EXAM
[No Acute Distress] : no acute distress [Well Nourished] : well nourished [Well Developed] : well developed [Well-Appearing] : well-appearing [Normal Voice/Communication] : normal voice/communication [Normal Sclera/Conjunctiva] : normal sclera/conjunctiva [PERRL] : pupils equal round and reactive to light [EOMI] : extraocular movements intact [Normal Outer Ear/Nose] : the outer ears and nose were normal in appearance [Normal Oropharynx] : the oropharynx was normal [No JVD] : no jugular venous distention [No Lymphadenopathy] : no lymphadenopathy [Supple] : supple [Thyroid Normal, No Nodules] : the thyroid was normal and there were no nodules present [No Respiratory Distress] : no respiratory distress  [No Accessory Muscle Use] : no accessory muscle use [Clear to Auscultation] : lungs were clear to auscultation bilaterally [Normal Rate] : normal rate  [Regular Rhythm] : with a regular rhythm [Normal S1, S2] : normal S1 and S2 [No Murmur] : no murmur heard [No Carotid Bruits] : no carotid bruits [No Abdominal Bruit] : a ~M bruit was not heard ~T in the abdomen [No Varicosities] : no varicosities [Pedal Pulses Present] : the pedal pulses are present [No Edema] : there was no peripheral edema [No Palpable Aorta] : no palpable aorta [No Extremity Clubbing/Cyanosis] : no extremity clubbing/cyanosis [Soft] : abdomen soft [Non Tender] : non-tender [Non-distended] : non-distended [No Masses] : no abdominal mass palpated [No HSM] : no HSM [Normal Bowel Sounds] : normal bowel sounds [Normal Supraclavicular Nodes] : no supraclavicular lymphadenopathy [Normal Axillary Nodes] : no axillary lymphadenopathy [Normal Posterior Cervical Nodes] : no posterior cervical lymphadenopathy [Normal Anterior Cervical Nodes] : no anterior cervical lymphadenopathy [Normal Inguinal Nodes] : no inguinal lymphadenopathy [Normal Femoral Nodes] : no femoral lymphadenopathy [No CVA Tenderness] : no CVA  tenderness [No Spinal Tenderness] : no spinal tenderness [No Joint Swelling] : no joint swelling [Grossly Normal Strength/Tone] : grossly normal strength/tone [No Rash] : no rash [Coordination Grossly Intact] : coordination grossly intact [No Focal Deficits] : no focal deficits [Normal Gait] : normal gait [Deep Tendon Reflexes (DTR)] : deep tendon reflexes were 2+ and symmetric [Speech Grossly Normal] : speech grossly normal [Memory Grossly Normal] : memory grossly normal [Normal Affect] : the affect was normal [Alert and Oriented x3] : oriented to person, place, and time [Normal Mood] : the mood was normal [Normal Insight/Judgement] : insight and judgment were intact [de-identified] : colostomy bag

## 2022-02-09 NOTE — HEALTH RISK ASSESSMENT
[Never] : Never [No] : In the past 12 months have you used drugs other than those required for medical reasons? No [No falls in past year] : Patient reported no falls in the past year [0] : 2) Feeling down, depressed, or hopeless: Not at all (0) [PHQ-2 Negative - No further assessment needed] : PHQ-2 Negative - No further assessment needed [[id number]] : 0

## 2022-02-10 ENCOUNTER — RX RENEWAL (OUTPATIENT)
Age: 67
End: 2022-02-10

## 2022-04-01 NOTE — DISCHARGE NOTE NURSING/CASE MANAGEMENT/SOCIAL WORK - NSDCDMENUMBER_GEN_ALL_CORE_FT
Called pt's dad-  Message given to him that lab work is ordered.    I told him he can call same day and come in for a nurse visit to have orthostatic BP's done along with height and weight.    No further questions at this time.    Closing encounter.     [phone]

## 2022-05-11 ENCOUNTER — RX RENEWAL (OUTPATIENT)
Age: 67
End: 2022-05-11

## 2022-05-20 RX ORDER — ADHESIVE TAPE 3"X 2.3 YD
50 MCG TAPE, NON-MEDICATED TOPICAL
Qty: 100 | Refills: 1 | Status: DISCONTINUED | COMMUNITY
Start: 2020-05-12 | End: 2022-05-20

## 2022-07-15 ENCOUNTER — NON-APPOINTMENT (OUTPATIENT)
Age: 67
End: 2022-07-15

## 2022-07-15 ENCOUNTER — APPOINTMENT (OUTPATIENT)
Dept: INTERNAL MEDICINE | Facility: CLINIC | Age: 67
End: 2022-07-15

## 2022-07-15 VITALS
HEART RATE: 70 BPM | HEIGHT: 65 IN | WEIGHT: 173 LBS | BODY MASS INDEX: 28.82 KG/M2 | SYSTOLIC BLOOD PRESSURE: 120 MMHG | TEMPERATURE: 98.4 F | DIASTOLIC BLOOD PRESSURE: 72 MMHG | OXYGEN SATURATION: 98 % | RESPIRATION RATE: 17 BRPM

## 2022-07-15 PROCEDURE — G0439: CPT

## 2022-07-15 PROCEDURE — 36415 COLL VENOUS BLD VENIPUNCTURE: CPT

## 2022-07-15 PROCEDURE — 93000 ELECTROCARDIOGRAM COMPLETE: CPT

## 2022-07-15 NOTE — PHYSICAL EXAM
[No Acute Distress] : no acute distress [Well Nourished] : well nourished [Well Developed] : well developed [Well-Appearing] : well-appearing [Normal Voice/Communication] : normal voice/communication [Normal Sclera/Conjunctiva] : normal sclera/conjunctiva [PERRL] : pupils equal round and reactive to light [EOMI] : extraocular movements intact [Normal Outer Ear/Nose] : the outer ears and nose were normal in appearance [Normal Oropharynx] : the oropharynx was normal [No JVD] : no jugular venous distention [No Lymphadenopathy] : no lymphadenopathy [Supple] : supple [Thyroid Normal, No Nodules] : the thyroid was normal and there were no nodules present [No Respiratory Distress] : no respiratory distress  [No Accessory Muscle Use] : no accessory muscle use [Clear to Auscultation] : lungs were clear to auscultation bilaterally [Normal Rate] : normal rate  [Regular Rhythm] : with a regular rhythm [Normal S1, S2] : normal S1 and S2 [No Murmur] : no murmur heard [No Carotid Bruits] : no carotid bruits [No Abdominal Bruit] : a ~M bruit was not heard ~T in the abdomen [No Varicosities] : no varicosities [Pedal Pulses Present] : the pedal pulses are present [No Edema] : there was no peripheral edema [No Palpable Aorta] : no palpable aorta [No Extremity Clubbing/Cyanosis] : no extremity clubbing/cyanosis [Soft] : abdomen soft [Non Tender] : non-tender [Non-distended] : non-distended [No Masses] : no abdominal mass palpated [No HSM] : no HSM [Normal Bowel Sounds] : normal bowel sounds [No Hernias] : no hernias [Normal Supraclavicular Nodes] : no supraclavicular lymphadenopathy [Normal Axillary Nodes] : no axillary lymphadenopathy [Normal Posterior Cervical Nodes] : no posterior cervical lymphadenopathy [Normal Anterior Cervical Nodes] : no anterior cervical lymphadenopathy [Normal Inguinal Nodes] : no inguinal lymphadenopathy [No CVA Tenderness] : no CVA  tenderness [No Spinal Tenderness] : no spinal tenderness [No Joint Swelling] : no joint swelling [Grossly Normal Strength/Tone] : grossly normal strength/tone [No Rash] : no rash [Coordination Grossly Intact] : coordination grossly intact [No Focal Deficits] : no focal deficits [Normal Gait] : normal gait [Deep Tendon Reflexes (DTR)] : deep tendon reflexes were 2+ and symmetric [Speech Grossly Normal] : speech grossly normal [Memory Grossly Normal] : memory grossly normal [Normal Affect] : the affect was normal [Alert and Oriented x3] : oriented to person, place, and time [Normal Mood] : the mood was normal [Normal Insight/Judgement] : insight and judgment were intact [de-identified] : colostomy

## 2022-07-15 NOTE — HEALTH RISK ASSESSMENT
[Good] : ~his/her~  mood as  good [Never] : Never [No] : In the past 12 months have you used drugs other than those required for medical reasons? No [No falls in past year] : Patient reported no falls in the past year [0] : 2) Feeling down, depressed, or hopeless: Not at all (0) [PHQ-2 Negative - No further assessment needed] : PHQ-2 Negative - No further assessment needed [[id number]] : 0

## 2022-07-15 NOTE — HISTORY OF PRESENT ILLNESS
[FreeTextEntry1] : annual physical [de-identified] : MONSE RAZO is a 66 year old M who presents today for annual physical \par feels well \par has elevated Cholesterol

## 2022-07-17 LAB
25(OH)D3 SERPL-MCNC: 56.4 NG/ML
ALBUMIN SERPL ELPH-MCNC: 4.8 G/DL
ALP BLD-CCNC: 93 U/L
ALT SERPL-CCNC: 15 U/L
ANION GAP SERPL CALC-SCNC: 14 MMOL/L
APPEARANCE: ABNORMAL
AST SERPL-CCNC: 15 U/L
BACTERIA: NEGATIVE
BASOPHILS # BLD AUTO: 0.13 K/UL
BASOPHILS NFR BLD AUTO: 1.4 %
BILIRUB SERPL-MCNC: 0.3 MG/DL
BILIRUBIN URINE: NEGATIVE
BLOOD URINE: ABNORMAL
BUN SERPL-MCNC: 14 MG/DL
CALCIUM SERPL-MCNC: 9 MG/DL
CHLORIDE SERPL-SCNC: 105 MMOL/L
CHOLEST SERPL-MCNC: 181 MG/DL
CK SERPL-CCNC: 75 U/L
CO2 SERPL-SCNC: 24 MMOL/L
COLOR: YELLOW
CREAT SERPL-MCNC: 0.99 MG/DL
EGFR: 84 ML/MIN/1.73M2
EOSINOPHIL # BLD AUTO: 0.26 K/UL
EOSINOPHIL NFR BLD AUTO: 2.9 %
ESTIMATED AVERAGE GLUCOSE: 120 MG/DL
GLUCOSE QUALITATIVE U: NEGATIVE
GLUCOSE SERPL-MCNC: 96 MG/DL
HBA1C MFR BLD HPLC: 5.8 %
HCT VFR BLD CALC: 47.1 %
HDLC SERPL-MCNC: 68 MG/DL
HGB BLD-MCNC: 14.8 G/DL
HYALINE CASTS: 0 /LPF
IMM GRANULOCYTES NFR BLD AUTO: 0.3 %
KETONES URINE: NEGATIVE
LDLC SERPL CALC-MCNC: 86 MG/DL
LEUKOCYTE ESTERASE URINE: NEGATIVE
LYMPHOCYTES # BLD AUTO: 2.37 K/UL
LYMPHOCYTES NFR BLD AUTO: 26.1 %
MAN DIFF?: NORMAL
MCHC RBC-ENTMCNC: 30.3 PG
MCHC RBC-ENTMCNC: 31.4 GM/DL
MCV RBC AUTO: 96.5 FL
MICROSCOPIC-UA: NORMAL
MONOCYTES # BLD AUTO: 1.02 K/UL
MONOCYTES NFR BLD AUTO: 11.2 %
NEUTROPHILS # BLD AUTO: 5.28 K/UL
NEUTROPHILS NFR BLD AUTO: 58.1 %
NITRITE URINE: NEGATIVE
NONHDLC SERPL-MCNC: 113 MG/DL
PH URINE: 5
PLATELET # BLD AUTO: 241 K/UL
POTASSIUM SERPL-SCNC: 4.3 MMOL/L
PROT SERPL-MCNC: 6.9 G/DL
PROTEIN URINE: NEGATIVE
PSA SERPL-MCNC: 6.09 NG/ML
RBC # BLD: 4.88 M/UL
RBC # FLD: 14.5 %
RED BLOOD CELLS URINE: 1 /HPF
SODIUM SERPL-SCNC: 142 MMOL/L
SPECIFIC GRAVITY URINE: 1.03
SQUAMOUS EPITHELIAL CELLS: 0 /HPF
TRIGL SERPL-MCNC: 135 MG/DL
TSH SERPL-ACNC: 1.68 UIU/ML
UROBILINOGEN URINE: NORMAL
WBC # FLD AUTO: 9.09 K/UL
WHITE BLOOD CELLS URINE: 1 /HPF

## 2022-08-09 ENCOUNTER — RX RENEWAL (OUTPATIENT)
Age: 67
End: 2022-08-09

## 2022-08-16 NOTE — BEHAVIORAL HEALTH ASSESSMENT NOTE - FUND OF KNOWLEDGE
This medication refill is regarding a electronic request.  Refill requested by  [address] [place] [address] Requested Prescriptions     Pending Prescriptions Disp Refills    propranolol (INDERAL LA) 120 MG extended release capsule [Pharmacy Med Name: PROPRANOLOL ER 120MG CAPSULES] 90 capsule 3     Sig: TAKE 1 CAPSULE BY MOUTH DAILY     Date of last visit: 6/13/2022   Date of next visit: None  Date of last refill: 8/16/21 #90/3    Rx verified, ordered and set to EP.
Normal

## 2022-08-21 NOTE — ED ADULT NURSE NOTE - NSSUHOSCREENINGYN_ED_ALL_ED
Chief Complaint   Patient presents with   • Back Pain     Lower back pain, bilateral, X Monday or Tuesday, lifted something heavy      Patient escorted to exam room.  Patient's name,  and allergies verified.  PPE worn in patient's room with limited patient contact.  Patient in stable condition.  Martín Bernal was offered treatment for pain control: No.  
Yes - the patient is able to be screened
Yes

## 2022-10-19 NOTE — PROGRESS NOTE ADULT - PROBLEM SELECTOR PLAN 5
RAPID RESPONSE RESPIRATORY THERAPY PROACTIVE ROUNDING NOTE             Time of visit: 728      Code Status: Full Code   : 1947  Bed: 70679/04961 A:   MRN: [id number]  Time spent at the bedside: < 15 min    SITUATION    Evaluated patient for: HFNC Compliance     BACKGROUND    Patient has a past medical history of Acute blood loss anemia, Allergy, Back pain, Chronic diastolic heart failure, Chronic diastolic heart failure, Colon polyp, Disorder of kidney and ureter, H/O Bell's palsy, Helicobacter pylori (H. pylori), HTN (hypertension), Hypothyroid, OA (osteoarthritis), DONAVAN (obstructive sleep apnea), Pneumonia due to other staphylococcus, Pulmonary HTN, Sepsis due to pneumonia, Trouble in sleeping, and Urinary incontinence.    24 Hours Vitals Range:  Temp:  [98.1 °F (36.7 °C)-100.8 °F (38.2 °C)]   Pulse:  []   Resp:  []   BP: (108-169)/(53-77)   SpO2:  [94 %-99 %]     Labs:    Recent Labs     10/17/22  1200 10/18/22  0337 10/19/22  0507   * 135* 128*   K 3.3* 3.6 3.5    104 100   CO2 24 20* 18*   CREATININE 1.6* 1.6* 2.0*   GLU 92 132* 95   MG 1.9  --   --         No results for input(s): PH, PCO2, PO2, HCO3, POCSATURATED, BE in the last 72 hours.    ASSESSMENT/INTERVENTIONS    Pt was still listed on chart as being on HFNC. When I entered room, I saw she was only on NC. Weaned liter flow from 3 to 2L. Pt sat 94%.    Last VS   Temp: 98.8 °F (37.1 °C) (10/19 0800)  Pulse: 87 (10/19 0758)  Resp: 23 (10/19 0904)  BP: 144/67 (10/19 0904)  SpO2: 95 % (10/19 0904)    Level of Consciousness: Level of Consciousness (AVPU): alert  Respiratory Effort: Respiratory Effort: Unlabored, Normal Expansion/Accessory Muscle Usage: Expansion/Accessory Muscles/Retractions: no retractions, expansion symmetric  All Lung Field Breath Sounds: All Lung Fields Breath Sounds: Posterior:, Lateral:, diminished, equal bilaterally  O2 Device/Concentration: NC 2L, 28%  Was the O2 device able to be weaned? Yes  Ambu at  bedside: Ambu bag with the patient?: Yes, Adult Ambu    Active Orders   Respiratory Care    Inhalation Treatment Q4H PRN     Frequency: Q4H PRN     Number of Occurrences: Until Specified    Oxygen PRN     Frequency: PRN     Number of Occurrences: Until Specified     Order Questions:      Device type: Low flow      Device: Nasal Cannula (1- 5 Liters)      LPM: 1-5      Titrate O2 per Oxygen Titration Protocol: Yes      To maintain SpO2 goal of: >= 90%      Notify MD of: Inability to achieve desired SpO2; Sudden change in patient status and requires 20% increase in FiO2; Patient requires >60% FiO2       RECOMMENDATIONS    We recommend: RRT Recs: Continue POC per primary team.      FOLLOW-UP    Please call back the Rapid Response RT, Donovan Braga RRT at x [phone] for any questions or concerns.                pt has been abusing his spouse's medications and was taking her medications while he's been hospitalized  - meds were confiscated, pt may be withdrawing  - tolerating suboxone. consulted Sherlyn today, he will eval the pt tomorrow

## 2022-11-04 ENCOUNTER — OUTPATIENT (OUTPATIENT)
Dept: OUTPATIENT SERVICES | Facility: HOSPITAL | Age: 67
LOS: 1 days | Discharge: HOME | End: 2022-11-04

## 2022-11-04 ENCOUNTER — APPOINTMENT (OUTPATIENT)
Dept: CT IMAGING | Facility: CLINIC | Age: 67
End: 2022-11-04

## 2022-11-04 ENCOUNTER — OUTPATIENT (OUTPATIENT)
Dept: OUTPATIENT SERVICES | Facility: HOSPITAL | Age: 67
LOS: 1 days | End: 2022-11-04
Payer: MEDICARE

## 2022-11-04 DIAGNOSIS — C18.9 MALIGNANT NEOPLASM OF COLON, UNSPECIFIED: ICD-10-CM

## 2022-11-04 DIAGNOSIS — Z00.8 ENCOUNTER FOR OTHER GENERAL EXAMINATION: ICD-10-CM

## 2022-11-04 PROCEDURE — 74177 CT ABD & PELVIS W/CONTRAST: CPT | Mod: 26,MH

## 2022-11-04 PROCEDURE — 71260 CT THORAX DX C+: CPT | Mod: 26,MH

## 2022-11-04 PROCEDURE — 74177 CT ABD & PELVIS W/CONTRAST: CPT

## 2022-11-04 PROCEDURE — 71260 CT THORAX DX C+: CPT

## 2022-11-07 ENCOUNTER — RX RENEWAL (OUTPATIENT)
Age: 67
End: 2022-11-07

## 2022-11-07 RX ORDER — DOCOSAHEXAENOIC ACID 300 MG
50 MCG CAPSULE ORAL
Qty: 90 | Refills: 2 | Status: DISCONTINUED | COMMUNITY
Start: 2020-12-19 | End: 2022-11-07

## 2022-11-07 RX ORDER — TADALAFIL 5 MG/1
5 TABLET ORAL
Qty: 90 | Refills: 0 | Status: DISCONTINUED | COMMUNITY
Start: 2021-05-06 | End: 2022-11-07

## 2022-11-07 RX ORDER — ONDANSETRON 8 MG/1
8 TABLET ORAL EVERY 8 HOURS
Qty: 10 | Refills: 0 | Status: DISCONTINUED | COMMUNITY
Start: 2019-05-08 | End: 2022-11-07

## 2022-11-07 RX ORDER — LOPERAMIDE HYDROCHLORIDE 2 MG/1
2 CAPSULE ORAL
Qty: 120 | Refills: 0 | Status: DISCONTINUED | COMMUNITY
Start: 2021-06-25 | End: 2022-11-07

## 2022-11-08 ENCOUNTER — APPOINTMENT (OUTPATIENT)
Dept: INTERNAL MEDICINE | Facility: CLINIC | Age: 67
End: 2022-11-08

## 2022-11-08 VITALS
HEART RATE: 86 BPM | OXYGEN SATURATION: 98 % | DIASTOLIC BLOOD PRESSURE: 80 MMHG | HEIGHT: 65 IN | WEIGHT: 175 LBS | BODY MASS INDEX: 29.16 KG/M2 | RESPIRATION RATE: 14 BRPM | TEMPERATURE: 98 F | SYSTOLIC BLOOD PRESSURE: 124 MMHG

## 2022-11-08 PROCEDURE — 99214 OFFICE O/P EST MOD 30 MIN: CPT

## 2022-11-08 NOTE — PHYSICAL EXAM
[No Acute Distress] : no acute distress [Well Nourished] : well nourished [Well Developed] : well developed [Well-Appearing] : well-appearing [Normal Voice/Communication] : normal voice/communication [Normal Sclera/Conjunctiva] : normal sclera/conjunctiva [PERRL] : pupils equal round and reactive to light [EOMI] : extraocular movements intact [Normal Outer Ear/Nose] : the outer ears and nose were normal in appearance [Normal Oropharynx] : the oropharynx was normal [No JVD] : no jugular venous distention [No Lymphadenopathy] : no lymphadenopathy [Supple] : supple [Thyroid Normal, No Nodules] : the thyroid was normal and there were no nodules present [No Respiratory Distress] : no respiratory distress  [No Accessory Muscle Use] : no accessory muscle use [Clear to Auscultation] : lungs were clear to auscultation bilaterally [Normal Rate] : normal rate  [Regular Rhythm] : with a regular rhythm [Normal S1, S2] : normal S1 and S2 [No Murmur] : no murmur heard [No Carotid Bruits] : no carotid bruits [No Abdominal Bruit] : a ~M bruit was not heard ~T in the abdomen [No Varicosities] : no varicosities [Pedal Pulses Present] : the pedal pulses are present [No Edema] : there was no peripheral edema [No Palpable Aorta] : no palpable aorta [No Extremity Clubbing/Cyanosis] : no extremity clubbing/cyanosis [Soft] : abdomen soft [Non Tender] : non-tender [Non-distended] : non-distended [No Masses] : no abdominal mass palpated [No HSM] : no HSM [Normal Bowel Sounds] : normal bowel sounds [Normal Supraclavicular Nodes] : no supraclavicular lymphadenopathy [Normal Posterior Cervical Nodes] : no posterior cervical lymphadenopathy [Normal Anterior Cervical Nodes] : no anterior cervical lymphadenopathy [No CVA Tenderness] : no CVA  tenderness [No Spinal Tenderness] : no spinal tenderness [No Joint Swelling] : no joint swelling [Grossly Normal Strength/Tone] : grossly normal strength/tone [No Rash] : no rash [Coordination Grossly Intact] : coordination grossly intact [No Focal Deficits] : no focal deficits [Normal Gait] : normal gait [Deep Tendon Reflexes (DTR)] : deep tendon reflexes were 2+ and symmetric [Speech Grossly Normal] : speech grossly normal [Memory Grossly Normal] : memory grossly normal [Normal Affect] : the affect was normal [Alert and Oriented x3] : oriented to person, place, and time [Normal Mood] : the mood was normal [Normal Insight/Judgement] : insight and judgment were intact [de-identified] : Colostomy bag

## 2022-11-08 NOTE — HISTORY OF PRESENT ILLNESS
[FreeTextEntry1] : follow up  [de-identified] : MONSE RAZO is a 67 year old M who presents today for follow up for renewal of medications. Pt recently diagnosed with Rectal Cancer

## 2022-11-08 NOTE — END OF VISIT
[FreeTextEntry3] : "I, Gayla Sanchez, personally scribed the services dictated to me by Dr. Dennis Plata MD in this documentation on 11/08/2022 " \par \par "I Dr. Dennis Plata MD, personally performed the services described in this documentation on 11/08/2022 for the patient as scribed by Gayla Sanchez in my presence. I have reviewed and verified that all the information is accurate and true."

## 2022-11-08 NOTE — HEALTH RISK ASSESSMENT
[Never] : Never [Never (0 pts)] : Never (0 points) [No] : In the past 12 months have you used drugs other than those required for medical reasons? No [No falls in past year] : Patient reported no falls in the past year [0] : 2) Feeling down, depressed, or hopeless: Not at all (0) [PHQ-2 Negative - No further assessment needed] : PHQ-2 Negative - No further assessment needed [[id number]] : 0

## 2022-11-14 ENCOUNTER — RESULT REVIEW (OUTPATIENT)
Age: 67
End: 2022-11-14

## 2022-11-14 ENCOUNTER — APPOINTMENT (OUTPATIENT)
Dept: HEMATOLOGY ONCOLOGY | Facility: CLINIC | Age: 67
End: 2022-11-14

## 2022-11-14 ENCOUNTER — TRANSCRIPTION ENCOUNTER (OUTPATIENT)
Age: 67
End: 2022-11-14

## 2022-11-14 VITALS
DIASTOLIC BLOOD PRESSURE: 86 MMHG | TEMPERATURE: 98.1 F | BODY MASS INDEX: 28.26 KG/M2 | OXYGEN SATURATION: 98 % | SYSTOLIC BLOOD PRESSURE: 134 MMHG | HEIGHT: 64.57 IN | HEART RATE: 104 BPM | RESPIRATION RATE: 16 BRPM | WEIGHT: 167.55 LBS

## 2022-11-14 LAB
ALBUMIN SERPL ELPH-MCNC: 4.7 G/DL
ALP BLD-CCNC: 92 U/L
ALT SERPL-CCNC: 14 U/L
ANION GAP SERPL CALC-SCNC: 15 MMOL/L
AST SERPL-CCNC: 17 U/L
BASOPHILS # BLD AUTO: 0.07 K/UL — SIGNIFICANT CHANGE UP (ref 0–0.2)
BASOPHILS NFR BLD AUTO: 0.9 % — SIGNIFICANT CHANGE UP (ref 0–2)
BILIRUB SERPL-MCNC: 0.5 MG/DL
BUN SERPL-MCNC: 14 MG/DL
CALCIUM SERPL-MCNC: 9.3 MG/DL
CEA SERPL-MCNC: 16 NG/ML
CHLORIDE SERPL-SCNC: 105 MMOL/L
CO2 SERPL-SCNC: 21 MMOL/L
CREAT SERPL-MCNC: 1.01 MG/DL
EGFR: 82 ML/MIN/1.73M2
EOSINOPHIL # BLD AUTO: 0.12 K/UL — SIGNIFICANT CHANGE UP (ref 0–0.5)
EOSINOPHIL NFR BLD AUTO: 1.5 % — SIGNIFICANT CHANGE UP (ref 0–6)
GLUCOSE SERPL-MCNC: 113 MG/DL
HCT VFR BLD CALC: 50.2 % — HIGH (ref 39–50)
HGB BLD-MCNC: 16.5 G/DL — SIGNIFICANT CHANGE UP (ref 13–17)
IMM GRANULOCYTES NFR BLD AUTO: 0.3 % — SIGNIFICANT CHANGE UP (ref 0–0.9)
LYMPHOCYTES # BLD AUTO: 1.95 K/UL — SIGNIFICANT CHANGE UP (ref 1–3.3)
LYMPHOCYTES # BLD AUTO: 24.5 % — SIGNIFICANT CHANGE UP (ref 13–44)
MCHC RBC-ENTMCNC: 29.5 PG — SIGNIFICANT CHANGE UP (ref 27–34)
MCHC RBC-ENTMCNC: 32.9 G/DL — SIGNIFICANT CHANGE UP (ref 32–36)
MCV RBC AUTO: 89.6 FL — SIGNIFICANT CHANGE UP (ref 80–100)
MONOCYTES # BLD AUTO: 0.88 K/UL — SIGNIFICANT CHANGE UP (ref 0–0.9)
MONOCYTES NFR BLD AUTO: 11 % — SIGNIFICANT CHANGE UP (ref 2–14)
NEUTROPHILS # BLD AUTO: 4.93 K/UL — SIGNIFICANT CHANGE UP (ref 1.8–7.4)
NEUTROPHILS NFR BLD AUTO: 61.8 % — SIGNIFICANT CHANGE UP (ref 43–77)
NRBC # BLD: 0 /100 WBCS — SIGNIFICANT CHANGE UP (ref 0–0)
PLATELET # BLD AUTO: 274 K/UL — SIGNIFICANT CHANGE UP (ref 150–400)
POTASSIUM SERPL-SCNC: 3.9 MMOL/L
PROT SERPL-MCNC: 7.3 G/DL
RBC # BLD: 5.6 M/UL — SIGNIFICANT CHANGE UP (ref 4.2–5.8)
RBC # FLD: 13.9 % — SIGNIFICANT CHANGE UP (ref 10.3–14.5)
SODIUM SERPL-SCNC: 141 MMOL/L
WBC # BLD: 7.97 K/UL — SIGNIFICANT CHANGE UP (ref 3.8–10.5)
WBC # FLD AUTO: 7.97 K/UL — SIGNIFICANT CHANGE UP (ref 3.8–10.5)

## 2022-11-14 PROCEDURE — 99205 OFFICE O/P NEW HI 60 MIN: CPT

## 2022-11-14 NOTE — HISTORY OF PRESENT ILLNESS
[de-identified] : Mr. Darby is a 67 year old gentleman with history of ulcerative colitis, s/p ileostomy June 2019 who had been followed by urology for an elevated PSA.  He had an MRI prostate performed 10/20/22 which revealed a 4.9X 4.6X 4.4cm prostate as well as a  concerning 2.4X 3.6 X 5.9 cm rectal mass with multiple prominent lymph nodes in the mesorectal fat as well as enlarged right pelvic sidewall lymph node measuring 1.4cm. Subsequently a biopsy of rectal mass was performed by his colorectal surgeon Dr. Abimael Velazquez on 11/3/2022 which revealed "invasive adenocarcinoma of the rectum, moderately differentiated".  Patient presents to cancer center to establish care. \par \par PMH: ulcerative colitis diagnosed at age 16, PTSD, BPH\par PSH: ileostomy 2019\par Family Hx: mother had colorectal cancer (passed at 75), paternal grandmother also with colorectal ca. 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 He is feeling well at baseline today. Denies weight loss, no nausea/vomiting, has a good appetite.

## 2022-11-14 NOTE — ASSESSMENT
[FreeTextEntry1] : 67 year old gentleman with newly diagnosed adenocarcinoma of the rectum here to establish care.\par \par #Rectal adenocarcinoma\par - will need to request pathology slides and imaging from outside. Patient states he already had a CT chest and was told by his surgeon his disease is Stage II. Will need to confirm once we review slides/imaging here\par - referral to Rad-Onc and Surg-Onc. Once we have slides/imaging will also present his case to tumor board\par - check CEA, CBC, BMP today\par \par #Ulcerative Colitis s/p ileostomy 2019\par - continue f/u with GI, currently has no symptoms\par \par #Anxiety/PTSD\par - c/w diazepam/trazodone as per direction of PCP

## 2022-11-15 LAB
HBV SURFACE AB SER QL: NONREACTIVE
HBV SURFACE AG SER QL: NONREACTIVE
HCV AB SER QL: NONREACTIVE
HCV S/CO RATIO: 0.07 S/CO

## 2022-11-16 ENCOUNTER — NON-APPOINTMENT (OUTPATIENT)
Age: 67
End: 2022-11-16

## 2022-11-23 ENCOUNTER — APPOINTMENT (OUTPATIENT)
Dept: COLORECTAL SURGERY | Facility: CLINIC | Age: 67
End: 2022-11-23

## 2022-11-25 ENCOUNTER — APPOINTMENT (OUTPATIENT)
Dept: MRI IMAGING | Facility: CLINIC | Age: 67
End: 2022-11-25

## 2022-11-25 ENCOUNTER — RESULT REVIEW (OUTPATIENT)
Age: 67
End: 2022-11-25

## 2022-11-25 LAB — SURGICAL PATHOLOGY STUDY: SIGNIFICANT CHANGE UP

## 2022-11-25 PROCEDURE — 88321 CONSLTJ&REPRT SLD PREP ELSWR: CPT

## 2022-12-07 ENCOUNTER — APPOINTMENT (OUTPATIENT)
Dept: HEMATOLOGY ONCOLOGY | Facility: CLINIC | Age: 67
End: 2022-12-07

## 2022-12-13 NOTE — PROGRESS NOTE ADULT - SUBJECTIVE AND OBJECTIVE BOX
6 mon f/u left mammogram approx. 4/18/2023  She will re-schedule appt with breast surgery  Pt denies palpable lump, completely asymptomatic  F/U after mammogram in 3-4 months   INTERVAL HPI/OVERNIGHT EVENTS: Patient seen and examined at bedside. Pain not so great this morning, changed is diet to full liquids from regular. Denies any diarrhea, melena.     Patient refused to continue to follow with [facility]. Consulted again Dr. Brantley group.     MEDICATIONS  (STANDING):  atorvastatin 20 milliGRAM(s) Oral at bedtime  ciprofloxacin   IVPB 400 milliGRAM(s) IV Intermittent every 12 hours  lactobacillus acidophilus 1 Tablet(s) Oral two times a day  mesalamine DR Capsule 1200 milliGRAM(s) Oral four times a day  mesalamine Suppository 1000 milliGRAM(s) Rectal at bedtime  methylPREDNISolone sodium succinate Injectable 40 milliGRAM(s) IV Push every 8 hours  metroNIDAZOLE  IVPB 500 milliGRAM(s) IV Intermittent every 8 hours  pantoprazole    Tablet 40 milliGRAM(s) Oral before breakfast  sodium chloride 0.9%. 1000 milliLiter(s) (75 mL/Hr) IV Continuous <Continuous>  traZODone 50 milliGRAM(s) Oral at bedtime    MEDICATIONS  (PRN):  acetaminophen   Tablet .. 650 milliGRAM(s) Oral every 6 hours PRN Temp greater or equal to 38C (100.4F), Mild Pain (1 - 3)  diazepam    Tablet 5 milliGRAM(s) Oral at bedtime PRN Sleep  morphine  - Injectable 2 milliGRAM(s) IV Push every 6 hours PRN Severe Pain (7 - 10)  morphine  - Injectable 1 milliGRAM(s) IV Push every 6 hours PRN Moderate Pain (4 - 6)  ondansetron Injectable 4 milliGRAM(s) IV Push every 6 hours PRN Nausea      Allergies    penicillin (Swelling)    Intolerances        CONSTITUTIONAL: No weakness, fevers or chills  RESPIRATORY: No cough, wheezing, hemoptysis; No shortness of breath  Cvs: No chest pain or palpitations  Gi: + abdominal pain. No nausea, vomiting, diarrhea or constipation. No melena or hematochezia.  Neuro: no weakness    All other review of systems is negative unless indicated above.    Vital Signs Last 24 Hrs  T(C): 36.7 (12 May 2019 08:00), Max: 36.9 (11 May 2019 23:39)  T(F): 98 (12 May 2019 08:00), Max: 98.5 (11 May 2019 23:39)  HR: 72 (12 May 2019 08:00) (72 - 85)  BP: 130/82 (12 May 2019 08:00) (122/74 - 144/83)  BP(mean): --  RR: 16 (12 May 2019 08:00) (16 - 16)  SpO2: 95% (12 May 2019 08:00) (95% - 96%)    General: NAD  Neurology: A&Ox3 , nonfocal  Respiratory: CTA B/L  CV: RRR, S1S2  Abdominal: Soft, mild tenderness on deep palpation , ND +BS  Extremities: No edema, + peripheral pulses    LABS:                        11.8   9.01  )-----------( 424      ( 12 May 2019 08:02 )             34.9     05-12    138  |  102  |  5<L>  ----------------------------<  152<H>  3.5   |  28  |  0.51    Ca    7.4<L>      12 May 2019 08:02  Phos  1.8     05-11  Mg     2.1     05-11    TPro  5.1<L>  /  Alb  1.5<L>  /  TBili  0.3  /  DBili  x   /  AST  9<L>  /  ALT  7<L>  /  AlkPhos  64  05-12    PT/INR - ( 11 May 2019 07:54 )   PT: 19.1 sec;   INR: 1.66 ratio         PTT - ( 11 May 2019 07:54 )  PTT:29.1 sec  Urinalysis Basic - ( 10 May 2019 16:27 )    Color: Yellow / Appearance: Clear / S.010 / pH: x  Gluc: x / Ketone: Small  / Bili: Small / Urobili: Negative   Blood: x / Protein: 25 mg/dL / Nitrite: Negative   Leuk Esterase: Negative / RBC: 3-5 /HPF / WBC 0-2   Sq Epi: x / Non Sq Epi: Moderate / Bacteria: Few

## 2023-01-09 ENCOUNTER — RX RENEWAL (OUTPATIENT)
Age: 68
End: 2023-01-09

## 2023-02-05 ENCOUNTER — RX RENEWAL (OUTPATIENT)
Age: 68
End: 2023-02-05

## 2023-03-22 NOTE — H&P ADULT - NSHPLABSRESULTS_GEN_ALL_CORE
OBGYN
LABS:                        12.5   6.81  )-----------( 387      ( 28 May 2019 13:40 )             37.4     05-28    139  |  100  |  8   ----------------------------<  97  2.6<LL>   |  29  |  0.62    Ca    7.5<L>      28 May 2019 15:38    TPro  5.9<L>  /  Alb  1.7<L>  /  TBili  0.4  /  DBili  x   /  AST  8<L>  /  ALT  <6<L>  /  AlkPhos  76  05-28

## 2023-03-27 ENCOUNTER — RX RENEWAL (OUTPATIENT)
Age: 68
End: 2023-03-27

## 2023-03-28 ENCOUNTER — APPOINTMENT (OUTPATIENT)
Dept: INTERNAL MEDICINE | Facility: CLINIC | Age: 68
End: 2023-03-28
Payer: MEDICARE

## 2023-03-28 VITALS
HEIGHT: 64.57 IN | BODY MASS INDEX: 28.67 KG/M2 | RESPIRATION RATE: 14 BRPM | HEART RATE: 80 BPM | WEIGHT: 170 LBS | SYSTOLIC BLOOD PRESSURE: 130 MMHG | DIASTOLIC BLOOD PRESSURE: 76 MMHG | OXYGEN SATURATION: 99 %

## 2023-03-28 DIAGNOSIS — K51.90 ULCERATIVE COLITIS, UNSPECIFIED, W/OUT COMPLICATIONS: ICD-10-CM

## 2023-03-28 PROCEDURE — 99214 OFFICE O/P EST MOD 30 MIN: CPT

## 2023-03-28 RX ORDER — ALFUZOSIN HYDROCHLORIDE 10 MG/1
10 TABLET, EXTENDED RELEASE ORAL
Qty: 90 | Refills: 0 | Status: DISCONTINUED | COMMUNITY
Start: 2022-11-02 | End: 2023-03-28

## 2023-03-28 NOTE — HISTORY OF PRESENT ILLNESS
[FreeTextEntry1] : follow up  [de-identified] : MONSE RAZO is a 67 year old M who presents today for follow up for refill of medication. Pt is being treated at [facility] for rectal cancer. has UC

## 2023-03-28 NOTE — PHYSICAL EXAM
[No Acute Distress] : no acute distress [Well Nourished] : well nourished [Well Developed] : well developed [Well-Appearing] : well-appearing [Normal Voice/Communication] : normal voice/communication [Normal Sclera/Conjunctiva] : normal sclera/conjunctiva [PERRL] : pupils equal round and reactive to light [EOMI] : extraocular movements intact [Normal Outer Ear/Nose] : the outer ears and nose were normal in appearance [Normal Oropharynx] : the oropharynx was normal [No JVD] : no jugular venous distention [No Lymphadenopathy] : no lymphadenopathy [Supple] : supple [Thyroid Normal, No Nodules] : the thyroid was normal and there were no nodules present [No Respiratory Distress] : no respiratory distress  [No Accessory Muscle Use] : no accessory muscle use [Clear to Auscultation] : lungs were clear to auscultation bilaterally [Normal Rate] : normal rate  [Regular Rhythm] : with a regular rhythm [Normal S1, S2] : normal S1 and S2 [No Murmur] : no murmur heard [No Carotid Bruits] : no carotid bruits [No Abdominal Bruit] : a ~M bruit was not heard ~T in the abdomen [No Varicosities] : no varicosities [Pedal Pulses Present] : the pedal pulses are present [No Edema] : there was no peripheral edema [No Palpable Aorta] : no palpable aorta [No Extremity Clubbing/Cyanosis] : no extremity clubbing/cyanosis [Soft] : abdomen soft [Non Tender] : non-tender [Non-distended] : non-distended [No Masses] : no abdominal mass palpated [No HSM] : no HSM [Normal Bowel Sounds] : normal bowel sounds [Normal Supraclavicular Nodes] : no supraclavicular lymphadenopathy [Normal Posterior Cervical Nodes] : no posterior cervical lymphadenopathy [Normal Anterior Cervical Nodes] : no anterior cervical lymphadenopathy [No CVA Tenderness] : no CVA  tenderness [No Spinal Tenderness] : no spinal tenderness [No Joint Swelling] : no joint swelling [Grossly Normal Strength/Tone] : grossly normal strength/tone [No Rash] : no rash [Coordination Grossly Intact] : coordination grossly intact [No Focal Deficits] : no focal deficits [Normal Gait] : normal gait [Deep Tendon Reflexes (DTR)] : deep tendon reflexes were 2+ and symmetric [Speech Grossly Normal] : speech grossly normal [Memory Grossly Normal] : memory grossly normal [Normal Affect] : the affect was normal [Alert and Oriented x3] : oriented to person, place, and time [Normal Mood] : the mood was normal [Normal Insight/Judgement] : insight and judgment were intact [de-identified] : colostomy

## 2023-03-28 NOTE — END OF VISIT
[FreeTextEntry3] : "I, Herminia Cabezas, personally scribed the services dictated to me by Dr. Dennis Plata MD in this documentation on 03/28/2023 " \par \par "I Dr. Dennis Plata MD, personally performed the services described in this documentation on 03/28/2023 for the patient as scribed by eHrminia Cabezas in my presence. I have reviewed and verified that all the information is accurate and true."

## 2023-03-28 NOTE — HEALTH RISK ASSESSMENT
[No] : In the past 12 months have you used drugs other than those required for medical reasons? No [No falls in past year] : Patient reported no falls in the past year [0] : 2) Feeling down, depressed, or hopeless: Not at all (0) [PHQ-2 Negative - No further assessment needed] : PHQ-2 Negative - No further assessment needed [Never] : Never [[id number]] : 0

## 2023-04-03 NOTE — ED PROVIDER NOTE - CROS ED CARDIOVAS ALL NEG
Patient scheduled for surgery with Dr. Roddy Lauren on 04- at 7:30am with an arrival of 6:00am.  Preop surgery instructions and antibacterial soap given to patient. Surgery consent signed. negative...

## 2023-05-08 ENCOUNTER — INPATIENT (INPATIENT)
Facility: HOSPITAL | Age: 68
LOS: 4 days | Discharge: HOME | DRG: 683 | End: 2023-05-13
Attending: INTERNAL MEDICINE | Admitting: INTERNAL MEDICINE
Payer: MEDICARE

## 2023-05-08 VITALS
WEIGHT: 151.02 LBS | OXYGEN SATURATION: 97 % | DIASTOLIC BLOOD PRESSURE: 77 MMHG | RESPIRATION RATE: 18 BRPM | HEART RATE: 100 BPM | HEIGHT: 65 IN | TEMPERATURE: 98 F | SYSTOLIC BLOOD PRESSURE: 106 MMHG

## 2023-05-08 DIAGNOSIS — Z98.890 OTHER SPECIFIED POSTPROCEDURAL STATES: Chronic | ICD-10-CM

## 2023-05-08 DIAGNOSIS — N17.9 ACUTE KIDNEY FAILURE, UNSPECIFIED: ICD-10-CM

## 2023-05-08 LAB
ACANTHOCYTES BLD QL SMEAR: SLIGHT — SIGNIFICANT CHANGE UP
ALBUMIN SERPL ELPH-MCNC: 4.4 G/DL — SIGNIFICANT CHANGE UP (ref 3.3–5)
ALP SERPL-CCNC: 119 U/L — SIGNIFICANT CHANGE UP (ref 40–120)
ALT FLD-CCNC: 20 U/L — SIGNIFICANT CHANGE UP (ref 10–45)
ANION GAP SERPL CALC-SCNC: 18 MMOL/L — HIGH (ref 5–17)
ANISOCYTOSIS BLD QL: SLIGHT — SIGNIFICANT CHANGE UP
APPEARANCE UR: ABNORMAL
AST SERPL-CCNC: 23 U/L — SIGNIFICANT CHANGE UP (ref 10–40)
BACTERIA # UR AUTO: NEGATIVE — SIGNIFICANT CHANGE UP
BASE EXCESS BLDV CALC-SCNC: 1.7 MMOL/L — SIGNIFICANT CHANGE UP (ref -2–3)
BASOPHILS # BLD AUTO: 0 K/UL — SIGNIFICANT CHANGE UP (ref 0–0.2)
BASOPHILS NFR BLD AUTO: 0 % — SIGNIFICANT CHANGE UP (ref 0–2)
BILIRUB SERPL-MCNC: 1.2 MG/DL — SIGNIFICANT CHANGE UP (ref 0.2–1.2)
BILIRUB UR-MCNC: NEGATIVE — SIGNIFICANT CHANGE UP
BUN SERPL-MCNC: 27 MG/DL — HIGH (ref 7–23)
BURR CELLS BLD QL SMEAR: PRESENT — SIGNIFICANT CHANGE UP
CA-I SERPL-SCNC: 1.13 MMOL/L — LOW (ref 1.15–1.33)
CALCIUM SERPL-MCNC: 9.6 MG/DL — SIGNIFICANT CHANGE UP (ref 8.4–10.5)
CHLORIDE BLDV-SCNC: 90 MMOL/L — LOW (ref 96–108)
CHLORIDE SERPL-SCNC: 88 MMOL/L — LOW (ref 96–108)
CO2 BLDV-SCNC: 27 MMOL/L — HIGH (ref 22–26)
CO2 SERPL-SCNC: 23 MMOL/L — SIGNIFICANT CHANGE UP (ref 22–31)
COLOR SPEC: YELLOW — SIGNIFICANT CHANGE UP
COMMENT - URINE: SIGNIFICANT CHANGE UP
CREAT SERPL-MCNC: 2.72 MG/DL — HIGH (ref 0.5–1.3)
DACRYOCYTES BLD QL SMEAR: SLIGHT — SIGNIFICANT CHANGE UP
DIFF PNL FLD: NEGATIVE — SIGNIFICANT CHANGE UP
EGFR: 25 ML/MIN/1.73M2 — LOW
EOSINOPHIL # BLD AUTO: 0 K/UL — SIGNIFICANT CHANGE UP (ref 0–0.5)
EOSINOPHIL NFR BLD AUTO: 0 % — SIGNIFICANT CHANGE UP (ref 0–6)
EPI CELLS # UR: 3 /HPF — SIGNIFICANT CHANGE UP
GAS PNL BLDV: 124 MMOL/L — LOW (ref 136–145)
GAS PNL BLDV: SIGNIFICANT CHANGE UP
GAS PNL BLDV: SIGNIFICANT CHANGE UP
GIANT PLATELETS BLD QL SMEAR: PRESENT — SIGNIFICANT CHANGE UP
GLUCOSE BLDV-MCNC: 177 MG/DL — HIGH (ref 70–99)
GLUCOSE SERPL-MCNC: 169 MG/DL — HIGH (ref 70–99)
GLUCOSE UR QL: ABNORMAL
HCO3 BLDV-SCNC: 26 MMOL/L — SIGNIFICANT CHANGE UP (ref 22–29)
HCT VFR BLD CALC: 39.2 % — SIGNIFICANT CHANGE UP (ref 39–50)
HCT VFR BLDA CALC: 43 % — SIGNIFICANT CHANGE UP (ref 39–51)
HGB BLD CALC-MCNC: 14.2 G/DL — SIGNIFICANT CHANGE UP (ref 12.6–17.4)
HGB BLD-MCNC: 14 G/DL — SIGNIFICANT CHANGE UP (ref 13–17)
HYALINE CASTS # UR AUTO: 99 /LPF — HIGH (ref 0–2)
KETONES UR-MCNC: SIGNIFICANT CHANGE UP
LACTATE BLDV-MCNC: 2.2 MMOL/L — HIGH (ref 0.5–2)
LEUKOCYTE ESTERASE UR-ACNC: NEGATIVE — SIGNIFICANT CHANGE UP
LYMPHOCYTES # BLD AUTO: 0.67 K/UL — LOW (ref 1–3.3)
LYMPHOCYTES # BLD AUTO: 10.5 % — LOW (ref 13–44)
MACROCYTES BLD QL: SLIGHT — SIGNIFICANT CHANGE UP
MAGNESIUM SERPL-MCNC: 2.1 MG/DL — SIGNIFICANT CHANGE UP (ref 1.6–2.6)
MANUAL SMEAR VERIFICATION: SIGNIFICANT CHANGE UP
MCHC RBC-ENTMCNC: 33 PG — SIGNIFICANT CHANGE UP (ref 27–34)
MCHC RBC-ENTMCNC: 35.7 GM/DL — SIGNIFICANT CHANGE UP (ref 32–36)
MCV RBC AUTO: 92.5 FL — SIGNIFICANT CHANGE UP (ref 80–100)
MONOCYTES # BLD AUTO: 0.34 K/UL — SIGNIFICANT CHANGE UP (ref 0–0.9)
MONOCYTES NFR BLD AUTO: 5.3 % — SIGNIFICANT CHANGE UP (ref 2–14)
NEUTROPHILS # BLD AUTO: 5.38 K/UL — SIGNIFICANT CHANGE UP (ref 1.8–7.4)
NEUTROPHILS NFR BLD AUTO: 78.9 % — HIGH (ref 43–77)
NEUTS BAND # BLD: 5.3 % — SIGNIFICANT CHANGE UP (ref 0–8)
NITRITE UR-MCNC: NEGATIVE — SIGNIFICANT CHANGE UP
PCO2 BLDV: 38 MMHG — LOW (ref 42–55)
PH BLDV: 7.44 — HIGH (ref 7.32–7.43)
PH UR: 5.5 — SIGNIFICANT CHANGE UP (ref 5–8)
PHOSPHATE SERPL-MCNC: 2.9 MG/DL — SIGNIFICANT CHANGE UP (ref 2.5–4.5)
PLAT MORPH BLD: NORMAL — SIGNIFICANT CHANGE UP
PLATELET # BLD AUTO: 163 K/UL — SIGNIFICANT CHANGE UP (ref 150–400)
PO2 BLDV: 28 MMHG — SIGNIFICANT CHANGE UP (ref 25–45)
POIKILOCYTOSIS BLD QL AUTO: SLIGHT — SIGNIFICANT CHANGE UP
POTASSIUM BLDV-SCNC: 3.5 MMOL/L — SIGNIFICANT CHANGE UP (ref 3.5–5.1)
POTASSIUM SERPL-MCNC: 3.6 MMOL/L — SIGNIFICANT CHANGE UP (ref 3.5–5.3)
POTASSIUM SERPL-SCNC: 3.6 MMOL/L — SIGNIFICANT CHANGE UP (ref 3.5–5.3)
PROT SERPL-MCNC: 7.3 G/DL — SIGNIFICANT CHANGE UP (ref 6–8.3)
PROT UR-MCNC: ABNORMAL
RBC # BLD: 4.24 M/UL — SIGNIFICANT CHANGE UP (ref 4.2–5.8)
RBC # FLD: 16.4 % — HIGH (ref 10.3–14.5)
RBC BLD AUTO: ABNORMAL
RBC CASTS # UR COMP ASSIST: 5 /HPF — HIGH (ref 0–4)
SAO2 % BLDV: 49.1 % — LOW (ref 67–88)
SCHISTOCYTES BLD QL AUTO: SLIGHT — SIGNIFICANT CHANGE UP
SODIUM SERPL-SCNC: 129 MMOL/L — LOW (ref 135–145)
SP GR SPEC: 1.02 — SIGNIFICANT CHANGE UP (ref 1.01–1.02)
UROBILINOGEN FLD QL: NEGATIVE — SIGNIFICANT CHANGE UP
WBC # BLD: 6.39 K/UL — SIGNIFICANT CHANGE UP (ref 3.8–10.5)
WBC # FLD AUTO: 6.39 K/UL — SIGNIFICANT CHANGE UP (ref 3.8–10.5)
WBC UR QL: 3 /HPF — SIGNIFICANT CHANGE UP (ref 0–5)

## 2023-05-08 PROCEDURE — 99223 1ST HOSP IP/OBS HIGH 75: CPT

## 2023-05-08 PROCEDURE — 99285 EMERGENCY DEPT VISIT HI MDM: CPT | Mod: GC

## 2023-05-08 PROCEDURE — 74176 CT ABD & PELVIS W/O CONTRAST: CPT | Mod: 26,MA

## 2023-05-08 PROCEDURE — 71045 X-RAY EXAM CHEST 1 VIEW: CPT | Mod: 26

## 2023-05-08 RX ORDER — DIAZEPAM 5 MG
5 TABLET ORAL AT BEDTIME
Refills: 0 | Status: DISCONTINUED | OUTPATIENT
Start: 2023-05-08 | End: 2023-05-13

## 2023-05-08 RX ORDER — MONTELUKAST 4 MG/1
10 TABLET, CHEWABLE ORAL DAILY
Refills: 0 | Status: DISCONTINUED | OUTPATIENT
Start: 2023-05-08 | End: 2023-05-13

## 2023-05-08 RX ORDER — SALIVA SUBSTITUTE COMB NO.11 351 MG
5 POWDER IN PACKET (EA) MUCOUS MEMBRANE
Refills: 0 | Status: DISCONTINUED | OUTPATIENT
Start: 2023-05-08 | End: 2023-05-13

## 2023-05-08 RX ORDER — MONTELUKAST 4 MG/1
1 TABLET, CHEWABLE ORAL
Refills: 0 | DISCHARGE

## 2023-05-08 RX ORDER — TRAZODONE HCL 50 MG
100 TABLET ORAL AT BEDTIME
Refills: 0 | Status: DISCONTINUED | OUTPATIENT
Start: 2023-05-08 | End: 2023-05-13

## 2023-05-08 RX ORDER — SODIUM CHLORIDE 9 MG/ML
1000 INJECTION INTRAMUSCULAR; INTRAVENOUS; SUBCUTANEOUS
Refills: 0 | Status: DISCONTINUED | OUTPATIENT
Start: 2023-05-08 | End: 2023-05-09

## 2023-05-08 RX ORDER — DIAZEPAM 5 MG
1 TABLET ORAL
Qty: 0 | Refills: 0 | DISCHARGE

## 2023-05-08 RX ORDER — ONDANSETRON 8 MG/1
4 TABLET, FILM COATED ORAL ONCE
Refills: 0 | Status: COMPLETED | OUTPATIENT
Start: 2023-05-08 | End: 2023-05-08

## 2023-05-08 RX ORDER — PANTOPRAZOLE SODIUM 20 MG/1
40 TABLET, DELAYED RELEASE ORAL ONCE
Refills: 0 | Status: COMPLETED | OUTPATIENT
Start: 2023-05-08 | End: 2023-05-08

## 2023-05-08 RX ORDER — SODIUM CHLORIDE 9 MG/ML
1000 INJECTION INTRAMUSCULAR; INTRAVENOUS; SUBCUTANEOUS ONCE
Refills: 0 | Status: COMPLETED | OUTPATIENT
Start: 2023-05-08 | End: 2023-05-08

## 2023-05-08 RX ORDER — ATORVASTATIN CALCIUM 80 MG/1
20 TABLET, FILM COATED ORAL AT BEDTIME
Refills: 0 | Status: DISCONTINUED | OUTPATIENT
Start: 2023-05-08 | End: 2023-05-13

## 2023-05-08 RX ORDER — TAMSULOSIN HYDROCHLORIDE 0.4 MG/1
1 CAPSULE ORAL
Refills: 0 | DISCHARGE

## 2023-05-08 RX ORDER — DIAZEPAM 5 MG
1 TABLET ORAL
Refills: 0 | DISCHARGE

## 2023-05-08 RX ORDER — ATORVASTATIN CALCIUM 80 MG/1
1 TABLET, FILM COATED ORAL
Qty: 0 | Refills: 0 | DISCHARGE

## 2023-05-08 RX ORDER — TAMSULOSIN HYDROCHLORIDE 0.4 MG/1
0.4 CAPSULE ORAL AT BEDTIME
Refills: 0 | Status: DISCONTINUED | OUTPATIENT
Start: 2023-05-08 | End: 2023-05-13

## 2023-05-08 RX ORDER — TRAZODONE HCL 50 MG
0 TABLET ORAL
Refills: 0 | DISCHARGE

## 2023-05-08 RX ORDER — SODIUM CHLORIDE 9 MG/ML
500 INJECTION INTRAMUSCULAR; INTRAVENOUS; SUBCUTANEOUS ONCE
Refills: 0 | Status: DISCONTINUED | OUTPATIENT
Start: 2023-05-08 | End: 2023-05-08

## 2023-05-08 RX ORDER — TRAZODONE HCL 50 MG
1 TABLET ORAL
Qty: 0 | Refills: 0 | DISCHARGE

## 2023-05-08 RX ADMIN — ONDANSETRON 4 MILLIGRAM(S): 8 TABLET, FILM COATED ORAL at 15:25

## 2023-05-08 RX ADMIN — Medication 100 MILLIGRAM(S): at 23:54

## 2023-05-08 RX ADMIN — SODIUM CHLORIDE 1000 MILLILITER(S): 9 INJECTION INTRAMUSCULAR; INTRAVENOUS; SUBCUTANEOUS at 15:26

## 2023-05-08 RX ADMIN — PANTOPRAZOLE SODIUM 40 MILLIGRAM(S): 20 TABLET, DELAYED RELEASE ORAL at 23:54

## 2023-05-08 RX ADMIN — Medication 5 MILLIGRAM(S): at 23:54

## 2023-05-08 NOTE — H&P ADULT - PROBLEM SELECTOR PLAN 1
Baseline creatinine Feb - Mar 2023 was 1.0 - 1.3 (outpatient labs available in the patient's chart for review)  - s/p 1L of NS in the ED  - continue with maintenance fluids NS at 75 cc/hr until diarrhea is improved  - repeat creatinine this morning

## 2023-05-08 NOTE — H&P ADULT - PROBLEM SELECTOR PLAN 7
- continue home trazodone 100mg qHS  - continue home diazepam 5mg qHS (pt takes this on a standing basis. iSTOP reviewed)

## 2023-05-08 NOTE — H&P ADULT - PROBLEM SELECTOR PLAN 2
Suspect secondary to chemotherapy agents, will need to rule out infectious cause  - obtain GI PCR  - obtain C diff PCR  - hold off on loperimide until infectious work up results and is negative  - fluids

## 2023-05-08 NOTE — H&P ADULT - PROBLEM/PLAN-1
6/10/2020              [address] [facility]         To Whom It May Concern,      Sincerely,    Franklin Bravo MD  [address] [place] [address], [address]  [facility] DISPLAY PLAN FREE TEXT

## 2023-05-08 NOTE — H&P ADULT - NSHPPHYSICALEXAM_GEN_ALL_CORE
Vital Signs Last 24 Hrs  T(C): 36.3 (08 May 2023 18:09), Max: 36.4 (08 May 2023 13:30)  T(F): 97.3 (08 May 2023 18:09), Max: 97.5 (08 May 2023 13:30)  HR: 71 (08 May 2023 18:09) (71 - 100)  BP: 145/90 (08 May 2023 18:09) (106/77 - 145/90)  BP(mean): --  RR: 18 (08 May 2023 18:09) (18 - 18)  SpO2: 99% (08 May 2023 18:09) (97% - 99%)    Parameters below as of 08 May 2023 18:09  Patient On (Oxygen Delivery Method): room air

## 2023-05-08 NOTE — ED PROVIDER NOTE - PHYSICAL EXAMINATION
GENERAL: Awake, alert, NAD  HEENT: NC/AT, moist mucous membranes, EOMI  LUNGS: CTAB, no wheezes or crackles   CARDIAC: RRR, no m/r/g  ABDOMEN: Soft, colostomy in place. non tender abd, no rebound  EXT: No edema, no calf tenderness, no deformities.  NEURO: A&Ox3. Moving all extremities.  SKIN: Warm and dry. No rash.  PSYCH: Normal affect.

## 2023-05-08 NOTE — ED ADULT NURSE NOTE - OBJECTIVE STATEMENT
Pt [place] [facility] [place] for rectal CA and pancolitis.  He was sent here for evaluation of acute onset renal failure.  He notes that his urine output has decreased recently and urine is darker in color.   No fevers, chills, states last round of chemo was held due to his abnormal labs.  Ileostomy has soft brown stool present.  Today he is experiencing hiccoughs.

## 2023-05-08 NOTE — CHART NOTE - NSCHARTNOTEFT_GEN_A_CORE
Briefly, 66 y/o male with rectal adenocarcinoma, on chemotherapy, presenting to [facility] with acute renal failure with concurrent nausea, vomiting, diarrhea, and poor oral intake.     If requiring admission to medicine services, please admit to Dr. Janes Woodruff. Briefly, 66 y/o male with rectal adenocarcinoma, on chemotherapy at [facility], presenting to [facility] with acute renal failure with concurrent nausea, vomiting, diarrhea, and poor oral intake.     If requiring admission to medicine services, please admit to Dr. Janes Woodruff.

## 2023-05-08 NOTE — H&P ADULT - ASSESSMENT
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

## 2023-05-08 NOTE — H&P ADULT - HISTORY OF PRESENT ILLNESS
67 year old male with a PMHx of UC, rectal adenocarcinoma on chemo (currently on capecitabine + oxaliplatin, last dose April 17th), and BPH who presents with abnormal outpatient labs showing elevated creatinine.       ED interventions: NS 1000 ml bolus, zofran. 67 year old male with a PMHx of UC s/p total colectomy with Alban pouch RLL ileostomy, rectal adenocarcinoma on chemo (currently on capecitabine + oxaliplatin, last dose April 17th), and BPH who presents with abnormal outpatient labs showing elevated creatinine. Pt reports that in the last week he has experienced fatigue, decreased PO intake, difficulty swallowing food d/t dry mouth. He endorses nausea without vomiting. He has large amount of watery contents via his ileostomy pouch, requiring changing his pouch more than 5 times/day (at baseline, he typically changes his pouch twice a day). The pouch contents are watery, barely any stool, and is the color of whatever he just drank. He feels very thirsty and has been drinking about 2L of liquids daily. He has developed hiccups / painful intermittent abdominal spasms over the past two days.       ED interventions: NS 1000 ml bolus, zofran.

## 2023-05-08 NOTE — H&P ADULT - NSICDXPASTMEDICALHX_GEN_ALL_CORE_FT
PAST MEDICAL HISTORY:  Anxiety     HLD (hyperlipidemia)     Rectal cancer     Ulcerative colitis      PAST MEDICAL HISTORY:  Anxiety     History of BPH     HLD (hyperlipidemia)     Rectal cancer     Ulcerative colitis

## 2023-05-08 NOTE — CONSULT NOTE ADULT - ASSESSMENT
MONSE RAZO is a 67y Male who presents with a chief complaint of ARF    Rectal Cancer  ·	Patient follows with Dr. Zoe Goldberg, [facility].  ·	He completed adjuvant concurrent chemoradiation, is currently on capecitabine + oxaliplatin, last dose April 17th.  ·	No systemic therapy while inpatient or during rehabilitation.    Acute Renal Failure  ·	Baseline creatinine at 1.0-1.3.  ·	Creatinine today 2.72.  ·	In the setting of diarrhea, poor oral intake.  ·	IVF. Nephrology evaluation if continues to worsen or not improve.    Diarrhea  ·	Possibly in the setting of chemotherapy.  ·	Rule out C. difficile or other GI infections.  ·	Supportive medications if no infectious cause found.    Will continue to follow.    Shaun Berrios MD  Hematology/Oncology  O: [phone]

## 2023-05-08 NOTE — H&P ADULT - PROBLEM SELECTOR PLAN 3
Hyponatremia, asymptomatic.  Likely hypotonic hypovolemic, from diarrhea  - s/p 1L of fluids  - continue maintenance fluids  - repeat sodium level this morning

## 2023-05-08 NOTE — ED ADULT NURSE NOTE - HOW OFTEN DO YOU HAVE A DRINK CONTAINING ALCOHOL?
Problem: Chronic Conditions and Co-morbidities  Goal: Patient's chronic conditions and co-morbidity symptoms are monitored and maintained or improved  Outcome: Progressing     Problem: Wound:  Goal: Will show signs of wound healing; wound closure and no evidence of infection  Description: Will show signs of wound healing; wound closure and no evidence of infection  Outcome: Progressing
Never

## 2023-05-08 NOTE — H&P ADULT - NSHPLABSRESULTS_GEN_ALL_CORE
LABS:                         14.0   6.39  )-----------( 163      ( 08 May 2023 15:29 )             39.2     05-08    129<L>  |  88<L>  |  27<H>  ----------------------------<  169<H>  3.6   |  23  |  2.72<H>    Ca    9.6      08 May 2023 15:29  Phos  2.9     05-08  Mg     2.1     05-08    TPro  7.3  /  Alb  4.4  /  TBili  1.2  /  DBili  x   /  AST  23  /  ALT  20  /  AlkPhos  119  05-08      Urinalysis Basic - ( 08 May 2023 17:24 )    Color: Yellow / Appearance: Slightly Turbid / S.023 / pH: x  Gluc: x / Ketone: Trace  / Bili: Negative / Urobili: Negative   Blood: x / Protein: 30 mg/dL / Nitrite: Negative   Leuk Esterase: Negative / RBC: 5 /hpf / WBC 3 /HPF   Sq Epi: x / Non Sq Epi: x / Bacteria: Negative              Records reviewed from prior hospitalization.  Labs reviewed remarkable for - hyponatremia 129; BUN/Cr is 27/2.72 (baseline creatinine 1.0-1.3).  EKG personally reviewed   CXR personally reviewed - clear lungs LABS:                         14.0   6.39  )-----------( 163      ( 08 May 2023 15:29 )             39.2     05-08    129<L>  |  88<L>  |  27<H>  ----------------------------<  169<H>  3.6   |  23  |  2.72<H>    Ca    9.6      08 May 2023 15:29  Phos  2.9     05-08  Mg     2.1     05-08    TPro  7.3  /  Alb  4.4  /  TBili  1.2  /  DBili  x   /  AST  23  /  ALT  20  /  AlkPhos  119  05-08      Urinalysis Basic - ( 08 May 2023 17:24 )    Color: Yellow / Appearance: Slightly Turbid / S.023 / pH: x  Gluc: x / Ketone: Trace  / Bili: Negative / Urobili: Negative   Blood: x / Protein: 30 mg/dL / Nitrite: Negative   Leuk Esterase: Negative / RBC: 5 /hpf / WBC 3 /HPF   Sq Epi: x / Non Sq Epi: x / Bacteria: Negative              Records reviewed from prior hospitalization.  Labs reviewed remarkable for - hyponatremia 129; BUN/Cr is 27/2.72 (baseline creatinine 1.0-1.3).  EKG personally reviewed - NSR  CXR personally reviewed - clear lungs

## 2023-05-08 NOTE — H&P ADULT - PROBLEM SELECTOR PLAN 4
Hiccups over the last 48 hours  - tried vagal manuever in the ED without improvement of symptoms  - pt takes protonix at home, only on a PRN basis  - will give protonix 40mg IV x1 now  - start standing protonix 40mg daily  - monitor symptoms Hiccups over the last 48 hours  - tried vagal manuever in the ED without improvement of symptoms  - pt takes protonix at home, only on a PRN basis  - will give protonix 40mg IV x1 now  - start standing protonix 40mg daily  - Pt still symptomatic after protonix. Will start reglan 5 mg PO q8hrs standing x 24 hours  - monitor for response

## 2023-05-08 NOTE — ED PROVIDER NOTE - ATTENDING CONTRIBUTION TO CARE
I, Farzad Nieto, performed a history and physical exam of the patient and discussed their management with the resident and/or advanced care provider. I reviewed the resident and/or advanced care provider's note and agree with the documented findings and plan of care. I was present and available for all procedures.     68yo male pt PMHx UC, rectal CA on chemo (every 3 weeks), BPH who presents to the ED for elevated creatinine from blood work performed today. Patient endorses 2 weeks of difficulty tolerating solids and has been mostly eating fluid and mashed food. Has noticed foul smelling and watery diarrhea since 2 weeks as well. Denies emesis, cp, sob, cough or fevers.    Well appearing and in NAD, head normal appearing atraumatic, trachea midline, no respiratory distress, lungs cta bilaterally, rrr no murmurs, soft NT ND abdomen r sided ileostomy liquid stool, no visible extremity deformities, Alert and oriented, non focal neuro exam, skin warm and dry, normal affect and mood, no leg swelling, calf ttp or jvd no cva ttp     patient went for chemo today rpt screening labs showing worsening maureen gfr 80's to 20's otherwise inability to madelyn po for almost 2 weeks, with +diarrhea no blood dark or recent abx for stools or travel other fevers. eval with labs ct po cont antiemetics likely admit for further graff and renal optimization discussed with patient agreed w plan unlikely acs pe ptx pna dissection aaa

## 2023-05-08 NOTE — ED ADULT NURSE NOTE - NSICDXPASTMEDICALHX_GEN_ALL_CORE_FT
PAST MEDICAL HISTORY:  Anxiety     HLD (hyperlipidemia)     Ulcerative colitis      PAST MEDICAL HISTORY:  Anxiety     HLD (hyperlipidemia)     Rectal cancer     Ulcerative colitis

## 2023-05-08 NOTE — CONSULT NOTE ADULT - SUBJECTIVE AND OBJECTIVE BOX
CHIEF COMPLAINT  ARF    HISTORY OF PRESENT ILLNESS  MONSE RAZO is a 67y Male who presents with a chief complaint of ARF    Patient was admitted on May 8th after presenting to the Emergency Department with worsening renal functions. He has reported difficulty tolerating oral intake, nausea, as well as worsening diarrhea. He has been very weak but did improve over the last day or two.    PAST MEDICAL AND SURGICAL HISTORY  Ulcerative Colitis  Rectal Cancer  Hyperlipidemia    FAMILY HISTORY  Non-contributory    SOCIAL HISTORY  Denies tobacco use    REVIEW OF SYSTEMS  A complete review of systems was performed; negative except per HPI    PHYSICAL EXAM  T(C): 36.3 (05-08-23 @ 18:09), Max: 36.4 (05-08-23 @ 13:30)  HR: 71 (05-08-23 @ 18:09) (71 - 100)  BP: 145/90 (05-08-23 @ 18:09) (106/77 - 145/90)  RR: 18 (05-08-23 @ 18:09) (18 - 18)  SpO2: 99% (05-08-23 @ 18:09) (97% - 99%)  Constitutional: alert, awake, in no acute distress  Eyes: PERRL, EOMI  HEENT: normocephalic, atraumatic  Neck: supple, non-tender  Cardiovascular: normal perfusion, no peripheral edema  Respiratory: normal respiratory efforts; no increased use of accessory muscles  Gastrointestinal: soft, non-tender  Musculoskeletal: normal range of motion, no deformities noted  Neurological: alert, CN II to XI grossly intact  Skin: warm, dry    LABORATORY DATA                        14.0   6.39  )-----------( 163      ( 08 May 2023 15:29 )             39.2     05-08    129<L>  |  88<L>  |  27<H>  ----------------------------<  169<H>  3.6   |  23  |  2.72<H>    Ca    9.6      08 May 2023 15:29  Phos  2.9     05-08  Mg     2.1     05-08    TPro  7.3  /  Alb  4.4  /  TBili  1.2  /  DBili  x   /  AST  23  /  ALT  20  /  AlkPhos  119  05-08    RADIOLOGY REVIEW  IMPRESSION:  No bowel obstruction. No acute pathology

## 2023-05-08 NOTE — ED PROVIDER NOTE - CLINICAL SUMMARY MEDICAL DECISION MAKING FREE TEXT BOX
68yo male pt who presents to the ED for difficulty toleration PO, foul smelling stools, and recent elevation to his creatinine most likely in the setting of dehydration/ pre renal etiology. Will eval with blood work, ekg. 68yo male pt who presents to the ED for difficulty toleration PO, foul smelling stools, and recent elevation to his creatinine most likely in the setting of dehydration/ pre renal etiology. Will eval with blood work, ekg.    pettet attending- see attending attestation for my mdm

## 2023-05-08 NOTE — H&P ADULT - PROBLEM SELECTOR PLAN 5
- follows at [facility]  - oncology is onboard, no plan for inpatient systemic treatment

## 2023-05-08 NOTE — CHART NOTE - NSCHARTNOTEFT_GEN_A_CORE
iSTOP prescription monitoring    This report was requested by: Mirian Pope | Reference #: [id number]  PDI	Current Rx	Drug Type	Rx Written	Rx Dispensed	Drug	Quantity	Days Supply	Prescriber Name	Prescriber THIERRY #	Payment Method  A	N	B	03/28/2023	03/29/2023	diazepam 5 mg tablet	60	30	Dennis Plata MD	[id number]	Medicare  Dispenser [employer]  A	N	B	12/05/2022	12/06/2022	diazepam 5 mg tablet	60	30	Dennis Plata MD	[id number]	Medicare  Dispenser [employer]  A	N	B	11/08/2022	11/08/2022	diazepam 5 mg tablet	60	30	Dennis Plata MD	[id number]	Medicare  Dispenser [employer]  A	N	B	08/23/2022	08/24/2022	diazepam 5 mg tablet	60	30	Dennis Plata MD	[id number]	Medicare  Dispenser [employer]  A	N	B	07/15/2022	07/19/2022	diazepam 5 mg tablet	60	30	Dennis Plata MD	[id number]	Medicare  Dispenser [employer]

## 2023-05-08 NOTE — ED PROVIDER NOTE - OBJECTIVE STATEMENT
68yo male pt PMHx UC, rectal CA on chemo (every 3 weeks), BPH who presents to the ED for elevated creatinine from blood work performed today. Patient endorses 2 weeks of difficulty tolerating solids and has been mostly eating fluid and mashed food. Has noticed foul smelling and watery diarrhea since 2 weeks as well. Denies emesis, cp, sob, cough or fevers.

## 2023-05-09 DIAGNOSIS — R06.6 HICCOUGH: ICD-10-CM

## 2023-05-09 DIAGNOSIS — R19.7 DIARRHEA, UNSPECIFIED: ICD-10-CM

## 2023-05-09 DIAGNOSIS — Z87.438 PERSONAL HISTORY OF OTHER DISEASES OF MALE GENITAL ORGANS: ICD-10-CM

## 2023-05-09 DIAGNOSIS — J45.909 UNSPECIFIED ASTHMA, UNCOMPLICATED: ICD-10-CM

## 2023-05-09 DIAGNOSIS — N17.9 ACUTE KIDNEY FAILURE, UNSPECIFIED: ICD-10-CM

## 2023-05-09 DIAGNOSIS — Z87.898 PERSONAL HISTORY OF OTHER SPECIFIED CONDITIONS: ICD-10-CM

## 2023-05-09 DIAGNOSIS — C20 MALIGNANT NEOPLASM OF RECTUM: ICD-10-CM

## 2023-05-09 DIAGNOSIS — E87.1 HYPO-OSMOLALITY AND HYPONATREMIA: ICD-10-CM

## 2023-05-09 DIAGNOSIS — Z29.9 ENCOUNTER FOR PROPHYLACTIC MEASURES, UNSPECIFIED: ICD-10-CM

## 2023-05-09 LAB
ANION GAP SERPL CALC-SCNC: 17 MMOL/L — SIGNIFICANT CHANGE UP (ref 5–17)
APPEARANCE UR: CLEAR — SIGNIFICANT CHANGE UP
BASE EXCESS BLDV CALC-SCNC: -1 MMOL/L — SIGNIFICANT CHANGE UP (ref -2–3)
BILIRUB UR-MCNC: NEGATIVE — SIGNIFICANT CHANGE UP
BUN SERPL-MCNC: 34 MG/DL — HIGH (ref 7–23)
C DIFF GDH STL QL: NEGATIVE — SIGNIFICANT CHANGE UP
C DIFF GDH STL QL: SIGNIFICANT CHANGE UP
CA-I SERPL-SCNC: 1.11 MMOL/L — LOW (ref 1.15–1.33)
CALCIUM SERPL-MCNC: 9 MG/DL — SIGNIFICANT CHANGE UP (ref 8.4–10.5)
CHLORIDE BLDV-SCNC: 90 MMOL/L — LOW (ref 96–108)
CHLORIDE SERPL-SCNC: 90 MMOL/L — LOW (ref 96–108)
CO2 BLDV-SCNC: 26 MMOL/L — SIGNIFICANT CHANGE UP (ref 22–26)
CO2 SERPL-SCNC: 21 MMOL/L — LOW (ref 22–31)
COLOR SPEC: YELLOW — SIGNIFICANT CHANGE UP
CREAT ?TM UR-MCNC: 148 MG/DL — SIGNIFICANT CHANGE UP
CREAT SERPL-MCNC: 2.83 MG/DL — HIGH (ref 0.5–1.3)
DIFF PNL FLD: NEGATIVE — SIGNIFICANT CHANGE UP
EGFR: 24 ML/MIN/1.73M2 — LOW
GAS PNL BLDV: 124 MMOL/L — LOW (ref 136–145)
GAS PNL BLDV: SIGNIFICANT CHANGE UP
GAS PNL BLDV: SIGNIFICANT CHANGE UP
GI PCR PANEL: SIGNIFICANT CHANGE UP
GLUCOSE BLDV-MCNC: 126 MG/DL — HIGH (ref 70–99)
GLUCOSE SERPL-MCNC: 136 MG/DL — HIGH (ref 70–99)
GLUCOSE UR QL: NEGATIVE — SIGNIFICANT CHANGE UP
HCO3 BLDV-SCNC: 24 MMOL/L — SIGNIFICANT CHANGE UP (ref 22–29)
HCT VFR BLDA CALC: 37 % — LOW (ref 39–51)
HGB BLD CALC-MCNC: 12.4 G/DL — LOW (ref 12.6–17.4)
KETONES UR-MCNC: NEGATIVE — SIGNIFICANT CHANGE UP
LACTATE BLDV-MCNC: 2.5 MMOL/L — HIGH (ref 0.5–2)
LEUKOCYTE ESTERASE UR-ACNC: NEGATIVE — SIGNIFICANT CHANGE UP
NITRITE UR-MCNC: NEGATIVE — SIGNIFICANT CHANGE UP
OSMOLALITY UR: 425 MOS/KG — SIGNIFICANT CHANGE UP (ref 300–900)
PCO2 BLDV: 42 MMHG — SIGNIFICANT CHANGE UP (ref 42–55)
PH BLDV: 7.37 — SIGNIFICANT CHANGE UP (ref 7.32–7.43)
PH UR: 5.5 — SIGNIFICANT CHANGE UP (ref 5–8)
PO2 BLDV: 35 MMHG — SIGNIFICANT CHANGE UP (ref 25–45)
POTASSIUM BLDV-SCNC: 3.9 MMOL/L — SIGNIFICANT CHANGE UP (ref 3.5–5.1)
POTASSIUM SERPL-MCNC: 3.7 MMOL/L — SIGNIFICANT CHANGE UP (ref 3.5–5.3)
POTASSIUM SERPL-SCNC: 3.7 MMOL/L — SIGNIFICANT CHANGE UP (ref 3.5–5.3)
PROT UR-MCNC: SIGNIFICANT CHANGE UP
SAO2 % BLDV: 53.7 % — LOW (ref 67–88)
SODIUM SERPL-SCNC: 128 MMOL/L — LOW (ref 135–145)
SODIUM UR-SCNC: 9 MMOL/L — SIGNIFICANT CHANGE UP
SP GR SPEC: 1.02 — SIGNIFICANT CHANGE UP (ref 1.01–1.02)
UROBILINOGEN FLD QL: NEGATIVE — SIGNIFICANT CHANGE UP

## 2023-05-09 RX ORDER — CHLORHEXIDINE GLUCONATE 213 G/1000ML
1 SOLUTION TOPICAL
Refills: 0 | Status: DISCONTINUED | OUTPATIENT
Start: 2023-05-09 | End: 2023-05-13

## 2023-05-09 RX ORDER — PANTOPRAZOLE SODIUM 20 MG/1
40 TABLET, DELAYED RELEASE ORAL
Refills: 0 | Status: DISCONTINUED | OUTPATIENT
Start: 2023-05-09 | End: 2023-05-10

## 2023-05-09 RX ORDER — SODIUM CHLORIDE 9 MG/ML
1000 INJECTION, SOLUTION INTRAVENOUS
Refills: 0 | Status: DISCONTINUED | OUTPATIENT
Start: 2023-05-09 | End: 2023-05-12

## 2023-05-09 RX ORDER — HEPARIN SODIUM 5000 [USP'U]/ML
5000 INJECTION INTRAVENOUS; SUBCUTANEOUS EVERY 12 HOURS
Refills: 0 | Status: DISCONTINUED | OUTPATIENT
Start: 2023-05-09 | End: 2023-05-13

## 2023-05-09 RX ORDER — METOCLOPRAMIDE HCL 10 MG
5 TABLET ORAL EVERY 8 HOURS
Refills: 0 | Status: COMPLETED | OUTPATIENT
Start: 2023-05-09 | End: 2023-05-09

## 2023-05-09 RX ADMIN — HEPARIN SODIUM 5000 UNIT(S): 5000 INJECTION INTRAVENOUS; SUBCUTANEOUS at 06:03

## 2023-05-09 RX ADMIN — Medication 5 MILLILITER(S): at 13:52

## 2023-05-09 RX ADMIN — SODIUM CHLORIDE 75 MILLILITER(S): 9 INJECTION, SOLUTION INTRAVENOUS at 17:28

## 2023-05-09 RX ADMIN — MONTELUKAST 10 MILLIGRAM(S): 4 TABLET, CHEWABLE ORAL at 09:51

## 2023-05-09 RX ADMIN — Medication 5 MILLIGRAM(S): at 02:32

## 2023-05-09 RX ADMIN — Medication 5 MILLILITER(S): at 06:04

## 2023-05-09 RX ADMIN — ATORVASTATIN CALCIUM 20 MILLIGRAM(S): 80 TABLET, FILM COATED ORAL at 22:35

## 2023-05-09 RX ADMIN — SODIUM CHLORIDE 75 MILLILITER(S): 9 INJECTION, SOLUTION INTRAVENOUS at 22:38

## 2023-05-09 RX ADMIN — Medication 5 MILLIGRAM(S): at 22:35

## 2023-05-09 RX ADMIN — Medication 100 MILLIGRAM(S): at 22:35

## 2023-05-09 RX ADMIN — Medication 5 MILLIGRAM(S): at 09:51

## 2023-05-09 RX ADMIN — HEPARIN SODIUM 5000 UNIT(S): 5000 INJECTION INTRAVENOUS; SUBCUTANEOUS at 17:28

## 2023-05-09 RX ADMIN — TAMSULOSIN HYDROCHLORIDE 0.4 MILLIGRAM(S): 0.4 CAPSULE ORAL at 22:35

## 2023-05-09 RX ADMIN — Medication 5 MILLIGRAM(S): at 17:28

## 2023-05-09 RX ADMIN — PANTOPRAZOLE SODIUM 40 MILLIGRAM(S): 20 TABLET, DELAYED RELEASE ORAL at 06:04

## 2023-05-09 NOTE — PROGRESS NOTE ADULT - SUBJECTIVE AND OBJECTIVE BOX
Patient is a 67y old  Male who presents with a chief complaint of ARF    Patient seen and examined at bedside this morning. Patient reports ongoing diarrhea, but is otherwise "fine"     MEDICATIONS  (STANDING):  atorvastatin 20 milliGRAM(s) Oral at bedtime  Biotene Dry Mouth Oral Rinse 5 milliLiter(s) Swish and Spit two times a day  chlorhexidine 2% Cloths 1 Application(s) Topical <User Schedule>  diazepam    Tablet 5 milliGRAM(s) Oral at bedtime  heparin   Injectable 5000 Unit(s) SubCutaneous every 12 hours  metoclopramide 5 milliGRAM(s) Oral every 8 hours  montelukast 10 milliGRAM(s) Oral daily  pantoprazole    Tablet 40 milliGRAM(s) Oral before breakfast  sodium chloride 0.45% 1000 milliLiter(s) (75 mL/Hr) IV Continuous <Continuous>  tamsulosin 0.4 milliGRAM(s) Oral at bedtime  traZODone 100 milliGRAM(s) Oral at bedtime    MEDICATIONS  (PRN):        Vital Signs Last 24 Hrs  T(C): 36.3 (09 May 2023 12:43), Max: 36.9 (09 May 2023 00:00)  T(F): 97.3 (09 May 2023 12:43), Max: 98.4 (09 May 2023 00:00)  HR: 67 (09 May 2023 12:43) (67 - 74)  BP: 130/88 (09 May 2023 12:43) (120/81 - 145/90)  BP(mean): --  RR: 18 (09 May 2023 12:43) (18 - 20)  SpO2: 97% (09 May 2023 12:43) (97% - 99%)    Parameters below as of 09 May 2023 12:43  Patient On (Oxygen Delivery Method): room air        PE  NAD  Awake, alert  Anicteric, MMM  RRR  CTAB  Abd soft, NT, ND  +colostomy  No c/c/e                            14.0   6.39  )-----------( 163      ( 08 May 2023 15:29 )             39.2       05-09    128<L>  |  90<L>  |  34<H>  ----------------------------<  136<H>  3.7   |  21<L>  |  2.83<H>    Ca    9.0      09 May 2023 06:50  Phos  2.9     05-08  Mg     2.1     05-08    TPro  7.3  /  Alb  4.4  /  TBili  1.2  /  DBili  x   /  AST  23  /  ALT  20  /  AlkPhos  119  05-08

## 2023-05-09 NOTE — PROGRESS NOTE ADULT - ASSESSMENT
MONSE RAZO is a 67y Male who presents with a chief complaint of ARF    Rectal Cancer  ·	Patient follows with Dr. Zoe Goldberg, [facility].  ·	He completed adjuvant concurrent chemoradiation, is currently on capecitabine + oxaliplatin, last dose April 17th.  ·	No systemic therapy while inpatient or during rehabilitation.    Acute Renal Failure  ·	Baseline creatinine at 1.0-1.3.  ·	Creatinine today 2.72.  ·	In the setting of diarrhea, poor oral intake.  ·	IVF. Nephrology evaluation if continues to worsen or not improve.  ·	nephrology consulted    Diarrhea  ·	Possibly in the setting of chemotherapy.  ·	Rule out C. difficile or other GI infections, GI following  ·	Supportive medications if no infectious cause found.    Will continue to follow.    Erma Rodríguez NP  Hematology/ Oncology  [facility]  [phone] (office)  [phone] (alt office)  Evenings and weekends please call MD on call or office

## 2023-05-09 NOTE — CONSULT NOTE ADULT - SUBJECTIVE AND OBJECTIVE BOX
Chief Complaint:  Patient is a 67y old  Male who presents with a chief complaint of     Date of service: 23 @ 11:26    HPI:    The patient is a     The patient denies dysphagia, nausea and vomiting, abdominal pain, diarrhea, unintentional weight loss, change in bowel habits or NSAID use.      Allergies:  penicillin (Swelling)      Home Medications:    Hospital Medications:  atorvastatin 20 milliGRAM(s) Oral at bedtime  Biotene Dry Mouth Oral Rinse 5 milliLiter(s) Swish and Spit two times a day  chlorhexidine 2% Cloths 1 Application(s) Topical <User Schedule>  diazepam    Tablet 5 milliGRAM(s) Oral at bedtime  heparin   Injectable 5000 Unit(s) SubCutaneous every 12 hours  metoclopramide 5 milliGRAM(s) Oral every 8 hours  montelukast 10 milliGRAM(s) Oral daily  pantoprazole    Tablet 40 milliGRAM(s) Oral before breakfast  tamsulosin 0.4 milliGRAM(s) Oral at bedtime  traZODone 100 milliGRAM(s) Oral at bedtime      PMHX/PSHX:  Ulcerative colitis    HLD (hyperlipidemia)    Anxiety    Rectal cancer    History of BPH    No significant past surgical history    H/O ileostomy        Family history:      Social History:   Denies ethanol use.  Denies illicit drug use.    ROS:     General:  No wt loss, fevers, chills, night sweats, fatigue,   Eyes:  Good vision, no reported pain  ENT:  No sore throat, pain, runny nose, dysphagia  CV:  No pain, palpitations, hypo/hypertension  Resp:  No dyspnea, cough, tachypnea, wheezing  GI:  See HPI  :  No pain, bleeding, incontinence, nocturia  Muscle:  No pain, weakness  Neuro:  No weakness, tingling, memory problems  Psych:  No fatigue, insomnia, mood problems, depression  Endocrine:  No polyuria, polydipsia, cold/heat intolerance  Heme:  No petechiae, ecchymosis, easy bruisability  Integumentary:  No rash, edema      PHYSICAL EXAM:     GENERAL:  Appears stated age, well-groomed, well-nourished, no distress  HEENT:  NC/AT,  conjunctivae anicteric, clear and pink,   NECK: supple, trachea midline  CHEST:  Full & symmetric excursion, no increased effort, breath sounds clear  HEART:  Regular rhythm, no JVD  ABDOMEN:  Soft, non-tender, non-distended, normoactive bowel sounds,  no masses , no hepatosplenomegaly  EXTREMITIES:  no cyanosis,clubbing or edema  SKIN:  No rash, erythema, or, ecchymoses, no jaundice  NEURO:  Alert, non-focal, no asterixis  PSYCH: Appropriate affect, oriented to place and time  RECTAL: Deferred      Vital Signs:  Vital Signs Last 24 Hrs  T(C): 36.4 (09 May 2023 00:57), Max: 36.9 (09 May 2023 00:00)  T(F): 97.5 (09 May 2023 00:57), Max: 98.4 (09 May 2023 00:00)  HR: 74 (09 May 2023 00:57) (71 - 100)  BP: 122/83 (09 May 2023 04:20) (106/77 - 145/90)  BP(mean): --  RR: 18 (09 May 2023 04:20) (18 - 20)  SpO2: 98% (09 May 2023 04:20) (97% - 99%)    Parameters below as of 09 May 2023 04:20  Patient On (Oxygen Delivery Method): room air      Daily Height in cm: 165.1 (08 May 2023 13:30)    Daily     LABS: Labs personally reviewed by me:                        14.0   6.39  )-----------( 163      ( 08 May 2023 15:29 )             39.2     05-09    128<L>  |  90<L>  |  34<H>  ----------------------------<  136<H>  3.7   |  21<L>  |  2.83<H>    Ca    9.0      09 May 2023 06:50  Phos  2.9     05-08  Mg     2.1     05-08    TPro  7.3  /  Alb  4.4  /  TBili  1.2  /  DBili  x   /  AST  23  /  ALT  20  /  AlkPhos  119  05-08    LIVER FUNCTIONS - ( 08 May 2023 15:29 )  Alb: 4.4 g/dL / Pro: 7.3 g/dL / ALK PHOS: 119 U/L / ALT: 20 U/L / AST: 23 U/L / GGT: x             Urinalysis Basic - ( 08 May 2023 17:24 )    Color: Yellow / Appearance: Slightly Turbid / S.023 / pH: x  Gluc: x / Ketone: Trace  / Bili: Negative / Urobili: Negative   Blood: x / Protein: 30 mg/dL / Nitrite: Negative   Leuk Esterase: Negative / RBC: 5 /hpf / WBC 3 /HPF   Sq Epi: x / Non Sq Epi: x / Bacteria: Negative          Imaging personally reviewed by me:           Chief Complaint:  Patient is a 67y old  Male who presents with a chief complaint of     Date of service: 23 @ 11:26    HPI:    The patient is a 67 year old man with a PMHx of UC s/p total colectomy with ileostomy and rectal adenocarcinoma on chemo (currently on capecitabine + oxaliplatin, last dose ), admitted for WM in the setting of diarrhea. For the past one week he has experienced fatigue, decreased PO intake, nausea and increased watery output from ostomy. Also developed hiccups / painful intermittent abdominal spasms over the past two days.     The patient denies dysphagia, nausea and vomiting, abdominal pain, diarrhea, unintentional weight loss, change in bowel habits or NSAID use.      Allergies:  penicillin (Swelling)      Home Medications:    Hospital Medications:  atorvastatin 20 milliGRAM(s) Oral at bedtime  Biotene Dry Mouth Oral Rinse 5 milliLiter(s) Swish and Spit two times a day  chlorhexidine 2% Cloths 1 Application(s) Topical <User Schedule>  diazepam    Tablet 5 milliGRAM(s) Oral at bedtime  heparin   Injectable 5000 Unit(s) SubCutaneous every 12 hours  metoclopramide 5 milliGRAM(s) Oral every 8 hours  montelukast 10 milliGRAM(s) Oral daily  pantoprazole    Tablet 40 milliGRAM(s) Oral before breakfast  tamsulosin 0.4 milliGRAM(s) Oral at bedtime  traZODone 100 milliGRAM(s) Oral at bedtime      PMHX/PSHX:  Ulcerative colitis    HLD (hyperlipidemia)    Anxiety    Rectal cancer    History of BPH    No significant past surgical history    H/O ileostomy        Family history:      Social History:   Denies ethanol use.  Denies illicit drug use.    ROS:     General:  No wt loss, fevers, chills, night sweats, fatigue,   Eyes:  Good vision, no reported pain  ENT:  No sore throat, pain, runny nose, dysphagia  CV:  No pain, palpitations, hypo/hypertension  Resp:  No dyspnea, cough, tachypnea, wheezing  GI:  See HPI  :  No pain, bleeding, incontinence, nocturia  Muscle:  No pain, weakness  Neuro:  No weakness, tingling, memory problems  Psych:  No fatigue, insomnia, mood problems, depression  Endocrine:  No polyuria, polydipsia, cold/heat intolerance  Heme:  No petechiae, ecchymosis, easy bruisability  Integumentary:  No rash, edema      PHYSICAL EXAM:     GENERAL:  Appears stated age, well-groomed, well-nourished, no distress  HEENT:  NC/AT,  conjunctivae anicteric, clear and pink,   NECK: supple, trachea midline  CHEST:  Full & symmetric excursion, no increased effort, breath sounds clear  HEART:  Regular rhythm, no JVD  ABDOMEN:  Soft, non-tender, non-distended, normoactive bowel sounds,  no masses , no hepatosplenomegaly  EXTREMITIES:  no cyanosis,clubbing or edema  SKIN:  No rash, erythema, or, ecchymoses, no jaundice  NEURO:  Alert, non-focal, no asterixis  PSYCH: Appropriate affect, oriented to place and time  RECTAL: Deferred      Vital Signs:  Vital Signs Last 24 Hrs  T(C): 36.4 (09 May 2023 00:57), Max: 36.9 (09 May 2023 00:00)  T(F): 97.5 (09 May 2023 00:57), Max: 98.4 (09 May 2023 00:00)  HR: 74 (09 May 2023 00:57) (71 - 100)  BP: 122/83 (09 May 2023 04:20) (106/77 - 145/90)  BP(mean): --  RR: 18 (09 May 2023 04:20) (18 - 20)  SpO2: 98% (09 May 2023 04:20) (97% - 99%)    Parameters below as of 09 May 2023 04:20  Patient On (Oxygen Delivery Method): room air      Daily Height in cm: 165.1 (08 May 2023 13:30)    Daily     LABS: Labs personally reviewed by me:                        14.0   6.39  )-----------( 163      ( 08 May 2023 15:29 )             39.2     05-09    128<L>  |  90<L>  |  34<H>  ----------------------------<  136<H>  3.7   |  21<L>  |  2.83<H>    Ca    9.0      09 May 2023 06:50  Phos  2.9     05-08  Mg     2.1     05-08    TPro  7.3  /  Alb  4.4  /  TBili  1.2  /  DBili  x   /  AST  23  /  ALT  20  /  AlkPhos  119  05-08    LIVER FUNCTIONS - ( 08 May 2023 15:29 )  Alb: 4.4 g/dL / Pro: 7.3 g/dL / ALK PHOS: 119 U/L / ALT: 20 U/L / AST: 23 U/L / GGT: x             Urinalysis Basic - ( 08 May 2023 17:24 )    Color: Yellow / Appearance: Slightly Turbid / S.023 / pH: x  Gluc: x / Ketone: Trace  / Bili: Negative / Urobili: Negative   Blood: x / Protein: 30 mg/dL / Nitrite: Negative   Leuk Esterase: Negative / RBC: 5 /hpf / WBC 3 /HPF   Sq Epi: x / Non Sq Epi: x / Bacteria: Negative          Imaging personally reviewed by me:           Chief Complaint:  Patient is a 67y old  Male who presents with a chief complaint of     Date of service: 23 @ 11:26    HPI:    The patient is a 67 year old man with a PMHx of UC s/p total colectomy with ileostomy and rectal adenocarcinoma on chemo (currently on capecitabine + oxaliplatin, last dose ), admitted for WM in the setting of diarrhea. For the past one week he has experienced fatigue, decreased PO intake, nausea and increased watery output from ostomy. States he had similar prolonged diarrheal episodes after prior chemo sessions. Minimal improvement with loperamide, lomotil worked better for him in the pastAlso developed hiccups / painful intermittent abdominal spasms over the past two days. The patient denies dysphagia, nausea and vomiting, abdominal pain.    Allergies:  penicillin (Swelling)      Home Medications:    Hospital Medications:  atorvastatin 20 milliGRAM(s) Oral at bedtime  Biotene Dry Mouth Oral Rinse 5 milliLiter(s) Swish and Spit two times a day  chlorhexidine 2% Cloths 1 Application(s) Topical <User Schedule>  diazepam    Tablet 5 milliGRAM(s) Oral at bedtime  heparin   Injectable 5000 Unit(s) SubCutaneous every 12 hours  metoclopramide 5 milliGRAM(s) Oral every 8 hours  montelukast 10 milliGRAM(s) Oral daily  pantoprazole    Tablet 40 milliGRAM(s) Oral before breakfast  tamsulosin 0.4 milliGRAM(s) Oral at bedtime  traZODone 100 milliGRAM(s) Oral at bedtime      PMHX/PSHX:  Ulcerative colitis    HLD (hyperlipidemia)    Anxiety    Rectal cancer    History of BPH    No significant past surgical history    H/O ileostomy        Family history:      Social History:   Denies ethanol use.  Denies illicit drug use.    ROS:     General:  No wt loss, fevers, chills, night sweats, fatigue,   Eyes:  Good vision, no reported pain  ENT:  No sore throat, pain, runny nose, dysphagia  CV:  No pain, palpitations, hypo/hypertension  Resp:  No dyspnea, cough, tachypnea, wheezing  GI:  See HPI  :  No pain, bleeding, incontinence, nocturia  Muscle:  No pain, weakness  Neuro:  No weakness, tingling, memory problems  Psych:  No fatigue, insomnia, mood problems, depression  Endocrine:  No polyuria, polydipsia, cold/heat intolerance  Heme:  No petechiae, ecchymosis, easy bruisability  Integumentary:  No rash, edema      PHYSICAL EXAM:     GENERAL:  Appears stated age, well-groomed, well-nourished, no distress  HEENT:  NC/AT,  conjunctivae anicteric, clear and pink,   NECK: supple, trachea midline  CHEST:  Full & symmetric excursion, no increased effort, breath sounds clear  HEART:  Regular rhythm, no JVD  ABDOMEN:  Soft, non-tender, non-distended, normoactive bowel sounds,  no masses , no hepatosplenomegaly  EXTREMITIES:  no cyanosis,clubbing or edema  SKIN:  No rash, erythema, or, ecchymoses, no jaundice  NEURO:  Alert, non-focal, no asterixis  PSYCH: Appropriate affect, oriented to place and time  RECTAL: Deferred      Vital Signs:  Vital Signs Last 24 Hrs  T(C): 36.4 (09 May 2023 00:57), Max: 36.9 (09 May 2023 00:00)  T(F): 97.5 (09 May 2023 00:57), Max: 98.4 (09 May 2023 00:00)  HR: 74 (09 May 2023 00:57) (71 - 100)  BP: 122/83 (09 May 2023 04:20) (106/77 - 145/90)  BP(mean): --  RR: 18 (09 May 2023 04:20) (18 - 20)  SpO2: 98% (09 May 2023 04:20) (97% - 99%)    Parameters below as of 09 May 2023 04:20  Patient On (Oxygen Delivery Method): room air      Daily Height in cm: 165.1 (08 May 2023 13:30)    Daily     LABS: Labs personally reviewed by me:                        14.0   6.39  )-----------( 163      ( 08 May 2023 15:29 )             39.2     05-09    128<L>  |  90<L>  |  34<H>  ----------------------------<  136<H>  3.7   |  21<L>  |  2.83<H>    Ca    9.0      09 May 2023 06:50  Phos  2.9     05-08  Mg     2.1     05-08    TPro  7.3  /  Alb  4.4  /  TBili  1.2  /  DBili  x   /  AST  23  /  ALT  20  /  AlkPhos  119  05-08    LIVER FUNCTIONS - ( 08 May 2023 15:29 )  Alb: 4.4 g/dL / Pro: 7.3 g/dL / ALK PHOS: 119 U/L / ALT: 20 U/L / AST: 23 U/L / GGT: x             Urinalysis Basic - ( 08 May 2023 17:24 )    Color: Yellow / Appearance: Slightly Turbid / S.023 / pH: x  Gluc: x / Ketone: Trace  / Bili: Negative / Urobili: Negative   Blood: x / Protein: 30 mg/dL / Nitrite: Negative   Leuk Esterase: Negative / RBC: 5 /hpf / WBC 3 /HPF   Sq Epi: x / Non Sq Epi: x / Bacteria: Negative          Imaging personally reviewed by me:

## 2023-05-09 NOTE — CONSULT NOTE ADULT - NS ATTEND OPT1 GEN_ALL_CORE
Problem: Pain  Goal: #Acceptable pain level achieved/maintained at rest using NRS/Faces  This goal is used for patients who can self-report.  Acceptable means the level is at or below the identified comfort/function goal.  Outcome: Outcome Not Met, Continue to Monitor  Pt complains of moderate pain with every encounter. PRN medications are being utilized, but not helpful. Frequent repositioning is also being maintained. Will continue to monitor.        I attest my time as attending is greater than 50% of the total combined time spent on qualifying patient care activities by the PA/NP and attending.

## 2023-05-09 NOTE — CONSULT NOTE ADULT - SUBJECTIVE AND OBJECTIVE BOX
[facility] [place] [facility]   MD HALIE SHEEHAN MD BRIANA PETITO, NP    TEL:  FROM 9 AM to 5 PM ---OFFICE: [phone]  FROM 5 PM - 9 AM PLEASE CALL ANSWERING SERVICE: [phone]     RENAL INITIAL CONSULT NOTE: DATE OF SERVICE 23 @ 12:16    HPI:  67 year old male with a PMHx of UC s/p total colectomy with Alban pouch RLL ileostomy, rectal adenocarcinoma on chemo (currently on capecitabine + oxaliplatin, last dose ), and BPH who presents with abnormal outpatient labs showing elevated creatinine. Pt reports that in the last week he has experienced fatigue, decreased PO intake, difficulty swallowing food d/t dry mouth. He endorses nausea without vomiting. He has large amount of watery contents via his ileostomy pouch, requiring changing his pouch more than 5 times/day (at baseline, he typically changes his pouch twice a day). The pouch contents are watery, barely any stool, and is the color of whatever he just drank. He feels very thirsty and has been drinking about 2L of liquids daily. He has developed hiccups / painful intermittent abdominal spasms over the past two days. Nephrology consulted for Renal failure.       Allergies:  penicillin (Swelling)      PAST MEDICAL & SURGICAL HISTORY:  Ulcerative colitis      HLD (hyperlipidemia)      Anxiety      Rectal cancer      History of BPH      H/O ileostomy      Home Medications Reviewed    Hospital Medications:   MEDICATIONS  (STANDING):  atorvastatin 20 milliGRAM(s) Oral at bedtime  Biotene Dry Mouth Oral Rinse 5 milliLiter(s) Swish and Spit two times a day  chlorhexidine 2% Cloths 1 Application(s) Topical <User Schedule>  diazepam    Tablet 5 milliGRAM(s) Oral at bedtime  heparin   Injectable 5000 Unit(s) SubCutaneous every 12 hours  metoclopramide 5 milliGRAM(s) Oral every 8 hours  montelukast 10 milliGRAM(s) Oral daily  pantoprazole    Tablet 40 milliGRAM(s) Oral before breakfast  tamsulosin 0.4 milliGRAM(s) Oral at bedtime  traZODone 100 milliGRAM(s) Oral at bedtime      SOCIAL HISTORY:  Denies ETOh, Smoking,     FAMILY HISTORY:      REVIEW OF SYSTEMS:  CONSTITUTIONAL: SEE HPI   EYES/ENT: No visual changes;  No vertigo or throat pain   NECK: No pain or stiffness  RESPIRATORY: No cough, wheezing, hemoptysis; No shortness of breath  CARDIOVASCULAR: No chest pain or palpitations.  GASTROINTESTINAL: SEE HPI   GENITOURINARY: No dysuria, frequency, foamy urine, urinary urgency, incontinence or hematuria  NEUROLOGICAL: No numbness or weakness  SKIN: No itching, burning, rashes, or lesions   VASCULAR: No bilateral lower extremity edema.   All other review of systems is negative unless indicated above.    VITALS:  T(F): 97.5 (23 @ 00:57), Max: 98.4 (23 @ 00:00)  HR: 74 (23 @ 00:57)  BP: 122/83 (23 @ 04:20)  RR: 18 (23 @ 04:20)  SpO2: 98% (23 @ 04:20)  Wt(kg): --     @ 07:01  -   @ 12:16  --------------------------------------------------------  IN: 0 mL / OUT: 600 mL / NET: -600 mL      Height (cm): 165.1 ( @ 13:30)  Weight (kg): 68.5 ( @ 13:30)  BMI (kg/m2): 25.1 ( 13:30)  BSA (m2): 1.76 ( 13:30)    PHYSICAL EXAM:  Constitutional: NAD  HEENT: anicteric sclera, oropharynx clear, MMM  Neck: No JVD  Respiratory: CTAB, no wheezes, rales or rhonchi  Cardiovascular: S1, S2, RRR  Gastrointestinal: BS+, soft, NT/ND. RLQ Ileostomy   Extremities: No cyanosis or clubbing. No peripheral edema  Neurological: A/O x 3, no focal deficits  Psychiatric: Normal mood, normal affect  : No CVA tenderness. No erwin.   Skin: No rashes    LABS:      128<L>  |  90<L>  |  34<H>  ----------------------------<  136<H>  3.7   |  21<L>  |  2.83<H>    Ca    9.0      09 May 2023 06:50  Phos  2.9     05-08  Mg     2.1     05-08    TPro  7.3  /  Alb  4.4  /  TBili  1.2  /  DBili      /  AST  23  /  ALT  20  /  AlkPhos  119  05-08    Creatinine Trend: 2.83 <--, 2.72 <--                        14.0   6.39  )-----------( 163      ( 08 May 2023 15:29 )             39.2     Urine Studies:  Urinalysis Basic - ( 08 May 2023 17:24 )    Color: Yellow / Appearance: Slightly Turbid / S.023 / pH:   Gluc:  / Ketone: Trace  / Bili: Negative / Urobili: Negative   Blood:  / Protein: 30 mg/dL / Nitrite: Negative   Leuk Esterase: Negative / RBC: 5 /hpf / WBC 3 /HPF   Sq Epi:  / Non Sq Epi:  / Bacteria: Negative          RADIOLOGY & ADDITIONAL STUDIES:

## 2023-05-09 NOTE — ED ADULT NURSE REASSESSMENT NOTE - NS ED NURSE REASSESS COMMENT FT1
@ 1805 on 5/8/23, pt was found sitting on floor, stated he was bending forward to look for an outlet to plug in his phone , when he fell.  Floor dry and free of clutter.  Pt had been observed ambulating without difficulty earlier.  Denies pain.

## 2023-05-09 NOTE — CONSULT NOTE ADULT - ASSESSMENT
WM   ? etiology possibly sec to dehydration  Check UA, Urine Cr, Urine Na  CT A/P 5/8 --> No hydronephrosis  Check Bladder scan  Start NS @ 75 cc/ hr x 24 hrs   Monitor BMP, u/o  Monitor Renal function closely      Hyponatremia  ? possibly SIADH in setting of malignancy vs Polydipsia   Check urine Na, urine Osm   Start Fluid restriction 1.5L/day     Acidosis   non Anion gap  Monitor at present  WM   likely pre renal sec to GI loss (ileostomy)   High SG and hyaline cast on UA suggestive of dehydration  Check Urine Cr, Urine Na  CT A/P 5/8 --> No hydronephrosis  Start 1/2 NS with 75 mEq Sodium bicarb @ 75 cc/ hr x 24 hrs   Monitor BMP, u/o  Monitor Renal function closely    Hyponatremia  likely hypovolemic sec to GI loss ( Ileostomy)  Check urine Na, urine Osm   Start IVFs as above     Acidosis   non Anion gap  sec to GI loss   IVF as above  Continue to monitor     Hematuria   Repeat UA for hematuria   CT a/p 5/8 with no renal mass     Discussed with primary team.

## 2023-05-09 NOTE — PROGRESS NOTE ADULT - SUBJECTIVE AND OBJECTIVE BOX
Name of Patient : MONSE RAZO  MRN: [id number]  Date of visit: 23 @ 14:38      Subjective: Patient seen and examined. No new events except as noted.   Patient seen earlier this AM. Sitting up in bed   Patient reports ongoing loose BM in ostomy  Asking to be placed on soft and bite sized diet. Diet changed  Continues on IVF for hdyration   GI PCR negative  Reports belching    REVIEW OF SYSTEMS:    CONSTITUTIONAL: Generalized weakness   EYES/ENT: No visual changes;  No vertigo or throat pain   NECK: No pain or stiffness  RESPIRATORY: No cough, wheezing, hemoptysis; No shortness of breath  CARDIOVASCULAR: No chest pain or palpitations  GASTROINTESTINAL: + Ostomy; Frequent loose BM; Diarrhea; Belching   GENITOURINARY: No dysuria, frequency or hematuria  NEUROLOGICAL: No numbness or weakness  SKIN: No itching, burning, rashes, or lesions   All other review of systems is negative unless indicated above.    MEDICATIONS:  MEDICATIONS  (STANDING):  atorvastatin 20 milliGRAM(s) Oral at bedtime  Biotene Dry Mouth Oral Rinse 5 milliLiter(s) Swish and Spit two times a day  chlorhexidine 2% Cloths 1 Application(s) Topical <User Schedule>  diazepam    Tablet 5 milliGRAM(s) Oral at bedtime  heparin   Injectable 5000 Unit(s) SubCutaneous every 12 hours  metoclopramide 5 milliGRAM(s) Oral every 8 hours  montelukast 10 milliGRAM(s) Oral daily  pantoprazole    Tablet 40 milliGRAM(s) Oral before breakfast  sodium chloride 0.45% 1000 milliLiter(s) (75 mL/Hr) IV Continuous <Continuous>  tamsulosin 0.4 milliGRAM(s) Oral at bedtime  traZODone 100 milliGRAM(s) Oral at bedtime      PHYSICAL EXAM:  T(C): 36.3 (23 @ 12:43), Max: 36.9 (23 @ 00:00)  HR: 67 (23 @ 12:43) (67 - 74)  BP: 130/88 (23 @ 12:43) (120/81 - 145/90)  RR: 18 (23 @ 12:43) (18 - 20)  SpO2: 97% (23 @ 12:43) (97% - 99%)  Wt(kg): --  I&O's Summary    09 May 2023 07:01  -  09 May 2023 14:38  --------------------------------------------------------  IN: 0 mL / OUT: 600 mL / NET: -600 mL          Appearance: Awake, Generalized weakness; Sitting up in bed 	  HEENT:  Eyes are open; glasses   Lymphatic: No lymphadenopathy   Cardiovascular: Normal    Respiratory: normal effort , clear  Gastrointestinal:  Soft, Non-tender; Ostomy with liquid output   Skin: No rashes,  warm to touch  Psychiatry:  Mood & affect appropriate  Musculoskeletal: No edema          23 @ 07:01  -  23 @ 14:38  --------------------------------------------------------  IN: 0 mL / OUT: 600 mL / NET: -600 mL                            14.0   6.39  )-----------( 163      ( 08 May 2023 15:29 )             39.2               05-09    128<L>  |  90<L>  |  34<H>  ----------------------------<  136<H>  3.7   |  21<L>  |  2.83<H>    Ca    9.0      09 May 2023 06:50  Phos  2.9       Mg     2.1         TPro  7.3  /  Alb  4.4  /  TBili  1.2  /  DBili  x   /  AST  23  /  ALT  20  /  AlkPhos  119  05-08                       Urinalysis Basic - ( 08 May 2023 17:24 )    Color: Yellow / Appearance: Slightly Turbid / S.023 / pH: x  Gluc: x / Ketone: Trace  / Bili: Negative / Urobili: Negative   Blood: x / Protein: 30 mg/dL / Nitrite: Negative   Leuk Esterase: Negative / RBC: 5 /hpf / WBC 3 /HPF   Sq Epi: x / Non Sq Epi: x / Bacteria: Negative          < from: CT Abdomen and Pelvis w/ Oral Cont (23 @ 18:37) >  IMPRESSION:  No bowel obstruction. No acute pathology    < end of copied text >          GI PCR Panel: NotDetec            C. difficile GDH & toxins A/B by EIA (23 @ 00:38)   Clostridium difficile GDH Toxins A&B, EIA:   Negative

## 2023-05-09 NOTE — CONSULT NOTE ADULT - ASSESSMENT
1. WM  - per nephrology     2. Diarrhea, likely chemo related. C-diff and GI PCR neg. CT A/P neg for enteritis or obstruction.  - IVF, supportive care  - loperamide 4 mg in the AM, then 2 mg pre-meals     3. Hiccups  - agree with Protonix 40 mg IV daily and if no improvement, would try baclofen 5 mg TID. Would caution the use of reglan as it could worsen his diarrhea.   - Zofran 4 IV q 8 hrs for nausea    4. Rectal adenocarcinoma  - per heme/onc      I had a prolonged conversation with the patient regarding the hospital course, differential diagnosis, results of diagnostic tests this far, and therapeutic modalities available. Plan of care discussed with the patient after the evaluation. Patient expresses a clear understanding of the plan of care. Sixty five minutes spent on the total encounter, of which more than fifty percent of the encounter was spent on counseling and/or coordinating care by the attending physician.    Advanced care planning forms were discussed. Code status including forceful chest compressions, defibrillation and intubation were discussed. The risks benefits and alternatives to pertinent gastrointestinal procedures and interventions were discussed in detail and all questions were answered. Duration: 15 Minutes.    Berkley Zhou M.D.   Gastroenterology and Hepatology  [address] [place], [place]  Office: [phone]  Cell: [phone] 1. WM  - per nephrology     2. Diarrhea, likely chemo related. C-diff and GI PCR neg. CT A/P neg for enteritis or obstruction.  - IVF, supportive care  - d/c loperamide, change to lomotil 1 tab 4 times daily     3. Hiccups  - agree with Protonix 40 mg IV daily and if no improvement, would try baclofen 5 mg TID. Would caution the use of reglan as it could worsen his diarrhea.   - Zofran 4 IV q 8 hrs for nausea    4. Rectal adenocarcinoma  - per heme/onc      I had a prolonged conversation with the patient regarding the hospital course, differential diagnosis, results of diagnostic tests this far, and therapeutic modalities available. Plan of care discussed with the patient after the evaluation. Patient expresses a clear understanding of the plan of care. Sixty five minutes spent on the total encounter, of which more than fifty percent of the encounter was spent on counseling and/or coordinating care by the attending physician.    Advanced care planning forms were discussed. Code status including forceful chest compressions, defibrillation and intubation were discussed. The risks benefits and alternatives to pertinent gastrointestinal procedures and interventions were discussed in detail and all questions were answered. Duration: 15 Minutes.    Berkley Zhou M.D.   Gastroenterology and Hepatology  [address] [place], [place]  Office: [phone]  Cell: [phone]

## 2023-05-10 LAB
ANION GAP SERPL CALC-SCNC: 18 MMOL/L — HIGH (ref 5–17)
BUN SERPL-MCNC: 39 MG/DL — HIGH (ref 7–23)
CALCIUM SERPL-MCNC: 8.2 MG/DL — LOW (ref 8.4–10.5)
CHLORIDE SERPL-SCNC: 88 MMOL/L — LOW (ref 96–108)
CO2 SERPL-SCNC: 23 MMOL/L — SIGNIFICANT CHANGE UP (ref 22–31)
CREAT SERPL-MCNC: 2.37 MG/DL — HIGH (ref 0.5–1.3)
EGFR: 29 ML/MIN/1.73M2 — LOW
GLUCOSE SERPL-MCNC: 123 MG/DL — HIGH (ref 70–99)
HCT VFR BLD CALC: 33.9 % — LOW (ref 39–50)
HGB BLD-MCNC: 12.1 G/DL — LOW (ref 13–17)
MCHC RBC-ENTMCNC: 32.8 PG — SIGNIFICANT CHANGE UP (ref 27–34)
MCHC RBC-ENTMCNC: 35.7 GM/DL — SIGNIFICANT CHANGE UP (ref 32–36)
MCV RBC AUTO: 91.9 FL — SIGNIFICANT CHANGE UP (ref 80–100)
MRSA PCR RESULT.: SIGNIFICANT CHANGE UP
NRBC # BLD: 0 /100 WBCS — SIGNIFICANT CHANGE UP (ref 0–0)
PLATELET # BLD AUTO: 141 K/UL — LOW (ref 150–400)
POTASSIUM SERPL-MCNC: 3.2 MMOL/L — LOW (ref 3.5–5.3)
POTASSIUM SERPL-SCNC: 3.2 MMOL/L — LOW (ref 3.5–5.3)
RBC # BLD: 3.69 M/UL — LOW (ref 4.2–5.8)
RBC # FLD: 16.5 % — HIGH (ref 10.3–14.5)
S AUREUS DNA NOSE QL NAA+PROBE: SIGNIFICANT CHANGE UP
SODIUM SERPL-SCNC: 129 MMOL/L — LOW (ref 135–145)
WBC # BLD: 5.7 K/UL — SIGNIFICANT CHANGE UP (ref 3.8–10.5)
WBC # FLD AUTO: 5.7 K/UL — SIGNIFICANT CHANGE UP (ref 3.8–10.5)

## 2023-05-10 RX ORDER — POTASSIUM CHLORIDE 20 MEQ
40 PACKET (EA) ORAL EVERY 4 HOURS
Refills: 0 | Status: COMPLETED | OUTPATIENT
Start: 2023-05-10 | End: 2023-05-10

## 2023-05-10 RX ORDER — LOPERAMIDE HCL 2 MG
2 TABLET ORAL THREE TIMES A DAY
Refills: 0 | Status: DISCONTINUED | OUTPATIENT
Start: 2023-05-10 | End: 2023-05-10

## 2023-05-10 RX ORDER — DIPHENOXYLATE HCL/ATROPINE 2.5-.025MG
1 TABLET ORAL
Refills: 0 | Status: DISCONTINUED | OUTPATIENT
Start: 2023-05-10 | End: 2023-05-13

## 2023-05-10 RX ORDER — BACLOFEN 100 %
5 POWDER (GRAM) MISCELLANEOUS EVERY 8 HOURS
Refills: 0 | Status: DISCONTINUED | OUTPATIENT
Start: 2023-05-10 | End: 2023-05-13

## 2023-05-10 RX ORDER — POTASSIUM CHLORIDE 20 MEQ
40 PACKET (EA) ORAL ONCE
Refills: 0 | Status: DISCONTINUED | OUTPATIENT
Start: 2023-05-10 | End: 2023-05-10

## 2023-05-10 RX ORDER — PANTOPRAZOLE SODIUM 20 MG/1
40 TABLET, DELAYED RELEASE ORAL DAILY
Refills: 0 | Status: DISCONTINUED | OUTPATIENT
Start: 2023-05-10 | End: 2023-05-13

## 2023-05-10 RX ORDER — SODIUM CHLORIDE 9 MG/ML
1000 INJECTION INTRAMUSCULAR; INTRAVENOUS; SUBCUTANEOUS
Refills: 0 | Status: DISCONTINUED | OUTPATIENT
Start: 2023-05-10 | End: 2023-05-12

## 2023-05-10 RX ADMIN — Medication 5 MILLIGRAM(S): at 15:47

## 2023-05-10 RX ADMIN — Medication 5 MILLILITER(S): at 05:52

## 2023-05-10 RX ADMIN — Medication 1 TABLET(S): at 23:09

## 2023-05-10 RX ADMIN — SODIUM CHLORIDE 75 MILLILITER(S): 9 INJECTION, SOLUTION INTRAVENOUS at 10:22

## 2023-05-10 RX ADMIN — Medication 100 MILLIGRAM(S): at 23:08

## 2023-05-10 RX ADMIN — SODIUM CHLORIDE 75 MILLILITER(S): 9 INJECTION, SOLUTION INTRAVENOUS at 05:51

## 2023-05-10 RX ADMIN — HEPARIN SODIUM 5000 UNIT(S): 5000 INJECTION INTRAVENOUS; SUBCUTANEOUS at 05:51

## 2023-05-10 RX ADMIN — PANTOPRAZOLE SODIUM 40 MILLIGRAM(S): 20 TABLET, DELAYED RELEASE ORAL at 11:51

## 2023-05-10 RX ADMIN — PANTOPRAZOLE SODIUM 40 MILLIGRAM(S): 20 TABLET, DELAYED RELEASE ORAL at 03:40

## 2023-05-10 RX ADMIN — Medication 40 MILLIEQUIVALENT(S): at 18:02

## 2023-05-10 RX ADMIN — MONTELUKAST 10 MILLIGRAM(S): 4 TABLET, CHEWABLE ORAL at 11:51

## 2023-05-10 RX ADMIN — Medication 1 TABLET(S): at 18:01

## 2023-05-10 RX ADMIN — Medication 5 MILLILITER(S): at 18:02

## 2023-05-10 RX ADMIN — TAMSULOSIN HYDROCHLORIDE 0.4 MILLIGRAM(S): 0.4 CAPSULE ORAL at 23:09

## 2023-05-10 RX ADMIN — Medication 5 MILLIGRAM(S): at 23:07

## 2023-05-10 RX ADMIN — ATORVASTATIN CALCIUM 20 MILLIGRAM(S): 80 TABLET, FILM COATED ORAL at 23:08

## 2023-05-10 RX ADMIN — Medication 40 MILLIEQUIVALENT(S): at 15:46

## 2023-05-10 RX ADMIN — SODIUM CHLORIDE 75 MILLILITER(S): 9 INJECTION INTRAMUSCULAR; INTRAVENOUS; SUBCUTANEOUS at 15:47

## 2023-05-10 RX ADMIN — HEPARIN SODIUM 5000 UNIT(S): 5000 INJECTION INTRAVENOUS; SUBCUTANEOUS at 18:01

## 2023-05-10 RX ADMIN — Medication 2 MILLIGRAM(S): at 05:51

## 2023-05-10 RX ADMIN — CHLORHEXIDINE GLUCONATE 1 APPLICATION(S): 213 SOLUTION TOPICAL at 06:36

## 2023-05-10 RX ADMIN — Medication 5 MILLIGRAM(S): at 23:08

## 2023-05-10 NOTE — PROGRESS NOTE ADULT - SUBJECTIVE AND OBJECTIVE BOX
[facility] [place] [facility]   MD HALIE SHEEHAN MD BRIANA PETITO, NP    TEL:  FROM 9 AM to 5 PM ---OFFICE: [phone]  FROM 5 PM - 9 AM PLEASE CALL ANSWERING SERVICE: [phone]     RENAL PROGRESS NOTE: DATE OF SERVICE 05-10-23 @ 14:00   67 year old male with a PMHx of UC s/p total colectomy with Alban pouch RLL ileostomy, rectal adenocarcinoma on chemo (currently on capecitabine + oxaliplatin, last dose April 17th), and BPH who presents with abnormal outpatient labs showing elevated creatinine. Pt reports that in the last week he has experienced fatigue, decreased PO intake, difficulty swallowing food d/t dry mouth. He endorses nausea without vomiting. He has large amount of watery contents via his ileostomy pouch, requiring changing his pouch more than 5 times/day (at baseline, he typically changes his pouch twice a day). The pouch contents are watery, barely any stool, and is the color of whatever he just drank. He feels very thirsty and has been drinking about 2L of liquids daily. He has developed hiccups / painful intermittent abdominal spasms over the past two days. Nephrology consulted for Renal failure.       Patient seen and examined at bedside. No chest pain/sob    VITALS:  T(F): 97.3 (05-10-23 @ 04:24), Max: 97.5 (05-09-23 @ 18:28)  HR: 56 (05-10-23 @ 13:08)  BP: 125/73 (05-10-23 @ 13:08)  RR: 18 (05-10-23 @ 13:08)  SpO2: 97% (05-10-23 @ 13:08)  Wt(kg): --    05-09 @ 07:01  -  05-10 @ 07:00  --------------------------------------------------------  IN: 1335 mL / OUT: 2500 mL / NET: -1165 mL        PHYSICAL EXAM:  Constitutional: NAD  Neck: No JVD  Respiratory: CTAB, no wheezes, rales or rhonchi  Cardiovascular: S1, S2, RRR  Gastrointestinal: BS+, soft, NT/ND, RLQ Ileostomy   Extremities: No peripheral edema    Hospital Medications:   MEDICATIONS  (STANDING):  atorvastatin 20 milliGRAM(s) Oral at bedtime  baclofen 5 milliGRAM(s) Oral every 8 hours  Biotene Dry Mouth Oral Rinse 5 milliLiter(s) Swish and Spit two times a day  chlorhexidine 2% Cloths 1 Application(s) Topical <User Schedule>  diazepam    Tablet 5 milliGRAM(s) Oral at bedtime  diphenoxylate/atropine 1 Tablet(s) Oral four times a day  heparin   Injectable 5000 Unit(s) SubCutaneous every 12 hours  montelukast 10 milliGRAM(s) Oral daily  pantoprazole  Injectable 40 milliGRAM(s) IV Push daily  potassium chloride   Powder 40 milliEquivalent(s) Oral every 4 hours  sodium chloride 0.45% 1000 milliLiter(s) (75 mL/Hr) IV Continuous <Continuous>  tamsulosin 0.4 milliGRAM(s) Oral at bedtime  traZODone 100 milliGRAM(s) Oral at bedtime      LABS:  05-10    129<L>  |  88<L>  |  39<H>  ----------------------------<  123<H>  3.2<L>   |  23  |  2.37<H>    Ca    8.2<L>      10 May 2023 10:41  Phos  2.9     05-08  Mg     2.1     05-08    TPro  7.3  /  Alb  4.4  /  TBili  1.2  /  DBili      /  AST  23  /  ALT  20  /  AlkPhos  119  05-08    Creatinine Trend: 2.37 <--, 2.83 <--, 2.72 <--                                12.1   5.70  )-----------( 141      ( 10 May 2023 10:41 )             33.9     Urine Studies:  Urinalysis - [05-09-23 @ 17:18]      Color Yellow / Appearance Clear / SG 1.016 / pH 5.5      Gluc Negative / Ketone Negative  / Bili Negative / Urobili Negative       Blood Negative / Protein Trace / Leuk Est Negative / Nitrite Negative      RBC  / WBC  / Hyaline  / Gran  / Sq Epi  / Non Sq Epi  / Bacteria     Urine Creatinine 148      [05-09-23 @ 17:18]  Urine Sodium 9      [05-09-23 @ 17:18]  Urine Osmolality 425      [05-09-23 @ 17:18]    HbA1c 5.7      [05-11-19 @ 11:43]        RADIOLOGY & ADDITIONAL STUDIES:   Patient was given step by step instructions on how to administer Depo-Testosterone injections safely. Handout provided to patient. Pt demonstrated correct protocol with handling med, preparing injection sight, and administration of med.  Advised patient on what to watch for with signs of infection  Pt verbalizes understanding of all

## 2023-05-10 NOTE — PROGRESS NOTE ADULT - ASSESSMENT
WM   likely pre renal sec to GI loss (ileostomy)   High SG and hyaline cast on UA suggestive of dehydration  Urine studies suggestive of pre renal state  Renal function improving at present s/p IVFs   Start NS @ 75 cc x 24 hrs    CT A/P 5/8 --> No hydronephrosis  Monitor BMP, u/o  Monitor Renal function closely    Hyponatremia  likely hypovolemic sec to GI loss ( Ileostomy)  Urine lytes suggestive of Dehydration   Continue IVFs as above     Hypokalemia  sec to GI loss  Start KCL PO 40 mEq q4hrs apart x 2 doses  Repeat K and replete as needed  Monitor closely    Acidosis   non Anion gap  sec to GI loss   improving   Continue to monitor     Hematuria   Repeat UA -- > wnl   CT a/p 5/8 with no renal mass     Discussed with primary team.

## 2023-05-10 NOTE — PROGRESS NOTE ADULT - ASSESSMENT
MONSE RAZO is a 67y Male who presents with a chief complaint of ARF    Rectal Cancer  ·	Patient follows with Dr. Zoe Goldberg, [facility].  ·	He completed adjuvant concurrent chemoradiation, is currently on capecitabine + oxaliplatin, last dose April 17th.  ·	No systemic therapy while inpatient or during rehabilitation.    Acute Renal Failure  ·	Baseline creatinine at 1.0-1.3.  ·	Creatinine 2.72 --> 2.37 today  ·	In the setting of diarrhea, poor oral intake.  ·	On IVF. Nephrology evaluation if continues to worsen or not improve.    Diarrhea  ·	Possibly in the setting of chemotherapy.  ·	prelim stool studies negative, imodium started   ·	Supportive medications if no infectious cause found.    Will continue to follow.    Erma Rodríguez NP  Hematology/ Oncology  [facility]  [phone] (office)  [phone] (alt office)  Evenings and weekends please call MD on call or office

## 2023-05-10 NOTE — PROGRESS NOTE ADULT - SUBJECTIVE AND OBJECTIVE BOX
Name of Patient : MONSE RAZO  MRN: [id number]  Date of visit: 05-10-23 @ 11:57      Subjective: Patient seen and examined. No new events except as noted.   Patient seen this AM. Lying down in bed. Reports his appetite is improving and is asking to be placed back on Regular diet. Diet changed.  Reports ongoing diarrhea. States his diarrhea is still present, however more formed when compared to yesterday.  Reports ongoing belching. Denies pain or discomfort.     REVIEW OF SYSTEMS:    CONSTITUTIONAL: Generalized weakness   EYES/ENT: No visual changes;  No vertigo or throat pain   NECK: No pain or stiffness  RESPIRATORY: No cough, wheezing, hemoptysis; No shortness of breath  CARDIOVASCULAR: No chest pain or palpitations  GASTROINTESTINAL: + Belching; Reports ongoing diarrhea. States his diarrhea is still present, however more formed when compared to yesterday.  GENITOURINARY: No dysuria, frequency or hematuria  NEUROLOGICAL: No numbness or weakness  SKIN: No itching, burning, rashes, or lesions   All other review of systems is negative unless indicated above.    MEDICATIONS:  MEDICATIONS  (STANDING):  atorvastatin 20 milliGRAM(s) Oral at bedtime  Biotene Dry Mouth Oral Rinse 5 milliLiter(s) Swish and Spit two times a day  chlorhexidine 2% Cloths 1 Application(s) Topical <User Schedule>  diazepam    Tablet 5 milliGRAM(s) Oral at bedtime  heparin   Injectable 5000 Unit(s) SubCutaneous every 12 hours  loperamide 2 milliGRAM(s) Oral three times a day  montelukast 10 milliGRAM(s) Oral daily  pantoprazole  Injectable 40 milliGRAM(s) IV Push daily  potassium chloride    Tablet ER 40 milliEquivalent(s) Oral once  sodium chloride 0.45% 1000 milliLiter(s) (75 mL/Hr) IV Continuous <Continuous>  tamsulosin 0.4 milliGRAM(s) Oral at bedtime  traZODone 100 milliGRAM(s) Oral at bedtime      PHYSICAL EXAM:  T(C): 36.3 (05-10-23 @ 04:24), Max: 36.4 (23 @ 18:28)  HR: 59 (05-10-23 @ 04:24) (59 - 68)  BP: 128/77 (05-10-23 @ 04:24) (120/80 - 130/88)  RR: 18 (05-10-23 @ 04:24) (18 - 18)  SpO2: 97% (05-10-23 @ 04:24) (97% - 99%)  Wt(kg): --  I&O's Summary    09 May 2023 07:01  -  10 May 2023 07:00  --------------------------------------------------------  IN: 1335 mL / OUT: 2500 mL / NET: -1165 mL          Appearance: Awake, Lying down in bed, Generalized weakness   HEENT:  Eyes are open; Glasses    Lymphatic: No lymphadenopathy   Cardiovascular: Normal    Respiratory: normal effort , clear  Gastrointestinal:  Soft, Non-tender; Ostomy with liquid stool output   Skin: No rashes,  warm to touch  Psychiatry:  Mood & affect appropriate  Musculoskeletal: No edema      23 @ 07:01  -  05-10-23 @ 07:00  --------------------------------------------------------  IN: 1335 mL / OUT: 2500 mL / NET: -1165 mL                            12.1   5.70  )-----------( 141      ( 10 May 2023 10:41 )             33.9               05-10    129<L>  |  88<L>  |  39<H>  ----------------------------<  123<H>  3.2<L>   |  23  |  2.37<H>    Ca    8.2<L>      10 May 2023 10:41  Phos  2.9     05-08  Mg     2.1     05-08    TPro  7.3  /  Alb  4.4  /  TBili  1.2  /  DBili  x   /  AST  23  /  ALT  20  /  AlkPhos  119  05-08                       Urinalysis Basic - ( 09 May 2023 17:18 )    Color: Yellow / Appearance: Clear / S.016 / pH: x  Gluc: x / Ketone: Negative  / Bili: Negative / Urobili: Negative   Blood: x / Protein: Trace / Nitrite: Negative   Leuk Esterase: Negative / RBC: x / WBC x   Sq Epi: x / Non Sq Epi: x / Bacteria: x          Culture - Stool (23 @ 00:38)   Specimen Source: .Stool Feces  Culture Results: No enteric pathogens to date: Final culture pending    MRSA PCR Result.: NotDetec      GI PCR Panel Stool (23 @ 00:38)   GI PCR Panel: NotDetec        Clostridium difficile GDH Toxins A&B, EIA:   Negative (23 @ 00:38)

## 2023-05-10 NOTE — PROGRESS NOTE ADULT - SUBJECTIVE AND OBJECTIVE BOX
Patient is a 67y old  Male who presents with a chief complaint of ARF    Patient seen and examined at bedside this morning. Patient reports ongoing diarrhea despite imodium. No other complaints offered     MEDICATIONS  (STANDING):  atorvastatin 20 milliGRAM(s) Oral at bedtime  Biotene Dry Mouth Oral Rinse 5 milliLiter(s) Swish and Spit two times a day  chlorhexidine 2% Cloths 1 Application(s) Topical <User Schedule>  diazepam    Tablet 5 milliGRAM(s) Oral at bedtime  heparin   Injectable 5000 Unit(s) SubCutaneous every 12 hours  loperamide 2 milliGRAM(s) Oral three times a day  montelukast 10 milliGRAM(s) Oral daily  pantoprazole  Injectable 40 milliGRAM(s) IV Push daily  potassium chloride    Tablet ER 40 milliEquivalent(s) Oral once  sodium chloride 0.45% 1000 milliLiter(s) (75 mL/Hr) IV Continuous <Continuous>  tamsulosin 0.4 milliGRAM(s) Oral at bedtime  traZODone 100 milliGRAM(s) Oral at bedtime    MEDICATIONS  (PRN):      Vital Signs Last 24 Hrs  T(C): 36.3 (10 May 2023 04:24), Max: 36.4 (09 May 2023 18:28)  T(F): 97.3 (10 May 2023 04:24), Max: 97.5 (09 May 2023 18:28)  HR: 59 (10 May 2023 04:24) (59 - 68)  BP: 128/77 (10 May 2023 04:24) (120/80 - 128/86)  BP(mean): --  RR: 18 (10 May 2023 04:24) (18 - 18)  SpO2: 97% (10 May 2023 04:24) (97% - 99%)    Parameters below as of 10 May 2023 04:24  Patient On (Oxygen Delivery Method): room air        PE  NAD  Awake, alert  Anicteric, MMM  RRR  CTAB  Abd soft, NT, ND  +colostomy  No c/c/e  No rash grossly  FROM                          12.1   5.70  )-----------( 141      ( 10 May 2023 10:41 )             33.9       05-10    129<L>  |  88<L>  |  39<H>  ----------------------------<  123<H>  3.2<L>   |  23  |  2.37<H>    Ca    8.2<L>      10 May 2023 10:41  Phos  2.9     05-08  Mg     2.1     05-08    TPro  7.3  /  Alb  4.4  /  TBili  1.2  /  DBili  x   /  AST  23  /  ALT  20  /  AlkPhos  119  05-08

## 2023-05-10 NOTE — PROGRESS NOTE ADULT - ASSESSMENT
1. WM  - per nephrology     2. Diarrhea, likely chemo related. C-diff and GI PCR neg. CT A/P neg for enteritis or obstruction.  - IVF, supportive care  - loperamide changed to lomotil 1 tab 4 times daily     3. Hiccups  - agree with Protonix 40 mg IV daily and if no improvement, would increase to BID try baclofen 5 mg TID. Would caution the use of reglan as it could worsen his diarrhea.   - Zofran 4 IV q 8 hrs for nausea    4. Rectal adenocarcinoma  - per heme/onc      Berkley Zhou M.D.   Gastroenterology and Hepatology  [address] [place], [place]  Office: [phone]  Cell: [phone]

## 2023-05-10 NOTE — PROGRESS NOTE ADULT - ASSESSMENT
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      WM (acute kidney injury).   - Baseline creatinine Feb - Mar 2023 was 1.0 - 1.3   - S/P 1L of NS in the ED  - Likely 2/2 diarrheal losses   - C/w maintenance fluid; Switched to 1/2 NS with bicarb as per renal   - Monitor and trend Cr   - Avoid nephrotoxic agents  - Renal eval appreciated; F/u recs     Acute diarrhea.   - Suspect secondary to chemotherapy agents, will need to rule out infectious cause --> C. Diff and GI PCR -- neg   - CT A/P Neg   - Infectious work up negative; Loperamide as per GI   - IVF for hydration; Monitor and replete electrolytes PRN   - GI eval appreciated; F/u recs     Hyponatremia.   - Hyponatremia, asymptomatic.  - Likely hypotonic hypovolemic, from diarrhea  - S/P 1L of fluids, on 1/2 NS w/ bicarb as per renal   - Cont to monitor and trend Na level closely; Avoid overcorrection > 6-8mEq in 24 hours   - Na of 129 this AM, continue to monitor and trend  - Renal eval appreciated; F/u recs     Hiccups.   - S/P vagal maneuver in ER without relief   - C/w Protonix 40mg daily  - Reglan 5 mg PO q8hrs   - GI follow up     Anemia  - Mild down-trend in Hgb. No gross bleeding reported  - Continue to monitor and trend. Check Iron, B12, Folate, Ferritin, TIBC  - Transfuse for Hgb < 7.0     HypoK  - Monitor and replete electrolytes PRN     Rectal adenocarcinoma.   - follows at [facility] [place]  - oncology is onboard, no plan for inpatient systemic treatment.  - F/u Heme/Onc recs     History of BPH.   - continue home tamsulosin 0.4mg qHS.    H/O insomnia.   - continue home trazodone 100mg qHS  - continue home diazepam 5mg qHS    Asthma.   - continue home montelukast 10mg daily.     Prophylactic measure.   - DVT ppx: heparin subq  - GI ppx: protonix  - Diet: regular --> Switched back to regular as per patient's request     67 year old male with a PMHx of UC s/p total colectomy with Alban pouch RLL ileostomy, rectal adenocarcinoma on chemo (currently on capecitabine + oxaliplatin, last dose April 17th), and BPH who presents with acute kidney injury secondary to dehydration from acute diarrhea.      WM (acute kidney injury).   - Baseline creatinine Feb - Mar 2023 was 1.0 - 1.3   - S/P 1L of NS in the ED  - Likely 2/2 diarrheal losses   - C/w maintenance fluid; Switched to 1/2 NS with bicarb as per renal   - Monitor and trend Cr   - Avoid nephrotoxic agents  - Renal eval appreciated; F/u recs     Acute diarrhea.   - Suspect secondary to chemotherapy agents, will need to rule out infectious cause --> C. Diff and GI PCR -- neg   - CT A/P Neg   - Infectious work up negative; Loperamide as per GI   - IVF for hydration; Monitor and replete electrolytes PRN   - GI eval appreciated; F/u recs     Hyponatremia.   - Hyponatremia, asymptomatic.  - Likely hypotonic hypovolemic, from diarrhea  - S/P 1L of fluids, on 1/2 NS w/ bicarb as per renal   - Cont to monitor and trend Na level closely; Avoid overcorrection > 6-8mEq in 24 hours   - Na of 129 this AM, continue to monitor and trend  - Renal eval appreciated; F/u recs     Hiccups.   - S/P vagal maneuver in ER without relief   - C/w Protonix 40mg daily  - S/P Reglan  - Start Baclofen as per GI   - GI follow up     Anemia  - Mild down-trend in Hgb. No gross bleeding reported  - Continue to monitor and trend. Check Iron, B12, Folate, Ferritin, TIBC  - Transfuse for Hgb < 7.0     HypoK  - Monitor and replete electrolytes PRN     Rectal adenocarcinoma.   - follows at [facility] [place]  - oncology is onboard, no plan for inpatient systemic treatment.  - F/u Heme/Onc recs     History of BPH.   - continue home tamsulosin 0.4mg qHS.    H/O insomnia.   - continue home trazodone 100mg qHS  - continue home diazepam 5mg qHS    Asthma.   - continue home montelukast 10mg daily.     Prophylactic measure.   - DVT ppx: heparin subq  - GI ppx: protonix  - Diet: regular --> Switched back to regular as per patient's request

## 2023-05-10 NOTE — PROGRESS NOTE ADULT - SUBJECTIVE AND OBJECTIVE BOX
Chief Complaint:  Patient is a 67y old  Male who presents with a chief complaint of     Date of service 05-10-23 @ 17:23      Interval Events:   no acute events    Hospital Medications:  atorvastatin 20 milliGRAM(s) Oral at bedtime  baclofen 5 milliGRAM(s) Oral every 8 hours  Biotene Dry Mouth Oral Rinse 5 milliLiter(s) Swish and Spit two times a day  chlorhexidine 2% Cloths 1 Application(s) Topical <User Schedule>  diazepam    Tablet 5 milliGRAM(s) Oral at bedtime  diphenoxylate/atropine 1 Tablet(s) Oral four times a day  heparin   Injectable 5000 Unit(s) SubCutaneous every 12 hours  montelukast 10 milliGRAM(s) Oral daily  pantoprazole  Injectable 40 milliGRAM(s) IV Push daily  potassium chloride   Powder 40 milliEquivalent(s) Oral every 4 hours  sodium chloride 0.45% 1000 milliLiter(s) IV Continuous <Continuous>  sodium chloride 0.9%. 1000 milliLiter(s) IV Continuous <Continuous>  tamsulosin 0.4 milliGRAM(s) Oral at bedtime  traZODone 100 milliGRAM(s) Oral at bedtime        Review of Systems:  General:  No wt loss, fevers, chills, night sweats, fatigue,   Eyes:  Good vision, no reported pain  ENT:  No sore throat, pain, runny nose, dysphagia  CV:  No pain, palpitations, hypo/hypertension  Resp:  No dyspnea, cough, tachypnea, wheezing  GI:  See HPI  :  No pain, bleeding, incontinence, nocturia  Muscle:  No pain, weakness  Neuro:  No weakness, tingling, memory problems  Psych:  No fatigue, insomnia, mood problems, depression  Endocrine:  No polyuria, polydipsia, cold/heat intolerance  Heme:  No petechiae, ecchymosis, easy bruisability  Integumentary:  No rash, edema    PHYSICAL EXAM:   Vital Signs:  Vital Signs Last 24 Hrs  T(C): 36.3 (10 May 2023 04:24), Max: 36.4 (09 May 2023 18:28)  T(F): 97.3 (10 May 2023 04:24), Max: 97.5 (09 May 2023 18:28)  HR: 56 (10 May 2023 13:08) (56 - 68)  BP: 125/73 (10 May 2023 13:08) (120/80 - 128/77)  BP(mean): --  RR: 18 (10 May 2023 13:08) (18 - 18)  SpO2: 97% (10 May 2023 13:08) (97% - 99%)    Parameters below as of 10 May 2023 13:08  Patient On (Oxygen Delivery Method): room air      Daily     Daily       PHYSICAL EXAM:     GENERAL:  Appears stated age, well-groomed, well-nourished, no distress  HEENT:  NC/AT,  conjunctivae anicteric, clear and pink,   NECK: supple, trachea midline  CHEST:  Full & symmetric excursion, no increased effort, breath sounds clear  HEART:  Regular rhythm, no JVD  ABDOMEN:  Soft, non-tender, non-distended, normoactive bowel sounds,  no masses , no hepatosplenomegaly  EXTREMITIES:  no cyanosis,clubbing or edema  SKIN:  No rash, erythema, or, ecchymoses, no jaundice  NEURO:  Alert, non-focal, no asterixis  PSYCH: Appropriate affect, oriented to place and time  RECTAL: Deferred      LABS Personally reviewed by me:                        12.1   5.70  )-----------( 141      ( 10 May 2023 10:41 )             33.9     Mean Cell Volume: 91.9 fl (05-10-23 @ 10:41)    05-10    129<L>  |  88<L>  |  39<H>  ----------------------------<  123<H>  3.2<L>   |  23  |  2.37<H>    Ca    8.2<L>      10 May 2023 10:41          Urinalysis Basic - ( 09 May 2023 17:18 )    Color: Yellow / Appearance: Clear / S.016 / pH: x  Gluc: x / Ketone: Negative  / Bili: Negative / Urobili: Negative   Blood: x / Protein: Trace / Nitrite: Negative   Leuk Esterase: Negative / RBC: x / WBC x   Sq Epi: x / Non Sq Epi: x / Bacteria: x                              12.1   5.70  )-----------( 141      ( 10 May 2023 10:41 )             33.9                         14.0   6.39  )-----------( 163      ( 08 May 2023 15:29 )             39.2       Imaging personally reviewed by me:

## 2023-05-11 LAB
ANION GAP SERPL CALC-SCNC: 13 MMOL/L — SIGNIFICANT CHANGE UP (ref 5–17)
BUN SERPL-MCNC: 34 MG/DL — HIGH (ref 7–23)
CALCIUM SERPL-MCNC: 8.5 MG/DL — SIGNIFICANT CHANGE UP (ref 8.4–10.5)
CHLORIDE SERPL-SCNC: 95 MMOL/L — LOW (ref 96–108)
CO2 SERPL-SCNC: 24 MMOL/L — SIGNIFICANT CHANGE UP (ref 22–31)
CREAT SERPL-MCNC: 2.03 MG/DL — HIGH (ref 0.5–1.3)
CULTURE RESULTS: SIGNIFICANT CHANGE UP
CULTURE RESULTS: SIGNIFICANT CHANGE UP
EGFR: 35 ML/MIN/1.73M2 — LOW
FERRITIN SERPL-MCNC: 746 NG/ML — HIGH (ref 30–400)
FOLATE SERPL-MCNC: 5.4 NG/ML — SIGNIFICANT CHANGE UP
GLUCOSE SERPL-MCNC: 82 MG/DL — SIGNIFICANT CHANGE UP (ref 70–99)
IRON SATN MFR SERPL: 17 % — SIGNIFICANT CHANGE UP (ref 16–55)
IRON SATN MFR SERPL: 55 UG/DL — SIGNIFICANT CHANGE UP (ref 45–165)
POTASSIUM SERPL-MCNC: 3.7 MMOL/L — SIGNIFICANT CHANGE UP (ref 3.5–5.3)
POTASSIUM SERPL-SCNC: 3.7 MMOL/L — SIGNIFICANT CHANGE UP (ref 3.5–5.3)
SODIUM SERPL-SCNC: 132 MMOL/L — LOW (ref 135–145)
SPECIMEN SOURCE: SIGNIFICANT CHANGE UP
SPECIMEN SOURCE: SIGNIFICANT CHANGE UP
TIBC SERPL-MCNC: 319 UG/DL — SIGNIFICANT CHANGE UP (ref 220–430)
UIBC SERPL-MCNC: 263 UG/DL — SIGNIFICANT CHANGE UP (ref 110–370)
VIT B12 SERPL-MCNC: 877 PG/ML — SIGNIFICANT CHANGE UP (ref 232–1245)

## 2023-05-11 RX ORDER — SODIUM CHLORIDE 9 MG/ML
1000 INJECTION, SOLUTION INTRAVENOUS
Refills: 0 | Status: DISCONTINUED | OUTPATIENT
Start: 2023-05-11 | End: 2023-05-12

## 2023-05-11 RX ADMIN — HEPARIN SODIUM 5000 UNIT(S): 5000 INJECTION INTRAVENOUS; SUBCUTANEOUS at 05:07

## 2023-05-11 RX ADMIN — Medication 5 MILLILITER(S): at 05:06

## 2023-05-11 RX ADMIN — Medication 5 MILLIGRAM(S): at 13:52

## 2023-05-11 RX ADMIN — SODIUM CHLORIDE 75 MILLILITER(S): 9 INJECTION, SOLUTION INTRAVENOUS at 17:38

## 2023-05-11 RX ADMIN — Medication 1 TABLET(S): at 17:40

## 2023-05-11 RX ADMIN — Medication 1 TABLET(S): at 05:07

## 2023-05-11 RX ADMIN — ATORVASTATIN CALCIUM 20 MILLIGRAM(S): 80 TABLET, FILM COATED ORAL at 22:59

## 2023-05-11 RX ADMIN — Medication 5 MILLIGRAM(S): at 05:07

## 2023-05-11 RX ADMIN — Medication 5 MILLIGRAM(S): at 22:59

## 2023-05-11 RX ADMIN — TAMSULOSIN HYDROCHLORIDE 0.4 MILLIGRAM(S): 0.4 CAPSULE ORAL at 22:59

## 2023-05-11 RX ADMIN — Medication 1 TABLET(S): at 11:41

## 2023-05-11 RX ADMIN — MONTELUKAST 10 MILLIGRAM(S): 4 TABLET, CHEWABLE ORAL at 11:41

## 2023-05-11 RX ADMIN — CHLORHEXIDINE GLUCONATE 1 APPLICATION(S): 213 SOLUTION TOPICAL at 11:39

## 2023-05-11 RX ADMIN — PANTOPRAZOLE SODIUM 40 MILLIGRAM(S): 20 TABLET, DELAYED RELEASE ORAL at 11:41

## 2023-05-11 RX ADMIN — SODIUM CHLORIDE 75 MILLILITER(S): 9 INJECTION INTRAMUSCULAR; INTRAVENOUS; SUBCUTANEOUS at 13:53

## 2023-05-11 RX ADMIN — HEPARIN SODIUM 5000 UNIT(S): 5000 INJECTION INTRAVENOUS; SUBCUTANEOUS at 17:40

## 2023-05-11 RX ADMIN — Medication 100 MILLIGRAM(S): at 22:59

## 2023-05-11 RX ADMIN — Medication 5 MILLILITER(S): at 17:40

## 2023-05-11 RX ADMIN — Medication 1 TABLET(S): at 23:00

## 2023-05-11 NOTE — PROGRESS NOTE ADULT - ASSESSMENT
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      WM (acute kidney injury).   - Baseline creatinine Feb - Mar 2023 was 1.0 - 1.3   - S/P 1L of NS in the ED; Was on Bicarb drip   - Likely 2/2 diarrheal losses   - C/w maintenance fluid; Switched to NS with KCL as per renal   - Monitor and trend Cr; Avoid nephrotoxic agents  - Cr down-trending   - Renal eval appreciated; F/u recs     Acute diarrhea  - Suspect secondary to chemotherapy agents, will need to rule out infectious cause --> C. Diff and GI PCR -- neg   - CT A/P Neg   - Infectious work up negative; Loperamide as per GI -- Reports improvement in Diarrhea   - IVF for hydration; Monitor and replete electrolytes PRN   - GI eval appreciated; F/u recs     Hyponatremia.   - Hyponatremia, asymptomatic.  - Likely hypotonic hypovolemic, from diarrhea  - S/P 1L of fluids, S/P 1/2 NS w/ bicarb as per renal   - Cont to monitor and trend Na level closely; Avoid overcorrection > 6-8mEq in 24 hours   - Na of 132 this AM, continue to monitor and trend  - Renal eval appreciated; F/u recs     Hiccups.   - S/P vagal maneuver in ER without relief   - C/w Protonix 40mg daily  - S/P Reglan  - C/w Baclofen as per GI -- Reports improvement   - GI follow up appreciated; F/u recs     Anemia  - Mild down-trend in Hgb. No gross bleeding reported  - Continue to monitor and trend. Check Iron, B12, Folate, Ferritin, TIBC --> Noted; Not consistent with deficiency   - Transfuse for Hgb < 7.0     HypoK  - Monitor and replete electrolytes PRN     Rectal adenocarcinoma.   [place] [facility] [place], no plan for inpatient systemic treatment.  - F/u Heme/Onc recs     History of BPH.   - continue home tamsulosin 0.4mg qHS.    H/O insomnia.   - continue home trazodone 100mg qHS  - continue home diazepam 5mg qHS    Asthma.   - continue home montelukast 10mg daily.     Prophylactic measure.   - DVT ppx: heparin subq  - GI ppx: protonix  - Diet: regular, tolerating

## 2023-05-11 NOTE — PROGRESS NOTE ADULT - SUBJECTIVE AND OBJECTIVE BOX
Patient is a 67y old  Male who presents with a chief complaint of fatigue.    Pt seen and examined at bedside. Feeling better. Reports improvement in diarrhea w/ Lomotil.Report    MEDICATIONS  (STANDING):  atorvastatin 20 milliGRAM(s) Oral at bedtime  baclofen 5 milliGRAM(s) Oral every 8 hours  Biotene Dry Mouth Oral Rinse 5 milliLiter(s) Swish and Spit two times a day  chlorhexidine 2% Cloths 1 Application(s) Topical <User Schedule>  diazepam    Tablet 5 milliGRAM(s) Oral at bedtime  diphenoxylate/atropine 1 Tablet(s) Oral four times a day  heparin   Injectable 5000 Unit(s) SubCutaneous every 12 hours  montelukast 10 milliGRAM(s) Oral daily  pantoprazole  Injectable 40 milliGRAM(s) IV Push daily  sodium chloride 0.45% 1000 milliLiter(s) (75 mL/Hr) IV Continuous <Continuous>  sodium chloride 0.9% 1000 milliLiter(s) (75 mL/Hr) IV Continuous <Continuous>  sodium chloride 0.9%. 1000 milliLiter(s) (75 mL/Hr) IV Continuous <Continuous>  tamsulosin 0.4 milliGRAM(s) Oral at bedtime  traZODone 100 milliGRAM(s) Oral at bedtime    MEDICATIONS  (PRN):    Vital Signs Last 24 Hrs  T(C): 36.4 (11 May 2023 04:28), Max: 36.4 (10 May 2023 20:26)  T(F): 97.6 (11 May 2023 04:28), Max: 97.6 (11 May 2023 04:28)  HR: 64 (11 May 2023 04:28) (62 - 64)  BP: 106/60 (11 May 2023 04:28) (102/66 - 106/60)  BP(mean): --  RR: 18 (11 May 2023 04:28) (18 - 18)  SpO2: 97% (11 May 2023 04:28) (97% - 97%)    Parameters below as of 11 May 2023 04:28  Patient On (Oxygen Delivery Method): room air        PE  NAD  Awake, alert  Anicteric, MMM  RRR  CTAB  Abd soft, NT, ND  No c/c/e  No rash grossly  FROM                          12.1   5.70  )-----------( 141      ( 10 May 2023 10:41 )             33.9       05-11    132<L>  |  95<L>  |  34<H>  ----------------------------<  82  3.7   |  24  |  2.03<H>    Ca    8.5      11 May 2023 07:17

## 2023-05-11 NOTE — PROGRESS NOTE ADULT - SUBJECTIVE AND OBJECTIVE BOX
[facility]   MD HALIE SHEEHAN MD BRIANA PETITO, NP    TEL:  FROM 9 AM to 5 PM ---OFFICE: [phone]  FROM 5 PM - 9 AM PLEASE CALL ANSWERING SERVICE: [phone]     RENAL PROGRESS NOTE: DATE OF SERVICE 05-11-23 @ 12:12  Patient is a 67y old  Male who presents with a chief complaint of experienced fatigue, decreased PO intake, difficulty swallowing food d/t dry mouth.     Patient seen and examined at bedside.  Pt feeling well.   VITALS:  T(F): 97.6 (05-11-23 @ 04:28), Max: 97.6 (05-11-23 @ 04:28)  HR: 64 (05-11-23 @ 04:28)  BP: 106/60 (05-11-23 @ 04:28)  RR: 18 (05-11-23 @ 04:28)  SpO2: 97% (05-11-23 @ 04:28)  Wt(kg): --      PHYSICAL EXAM:  Constitutional: NAD  Neck: No JVD  Respiratory: CTAB, no wheezes, rales or rhonchi  Cardiovascular: S1, S2, RRR  Gastrointestinal: BS+, soft, NT/ND. RLQ Ileostomy   Extremities: No peripheral edema    Hospital Medications:   MEDICATIONS  (STANDING):  atorvastatin 20 milliGRAM(s) Oral at bedtime  baclofen 5 milliGRAM(s) Oral every 8 hours  Biotene Dry Mouth Oral Rinse 5 milliLiter(s) Swish and Spit two times a day  chlorhexidine 2% Cloths 1 Application(s) Topical <User Schedule>  diazepam    Tablet 5 milliGRAM(s) Oral at bedtime  diphenoxylate/atropine 1 Tablet(s) Oral four times a day  heparin   Injectable 5000 Unit(s) SubCutaneous every 12 hours  montelukast 10 milliGRAM(s) Oral daily  pantoprazole  Injectable 40 milliGRAM(s) IV Push daily  sodium chloride 0.45% 1000 milliLiter(s) (75 mL/Hr) IV Continuous <Continuous>  sodium chloride 0.9%. 1000 milliLiter(s) (75 mL/Hr) IV Continuous <Continuous>  tamsulosin 0.4 milliGRAM(s) Oral at bedtime  traZODone 100 milliGRAM(s) Oral at bedtime      LABS:  05-11    132<L>  |  95<L>  |  34<H>  ----------------------------<  82  3.7   |  24  |  2.03<H>    Ca    8.5      11 May 2023 07:17      Creatinine Trend: 2.03 <--, 2.37 <--, 2.83 <--, 2.72 <--    Iron Total, Serum: 55 ug/dL (05-11 @ 07:17)  Iron - Total Binding Capacity.: 319 ug/dL (05-11 @ 07:17)  Ferritin, Serum: 746 ng/mL (05-11 @ 07:17)                              12.1   5.70  )-----------( 141      ( 10 May 2023 10:41 )             33.9     Urine Studies:  Urinalysis - [05-09-23 @ 17:18]      Color Yellow / Appearance Clear / SG 1.016 / pH 5.5      Gluc Negative / Ketone Negative  / Bili Negative / Urobili Negative       Blood Negative / Protein Trace / Leuk Est Negative / Nitrite Negative      RBC  / WBC  / Hyaline  / Gran  / Sq Epi  / Non Sq Epi  / Bacteria     Urine Creatinine 148      [05-09-23 @ 17:18]  Urine Sodium 9      [05-09-23 @ 17:18]  Urine Osmolality 425      [05-09-23 @ 17:18]    Iron 55, TIBC 319, %sat 17      [05-11-23 @ 07:17]  Ferritin 746      [05-11-23 @ 07:17]  HbA1c 5.7      [05-11-19 @ 11:43]        RADIOLOGY & ADDITIONAL STUDIES:

## 2023-05-11 NOTE — PROGRESS NOTE ADULT - ASSESSMENT
1. WM  - per nephrology     2. Diarrhea, likely chemo related. C-diff and GI PCR neg. CT A/P neg for enteritis or obstruction.  - IVF, supportive care  - continue lomotil 1 tab 4 times daily     3. Hiccups  - agree with Protonix 40 mg IV daily   - reglan d/c'd (may be exacerbating high ostomy output) > changed to baclofen 5 mg TID  - Zofran 4 IV q 8 hrs for nausea    4. Rectal adenocarcinoma  - per heme/onc      Berkley Zhou M.D.   Gastroenterology and Hepatology  [address] [place], [place]  Office: [phone]  Cell: [phone]

## 2023-05-11 NOTE — PROGRESS NOTE ADULT - SUBJECTIVE AND OBJECTIVE BOX
Chief Complaint:  Patient is a 67y old  Male who presents with a chief complaint of     Date of service 23 @ 17:10      Interval Events:   ostomy output still liquidy     Hospital Medications:  atorvastatin 20 milliGRAM(s) Oral at bedtime  baclofen 5 milliGRAM(s) Oral every 8 hours  Biotene Dry Mouth Oral Rinse 5 milliLiter(s) Swish and Spit two times a day  chlorhexidine 2% Cloths 1 Application(s) Topical <User Schedule>  diazepam    Tablet 5 milliGRAM(s) Oral at bedtime  diphenoxylate/atropine 1 Tablet(s) Oral four times a day  heparin   Injectable 5000 Unit(s) SubCutaneous every 12 hours  montelukast 10 milliGRAM(s) Oral daily  pantoprazole  Injectable 40 milliGRAM(s) IV Push daily  sodium chloride 0.45% 1000 milliLiter(s) IV Continuous <Continuous>  sodium chloride 0.9% 1000 milliLiter(s) IV Continuous <Continuous>  sodium chloride 0.9%. 1000 milliLiter(s) IV Continuous <Continuous>  tamsulosin 0.4 milliGRAM(s) Oral at bedtime  traZODone 100 milliGRAM(s) Oral at bedtime        Review of Systems:  General:  No wt loss, fevers, chills, night sweats, fatigue,   Eyes:  Good vision, no reported pain  ENT:  No sore throat, pain, runny nose, dysphagia  CV:  No pain, palpitations, hypo/hypertension  Resp:  No dyspnea, cough, tachypnea, wheezing  GI:  See HPI  :  No pain, bleeding, incontinence, nocturia  Muscle:  No pain, weakness  Neuro:  No weakness, tingling, memory problems  Psych:  No fatigue, insomnia, mood problems, depression  Endocrine:  No polyuria, polydipsia, cold/heat intolerance  Heme:  No petechiae, ecchymosis, easy bruisability  Integumentary:  No rash, edema    PHYSICAL EXAM:   Vital Signs:  Vital Signs Last 24 Hrs  T(C): 36.6 (11 May 2023 12:45), Max: 36.6 (11 May 2023 12:45)  T(F): 97.8 (11 May 2023 12:45), Max: 97.8 (11 May 2023 12:45)  HR: 58 (11 May 2023 12:45) (58 - 64)  BP: 111/74 (11 May 2023 12:45) (102/66 - 111/74)  BP(mean): --  RR: 18 (11 May 2023 12:45) (18 - 18)  SpO2: 99% (11 May 2023 12:45) (97% - 99%)    Parameters below as of 11 May 2023 12:45  Patient On (Oxygen Delivery Method): room air      Daily     Daily       PHYSICAL EXAM:     GENERAL:  Appears stated age, well-groomed, well-nourished, no distress  HEENT:  NC/AT,  conjunctivae anicteric, clear and pink,   NECK: supple, trachea midline  CHEST:  Full & symmetric excursion, no increased effort, breath sounds clear  HEART:  Regular rhythm, no JVD  ABDOMEN:  Soft, non-tender, non-distended, normoactive bowel sounds,  no masses , no hepatosplenomegaly  EXTREMITIES:  no cyanosis,clubbing or edema  SKIN:  No rash, erythema, or, ecchymoses, no jaundice  NEURO:  Alert, non-focal, no asterixis  PSYCH: Appropriate affect, oriented to place and time  RECTAL: Deferred      LABS Personally reviewed by me:                        12.1     )-----------( 141      ( 10 May 2023 10:41 )             33.9       05-11    132<L>  |  95<L>  |  34<H>  ----------------------------<  82  3.7   |  24  |  2.03<H>    Ca    8.5      11 May 2023 07:17          Urinalysis Basic - ( 09 May 2023 17:18 )    Color: Yellow / Appearance: Clear / S.016 / pH: x  Gluc: x / Ketone: Negative  / Bili: Negative / Urobili: Negative   Blood: x / Protein: Trace / Nitrite: Negative   Leuk Esterase: Negative / RBC: x / WBC x   Sq Epi: x / Non Sq Epi: x / Bacteria: x                              12.1   .  )-----------( 141      ( 10 May 2023 10:41 )             33.9       Imaging personally reviewed by me:

## 2023-05-11 NOTE — PROGRESS NOTE ADULT - ASSESSMENT
MONSE RAZO is a 67y Male who presents with a chief complaint of ARF    Rectal Cancer  ·	Patient follows with Dr. Zoe Goldberg, [facility].  ·	He completed adjuvant concurrent chemoradiation, is currently on capecitabine + oxaliplatin, last dose April 17th.  ·	No systemic therapy while inpatient or during rehabilitation.    Acute Renal Failure  ·	Baseline creatinine at 1.0-1.3.  ·	Creatinine 2.03 today  ·	In the setting of diarrhea, poor oral intake.  ·	On IVF. Nephrology following.    Diarrhea  ·	Possibly in the setting of chemotherapy.  ·	Reports improvement in diarrhea w/ lomotil    Will continue to follow.    Dariusz Plascencia PA-C  Hematology/Oncology  [facility]  [phone] (office) MONSE RAZO is a 67y Male who presents with a chief complaint of ARF    Rectal Cancer  ·	Patient follows with Dr. Zoe Goldberg, [facility].  ·	He completed adjuvant concurrent chemoradiation, is currently on capecitabine (LD about 2 weeks ago) + oxaliplatin, last dose April 17th.  ·	No systemic therapy while inpatient or during rehabilitation.    Acute Renal Failure  ·	Baseline creatinine at 1.0-1.3.  ·	Creatinine 2.03 today  ·	In the setting of diarrhea, poor oral intake.  ·	On IVF. Nephrology following.    Diarrhea  ·	Possibly in the setting of chemotherapy.  ·	Reports improvement in diarrhea w/ lomotil    Will continue to follow.    Dariusz Plascencia PA-C  Hematology/Oncology  [facility]  [phone] (office)

## 2023-05-11 NOTE — PROGRESS NOTE ADULT - SUBJECTIVE AND OBJECTIVE BOX
Name of Patient : MONSE RAZO  MRN: [id number]  Date of visit: 23 @ 14:50      Subjective: Patient seen and examined. No new events except as noted.   Patient seen earlier this AM. Lying down in bed. Reports diarrhea is improving with Lomotil.   Denies abdominal pain or discomfort.   Reports belching has subsided on Baclofen.   Cr elevated, however down-trending. Na improving.     REVIEW OF SYSTEMS:    CONSTITUTIONAL: No weakness, fevers or chills  EYES/ENT: No visual changes;  No vertigo or throat pain   NECK: No pain or stiffness  RESPIRATORY: No cough, wheezing, hemoptysis; No shortness of breath  CARDIOVASCULAR: No chest pain or palpitations  GASTROINTESTINAL: + Reports diarrhea is improving; No abdominal or epigastric pain. No nausea, vomiting, or hematemesis   GENITOURINARY: No dysuria, frequency or hematuria  NEUROLOGICAL: No numbness or weakness  SKIN: No itching, burning, rashes, or lesions   All other review of systems is negative unless indicated above.    MEDICATIONS:  MEDICATIONS  (STANDING):  atorvastatin 20 milliGRAM(s) Oral at bedtime  baclofen 5 milliGRAM(s) Oral every 8 hours  Biotene Dry Mouth Oral Rinse 5 milliLiter(s) Swish and Spit two times a day  chlorhexidine 2% Cloths 1 Application(s) Topical <User Schedule>  diazepam    Tablet 5 milliGRAM(s) Oral at bedtime  diphenoxylate/atropine 1 Tablet(s) Oral four times a day  heparin   Injectable 5000 Unit(s) SubCutaneous every 12 hours  montelukast 10 milliGRAM(s) Oral daily  pantoprazole  Injectable 40 milliGRAM(s) IV Push daily  sodium chloride 0.45% 1000 milliLiter(s) (75 mL/Hr) IV Continuous <Continuous>  sodium chloride 0.9% 1000 milliLiter(s) (75 mL/Hr) IV Continuous <Continuous>  sodium chloride 0.9%. 1000 milliLiter(s) (75 mL/Hr) IV Continuous <Continuous>  tamsulosin 0.4 milliGRAM(s) Oral at bedtime  traZODone 100 milliGRAM(s) Oral at bedtime      PHYSICAL EXAM:  T(C): 36.6 (23 @ 12:45), Max: 36.6 (23 @ 12:45)  HR: 58 (23 @ 12:45) (58 - 64)  BP: 111/74 (23 @ 12:45) (102/66 - 111/74)  RR: 18 (23 @ 12:45) (18 - 18)  SpO2: 99% (23 @ 12:45) (97% - 99%)  Wt(kg): --  I&O's Summary        Appearance: Normal; Lying down in bed   HEENT:  Eyes are open   Lymphatic: No lymphadenopathy   Cardiovascular: Normal   Respiratory: normal effort, clear  Gastrointestinal:  Soft, Non-tender; + Ostomy   Skin: No rashes,  warm to touch  Psychiatry:  Mood & affect appropriate  Musculoskeletal: No edema                              12.1   5.70  )-----------( 141      ( 10 May 2023 10:41 )             33.9                   132<L>  |  95<L>  |  34<H>  ----------------------------<  82  3.7   |  24  |  2.03<H>    Ca    8.5      11 May 2023 07:17                Urinalysis Basic - ( 09 May 2023 17:18 )    Color: Yellow / Appearance: Clear / S.016 / pH: x  Gluc: x / Ketone: Negative  / Bili: Negative / Urobili: Negative   Blood: x / Protein: Trace / Nitrite: Negative   Leuk Esterase: Negative / RBC: x / WBC x   Sq Epi: x / Non Sq Epi: x / Bacteria: x        Culture - Stool (23 @ 00:38)   Specimen Source: .Stool Feces  Culture Results: No enteric pathogens isolated.

## 2023-05-12 LAB
ANION GAP SERPL CALC-SCNC: 14 MMOL/L — SIGNIFICANT CHANGE UP (ref 5–17)
BUN SERPL-MCNC: 26 MG/DL — HIGH (ref 7–23)
CALCIUM SERPL-MCNC: 8.2 MG/DL — LOW (ref 8.4–10.5)
CHLORIDE SERPL-SCNC: 97 MMOL/L — SIGNIFICANT CHANGE UP (ref 96–108)
CO2 SERPL-SCNC: 22 MMOL/L — SIGNIFICANT CHANGE UP (ref 22–31)
CREAT SERPL-MCNC: 1.56 MG/DL — HIGH (ref 0.5–1.3)
EGFR: 48 ML/MIN/1.73M2 — LOW
GLUCOSE SERPL-MCNC: 87 MG/DL — SIGNIFICANT CHANGE UP (ref 70–99)
POTASSIUM SERPL-MCNC: 3.8 MMOL/L — SIGNIFICANT CHANGE UP (ref 3.5–5.3)
POTASSIUM SERPL-SCNC: 3.8 MMOL/L — SIGNIFICANT CHANGE UP (ref 3.5–5.3)
SODIUM SERPL-SCNC: 133 MMOL/L — LOW (ref 135–145)

## 2023-05-12 RX ORDER — SODIUM CHLORIDE 9 MG/ML
1000 INJECTION INTRAMUSCULAR; INTRAVENOUS; SUBCUTANEOUS
Refills: 0 | Status: DISCONTINUED | OUTPATIENT
Start: 2023-05-12 | End: 2023-05-13

## 2023-05-12 RX ADMIN — HEPARIN SODIUM 5000 UNIT(S): 5000 INJECTION INTRAVENOUS; SUBCUTANEOUS at 17:07

## 2023-05-12 RX ADMIN — MONTELUKAST 10 MILLIGRAM(S): 4 TABLET, CHEWABLE ORAL at 11:30

## 2023-05-12 RX ADMIN — HEPARIN SODIUM 5000 UNIT(S): 5000 INJECTION INTRAVENOUS; SUBCUTANEOUS at 05:05

## 2023-05-12 RX ADMIN — PANTOPRAZOLE SODIUM 40 MILLIGRAM(S): 20 TABLET, DELAYED RELEASE ORAL at 11:30

## 2023-05-12 RX ADMIN — Medication 5 MILLIGRAM(S): at 21:31

## 2023-05-12 RX ADMIN — ATORVASTATIN CALCIUM 20 MILLIGRAM(S): 80 TABLET, FILM COATED ORAL at 21:31

## 2023-05-12 RX ADMIN — Medication 5 MILLIGRAM(S): at 05:05

## 2023-05-12 RX ADMIN — SODIUM CHLORIDE 75 MILLILITER(S): 9 INJECTION INTRAMUSCULAR; INTRAVENOUS; SUBCUTANEOUS at 14:27

## 2023-05-12 RX ADMIN — TAMSULOSIN HYDROCHLORIDE 0.4 MILLIGRAM(S): 0.4 CAPSULE ORAL at 21:31

## 2023-05-12 RX ADMIN — Medication 5 MILLILITER(S): at 05:05

## 2023-05-12 RX ADMIN — Medication 1 TABLET(S): at 17:07

## 2023-05-12 RX ADMIN — Medication 5 MILLIGRAM(S): at 13:34

## 2023-05-12 RX ADMIN — Medication 5 MILLIGRAM(S): at 23:50

## 2023-05-12 RX ADMIN — Medication 5 MILLILITER(S): at 17:07

## 2023-05-12 RX ADMIN — Medication 1 TABLET(S): at 05:05

## 2023-05-12 RX ADMIN — Medication 100 MILLIGRAM(S): at 23:50

## 2023-05-12 RX ADMIN — CHLORHEXIDINE GLUCONATE 1 APPLICATION(S): 213 SOLUTION TOPICAL at 05:06

## 2023-05-12 RX ADMIN — Medication 1 TABLET(S): at 11:30

## 2023-05-12 RX ADMIN — Medication 1 TABLET(S): at 23:52

## 2023-05-12 NOTE — PROGRESS NOTE ADULT - SUBJECTIVE AND OBJECTIVE BOX
Name of Patient : MONSE RAZO  MRN: [id number]  Date of visit: 05-12-23 @ 12:28      Subjective: Patient seen and examined. No new events except as noted.   Patient seen earlier this AM. Sitting up in bed.  Reports    REVIEW OF SYSTEMS:    CONSTITUTIONAL: No weakness, fevers or chills  EYES/ENT: No visual changes;  No vertigo or throat pain   NECK: No pain or stiffness  RESPIRATORY: No cough, wheezing, hemoptysis; No shortness of breath  CARDIOVASCULAR: No chest pain or palpitations  GASTROINTESTINAL: No abdominal or epigastric pain. No nausea, vomiting, or hematemesis; No diarrhea or constipation. No melena or hematochezia.  GENITOURINARY: No dysuria, frequency or hematuria  NEUROLOGICAL: No numbness or weakness  SKIN: No itching, burning, rashes, or lesions   All other review of systems is negative unless indicated above.    MEDICATIONS:  MEDICATIONS  (STANDING):  atorvastatin 20 milliGRAM(s) Oral at bedtime  baclofen 5 milliGRAM(s) Oral every 8 hours  Biotene Dry Mouth Oral Rinse 5 milliLiter(s) Swish and Spit two times a day  chlorhexidine 2% Cloths 1 Application(s) Topical <User Schedule>  diazepam    Tablet 5 milliGRAM(s) Oral at bedtime  diphenoxylate/atropine 1 Tablet(s) Oral four times a day  heparin   Injectable 5000 Unit(s) SubCutaneous every 12 hours  montelukast 10 milliGRAM(s) Oral daily  pantoprazole  Injectable 40 milliGRAM(s) IV Push daily  sodium chloride 0.45% 1000 milliLiter(s) (75 mL/Hr) IV Continuous <Continuous>  sodium chloride 0.9% 1000 milliLiter(s) (75 mL/Hr) IV Continuous <Continuous>  sodium chloride 0.9%. 1000 milliLiter(s) (75 mL/Hr) IV Continuous <Continuous>  tamsulosin 0.4 milliGRAM(s) Oral at bedtime  traZODone 100 milliGRAM(s) Oral at bedtime      PHYSICAL EXAM:  T(C): 36.4 (05-12-23 @ 11:30), Max: 36.7 (05-11-23 @ 21:26)  HR: 61 (05-12-23 @ 11:30) (58 - 61)  BP: 97/61 (05-12-23 @ 11:30) (91/60 - 111/74)  RR: 18 (05-12-23 @ 11:30) (18 - 18)  SpO2: 99% (05-12-23 @ 11:30) (97% - 99%)  Wt(kg): --  I&O's Summary        Appearance: Normal	  HEENT:  PERRLA   Lymphatic: No lymphadenopathy   Cardiovascular: Normal S1 S2, no JVD  Respiratory: normal effort , clear  Gastrointestinal:  Soft, Non-tender  Skin: No rashes,  warm to touch  Psychiatry:  Mood & affect appropriate  Musculuskeletal: No edema      All labs, Imaging and EKGs personally reviewed                        Name of Patient : MONSE RAZO  MRN: [id number]  Date of visit: 05-12-23 @ 12:28      Subjective: Patient seen and examined. No new events except as noted.   Patient seen earlier this AM. Sitting up in bed.  Reports feeling better.   PO hydration encouraged.       REVIEW OF SYSTEMS:    CONSTITUTIONAL: No weakness, fevers or chills  EYES/ENT: No visual changes;  No vertigo or throat pain   NECK: No pain or stiffness  RESPIRATORY: No cough, wheezing, hemoptysis; No shortness of breath  CARDIOVASCULAR: No chest pain or palpitations  GASTROINTESTINAL: No abdominal or epigastric pain. No nausea, vomiting, or hematemesis; No diarrhea or constipation. No melena or hematochezia.  GENITOURINARY: No dysuria, frequency or hematuria  NEUROLOGICAL: No numbness or weakness  SKIN: No itching, burning, rashes, or lesions   All other review of systems is negative unless indicated above.    MEDICATIONS:  MEDICATIONS  (STANDING):  atorvastatin 20 milliGRAM(s) Oral at bedtime  baclofen 5 milliGRAM(s) Oral every 8 hours  Biotene Dry Mouth Oral Rinse 5 milliLiter(s) Swish and Spit two times a day  chlorhexidine 2% Cloths 1 Application(s) Topical <User Schedule>  diazepam    Tablet 5 milliGRAM(s) Oral at bedtime  diphenoxylate/atropine 1 Tablet(s) Oral four times a day  heparin   Injectable 5000 Unit(s) SubCutaneous every 12 hours  montelukast 10 milliGRAM(s) Oral daily  pantoprazole  Injectable 40 milliGRAM(s) IV Push daily  sodium chloride 0.45% 1000 milliLiter(s) (75 mL/Hr) IV Continuous <Continuous>  sodium chloride 0.9% 1000 milliLiter(s) (75 mL/Hr) IV Continuous <Continuous>  sodium chloride 0.9%. 1000 milliLiter(s) (75 mL/Hr) IV Continuous <Continuous>  tamsulosin 0.4 milliGRAM(s) Oral at bedtime  traZODone 100 milliGRAM(s) Oral at bedtime      PHYSICAL EXAM:  T(C): 36.4 (05-12-23 @ 11:30), Max: 36.7 (05-11-23 @ 21:26)  HR: 61 (05-12-23 @ 11:30) (58 - 61)  BP: 97/61 (05-12-23 @ 11:30) (91/60 - 111/74)  RR: 18 (05-12-23 @ 11:30) (18 - 18)  SpO2: 99% (05-12-23 @ 11:30) (97% - 99%)  Wt(kg): --  I&O's Summary        Appearance: Normal	  HEENT:  PERRLA   Lymphatic: No lymphadenopathy   Cardiovascular: Normal S1 S2, no JVD  Respiratory: normal effort , clear  Gastrointestinal:  Soft, Non-tender  Skin: No rashes,  warm to touch  Psychiatry:  Mood & affect appropriate  Musculuskeletal: No edema      All labs, Imaging and EKGs personally reviewed                        Name of Patient : MONSE RAZO  MRN: [id number]  Date of visit: 05-12-23 @ 12:28      Subjective: Patient seen and examined. No new events except as noted.   Patient seen earlier this AM. Sitting up in bed.  Reports feeling better.   PO hydration encouraged.       REVIEW OF SYSTEMS:    CONSTITUTIONAL: No weakness, fevers or chills  EYES/ENT: No visual changes;  No vertigo or throat pain   NECK: No pain or stiffness  RESPIRATORY: No cough, wheezing, hemoptysis; No shortness of breath  CARDIOVASCULAR: No chest pain or palpitations  GASTROINTESTINAL: No abdominal or epigastric pain. No nausea, vomiting, or hematemesis; No diarrhea or constipation. No melena or hematochezia.  GENITOURINARY: No dysuria, frequency or hematuria  NEUROLOGICAL: No numbness or weakness  SKIN: No itching, burning, rashes, or lesions   All other review of systems is negative unless indicated above.    MEDICATIONS:  MEDICATIONS  (STANDING):  atorvastatin 20 milliGRAM(s) Oral at bedtime  baclofen 5 milliGRAM(s) Oral every 8 hours  Biotene Dry Mouth Oral Rinse 5 milliLiter(s) Swish and Spit two times a day  chlorhexidine 2% Cloths 1 Application(s) Topical <User Schedule>  diazepam    Tablet 5 milliGRAM(s) Oral at bedtime  diphenoxylate/atropine 1 Tablet(s) Oral four times a day  heparin   Injectable 5000 Unit(s) SubCutaneous every 12 hours  montelukast 10 milliGRAM(s) Oral daily  pantoprazole  Injectable 40 milliGRAM(s) IV Push daily  sodium chloride 0.45% 1000 milliLiter(s) (75 mL/Hr) IV Continuous <Continuous>  sodium chloride 0.9% 1000 milliLiter(s) (75 mL/Hr) IV Continuous <Continuous>  sodium chloride 0.9%. 1000 milliLiter(s) (75 mL/Hr) IV Continuous <Continuous>  tamsulosin 0.4 milliGRAM(s) Oral at bedtime  traZODone 100 milliGRAM(s) Oral at bedtime      PHYSICAL EXAM:  T(C): 36.4 (05-12-23 @ 11:30), Max: 36.7 (05-11-23 @ 21:26)  HR: 61 (05-12-23 @ 11:30) (58 - 61)  BP: 97/61 (05-12-23 @ 11:30) (91/60 - 111/74)  RR: 18 (05-12-23 @ 11:30) (18 - 18)  SpO2: 99% (05-12-23 @ 11:30) (97% - 99%)  Wt(kg): --  I&O's Summary      Appearance: Normal; Lying down in bed   HEENT:  Eyes are open   Lymphatic: No lymphadenopathy   Cardiovascular: Normal   Respiratory: normal effort, clear  Gastrointestinal:  Soft, Non-tender; + Ostomy   Skin: No rashes,  warm to touch  Psychiatry:  Mood & affect appropriate  Musculoskeletal: No edema                    05-12    133<L>  |  97  |  26<H>  ----------------------------<  87  3.8   |  22  |  1.56<H>    Ca    8.2<L>      12 May 2023 07:11             Culture - Stool (05.09.23 @ 00:38)   Specimen Source: .Stool Feces  Culture Results: No enteric pathogens isolated.     Culture - Urine (05.08.23 @ 17:24)   Specimen Source: Clean Catch Clean Catch (Midstream)  Culture Results: <10,000 CFU/mL Normal Urogenital Bhargavi    Clostridium difficile [facility] Toxins A&B, EIA:   Negative (05.09.23 @ 00:38)

## 2023-05-12 NOTE — PROGRESS NOTE ADULT - SUBJECTIVE AND OBJECTIVE BOX
Chief Complaint:  Patient is a 67y old  Male who presents with a chief complaint of     Date of service 05-12-23 @ 15:52      Interval Events:   no acute events     Hospital Medications:  atorvastatin 20 milliGRAM(s) Oral at bedtime  baclofen 5 milliGRAM(s) Oral every 8 hours  Biotene Dry Mouth Oral Rinse 5 milliLiter(s) Swish and Spit two times a day  chlorhexidine 2% Cloths 1 Application(s) Topical <User Schedule>  diazepam    Tablet 5 milliGRAM(s) Oral at bedtime  diphenoxylate/atropine 1 Tablet(s) Oral four times a day  heparin   Injectable 5000 Unit(s) SubCutaneous every 12 hours  montelukast 10 milliGRAM(s) Oral daily  pantoprazole  Injectable 40 milliGRAM(s) IV Push daily  sodium chloride 0.45% 1000 milliLiter(s) IV Continuous <Continuous>  sodium chloride 0.9% 1000 milliLiter(s) IV Continuous <Continuous>  sodium chloride 0.9%. 1000 milliLiter(s) IV Continuous <Continuous>  sodium chloride 0.9%. 1000 milliLiter(s) IV Continuous <Continuous>  tamsulosin 0.4 milliGRAM(s) Oral at bedtime  traZODone 100 milliGRAM(s) Oral at bedtime        Review of Systems:  General:  No wt loss, fevers, chills, night sweats, fatigue,   Eyes:  Good vision, no reported pain  ENT:  No sore throat, pain, runny nose, dysphagia  CV:  No pain, palpitations, hypo/hypertension  Resp:  No dyspnea, cough, tachypnea, wheezing  GI:  See HPI  :  No pain, bleeding, incontinence, nocturia  Muscle:  No pain, weakness  Neuro:  No weakness, tingling, memory problems  Psych:  No fatigue, insomnia, mood problems, depression  Endocrine:  No polyuria, polydipsia, cold/heat intolerance  Heme:  No petechiae, ecchymosis, easy bruisability  Integumentary:  No rash, edema    PHYSICAL EXAM:   Vital Signs:  Vital Signs Last 24 Hrs  T(C): 36.4 (12 May 2023 11:30), Max: 36.7 (11 May 2023 21:26)  T(F): 97.6 (12 May 2023 11:30), Max: 98 (11 May 2023 21:26)  HR: 61 (12 May 2023 11:30) (58 - 61)  BP: 97/61 (12 May 2023 11:30) (91/60 - 111/71)  BP(mean): --  RR: 18 (12 May 2023 11:30) (18 - 18)  SpO2: 99% (12 May 2023 11:30) (97% - 99%)    Parameters below as of 12 May 2023 11:30  Patient On (Oxygen Delivery Method): room air      Daily     Daily       PHYSICAL EXAM:     GENERAL:  Appears stated age, well-groomed, well-nourished, no distress  HEENT:  NC/AT,  conjunctivae anicteric, clear and pink,   NECK: supple, trachea midline  CHEST:  Full & symmetric excursion, no increased effort, breath sounds clear  HEART:  Regular rhythm, no JVD  ABDOMEN:  Soft, non-tender, non-distended, normoactive bowel sounds,  no masses , no hepatosplenomegaly  EXTREMITIES:  no cyanosis,clubbing or edema  SKIN:  No rash, erythema, or, ecchymoses, no jaundice  NEURO:  Alert, non-focal, no asterixis  PSYCH: Appropriate affect, oriented to place and time  RECTAL: Deferred      LABS Personally reviewed by me:      05-12    133<L>  |  97  |  26<H>  ----------------------------<  87  3.8   |  22  |  1.56<H>    Ca    8.2<L>      12 May 2023 07:11                                    12.1   5.70  )-----------( 141      ( 10 May 2023 10:41 )             33.9       Imaging personally reviewed by me:

## 2023-05-12 NOTE — PROGRESS NOTE ADULT - ASSESSMENT
MONSE RAZO is a 67y Male who presents with a chief complaint of ARF    Rectal Cancer  ·	Patient follows with Dr. Zoe Goldberg, [facility].  ·	He completed adjuvant concurrent chemoradiation, is currently on capecitabine (LD about 2 weeks ago) + oxaliplatin, last dose April 17th.  ·	No systemic therapy while inpatient or during rehabilitation.    Acute Renal Failure  ·	Baseline creatinine at 1.0-1.3.  ·	Creatinine improving, 1.56 today  ·	In the setting of diarrhea, poor oral intake.  ·	On IVF. Nephrology following.    Diarrhea  ·	Possibly in the setting of chemotherapy.  ·	Reports continued improvement in diarrhea w/ lomotil    Will continue to follow.    Dariusz Plascencia PA-C  Hematology/Oncology  [facility]  [phone] (office)

## 2023-05-12 NOTE — PROGRESS NOTE ADULT - NS ATTEND AMEND GEN_ALL_CORE FT
Agree with above
Pt care and plan discussed and reviewed with PA. Plan as outlined above edited by me to reflect our discussion.
agree with above
pt with diarrhea, cr elevated. nephro and gi called. likely from chemo. will send infectious work up
Diarrhea likely due to xeloda, last dose was less than 2 weeks ago.  Symptoms better on lomotil, await further improvement in his creatinine prior to discharge (baseline cr 1.0).
pt seen and examined   agree with above
Diarrhea likely due to xeloda, last dose was less than 2 weeks ago.  He continues to have diarrhea but overall better.   Tolerating diet/liquids.  Creatinine better, (baseline cr 1.0).  Discharge planning.
seen and examined   agree with above
Pt care and plan discussed and reviewed with PA. Plan as outlined above edited by me to reflect our discussion. Advanced care planning/advanced directives discussed with patient/family. DNR status including forceful chest compressions to attempt to restart the heart, ventilator support/artificial breathing, electric shock, artificial nutrition, health care proxy, Molst form all discussed with pt.

## 2023-05-12 NOTE — PROGRESS NOTE ADULT - ASSESSMENT
WM   likely pre renal sec to GI loss (ileostomy)   High SG and hyaline cast on UA suggestive of dehydration  Urine studies suggestive of pre renal state  Renal function improving at present s/p IVFs   Continue NS @ 75 cc/ hr x 24 hrs  CT A/P 5/8 --> No hydronephrosis  Monitor BMP, u/o  Monitor Renal function closely    Hyponatremia  likely hypovolemic sec to GI loss ( Ileostomy)  Urine lytes suggestive of Dehydration   improving   Continue IVFs as above   Monitor  Avoid overcorrection > 6-8 mEq    Hypokalemia  sec to GI loss  improving   Monitor closely    Acidosis   non Anion gap  sec to GI loss   improving   Continue to monitor     Hematuria   Repeat UA -- > wnl   CT a/p 5/8 with no renal mass     Discussed with primary team.

## 2023-05-12 NOTE — PROGRESS NOTE ADULT - SUBJECTIVE AND OBJECTIVE BOX
Patient is a 67y old  Male who presents with a chief complaint of elevated creatinine.    Pt seen and examined at bedside. Feeling well. Reports diarrhea has been stable, no bleeding. Continue lomotil.    MEDICATIONS  (STANDING):  atorvastatin 20 milliGRAM(s) Oral at bedtime  baclofen 5 milliGRAM(s) Oral every 8 hours  Biotene Dry Mouth Oral Rinse 5 milliLiter(s) Swish and Spit two times a day  chlorhexidine 2% Cloths 1 Application(s) Topical <User Schedule>  diazepam    Tablet 5 milliGRAM(s) Oral at bedtime  diphenoxylate/atropine 1 Tablet(s) Oral four times a day  heparin   Injectable 5000 Unit(s) SubCutaneous every 12 hours  montelukast 10 milliGRAM(s) Oral daily  pantoprazole  Injectable 40 milliGRAM(s) IV Push daily  sodium chloride 0.45% 1000 milliLiter(s) (75 mL/Hr) IV Continuous <Continuous>  sodium chloride 0.9% 1000 milliLiter(s) (75 mL/Hr) IV Continuous <Continuous>  sodium chloride 0.9%. 1000 milliLiter(s) (75 mL/Hr) IV Continuous <Continuous>  tamsulosin 0.4 milliGRAM(s) Oral at bedtime  traZODone 100 milliGRAM(s) Oral at bedtime    MEDICATIONS  (PRN):    Vital Signs Last 24 Hrs  T(C): 36.4 (12 May 2023 11:30), Max: 36.7 (11 May 2023 21:26)  T(F): 97.6 (12 May 2023 11:30), Max: 98 (11 May 2023 21:26)  HR: 61 (12 May 2023 11:30) (58 - 61)  BP: 97/61 (12 May 2023 11:30) (91/60 - 111/71)  BP(mean): --  RR: 18 (12 May 2023 11:30) (18 - 18)  SpO2: 99% (12 May 2023 11:30) (97% - 99%)    Parameters below as of 12 May 2023 11:30  Patient On (Oxygen Delivery Method): room air        PE  NAD  Awake, alert  Anicteric, MMM  No c/c/e  No rash grossly  FROM        05-12    133<L>  |  97  |  26<H>  ----------------------------<  87  3.8   |  22  |  1.56<H>    Ca    8.2<L>      12 May 2023 07:11

## 2023-05-12 NOTE — PROGRESS NOTE ADULT - ASSESSMENT
1. WM, improving   - per nephrology     2. Diarrhea, chemo related. C-diff and GI PCR neg. CT A/P neg for enteritis or obstruction.  - IVF, supportive care  - continue lomotil 1 tab 4 times daily     3. Hiccups  - agree with Protonix 40 mg IV daily   - reglan d/c'd (may have been exacerbating high ostomy output) > changed to baclofen 5 mg TID  - Zofran 4 IV q 8 hrs for nausea    4. Rectal adenocarcinoma  - per heme/onc      Berkley Zhou M.D.   Gastroenterology and Hepatology  [address] [place], [place]  Office: [phone]  Cell: [phone]

## 2023-05-12 NOTE — PROGRESS NOTE ADULT - ASSESSMENT
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      WM (acute kidney injury).   - Baseline creatinine Feb - Mar 2023 was 1.0 - 1.3   - S/P 1L of NS in the ED; Was on Bicarb drip   - Likely 2/2 diarrheal losses   - C/w maintenance fluid; Switched to NS with KCL; IVF as per renal   - Monitor and trend Cr; Avoid nephrotoxic agents  - Cr down-trending; PO hydration encouraged   - Renal eval appreciated; F/u recs     Acute diarrhea  - Suspect secondary to chemotherapy agents, will need to rule out infectious cause --> C. Diff and GI PCR -- neg   - CT A/P Neg   - Infectious work up negative; Loperamide as per GI -- Reports improvement in Diarrhea   - IVF for hydration; Monitor and replete electrolytes PRN   - GI eval appreciated; F/u recs     Hyponatremia.   - Hyponatremia, asymptomatic.  - Likely hypotonic hypovolemic, from diarrhea  - S/P 1L of fluids, S/P 1/2 NS w/ bicarb as per renal   - Cont to monitor and trend Na level closely; Avoid overcorrection > 6-8mEq in 24 hours   - Na of 133 this AM, continue to monitor and trend  - Renal eval appreciated; F/u recs     Hiccups.   - S/P vagal maneuver in ER without relief; S/P Reglan   - C/w Protonix 40mg daily  - C/w Baclofen as per GI -- Reports improvement   - GI follow up appreciated; F/u recs     Anemia  - Mild down-trend in Hgb. No gross bleeding reported  - Continue to monitor and trend. Iron, B12, Folate, Ferritin, TIBC --> Noted; Not consistent with deficiency   - Transfuse for Hgb < 7.0     HypoK  - Monitor and replete electrolytes PRN     Rectal adenocarcinoma.   - follows at [facility]  - oncology is onboard, no plan for inpatient systemic treatment.  - F/u Heme/Onc recs     History of BPH.   - continue home tamsulosin 0.4mg qHS.    H/O insomnia.   - continue home trazodone 100mg qHS  - continue home diazepam 5mg qHS    Asthma.   - continue home montelukast 10mg daily.     Prophylactic measure.   - DVT ppx: heparin subq  - GI ppx: protonix  - Diet: regular, tolerating

## 2023-05-12 NOTE — PROGRESS NOTE ADULT - SUBJECTIVE AND OBJECTIVE BOX
[facility] NEPHROLOGY PRACTICE   MD HALIE SHEEHAN MD BRIANA PETITO, NP    TEL:  FROM 9 AM to 5 PM ---OFFICE: [phone]  FROM 5 PM - 9 AM PLEASE CALL ANSWERING SERVICE: [phone]     RENAL PROGRESS NOTE: DATE OF SERVICE 05-12-23 @ 13:39  Patient is a 67y old  Male who presents with a chief complaint of weakness.   Patient seen and examined at bedside. No chest pain/sob    VITALS:  T(F): 97.6 (05-12-23 @ 11:30), Max: 98 (05-11-23 @ 21:26)  HR: 61 (05-12-23 @ 11:30)  BP: 97/61 (05-12-23 @ 11:30)  RR: 18 (05-12-23 @ 11:30)  SpO2: 99% (05-12-23 @ 11:30)  Wt(kg): --      PHYSICAL EXAM:  Constitutional: NAD  Neck: No JVD  Respiratory: CTAB, no wheezes, rales or rhonchi  Cardiovascular: S1, S2, RRR  Gastrointestinal: BS+, soft, NT/ND  Extremities: No peripheral edema    Hospital Medications:   MEDICATIONS  (STANDING):  atorvastatin 20 milliGRAM(s) Oral at bedtime  baclofen 5 milliGRAM(s) Oral every 8 hours  Biotene Dry Mouth Oral Rinse 5 milliLiter(s) Swish and Spit two times a day  chlorhexidine 2% Cloths 1 Application(s) Topical <User Schedule>  diazepam    Tablet 5 milliGRAM(s) Oral at bedtime  diphenoxylate/atropine 1 Tablet(s) Oral four times a day  heparin   Injectable 5000 Unit(s) SubCutaneous every 12 hours  montelukast 10 milliGRAM(s) Oral daily  pantoprazole  Injectable 40 milliGRAM(s) IV Push daily  sodium chloride 0.45% 1000 milliLiter(s) (75 mL/Hr) IV Continuous <Continuous>  sodium chloride 0.9% 1000 milliLiter(s) (75 mL/Hr) IV Continuous <Continuous>  sodium chloride 0.9%. 1000 milliLiter(s) (75 mL/Hr) IV Continuous <Continuous>  tamsulosin 0.4 milliGRAM(s) Oral at bedtime  traZODone 100 milliGRAM(s) Oral at bedtime      LABS:  05-12    133<L>  |  97  |  26<H>  ----------------------------<  87  3.8   |  22  |  1.56<H>    Ca    8.2<L>      12 May 2023 07:11      Creatinine Trend: 1.56 <--, 2.03 <--, 2.37 <--, 2.83 <--, 2.72 <--            Urine Studies:  Urinalysis - [05-09-23 @ 17:18]      Color Yellow / Appearance Clear / SG 1.016 / pH 5.5      Gluc Negative / Ketone Negative  / Bili Negative / Urobili Negative       Blood Negative / Protein Trace / Leuk Est Negative / Nitrite Negative      RBC  / WBC  / Hyaline  / Gran  / Sq Epi  / Non Sq Epi  / Bacteria     Urine Creatinine 148      [05-09-23 @ 17:18]  Urine Sodium 9      [05-09-23 @ 17:18]  Urine Osmolality 425      [05-09-23 @ 17:18]    Iron 55, TIBC 319, %sat 17      [05-11-23 @ 07:17]  Ferritin 746      [05-11-23 @ 07:17]  HbA1c 5.7      [05-11-19 @ 11:43]        RADIOLOGY & ADDITIONAL STUDIES:

## 2023-05-12 NOTE — PROGRESS NOTE ADULT - NS ATTEND OPT1 GEN_ALL_CORE
I attest my time as attending is greater than 50% of the total combined time spent on qualifying patient care activities by the PA/NP and attending.
I independently performed the documented:
I independently performed the documented:

## 2023-05-13 ENCOUNTER — TRANSCRIPTION ENCOUNTER (OUTPATIENT)
Age: 68
End: 2023-05-13

## 2023-05-13 VITALS
TEMPERATURE: 98 F | DIASTOLIC BLOOD PRESSURE: 86 MMHG | SYSTOLIC BLOOD PRESSURE: 123 MMHG | HEART RATE: 60 BPM | OXYGEN SATURATION: 99 % | RESPIRATION RATE: 18 BRPM

## 2023-05-13 LAB
ANION GAP SERPL CALC-SCNC: 14 MMOL/L — SIGNIFICANT CHANGE UP (ref 5–17)
BUN SERPL-MCNC: 17 MG/DL — SIGNIFICANT CHANGE UP (ref 7–23)
CALCIUM SERPL-MCNC: 8.6 MG/DL — SIGNIFICANT CHANGE UP (ref 8.4–10.5)
CHLORIDE SERPL-SCNC: 99 MMOL/L — SIGNIFICANT CHANGE UP (ref 96–108)
CO2 SERPL-SCNC: 20 MMOL/L — LOW (ref 22–31)
CREAT SERPL-MCNC: 1.34 MG/DL — HIGH (ref 0.5–1.3)
EGFR: 58 ML/MIN/1.73M2 — LOW
GLUCOSE SERPL-MCNC: 94 MG/DL — SIGNIFICANT CHANGE UP (ref 70–99)
HCT VFR BLD CALC: 35.4 % — LOW (ref 39–50)
HGB BLD-MCNC: 12.1 G/DL — LOW (ref 13–17)
MCHC RBC-ENTMCNC: 32.8 PG — SIGNIFICANT CHANGE UP (ref 27–34)
MCHC RBC-ENTMCNC: 34.2 GM/DL — SIGNIFICANT CHANGE UP (ref 32–36)
MCV RBC AUTO: 95.9 FL — SIGNIFICANT CHANGE UP (ref 80–100)
NRBC # BLD: 0 /100 WBCS — SIGNIFICANT CHANGE UP (ref 0–0)
PLATELET # BLD AUTO: 112 K/UL — LOW (ref 150–400)
POTASSIUM SERPL-MCNC: 3.9 MMOL/L — SIGNIFICANT CHANGE UP (ref 3.5–5.3)
POTASSIUM SERPL-SCNC: 3.9 MMOL/L — SIGNIFICANT CHANGE UP (ref 3.5–5.3)
RBC # BLD: 3.69 M/UL — LOW (ref 4.2–5.8)
RBC # FLD: 17.3 % — HIGH (ref 10.3–14.5)
SODIUM SERPL-SCNC: 133 MMOL/L — LOW (ref 135–145)
WBC # BLD: 6.5 K/UL — SIGNIFICANT CHANGE UP (ref 3.8–10.5)
WBC # FLD AUTO: 6.5 K/UL — SIGNIFICANT CHANGE UP (ref 3.8–10.5)

## 2023-05-13 PROCEDURE — 82947 ASSAY GLUCOSE BLOOD QUANT: CPT

## 2023-05-13 PROCEDURE — 81003 URINALYSIS AUTO W/O SCOPE: CPT

## 2023-05-13 PROCEDURE — 80053 COMPREHEN METABOLIC PANEL: CPT

## 2023-05-13 PROCEDURE — 82570 ASSAY OF URINE CREATININE: CPT

## 2023-05-13 PROCEDURE — 80048 BASIC METABOLIC PNL TOTAL CA: CPT

## 2023-05-13 PROCEDURE — 87045 FECES CULTURE AEROBIC BACT: CPT

## 2023-05-13 PROCEDURE — 85027 COMPLETE CBC AUTOMATED: CPT

## 2023-05-13 PROCEDURE — 99285 EMERGENCY DEPT VISIT HI MDM: CPT | Mod: 25

## 2023-05-13 PROCEDURE — 96375 TX/PRO/DX INJ NEW DRUG ADDON: CPT

## 2023-05-13 PROCEDURE — 83540 ASSAY OF IRON: CPT

## 2023-05-13 PROCEDURE — 82746 ASSAY OF FOLIC ACID SERUM: CPT

## 2023-05-13 PROCEDURE — 82565 ASSAY OF CREATININE: CPT

## 2023-05-13 PROCEDURE — 84132 ASSAY OF SERUM POTASSIUM: CPT

## 2023-05-13 PROCEDURE — 87507 IADNA-DNA/RNA PROBE TQ 12-25: CPT

## 2023-05-13 PROCEDURE — 82435 ASSAY OF BLOOD CHLORIDE: CPT

## 2023-05-13 PROCEDURE — 82803 BLOOD GASES ANY COMBINATION: CPT

## 2023-05-13 PROCEDURE — 81001 URINALYSIS AUTO W/SCOPE: CPT

## 2023-05-13 PROCEDURE — 87449 NOS EACH ORGANISM AG IA: CPT

## 2023-05-13 PROCEDURE — 83550 IRON BINDING TEST: CPT

## 2023-05-13 PROCEDURE — 96374 THER/PROPH/DIAG INJ IV PUSH: CPT

## 2023-05-13 PROCEDURE — 82728 ASSAY OF FERRITIN: CPT

## 2023-05-13 PROCEDURE — 84100 ASSAY OF PHOSPHORUS: CPT

## 2023-05-13 PROCEDURE — 83735 ASSAY OF MAGNESIUM: CPT

## 2023-05-13 PROCEDURE — 85018 HEMOGLOBIN: CPT

## 2023-05-13 PROCEDURE — 83605 ASSAY OF LACTIC ACID: CPT

## 2023-05-13 PROCEDURE — 87641 MR-STAPH DNA AMP PROBE: CPT

## 2023-05-13 PROCEDURE — 87640 STAPH A DNA AMP PROBE: CPT

## 2023-05-13 PROCEDURE — 87046 STOOL CULTR AEROBIC BACT EA: CPT

## 2023-05-13 PROCEDURE — 84300 ASSAY OF URINE SODIUM: CPT

## 2023-05-13 PROCEDURE — 83935 ASSAY OF URINE OSMOLALITY: CPT

## 2023-05-13 PROCEDURE — 36415 COLL VENOUS BLD VENIPUNCTURE: CPT

## 2023-05-13 PROCEDURE — 84295 ASSAY OF SERUM SODIUM: CPT

## 2023-05-13 PROCEDURE — 82330 ASSAY OF CALCIUM: CPT

## 2023-05-13 PROCEDURE — 87324 CLOSTRIDIUM AG IA: CPT

## 2023-05-13 PROCEDURE — 71045 X-RAY EXAM CHEST 1 VIEW: CPT

## 2023-05-13 PROCEDURE — 85025 COMPLETE CBC W/AUTO DIFF WBC: CPT

## 2023-05-13 PROCEDURE — 82607 VITAMIN B-12: CPT

## 2023-05-13 PROCEDURE — 87086 URINE CULTURE/COLONY COUNT: CPT

## 2023-05-13 PROCEDURE — 85014 HEMATOCRIT: CPT

## 2023-05-13 PROCEDURE — 74176 CT ABD & PELVIS W/O CONTRAST: CPT | Mod: MA

## 2023-05-13 RX ORDER — PANTOPRAZOLE SODIUM 20 MG/1
1 TABLET, DELAYED RELEASE ORAL
Qty: 30 | Refills: 0
Start: 2023-05-13 | End: 2023-06-11

## 2023-05-13 RX ORDER — DIPHENOXYLATE HCL/ATROPINE 2.5-.025MG
1 TABLET ORAL
Qty: 20 | Refills: 0
Start: 2023-05-13 | End: 2023-05-17

## 2023-05-13 RX ORDER — CAPECITABINE 500 MG/1
3 TABLET ORAL
Refills: 0 | DISCHARGE

## 2023-05-13 RX ORDER — PROCHLORPERAZINE MALEATE 5 MG
1 TABLET ORAL
Refills: 0 | DISCHARGE

## 2023-05-13 RX ORDER — BACLOFEN 100 %
1 POWDER (GRAM) MISCELLANEOUS
Qty: 45 | Refills: 0
Start: 2023-05-13 | End: 2023-05-27

## 2023-05-13 RX ORDER — SALIVA SUBSTITUTE COMB NO.11 351 MG
5 POWDER IN PACKET (EA) MUCOUS MEMBRANE
Qty: 10 | Refills: 0
Start: 2023-05-13 | End: 2023-05-22

## 2023-05-13 RX ADMIN — MONTELUKAST 10 MILLIGRAM(S): 4 TABLET, CHEWABLE ORAL at 13:49

## 2023-05-13 RX ADMIN — Medication 5 MILLIGRAM(S): at 05:18

## 2023-05-13 RX ADMIN — Medication 5 MILLILITER(S): at 05:18

## 2023-05-13 RX ADMIN — CHLORHEXIDINE GLUCONATE 1 APPLICATION(S): 213 SOLUTION TOPICAL at 05:19

## 2023-05-13 RX ADMIN — SODIUM CHLORIDE 75 MILLILITER(S): 9 INJECTION INTRAMUSCULAR; INTRAVENOUS; SUBCUTANEOUS at 00:04

## 2023-05-13 RX ADMIN — Medication 1 TABLET(S): at 13:48

## 2023-05-13 RX ADMIN — Medication 1 TABLET(S): at 05:18

## 2023-05-13 RX ADMIN — Medication 300 UNIT(S): at 15:00

## 2023-05-13 RX ADMIN — PANTOPRAZOLE SODIUM 40 MILLIGRAM(S): 20 TABLET, DELAYED RELEASE ORAL at 13:48

## 2023-05-13 RX ADMIN — Medication 5 MILLIGRAM(S): at 13:49

## 2023-05-13 NOTE — DISCHARGE NOTE PROVIDER - CARE PROVIDERS DIRECT ADDRESSES
[email or web address].[email or web address]

## 2023-05-13 NOTE — DISCHARGE NOTE PROVIDER - CARE PROVIDER_API CALL
Dennis Plata)  Internal Medicine  [address] [place] [address]  Phone: [phone]  Fax: [phone]  Follow Up Time: 2 weeks    Garcia Aviles)  Internal Medicine  [address] [place] [address]  Phone: [phone]  Fax: [phone]  Follow Up Time: 1 week    Patricia Patel (DO)  Hematology; Medical Oncology  [address] [place] [address]  Phone: [phone]  Fax: [phone]  Follow Up Time: 2 weeks

## 2023-05-13 NOTE — DISCHARGE NOTE PROVIDER - PROVIDER TOKENS
PROVIDER:[TOKEN:[[id number]],FOLLOWUP:[2 weeks]],PROVIDER:[TOKEN:[[id number]],FOLLOWUP:[1 week]],PROVIDER:[TOKEN:[[id number]],FOLLOWUP:[2 weeks]]

## 2023-05-13 NOTE — DISCHARGE NOTE NURSING/CASE MANAGEMENT/SOCIAL WORK - PATIENT PORTAL LINK FT
You can access the FollowMyHealth Patient Portal offered by [facility] by registering at the following website: [email or web address]. By joining [employer]’s FollowMyHealth portal, you will also be able to view your health information using other applications (apps) compatible with our system.

## 2023-05-13 NOTE — DISCHARGE NOTE PROVIDER - NSDCCPCAREPLAN_GEN_ALL_CORE_FT
PRINCIPAL DISCHARGE DIAGNOSIS  Diagnosis: Acute renal injury  Assessment and Plan of Treatment: Cy likely related to dehyration , now s/p IVF      SECONDARY DISCHARGE DIAGNOSES  Diagnosis: Rectal adenocarcinoma  Assessment and Plan of Treatment: Follow up with oncologist

## 2023-05-13 NOTE — DISCHARGE NOTE PROVIDER - HOSPITAL COURSE
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      WM (acute kidney injury).   - Baseline creatinine Feb - Mar 2023 was 1.0 - 1.3   - S/P 1L of NS in the ED; Was on Bicarb drip   - Likely 2/2 diarrheal losses   - C/w maintenance fluid; Switched to NS with KCL; IVF as per renal   - Monitor and trend Cr; Avoid nephrotoxic agents  - Cr down-trending; PO hydration encouraged   - Renal eval appreciated; F/u recs     Acute diarrhea  - Suspect secondary to chemotherapy agents, will need to rule out infectious cause --> C. Diff and GI PCR -- neg   - CT A/P Neg   - Infectious work up negative; Loperamide as per GI -- Reports improvement in Diarrhea   - IVF for hydration; Monitor and replete electrolytes PRN   - GI eval appreciated; F/u recs     Hyponatremia.   - Hyponatremia, asymptomatic.  - Likely hypotonic hypovolemic, from diarrhea  - S/P 1L of fluids, S/P 1/2 NS w/ bicarb as per renal   - Cont to monitor and trend Na level closely; Avoid overcorrection > 6-8mEq in 24 hours   - Na of 133 this AM, continue to monitor and trend  - Renal eval appreciated; F/u recs     Hiccups.   - S/P vagal maneuver in ER without relief; S/P Reglan   - C/w Protonix 40mg daily  - C/w Baclofen as per GI -- Reports improvement   - GI follow up appreciated; F/u recs     Anemia  - Mild down-trend in Hgb. No gross bleeding reported  - Continue to monitor and trend. Iron, B12, Folate, Ferritin, TIBC --> Noted; Not consistent with deficiency   - Transfuse for Hgb < 7.0     HypoK  - Monitor and replete electrolytes PRN     Rectal adenocarcinoma.   - follows at [facility]  - oncology is onboard, no plan for inpatient systemic treatment.  - F/u Heme/Onc recs     History of BPH.   - continue home tamsulosin 0.4mg qHS.    H/O insomnia.   - continue home trazodone 100mg qHS  - continue home diazepam 5mg qHS    Asthma.   - continue home montelukast 10mg daily.

## 2023-05-13 NOTE — PROGRESS NOTE ADULT - SUBJECTIVE AND OBJECTIVE BOX
CHIEF COMPLAINT  WM    HISTORY OF PRESENT ILLNESS  MONSE RAZO is a 67y Male who presents with a chief complaint of WM    No acute events. No complaints.    REVIEW OF SYSTEMS  A complete review of systems was performed; negative except per HPI    PHYSICAL EXAM  T(C): 36.2 (05-13-23 @ 05:13), Max: 36.7 (05-12-23 @ 21:24)  HR: 76 (05-13-23 @ 05:13) (68 - 76)  BP: 107/69 (05-13-23 @ 05:13) (106/66 - 107/69)  RR: 18 (05-13-23 @ 05:13) (18 - 18)  SpO2: 99% (05-13-23 @ 05:13) (99% - 99%)  Constitutional: alert, awake, in no acute distress  Eyes: PERRL, EOMI  HEENT: normocephalic, atraumatic  Neck: supple, non-tender  Cardiovascular: normal perfusion, no peripheral edema  Respiratory: normal respiratory efforts; no increased use of accessory muscles  Gastrointestinal: soft, non-tender  Musculoskeletal: normal range of motion, no deformities noted  Neurological: alert, CN II to XI grossly intact  Skin: warm, dry    LABORATORY DATA                        12.1   6.50  )-----------( 112      ( 13 May 2023 07:10 )             35.4     05-13    133<L>  |  99  |  17  ----------------------------<  94  3.9   |  20<L>  |  1.34<H>    Ca    8.6      13 May 2023 07:05

## 2023-05-13 NOTE — DISCHARGE NOTE PROVIDER - NSDCMRMEDTOKEN_GEN_ALL_CORE_FT
atorvastatin 20 mg oral tablet: 1 tab(s) orally once a day  capecitabine 500 mg oral tablet: 3 tab(s) orally 2 times a day  diazePAM 5 mg oral tablet: 1 orally once a day  loperamide 2 mg oral capsule: 2 cap(s) orally 2 times a day MDD:4  predniSONE 5 mg oral tablet: 1 tab(s) orally every other day MDD:1  prochlorperazine 10 mg oral tablet: 1 orally every 6 hours as needed for  nausea  rolling walker: rolling walker to be used daily to assist ambulation due to muscle weakness  Singulair 10 mg oral tablet: 1 orally once a day  tamsulosin 0.4 mg oral capsule: 1 orally once a day  traZODone 100 mg oral tablet: orally once a day (at bedtime)   atorvastatin 20 mg oral tablet: 1 tab(s) orally once a day  baclofen 5 mg oral tablet: 1 tab(s) orally every 8 hours  diazePAM 5 mg oral tablet: 1 orally once a day  diphenoxylate-atropine 2.5 mg-0.025 mg oral tablet: 1 tab(s) orally 4 times a day MDD: 4 tabs daily  Protonix 40 mg oral delayed release tablet: 1 tab(s) orally  rolling walker: rolling walker to be used daily to assist ambulation due to muscle weakness  saliva substitutes oral solution: 5 orally 3 times a day  Singulair 10 mg oral tablet: 1 orally once a day  tamsulosin 0.4 mg oral capsule: 1 orally once a day  traZODone 100 mg oral tablet: orally once a day (at bedtime)   atorvastatin 20 mg oral tablet: 1 tab(s) orally once a day  baclofen 5 mg oral tablet: 1 tab(s) orally every 8 hours  diazePAM 5 mg oral tablet: 1 orally once a day  diphenoxylate-atropine 2.5 mg-0.025 mg oral tablet: 1 tab(s) orally 4 times a day MDD: 4 tabs daily  Protonix 40 mg oral delayed release tablet: 1 tab(s) orally once a day  rolling walker: rolling walker to be used daily to assist ambulation due to muscle weakness  saliva substitutes oral solution: 5 orally 3 times a day  Singulair 10 mg oral tablet: 1 orally once a day  tamsulosin 0.4 mg oral capsule: 1 orally once a day  traZODone 100 mg oral tablet: orally once a day (at bedtime)

## 2023-05-13 NOTE — PROGRESS NOTE ADULT - PROVIDER SPECIALTY LIST ADULT
Gastroenterology
Gastroenterology
Heme/Onc
Internal Medicine
Nephrology
Nephrology
Gastroenterology
Heme/Onc
Internal Medicine
Nephrology

## 2023-05-13 NOTE — PROGRESS NOTE ADULT - ASSESSMENT
MONSE RAZO is a 67y Male who presents with a chief complaint of ARF    Rectal Cancer  ·	Patient follows with Dr. Zoe Goldberg, [facility].  ·	He completed adjuvant concurrent chemoradiation, is currently on capecitabine (LD about 2 weeks ago) + oxaliplatin, last dose April 17th.  ·	No systemic therapy while inpatient or during rehabilitation.    Acute Renal Failure  ·	Baseline creatinine at 1.0-1.3.  ·	Creatinine improving, 1.34 today  ·	In the setting of diarrhea, poor oral intake.  ·	On IVF. Nephrology following.    Diarrhea  ·	Possibly in the setting of chemotherapy. Infectious workup negative. No enteritis seen on CT.  ·	Reports continued improvement in diarrhea w/ lomotil  ·	Gastroenterology following.    Will continue to follow.    Shaun Berrios MD  Hematology/Oncology  O: [phone]

## 2023-05-13 NOTE — DISCHARGE NOTE PROVIDER - NSDCFUADDAPPT_GEN_ALL_CORE_FT
APPTS ARE READY TO BE MADE: [ ] YES    Best Family or Patient Contact (if needed):    Additional Information about above appointments (if needed):    1: Oncologist   2: medicine   3:     Other comments or requests:

## 2023-05-16 ENCOUNTER — NON-APPOINTMENT (OUTPATIENT)
Age: 68
End: 2023-05-16

## 2023-06-01 ENCOUNTER — RX RENEWAL (OUTPATIENT)
Age: 68
End: 2023-06-01

## 2023-06-21 ENCOUNTER — APPOINTMENT (OUTPATIENT)
Dept: INTERNAL MEDICINE | Facility: CLINIC | Age: 68
End: 2023-06-21
Payer: MEDICARE

## 2023-06-21 VITALS
SYSTOLIC BLOOD PRESSURE: 118 MMHG | HEART RATE: 83 BPM | DIASTOLIC BLOOD PRESSURE: 72 MMHG | BODY MASS INDEX: 27.83 KG/M2 | OXYGEN SATURATION: 98 % | WEIGHT: 163 LBS | HEIGHT: 64 IN | TEMPERATURE: 98.3 F | RESPIRATION RATE: 14 BRPM

## 2023-06-21 DIAGNOSIS — C20 MALIGNANT NEOPLASM OF RECTUM: ICD-10-CM

## 2023-06-21 PROBLEM — Z87.438 PERSONAL HISTORY OF OTHER DISEASES OF MALE GENITAL ORGANS: Chronic | Status: ACTIVE | Noted: 2023-05-08

## 2023-06-21 PROCEDURE — 99214 OFFICE O/P EST MOD 30 MIN: CPT

## 2023-06-21 NOTE — HISTORY OF PRESENT ILLNESS
[FreeTextEntry1] : follow up\par   [de-identified] : MONSE RAZO is a 67 year old M who presents today for a follow-up for the renewal of his medication for HLD. Pt has a hx of HLD, anxiety, and rectal cancer. Pt was in the hospital last month for WM. Patient has no new complaints. Pt is scheduled for surgery on 06/23/2023.

## 2023-06-21 NOTE — HEALTH RISK ASSESSMENT
[Never (0 pts)] : Never (0 points) [No] : In the past 12 months have you used drugs other than those required for medical reasons? No [No falls in past year] : Patient reported no falls in the past year [0] : 2) Feeling down, depressed, or hopeless: Not at all (0) [PHQ-2 Negative - No further assessment needed] : PHQ-2 Negative - No further assessment needed [Never] : Never [[id number]] : 0 English

## 2023-06-21 NOTE — END OF VISIT
[FreeTextEntry3] : "I, Zuly Camacho, personally scribed the services dictated to me by Dr. Dennis Plata MD in this documentation on 06/21/2023 " \par \par "I Dr. Dennis Plata MD, personally performed the services described in this documentation on 06/21/2023 for the patient as scribed by Zuly Camacho in my presence. I have reviewed and verified that all the information is accurate and true."

## 2023-06-30 ENCOUNTER — NON-APPOINTMENT (OUTPATIENT)
Age: 68
End: 2023-06-30

## 2023-07-30 ENCOUNTER — RX RENEWAL (OUTPATIENT)
Age: 68
End: 2023-07-30

## 2023-08-05 ENCOUNTER — NON-APPOINTMENT (OUTPATIENT)
Age: 68
End: 2023-08-05

## 2023-08-17 ENCOUNTER — APPOINTMENT (OUTPATIENT)
Dept: NEPHROLOGY | Facility: CLINIC | Age: 68
End: 2023-08-17

## 2023-10-16 ENCOUNTER — APPOINTMENT (OUTPATIENT)
Dept: ORTHOPEDIC SURGERY | Facility: CLINIC | Age: 68
End: 2023-10-16
Payer: MEDICARE

## 2023-10-16 VITALS — WEIGHT: 161 LBS | BODY MASS INDEX: 25.88 KG/M2 | HEIGHT: 66 IN

## 2023-10-16 DIAGNOSIS — E78.00 PURE HYPERCHOLESTEROLEMIA, UNSPECIFIED: ICD-10-CM

## 2023-10-16 DIAGNOSIS — M75.41 IMPINGEMENT SYNDROME OF RIGHT SHOULDER: ICD-10-CM

## 2023-10-16 DIAGNOSIS — C19 MALIGNANT NEOPLASM OF RECTOSIGMOID JUNCTION: ICD-10-CM

## 2023-10-16 DIAGNOSIS — M70.70 OTHER BURSITIS OF HIP, UNSPECIFIED HIP: ICD-10-CM

## 2023-10-16 DIAGNOSIS — C80.1 MALIGNANT (PRIMARY) NEOPLASM, UNSPECIFIED: ICD-10-CM

## 2023-10-16 PROCEDURE — 99203 OFFICE O/P NEW LOW 30 MIN: CPT

## 2023-10-16 PROCEDURE — 73030 X-RAY EXAM OF SHOULDER: CPT | Mod: RT

## 2023-10-25 ENCOUNTER — NON-APPOINTMENT (OUTPATIENT)
Age: 68
End: 2023-10-25

## 2023-11-09 ENCOUNTER — RX RENEWAL (OUTPATIENT)
Age: 68
End: 2023-11-09

## 2023-11-29 DIAGNOSIS — R05.9 COUGH, UNSPECIFIED: ICD-10-CM

## 2023-11-29 RX ORDER — BENZONATATE 100 MG/1
100 CAPSULE ORAL 3 TIMES DAILY
Qty: 30 | Refills: 0 | Status: ACTIVE | COMMUNITY
Start: 2023-11-29 | End: 1900-01-01

## 2023-12-01 ENCOUNTER — NON-APPOINTMENT (OUTPATIENT)
Age: 68
End: 2023-12-01

## 2023-12-01 ENCOUNTER — APPOINTMENT (OUTPATIENT)
Dept: INTERNAL MEDICINE | Facility: CLINIC | Age: 68
End: 2023-12-01
Payer: MEDICARE

## 2023-12-01 VITALS
HEART RATE: 84 BPM | DIASTOLIC BLOOD PRESSURE: 78 MMHG | RESPIRATION RATE: 14 BRPM | OXYGEN SATURATION: 97 % | TEMPERATURE: 98.2 F | HEIGHT: 66 IN | WEIGHT: 167 LBS | BODY MASS INDEX: 26.84 KG/M2 | SYSTOLIC BLOOD PRESSURE: 126 MMHG

## 2023-12-01 DIAGNOSIS — R73.03 PREDIABETES.: ICD-10-CM

## 2023-12-01 DIAGNOSIS — Z00.00 ENCOUNTER FOR GENERAL ADULT MEDICAL EXAMINATION W/OUT ABNORMAL FINDINGS: ICD-10-CM

## 2023-12-01 PROCEDURE — 93000 ELECTROCARDIOGRAM COMPLETE: CPT | Mod: 59

## 2023-12-01 PROCEDURE — G0439: CPT

## 2023-12-03 LAB
25(OH)D3 SERPL-MCNC: 60.4 NG/ML
ALBUMIN SERPL ELPH-MCNC: 4.5 G/DL
ALP BLD-CCNC: 118 U/L
ALT SERPL-CCNC: 17 U/L
ANION GAP SERPL CALC-SCNC: 15 MMOL/L
APPEARANCE: ABNORMAL
AST SERPL-CCNC: 22 U/L
BACTERIA: NEGATIVE /HPF
BASOPHILS # BLD AUTO: 0.04 K/UL
BASOPHILS NFR BLD AUTO: 0.8 %
BILIRUB SERPL-MCNC: 0.3 MG/DL
BILIRUBIN URINE: NEGATIVE
BLOOD URINE: NEGATIVE
BUN SERPL-MCNC: 13 MG/DL
CALCIUM OXALATE CRYSTALS: PRESENT
CALCIUM SERPL-MCNC: 9.5 MG/DL
CAST: 0 /LPF
CHLORIDE SERPL-SCNC: 104 MMOL/L
CHOLEST SERPL-MCNC: 204 MG/DL
CK SERPL-CCNC: 65 U/L
CO2 SERPL-SCNC: 21 MMOL/L
COLOR: NORMAL
CREAT SERPL-MCNC: 0.89 MG/DL
EGFR: 93 ML/MIN/1.73M2
EOSINOPHIL # BLD AUTO: 0.18 K/UL
EOSINOPHIL NFR BLD AUTO: 3.6 %
EPITHELIAL CELLS: 1 /HPF
ESTIMATED AVERAGE GLUCOSE: 108 MG/DL
GLUCOSE QUALITATIVE U: NEGATIVE MG/DL
GLUCOSE SERPL-MCNC: 99 MG/DL
HBA1C MFR BLD HPLC: 5.4 %
HCT VFR BLD CALC: 46.6 %
HDLC SERPL-MCNC: 67 MG/DL
HGB BLD-MCNC: 14.6 G/DL
IMM GRANULOCYTES NFR BLD AUTO: 0 %
KETONES URINE: ABNORMAL MG/DL
LDLC SERPL CALC-MCNC: 116 MG/DL
LEUKOCYTE ESTERASE URINE: NEGATIVE
LYMPHOCYTES # BLD AUTO: 1.53 K/UL
LYMPHOCYTES NFR BLD AUTO: 30.6 %
MAN DIFF?: NORMAL
MCHC RBC-ENTMCNC: 28.5 PG
MCHC RBC-ENTMCNC: 31.3 GM/DL
MCV RBC AUTO: 90.8 FL
MICROSCOPIC-UA: NORMAL
MONOCYTES # BLD AUTO: 0.65 K/UL
MONOCYTES NFR BLD AUTO: 13 %
NEUTROPHILS # BLD AUTO: 2.6 K/UL
NEUTROPHILS NFR BLD AUTO: 52 %
NITRITE URINE: NEGATIVE
NONHDLC SERPL-MCNC: 137 MG/DL
PH URINE: 5.5
PLATELET # BLD AUTO: 199 K/UL
POTASSIUM SERPL-SCNC: 3.9 MMOL/L
PROT SERPL-MCNC: 7.2 G/DL
PROTEIN URINE: 30 MG/DL
PSA SERPL-MCNC: 3.12 NG/ML
RBC # BLD: 5.13 M/UL
RBC # FLD: 16 %
RED BLOOD CELLS URINE: 2 /HPF
REVIEW: NORMAL
SODIUM SERPL-SCNC: 141 MMOL/L
SPECIFIC GRAVITY URINE: 1.03
TRIGL SERPL-MCNC: 115 MG/DL
TSH SERPL-ACNC: 1.38 UIU/ML
UROBILINOGEN URINE: 1 MG/DL
WBC # FLD AUTO: 5 K/UL
WHITE BLOOD CELLS URINE: 0 /HPF

## 2023-12-13 NOTE — CHART NOTE - NSCHARTNOTESELECT_GEN_ALL_CORE
74-year-old man with a history of hypertension hyperlipidemia diabetes EtOH abuse dementia admitted 12/11/23 with chills and malaise. He recently came from  12/1/23 and was hospitalized at [facility] 12/1 --> 12/8/23 with MICHAELA, transaminitis, and thrombocytopenia. 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  12/13  POSITIVE RMSF IgM  (neg IgG)    Rickettsia rickettsia, the agent of RMSF has been found in the [place], The serology may cross react with other rickettsial illnesses.   gini RA, Hero S, Breonna P, Erik DO, Glover-Mercado AA, Aguilar-Ferrari B, Ignacia DIANEC, Klein Vega I, Hermelinda A, Tim A, Re S, Marco-Tevin A. Ticks and Tick-Borne Diseases in [place] and the [place]: A One Health Perspective. Pathogens. 2021 Oct 2;10(10):1273. doi: 10.3390/[id number]. PMID: 83105650; PMCID: [id number].)      Suggest  Continue Doxycycline 12/11 --> 12/12; 12/13 -->    14 day course anticipated,   can give Doxy po  trend CBC, CMP    Hopefully convalescent RMSF serologies can be obtained in about a month   74-year-old man with a history of hypertension hyperlipidemia diabetes EtOH abuse dementia admitted 12/11/23 with chills and malaise. He recently came from  12/1/23 and was [facility] 12/1 --> 12/8/23 with MICHAELA, transaminitis, and thrombocytopenia. 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  12/13  POSITIVE RMSF IgM  (neg IgG)    Rickettsia rickettsia, the agent of RMSF has been found in the [place], The serology may cross react with other rickettsial illnesses.   gini RA, eHro S, Breonna P, Erik DO, Glover-Mercado AA, Aguilar-Ferrari B, Ignacia DIANEC, Klein Vega I, Hermelinda A, Tim A, Re S, Marco-Tevin A. Ticks and Tick-Borne Diseases in [place] and the [place]: A One Health Perspective. Pathogens. 2021 Oct 2;10(10):1273. doi: 10.3390/[id number]. PMID: 37046736; PMCID: [id number].)      Suggest  Continue Doxycycline 12/11 --> 12/12; 12/13 -->    14 day course anticipated,   can give Doxy po  trend CBC, CMP    Hopefully convalescent RMSF serologies can be obtained in about a month   Event Note

## 2023-12-26 ENCOUNTER — RX RENEWAL (OUTPATIENT)
Age: 68
End: 2023-12-26

## 2024-01-18 NOTE — PROGRESS NOTE ADULT - PROVIDER SPECIALTY LIST ADULT
Hospitalist No protocol for requested medication.    Medication:fluticasone-vilanterol (Breo Ellipta) 100-25 MCG/ACT inhaler   Last office visit date: 11/27/23  Pharmacy: [id number] - [place] - [address]    Order pended, routed to clinician for review.

## 2024-01-18 NOTE — CHART NOTE - NSCHARTNOTESELECT_GEN_ALL_CORE
Pt here for Closed wedge Compression fx of T7, HTN otherwise VSS on 1L O2 NC, A/OX4 with intermittent confusion, wheezes noted upon auscultation, Scattered bruises, takes pills whole with thin liquids, PIV SL, poor oral intake, Up 2 dot steady, not OOB this shift,  Discharge awaiting to TCU.   Nutrition Services

## 2024-02-19 ENCOUNTER — RX RENEWAL (OUTPATIENT)
Age: 69
End: 2024-02-19

## 2024-03-08 ENCOUNTER — RX RENEWAL (OUTPATIENT)
Age: 69
End: 2024-03-08

## 2024-03-15 NOTE — ASSESSMENT
[FreeTextEntry1] : Benign Prostatic Hyperplasia:\par No bothersome lower urinary tract symptoms and minimal post void residual. \par \par Elevated PSA:\par Again discussed Prostate biopsy. Will like to continue PSA monitoring for now. \par Total and Free PSA in 3 months. Will inform results. \par \par \par Return to office in 6 months or sooner if any issues.  [Initial Evaluation] : an initial evaluation

## 2024-03-28 NOTE — PROGRESS NOTE ADULT - SUBJECTIVE AND OBJECTIVE BOX
No care due was identified.  Health [facility] Embedded Care Due Messages. Reference number: [id number].   3/28/2024 2:07:24 PM CDT   INTERVAL HPI/OVERNIGHT EVENTS:  pt seen and examined  no new events     MEDICATIONS  (STANDING):  buprenorphine 2 mG/naloxone 0.5 mG SL  Tablet 2 Tablet(s) SubLingual daily  chlorhexidine 4% Liquid 1 Application(s) Topical <User Schedule>  dextrose 5%. 1000 milliLiter(s) (50 mL/Hr) IV Continuous <Continuous>  dextrose 50% Injectable 12.5 Gram(s) IV Push once  dextrose 50% Injectable 25 Gram(s) IV Push once  dextrose 50% Injectable 25 Gram(s) IV Push once  enoxaparin Injectable 40 milliGRAM(s) SubCutaneous daily  insulin lispro (HumaLOG) corrective regimen sliding scale   SubCutaneous every 6 hours  mesalamine DR Capsule 1200 milliGRAM(s) Oral four times a day  pantoprazole    Tablet 40 milliGRAM(s) Oral two times a day  Parenteral Nutrition - Adult 1 Each (52 mL/Hr) TPN Continuous <Continuous>  potassium chloride    Tablet ER 40 milliEquivalent(s) Oral once  predniSONE   Tablet 40 milliGRAM(s) Oral daily  sucralfate suspension 1 Gram(s) Oral two times a day  traZODone 50 milliGRAM(s) Oral at bedtime    MEDICATIONS  (PRN):  acetaminophen   Tablet .. 650 milliGRAM(s) Oral every 6 hours PRN Temp greater or equal to 38C (100.4F)  clonazePAM  Tablet 0.5 milliGRAM(s) Oral three times a day PRN anxiety  dextrose 40% Gel 15 Gram(s) Oral once PRN Blood Glucose LESS THAN 70 milliGRAM(s)/deciliter  glucagon  Injectable 1 milliGRAM(s) IntraMuscular once PRN Glucose LESS THAN 70 milligrams/deciliter      Allergies    penicillin (Swelling)    Intolerances        Review of Systems:    General:  No wt loss, fevers, chills, night sweats, fatigue   Eyes:  Good vision, no reported pain  ENT:  No sore throat, pain, runny nose, dysphagia  CV:  No pain, palpitations, hypo/hypertension  Resp:  No dyspnea, cough, tachypnea, wheezing  GI:  + pain, No nausea, No vomiting, +diarrhea, No constipation, No weight loss, No fever, No pruritis, No rectal bleeding, No melena, No dysphagia  :  No pain, bleeding, incontinence, nocturia  Muscle:  No pain, weakness  Neuro:  No weakness, tingling, memory problems  Psych:  No fatigue, insomnia, mood problems, depression  Endocrine:  No polyuria, polydypsia, cold/heat intolerance  Heme:  No petechiae, ecchymosis, easy bruisability  Skin:  No rash, tattoos, scars, edema      Vital Signs Last 24 Hrs  T(C): 36.6 (07 Jun 2019 07:41), Max: 37.2 (07 Jun 2019 00:18)  T(F): 97.9 (07 Jun 2019 07:41), Max: 99 (07 Jun 2019 00:18)  HR: 86 (07 Jun 2019 07:41) (82 - 86)  BP: 113/77 (07 Jun 2019 07:41) (102/66 - 113/77)  BP(mean): --  RR: 18 (07 Jun 2019 07:41) (18 - 18)  SpO2: 99% (07 Jun 2019 07:41) (98% - 99%)    PHYSICAL EXAM:  General:  nad  HEENT:  NC/AT  Chest:  dec bs  Cardiovascular:  rrr S1, S2  Abdomen:  Soft, mild lq ttp no guarding mild dt  Extremities:  no edema  Neuro/Psych:  A&Ox3    LABS:                        9.0    5.62  )-----------( 263      ( 07 Jun 2019 06:14 )             26.7     06-07    138  |  99  |  3<L>  ----------------------------<  135<H>  3.4<L>   |  32<H>  |  0.47<L>    Ca    7.2<L>      07 Jun 2019 06:14  Phos  3.1     06-07  Mg     1.7     06-07    TPro  4.8<L>  /  Alb  1.5<L>  /  TBili  0.3  /  DBili  x   /  AST  6<L>  /  ALT  8<L>  /  AlkPhos  68  06-07          RADIOLOGY & ADDITIONAL TESTS: no

## 2024-05-16 ENCOUNTER — RX RENEWAL (OUTPATIENT)
Age: 69
End: 2024-05-16

## 2024-05-16 RX ORDER — ERGOCALCIFEROL 1.25 MG/1
1.25 MG CAPSULE, LIQUID FILLED ORAL
Qty: 13 | Refills: 0 | Status: ACTIVE | COMMUNITY
Start: 2022-05-20 | End: 1900-01-01

## 2024-06-03 ENCOUNTER — LABORATORY RESULT (OUTPATIENT)
Age: 69
End: 2024-06-03

## 2024-06-03 ENCOUNTER — APPOINTMENT (OUTPATIENT)
Dept: INTERNAL MEDICINE | Facility: CLINIC | Age: 69
End: 2024-06-03
Payer: MEDICARE

## 2024-06-03 VITALS
SYSTOLIC BLOOD PRESSURE: 124 MMHG | DIASTOLIC BLOOD PRESSURE: 76 MMHG | HEIGHT: 66 IN | WEIGHT: 182 LBS | OXYGEN SATURATION: 97 % | RESPIRATION RATE: 16 BRPM | BODY MASS INDEX: 29.25 KG/M2 | TEMPERATURE: 97.9 F | HEART RATE: 72 BPM

## 2024-06-03 DIAGNOSIS — F41.9 ANXIETY DISORDER, UNSPECIFIED: ICD-10-CM

## 2024-06-03 DIAGNOSIS — E78.00 PURE HYPERCHOLESTEROLEMIA, UNSPECIFIED: ICD-10-CM

## 2024-06-03 PROCEDURE — G2211 COMPLEX E/M VISIT ADD ON: CPT

## 2024-06-03 PROCEDURE — 99214 OFFICE O/P EST MOD 30 MIN: CPT

## 2024-06-03 RX ORDER — PANTOPRAZOLE 40 MG/1
40 TABLET, DELAYED RELEASE ORAL
Qty: 30 | Refills: 1 | Status: DISCONTINUED | COMMUNITY
End: 2024-06-03

## 2024-06-03 RX ORDER — TAMSULOSIN HYDROCHLORIDE 0.4 MG/1
0.4 CAPSULE ORAL TWICE DAILY
Qty: 60 | Refills: 0 | Status: DISCONTINUED | COMMUNITY
End: 2024-06-03

## 2024-06-03 NOTE — PHYSICAL EXAM
[No Acute Distress] : no acute distress [Well Nourished] : well nourished [Well Developed] : well developed [Well-Appearing] : well-appearing [Normal Voice/Communication] : normal voice/communication [Normal Sclera/Conjunctiva] : normal sclera/conjunctiva [PERRL] : pupils equal round and reactive to light [EOMI] : extraocular movements intact [Normal Outer Ear/Nose] : the outer ears and nose were normal in appearance [Normal Oropharynx] : the oropharynx was normal [Normal TMs] : both tympanic membranes were normal [No JVD] : no jugular venous distention [No Lymphadenopathy] : no lymphadenopathy [Supple] : supple [Thyroid Normal, No Nodules] : the thyroid was normal and there were no nodules present [No Respiratory Distress] : no respiratory distress  [No Accessory Muscle Use] : no accessory muscle use [Clear to Auscultation] : lungs were clear to auscultation bilaterally [Normal Rate] : normal rate  [Regular Rhythm] : with a regular rhythm [Normal S1, S2] : normal S1 and S2 [No Murmur] : no murmur heard [No Carotid Bruits] : no carotid bruits [No Abdominal Bruit] : a ~M bruit was not heard ~T in the abdomen [No Varicosities] : no varicosities [Pedal Pulses Present] : the pedal pulses are present [No Edema] : there was no peripheral edema [No Palpable Aorta] : no palpable aorta [No Extremity Clubbing/Cyanosis] : no extremity clubbing/cyanosis [Soft] : abdomen soft [Non Tender] : non-tender [Non-distended] : non-distended [No Masses] : no abdominal mass palpated [No HSM] : no HSM [Normal Bowel Sounds] : normal bowel sounds [Normal Supraclavicular Nodes] : no supraclavicular lymphadenopathy [Normal Posterior Cervical Nodes] : no posterior cervical lymphadenopathy [Normal Anterior Cervical Nodes] : no anterior cervical lymphadenopathy [No CVA Tenderness] : no CVA  tenderness [No Spinal Tenderness] : no spinal tenderness [No Joint Swelling] : no joint swelling [Grossly Normal Strength/Tone] : grossly normal strength/tone [No Rash] : no rash [Coordination Grossly Intact] : coordination grossly intact [No Focal Deficits] : no focal deficits [Normal Gait] : normal gait [Deep Tendon Reflexes (DTR)] : deep tendon reflexes were 2+ and symmetric [Speech Grossly Normal] : speech grossly normal [Memory Grossly Normal] : memory grossly normal [Normal Affect] : the affect was normal [Alert and Oriented x3] : oriented to person, place, and time [Normal Mood] : the mood was normal [Normal Insight/Judgement] : insight and judgment were intact [de-identified] : Colostomy

## 2024-06-03 NOTE — PLAN
[FreeTextEntry1] : continue medications Atorvastatin Trazadone  Further instructions pending lab results  Advised to lose weight  chronic medical conditions HLD  Renew Valium

## 2024-06-03 NOTE — END OF VISIT
[FreeTextEntry3] : "I, Kirk Khan, personally scribed the services dictated to me by Dr. Dennis Plata MD in this documentation on 06/03/2024"   "I Dr. Dennis Plata MD, personally performed the services described in this documentation on 06/03/2024 for the patient as scribed by Kirk Khan in my presence. I have reviewed and verified that all the information is accurate and true."

## 2024-06-03 NOTE — HISTORY OF PRESENT ILLNESS
[FreeTextEntry1] : follow up  [de-identified] : MONSE RAZO is a 68-year-old M who presents today for follow up for medication renewal. Pt has a hx of HLD and rectal cancer. Pt has no new complaints.

## 2024-06-03 NOTE — HEALTH RISK ASSESSMENT
[No] : In the past 12 months have you used drugs other than those required for medical reasons? No [No falls in past year] : Patient reported no falls in the past year [Little interest or pleasure doing things] : 1) Little interest or pleasure doing things [Feeling down, depressed, or hopeless] : 2) Feeling down, depressed, or hopeless [0] : 2) Feeling down, depressed, or hopeless: Not at all (0) [Never] : Never [[id number]] : 0

## 2024-06-05 DIAGNOSIS — Z12.5 ENCOUNTER FOR SCREENING FOR MALIGNANT NEOPLASM OF PROSTATE: ICD-10-CM

## 2024-06-05 LAB
25(OH)D3 SERPL-MCNC: 80.5 NG/ML
ALBUMIN SERPL ELPH-MCNC: 4.6 G/DL
ALP BLD-CCNC: 112 U/L
ALT SERPL-CCNC: 19 U/L
ANION GAP SERPL CALC-SCNC: 13 MMOL/L
AST SERPL-CCNC: 19 U/L
BASOPHILS # BLD AUTO: 0.1 K/UL
BASOPHILS NFR BLD AUTO: 1.4 %
BILIRUB SERPL-MCNC: 0.5 MG/DL
BUN SERPL-MCNC: 12 MG/DL
CALCIUM SERPL-MCNC: 9.4 MG/DL
CHLORIDE SERPL-SCNC: 105 MMOL/L
CHOLEST SERPL-MCNC: 215 MG/DL
CK SERPL-CCNC: 100 U/L
CO2 SERPL-SCNC: 23 MMOL/L
CREAT SERPL-MCNC: 1.04 MG/DL
EGFR: 78 ML/MIN/1.73M2
EOSINOPHIL # BLD AUTO: 0.24 K/UL
EOSINOPHIL NFR BLD AUTO: 3.4 %
ESTIMATED AVERAGE GLUCOSE: 120 MG/DL
GLUCOSE SERPL-MCNC: 94 MG/DL
HBA1C MFR BLD HPLC: 5.8 %
HCT VFR BLD CALC: 47.3 %
HDLC SERPL-MCNC: 84 MG/DL
HGB BLD-MCNC: 15.3 G/DL
IMM GRANULOCYTES NFR BLD AUTO: 0.1 %
LDLC SERPL CALC-MCNC: 114 MG/DL
LYMPHOCYTES # BLD AUTO: 1.82 K/UL
LYMPHOCYTES NFR BLD AUTO: 25.9 %
MAN DIFF?: NORMAL
MCHC RBC-ENTMCNC: 29.8 PG
MCHC RBC-ENTMCNC: 32.3 GM/DL
MCV RBC AUTO: 92.2 FL
MONOCYTES # BLD AUTO: 0.78 K/UL
MONOCYTES NFR BLD AUTO: 11.1 %
NEUTROPHILS # BLD AUTO: 4.09 K/UL
NEUTROPHILS NFR BLD AUTO: 58.1 %
NONHDLC SERPL-MCNC: 131 MG/DL
PLATELET # BLD AUTO: 241 K/UL
POTASSIUM SERPL-SCNC: 4.3 MMOL/L
PROT SERPL-MCNC: 7.2 G/DL
RBC # BLD: 5.13 M/UL
RBC # FLD: 14.5 %
SODIUM SERPL-SCNC: 141 MMOL/L
TRIGL SERPL-MCNC: 96 MG/DL
TSH SERPL-ACNC: 2.17 UIU/ML
WBC # FLD AUTO: 7.04 K/UL

## 2024-06-10 ENCOUNTER — RX RENEWAL (OUTPATIENT)
Age: 69
End: 2024-06-10

## 2024-06-10 RX ORDER — ATORVASTATIN CALCIUM 20 MG/1
20 TABLET, FILM COATED ORAL
Qty: 90 | Refills: 0 | Status: ACTIVE | COMMUNITY
Start: 2019-03-07 | End: 1900-01-01

## 2024-09-12 NOTE — BEHAVIORAL HEALTH ASSESSMENT NOTE - NSBHREFERLPTEAMNAME_PSY_A_CORE_FT
Patient left a voicemail that she is going on vacation and needs this script today.  She would like a call back that this will be sent.   Dr. Lund

## 2024-12-16 ENCOUNTER — NON-APPOINTMENT (OUTPATIENT)
Age: 69
End: 2024-12-16

## 2024-12-16 ENCOUNTER — APPOINTMENT (OUTPATIENT)
Dept: INTERNAL MEDICINE | Facility: CLINIC | Age: 69
End: 2024-12-16
Payer: MEDICARE

## 2024-12-16 VITALS
HEART RATE: 78 BPM | TEMPERATURE: 98 F | SYSTOLIC BLOOD PRESSURE: 126 MMHG | HEIGHT: 66 IN | BODY MASS INDEX: 28.12 KG/M2 | RESPIRATION RATE: 14 BRPM | OXYGEN SATURATION: 98 % | WEIGHT: 175 LBS | DIASTOLIC BLOOD PRESSURE: 84 MMHG

## 2024-12-16 DIAGNOSIS — E78.00 PURE HYPERCHOLESTEROLEMIA, UNSPECIFIED: ICD-10-CM

## 2024-12-16 DIAGNOSIS — R73.03 PREDIABETES.: ICD-10-CM

## 2024-12-16 DIAGNOSIS — Z00.00 ENCOUNTER FOR GENERAL ADULT MEDICAL EXAMINATION W/OUT ABNORMAL FINDINGS: ICD-10-CM

## 2024-12-16 DIAGNOSIS — Z12.5 ENCOUNTER FOR SCREENING FOR MALIGNANT NEOPLASM OF PROSTATE: ICD-10-CM

## 2024-12-16 PROCEDURE — 93000 ELECTROCARDIOGRAM COMPLETE: CPT

## 2024-12-16 PROCEDURE — G0439: CPT

## 2024-12-19 LAB
25(OH)D3 SERPL-MCNC: 72.4 NG/ML
ALBUMIN SERPL ELPH-MCNC: 4.5 G/DL
ALP BLD-CCNC: 108 U/L
ALT SERPL-CCNC: 14 U/L
ANION GAP SERPL CALC-SCNC: 16 MMOL/L
APPEARANCE: ABNORMAL
AST SERPL-CCNC: 18 U/L
BACTERIA: NEGATIVE /HPF
BASOPHILS # BLD AUTO: 0.08 K/UL
BASOPHILS NFR BLD AUTO: 0.9 %
BILIRUB SERPL-MCNC: 0.3 MG/DL
BILIRUBIN URINE: NEGATIVE
BLOOD URINE: NEGATIVE
BUN SERPL-MCNC: 17 MG/DL
CALCIUM OXALATE CRYSTALS: PRESENT
CALCIUM SERPL-MCNC: 9.8 MG/DL
CAST: 1 /LPF
CHLORIDE SERPL-SCNC: 105 MMOL/L
CHOLEST SERPL-MCNC: 213 MG/DL
CK SERPL-CCNC: 103 U/L
CO2 SERPL-SCNC: 22 MMOL/L
COLOR: YELLOW
CREAT SERPL-MCNC: 1 MG/DL
EGFR: 81 ML/MIN/1.73M2
EOSINOPHIL # BLD AUTO: 0.18 K/UL
EOSINOPHIL NFR BLD AUTO: 2.1 %
EPITHELIAL CELLS: 0 /HPF
ESTIMATED AVERAGE GLUCOSE: 120 MG/DL
GLUCOSE QUALITATIVE U: NEGATIVE MG/DL
GLUCOSE SERPL-MCNC: 91 MG/DL
HBA1C MFR BLD HPLC: 5.8 %
HCT VFR BLD CALC: 48.7 %
HDLC SERPL-MCNC: 73 MG/DL
HGB BLD-MCNC: 15.2 G/DL
HYALINE CASTS: PRESENT
IMM GRANULOCYTES NFR BLD AUTO: 0.3 %
KETONES URINE: NEGATIVE MG/DL
LDLC SERPL CALC-MCNC: 107 MG/DL
LEUKOCYTE ESTERASE URINE: NEGATIVE
LYMPHOCYTES # BLD AUTO: 2 K/UL
LYMPHOCYTES NFR BLD AUTO: 23.2 %
MAN DIFF?: NORMAL
MCHC RBC-ENTMCNC: 29.2 PG
MCHC RBC-ENTMCNC: 31.2 G/DL
MCV RBC AUTO: 93.7 FL
MICROSCOPIC-UA: NORMAL
MONOCYTES # BLD AUTO: 0.87 K/UL
MONOCYTES NFR BLD AUTO: 10.1 %
MUCUS: PRESENT
NEUTROPHILS # BLD AUTO: 5.45 K/UL
NEUTROPHILS NFR BLD AUTO: 63.4 %
NITRITE URINE: NEGATIVE
NONHDLC SERPL-MCNC: 140 MG/DL
PH URINE: 5.5
PLATELET # BLD AUTO: 229 K/UL
POTASSIUM SERPL-SCNC: 4.4 MMOL/L
PROT SERPL-MCNC: 7.1 G/DL
PROTEIN URINE: NEGATIVE MG/DL
PSA SERPL-MCNC: 3.13 NG/ML
RBC # BLD: 5.2 M/UL
RBC # FLD: 14.6 %
RED BLOOD CELLS URINE: 1 /HPF
REVIEW: NORMAL
SODIUM SERPL-SCNC: 142 MMOL/L
SPECIFIC GRAVITY URINE: 1.03
TRIGL SERPL-MCNC: 190 MG/DL
TSH SERPL-ACNC: 1.79 UIU/ML
UROBILINOGEN URINE: 0.2 MG/DL
WBC # FLD AUTO: 8.61 K/UL
WHITE BLOOD CELLS URINE: 0 /HPF

## 2024-12-29 NOTE — PATIENT PROFILE ADULT - NSASFUNCLEVELADLTRANSFER_GEN_A_NUR
[facility] admissions team received referral on patient's case for possible [facility] placement. [facility] admissions team will continue to review and follow patient's progress and correspond with PT/OT therapies / [facility] physician and case management until a determination of acceptance to [facility] is made.   0 = independent

## 2025-02-03 ENCOUNTER — RX RENEWAL (OUTPATIENT)
Age: 70
End: 2025-02-03

## 2025-07-09 NOTE — BEHAVIORAL HEALTH ASSESSMENT NOTE - NSBHCONSFOLLOWNEEDS_PSY_A_CORE
